# Patient Record
Sex: FEMALE | Race: WHITE | NOT HISPANIC OR LATINO | Employment: OTHER | URBAN - METROPOLITAN AREA
[De-identification: names, ages, dates, MRNs, and addresses within clinical notes are randomized per-mention and may not be internally consistent; named-entity substitution may affect disease eponyms.]

---

## 2017-01-13 ENCOUNTER — TELEPHONE (OUTPATIENT)
Dept: INTERNAL MEDICINE | Facility: CLINIC | Age: 82
End: 2017-01-13

## 2017-01-13 DIAGNOSIS — D64.9 ANEMIA, UNSPECIFIED TYPE: Primary | ICD-10-CM

## 2017-01-13 NOTE — TELEPHONE ENCOUNTER
----- Message from Emily Crawford sent at 1/13/2017 12:07 PM CST -----  Contact: Relative/Cindy/[phone]  Pt's relative(Cindy) said that she is calling in regards to pt has an appointment for 1/17/2017 and she wants to know if pt needs to have labs done before her appointment, she said if she does not answer just leave her a voicemail. Please call and advise            Thank you

## 2017-01-17 ENCOUNTER — TELEPHONE (OUTPATIENT)
Dept: INTERNAL MEDICINE | Facility: CLINIC | Age: 82
End: 2017-01-17

## 2017-01-17 ENCOUNTER — OFFICE VISIT (OUTPATIENT)
Dept: INTERNAL MEDICINE | Facility: CLINIC | Age: 82
DRG: 812 | End: 2017-01-17
Payer: MEDICARE

## 2017-01-17 ENCOUNTER — HOSPITAL ENCOUNTER (INPATIENT)
Facility: HOSPITAL | Age: 82
LOS: 1 days | Discharge: HOME | DRG: 812 | End: 2017-01-18
Attending: EMERGENCY MEDICINE | Admitting: INTERNAL MEDICINE
Payer: MEDICARE

## 2017-01-17 VITALS — WEIGHT: 132 LBS | DIASTOLIC BLOOD PRESSURE: 75 MMHG | BODY MASS INDEX: 29.59 KG/M2 | SYSTOLIC BLOOD PRESSURE: 140 MMHG

## 2017-01-17 DIAGNOSIS — F33.41 RECURRENT MAJOR DEPRESSIVE DISORDER, IN PARTIAL REMISSION: ICD-10-CM

## 2017-01-17 DIAGNOSIS — M05.79 RHEUMATOID ARTHRITIS INVOLVING MULTIPLE SITES WITH POSITIVE RHEUMATOID FACTOR: ICD-10-CM

## 2017-01-17 DIAGNOSIS — Z79.52 CURRENT CHRONIC USE OF SYSTEMIC STEROIDS: Primary | ICD-10-CM

## 2017-01-17 DIAGNOSIS — D64.9 ANEMIA, UNSPECIFIED TYPE: ICD-10-CM

## 2017-01-17 DIAGNOSIS — I67.2 CEREBRAL ATHEROSCLEROSIS: ICD-10-CM

## 2017-01-17 DIAGNOSIS — I25.10 CORONARY ARTERY DISEASE DUE TO CALCIFIED CORONARY LESION: ICD-10-CM

## 2017-01-17 DIAGNOSIS — R09.89 BIBASILAR CRACKLES: ICD-10-CM

## 2017-01-17 DIAGNOSIS — I77.9 BILATERAL CAROTID ARTERY DISEASE: ICD-10-CM

## 2017-01-17 DIAGNOSIS — D50.0 CHRONIC BLOOD LOSS ANEMIA: ICD-10-CM

## 2017-01-17 DIAGNOSIS — I10 BENIGN HYPERTENSION: ICD-10-CM

## 2017-01-17 DIAGNOSIS — I51.89 LEFT VENTRICULAR DIASTOLIC DYSFUNCTION WITH PRESERVED SYSTOLIC FUNCTION: ICD-10-CM

## 2017-01-17 DIAGNOSIS — D64.9 SEVERE ANEMIA: ICD-10-CM

## 2017-01-17 DIAGNOSIS — K21.9 GASTROESOPHAGEAL REFLUX DISEASE WITHOUT ESOPHAGITIS: ICD-10-CM

## 2017-01-17 DIAGNOSIS — K92.2 GASTROINTESTINAL HEMORRHAGE, UNSPECIFIED GASTROINTESTINAL HEMORRHAGE TYPE: ICD-10-CM

## 2017-01-17 DIAGNOSIS — I25.84 CORONARY ARTERY DISEASE DUE TO CALCIFIED CORONARY LESION: ICD-10-CM

## 2017-01-17 DIAGNOSIS — G31.84 MCI (MILD COGNITIVE IMPAIRMENT) WITH MEMORY LOSS: ICD-10-CM

## 2017-01-17 DIAGNOSIS — N32.81 OAB (OVERACTIVE BLADDER): ICD-10-CM

## 2017-01-17 DIAGNOSIS — D69.2 SENILE PURPURA: ICD-10-CM

## 2017-01-17 DIAGNOSIS — I10 ESSENTIAL HYPERTENSION: ICD-10-CM

## 2017-01-17 DIAGNOSIS — I70.0 AORTIC ATHEROSCLEROSIS: ICD-10-CM

## 2017-01-17 DIAGNOSIS — D50.0 BLOOD LOSS ANEMIA: Primary | ICD-10-CM

## 2017-01-17 DIAGNOSIS — G70.00 MG (MYASTHENIA GRAVIS): ICD-10-CM

## 2017-01-17 LAB
ABO + RH BLD: NORMAL
ALBUMIN SERPL BCP-MCNC: 3.3 G/DL
ALP SERPL-CCNC: 54 U/L
ALT SERPL W/O P-5'-P-CCNC: 11 U/L
ANION GAP SERPL CALC-SCNC: 12 MMOL/L
APTT BLDCRRT: 21.7 SEC
AST SERPL-CCNC: 17 U/L
BASOPHILS # BLD AUTO: 0.03 K/UL
BASOPHILS NFR BLD: 0.3 %
BILIRUB SERPL-MCNC: 0.2 MG/DL
BLD GP AB SCN CELLS X3 SERPL QL: NORMAL
BLD PROD TYP BPU: NORMAL
BLD PROD TYP BPU: NORMAL
BLOOD UNIT EXPIRATION DATE: NORMAL
BLOOD UNIT EXPIRATION DATE: NORMAL
BLOOD UNIT TYPE CODE: 5100
BLOOD UNIT TYPE CODE: 5100
BLOOD UNIT TYPE: NORMAL
BLOOD UNIT TYPE: NORMAL
BNP SERPL-MCNC: 618 PG/ML
BUN SERPL-MCNC: 19 MG/DL
CALCIUM SERPL-MCNC: 8.4 MG/DL
CHLORIDE SERPL-SCNC: 104 MMOL/L
CO2 SERPL-SCNC: 22 MMOL/L
CODING SYSTEM: NORMAL
CODING SYSTEM: NORMAL
CREAT SERPL-MCNC: 1.2 MG/DL
DIFFERENTIAL METHOD: ABNORMAL
DISPENSE STATUS: NORMAL
DISPENSE STATUS: NORMAL
EOSINOPHIL # BLD AUTO: 0 K/UL
EOSINOPHIL NFR BLD: 0.1 %
ERYTHROCYTE [DISTWIDTH] IN BLOOD BY AUTOMATED COUNT: 16.1 %
EST. GFR  (AFRICAN AMERICAN): 46.6 ML/MIN/1.73 M^2
EST. GFR  (NON AFRICAN AMERICAN): 40.4 ML/MIN/1.73 M^2
FERRITIN SERPL-MCNC: 12 NG/ML
FOLATE SERPL-MCNC: 15.7 NG/ML
GLUCOSE SERPL-MCNC: 107 MG/DL
HCT VFR BLD AUTO: 17.4 %
HGB BLD-MCNC: 5.3 G/DL
INR PPP: 1
IRON SERPL-MCNC: 130 UG/DL
LIPASE SERPL-CCNC: 11 U/L
LYMPHOCYTES # BLD AUTO: 0.3 K/UL
LYMPHOCYTES NFR BLD: 3 %
MCH RBC QN AUTO: 24.5 PG
MCHC RBC AUTO-ENTMCNC: 30.5 %
MCV RBC AUTO: 81 FL
MONOCYTES # BLD AUTO: 0.8 K/UL
MONOCYTES NFR BLD: 7.4 %
NEUTROPHILS # BLD AUTO: 9.2 K/UL
NEUTROPHILS NFR BLD: 89 %
PLATELET # BLD AUTO: 391 K/UL
PMV BLD AUTO: 12.2 FL
POTASSIUM SERPL-SCNC: 3.6 MMOL/L
PROT SERPL-MCNC: 6.4 G/DL
PROTHROMBIN TIME: 11 SEC
RBC # BLD AUTO: 2.16 M/UL
RETICS/RBC NFR AUTO: 2.2 %
SATURATED IRON: 28 %
SODIUM SERPL-SCNC: 138 MMOL/L
TOTAL IRON BINDING CAPACITY: 472 UG/DL
TRANS ERYTHROCYTES VOL PATIENT: NORMAL ML
TRANS ERYTHROCYTES VOL PATIENT: NORMAL ML
TRANSFERRIN SERPL-MCNC: 319 MG/DL
VIT B12 SERPL-MCNC: 520 PG/ML
WBC # BLD AUTO: 10.33 K/UL

## 2017-01-17 PROCEDURE — 85730 THROMBOPLASTIN TIME PARTIAL: CPT

## 2017-01-17 PROCEDURE — 86900 BLOOD TYPING SEROLOGIC ABO: CPT

## 2017-01-17 PROCEDURE — 93010 ELECTROCARDIOGRAM REPORT: CPT | Mod: ,,, | Performed by: INTERNAL MEDICINE

## 2017-01-17 PROCEDURE — 99285 EMERGENCY DEPT VISIT HI MDM: CPT | Mod: 25

## 2017-01-17 PROCEDURE — 63600175 PHARM REV CODE 636 W HCPCS: Performed by: STUDENT IN AN ORGANIZED HEALTH CARE EDUCATION/TRAINING PROGRAM

## 2017-01-17 PROCEDURE — 80053 COMPREHEN METABOLIC PANEL: CPT

## 2017-01-17 PROCEDURE — P9021 RED BLOOD CELLS UNIT: HCPCS

## 2017-01-17 PROCEDURE — 1125F AMNT PAIN NOTED PAIN PRSNT: CPT | Mod: S$GLB,,, | Performed by: INTERNAL MEDICINE

## 2017-01-17 PROCEDURE — 96376 TX/PRO/DX INJ SAME DRUG ADON: CPT

## 2017-01-17 PROCEDURE — 12000002 HC ACUTE/MED SURGE SEMI-PRIVATE ROOM

## 2017-01-17 PROCEDURE — 1157F ADVNC CARE PLAN IN RCRD: CPT | Mod: S$GLB,,, | Performed by: INTERNAL MEDICINE

## 2017-01-17 PROCEDURE — 63600175 PHARM REV CODE 636 W HCPCS: Performed by: EMERGENCY MEDICINE

## 2017-01-17 PROCEDURE — 83690 ASSAY OF LIPASE: CPT

## 2017-01-17 PROCEDURE — 86850 RBC ANTIBODY SCREEN: CPT

## 2017-01-17 PROCEDURE — 85025 COMPLETE CBC W/AUTO DIFF WBC: CPT | Mod: 91

## 2017-01-17 PROCEDURE — 85045 AUTOMATED RETICULOCYTE COUNT: CPT

## 2017-01-17 PROCEDURE — C9113 INJ PANTOPRAZOLE SODIUM, VIA: HCPCS | Performed by: EMERGENCY MEDICINE

## 2017-01-17 PROCEDURE — 82746 ASSAY OF FOLIC ACID SERUM: CPT

## 2017-01-17 PROCEDURE — 1159F MED LIST DOCD IN RCRD: CPT | Mod: S$GLB,,, | Performed by: INTERNAL MEDICINE

## 2017-01-17 PROCEDURE — 82728 ASSAY OF FERRITIN: CPT

## 2017-01-17 PROCEDURE — 83880 ASSAY OF NATRIURETIC PEPTIDE: CPT

## 2017-01-17 PROCEDURE — 25000003 PHARM REV CODE 250: Performed by: EMERGENCY MEDICINE

## 2017-01-17 PROCEDURE — 96375 TX/PRO/DX INJ NEW DRUG ADDON: CPT

## 2017-01-17 PROCEDURE — 99291 CRITICAL CARE FIRST HOUR: CPT | Mod: ,,, | Performed by: EMERGENCY MEDICINE

## 2017-01-17 PROCEDURE — 99499 UNLISTED E&M SERVICE: CPT | Mod: S$GLB,,, | Performed by: INTERNAL MEDICINE

## 2017-01-17 PROCEDURE — 96366 THER/PROPH/DIAG IV INF ADDON: CPT

## 2017-01-17 PROCEDURE — 99999 PR PBB SHADOW E&M-EST. PATIENT-LVL III: CPT | Mod: PBBFAC,,, | Performed by: INTERNAL MEDICINE

## 2017-01-17 PROCEDURE — 86920 COMPATIBILITY TEST SPIN: CPT

## 2017-01-17 PROCEDURE — 36430 TRANSFUSION BLD/BLD COMPNT: CPT

## 2017-01-17 PROCEDURE — 96365 THER/PROPH/DIAG IV INF INIT: CPT

## 2017-01-17 PROCEDURE — 1160F RVW MEDS BY RX/DR IN RCRD: CPT | Mod: S$GLB,,, | Performed by: INTERNAL MEDICINE

## 2017-01-17 PROCEDURE — 82607 VITAMIN B-12: CPT

## 2017-01-17 PROCEDURE — 83540 ASSAY OF IRON: CPT

## 2017-01-17 PROCEDURE — 99214 OFFICE O/P EST MOD 30 MIN: CPT | Mod: S$GLB,,, | Performed by: INTERNAL MEDICINE

## 2017-01-17 PROCEDURE — 85610 PROTHROMBIN TIME: CPT

## 2017-01-17 PROCEDURE — 93005 ELECTROCARDIOGRAM TRACING: CPT

## 2017-01-17 RX ORDER — HYDROCODONE BITARTRATE AND ACETAMINOPHEN 5; 325 MG/1; MG/1
1 TABLET ORAL 3 TIMES DAILY PRN
Qty: 90 TABLET | Refills: 0 | Status: ON HOLD | OUTPATIENT
Start: 2017-02-18 | End: 2017-03-30 | Stop reason: HOSPADM

## 2017-01-17 RX ORDER — GLUCAGON 1 MG
1 KIT INJECTION
Status: DISCONTINUED | OUTPATIENT
Start: 2017-01-18 | End: 2017-01-18 | Stop reason: HOSPADM

## 2017-01-17 RX ORDER — OXYBUTYNIN CHLORIDE 5 MG/1
5 TABLET, EXTENDED RELEASE ORAL DAILY
Status: DISCONTINUED | OUTPATIENT
Start: 2017-01-18 | End: 2017-01-18 | Stop reason: HOSPADM

## 2017-01-17 RX ORDER — FUROSEMIDE 10 MG/ML
10 INJECTION INTRAMUSCULAR; INTRAVENOUS
Status: COMPLETED | OUTPATIENT
Start: 2017-01-17 | End: 2017-01-17

## 2017-01-17 RX ORDER — PANTOPRAZOLE SODIUM 40 MG/10ML
40 INJECTION, POWDER, LYOPHILIZED, FOR SOLUTION INTRAVENOUS 2 TIMES DAILY
Status: DISCONTINUED | OUTPATIENT
Start: 2017-01-18 | End: 2017-01-18 | Stop reason: HOSPADM

## 2017-01-17 RX ORDER — IBUPROFEN 200 MG
16 TABLET ORAL
Status: DISCONTINUED | OUTPATIENT
Start: 2017-01-18 | End: 2017-01-18 | Stop reason: HOSPADM

## 2017-01-17 RX ORDER — IBUPROFEN 200 MG
24 TABLET ORAL
Status: DISCONTINUED | OUTPATIENT
Start: 2017-01-18 | End: 2017-01-18 | Stop reason: HOSPADM

## 2017-01-17 RX ORDER — HYDROCODONE BITARTRATE AND ACETAMINOPHEN 5; 325 MG/1; MG/1
1 TABLET ORAL 3 TIMES DAILY PRN
Qty: 90 TABLET | Refills: 0 | Status: ON HOLD | OUTPATIENT
Start: 2017-03-18 | End: 2017-03-30 | Stop reason: HOSPADM

## 2017-01-17 RX ORDER — ACETAMINOPHEN 500 MG
500 TABLET ORAL EVERY 6 HOURS PRN
Status: DISCONTINUED | OUTPATIENT
Start: 2017-01-18 | End: 2017-01-18 | Stop reason: HOSPADM

## 2017-01-17 RX ORDER — ATORVASTATIN CALCIUM 40 MG/1
40 TABLET, FILM COATED ORAL NIGHTLY
Qty: 90 TABLET | Refills: 3 | Status: ON HOLD | OUTPATIENT
Start: 2017-01-17 | End: 2017-04-23 | Stop reason: HOSPADM

## 2017-01-17 RX ORDER — TOLTERODINE 2 MG/1
2 CAPSULE, EXTENDED RELEASE ORAL DAILY
Qty: 90 CAPSULE | Refills: 3 | Status: ON HOLD | OUTPATIENT
Start: 2017-01-17 | End: 2017-04-24 | Stop reason: HOSPADM

## 2017-01-17 RX ORDER — PREDNISONE 10 MG/1
10 TABLET ORAL DAILY
Status: DISCONTINUED | OUTPATIENT
Start: 2017-01-18 | End: 2017-01-18 | Stop reason: HOSPADM

## 2017-01-17 RX ORDER — LOSARTAN POTASSIUM 50 MG/1
100 TABLET ORAL DAILY
Status: DISCONTINUED | OUTPATIENT
Start: 2017-01-18 | End: 2017-01-18 | Stop reason: HOSPADM

## 2017-01-17 RX ORDER — PANTOPRAZOLE SODIUM 40 MG/1
40 TABLET, DELAYED RELEASE ORAL DAILY
Qty: 90 TABLET | Refills: 3 | Status: SHIPPED | OUTPATIENT
Start: 2017-01-17

## 2017-01-17 RX ORDER — HYDRALAZINE HYDROCHLORIDE 25 MG/1
25 TABLET, FILM COATED ORAL EVERY 8 HOURS
Status: DISCONTINUED | OUTPATIENT
Start: 2017-01-18 | End: 2017-01-18 | Stop reason: HOSPADM

## 2017-01-17 RX ORDER — ISOSORBIDE MONONITRATE 30 MG/1
30 TABLET, EXTENDED RELEASE ORAL NIGHTLY
Qty: 90 TABLET | Refills: 0 | Status: ON HOLD | OUTPATIENT
Start: 2017-01-17 | End: 2017-04-18 | Stop reason: SDUPTHER

## 2017-01-17 RX ORDER — ATORVASTATIN CALCIUM 20 MG/1
40 TABLET, FILM COATED ORAL NIGHTLY
Status: DISCONTINUED | OUTPATIENT
Start: 2017-01-18 | End: 2017-01-18 | Stop reason: HOSPADM

## 2017-01-17 RX ORDER — HYDROCODONE BITARTRATE AND ACETAMINOPHEN 500; 5 MG/1; MG/1
TABLET ORAL
Status: DISCONTINUED | OUTPATIENT
Start: 2017-01-17 | End: 2017-01-18 | Stop reason: HOSPADM

## 2017-01-17 RX ORDER — ISOSORBIDE MONONITRATE 30 MG/1
30 TABLET, EXTENDED RELEASE ORAL NIGHTLY
Status: DISCONTINUED | OUTPATIENT
Start: 2017-01-18 | End: 2017-01-18 | Stop reason: HOSPADM

## 2017-01-17 RX ORDER — HYDRALAZINE HYDROCHLORIDE 25 MG/1
25 TABLET, FILM COATED ORAL EVERY 8 HOURS
Qty: 270 TABLET | Refills: 3 | Status: ON HOLD | OUTPATIENT
Start: 2017-01-17 | End: 2017-04-23 | Stop reason: HOSPADM

## 2017-01-17 RX ORDER — DONEPEZIL HYDROCHLORIDE 10 MG/1
10 TABLET, FILM COATED ORAL DAILY
Status: DISCONTINUED | OUTPATIENT
Start: 2017-01-18 | End: 2017-01-18 | Stop reason: HOSPADM

## 2017-01-17 RX ORDER — RAMELTEON 8 MG/1
8 TABLET ORAL NIGHTLY PRN
Status: DISCONTINUED | OUTPATIENT
Start: 2017-01-18 | End: 2017-01-18 | Stop reason: HOSPADM

## 2017-01-17 RX ORDER — LOSARTAN POTASSIUM 100 MG/1
100 TABLET ORAL DAILY
Qty: 90 TABLET | Refills: 3 | Status: ON HOLD | OUTPATIENT
Start: 2017-01-17 | End: 2017-04-23

## 2017-01-17 RX ORDER — HYDROCODONE BITARTRATE AND ACETAMINOPHEN 5; 325 MG/1; MG/1
1 TABLET ORAL 3 TIMES DAILY PRN
Qty: 90 TABLET | Refills: 0 | Status: SHIPPED | OUTPATIENT
Start: 2017-01-18 | End: 2017-02-13 | Stop reason: SDUPTHER

## 2017-01-17 RX ORDER — PANTOPRAZOLE SODIUM 40 MG/10ML
80 INJECTION, POWDER, LYOPHILIZED, FOR SOLUTION INTRAVENOUS
Status: COMPLETED | OUTPATIENT
Start: 2017-01-17 | End: 2017-01-17

## 2017-01-17 RX ORDER — DONEPEZIL HYDROCHLORIDE 10 MG/1
10 TABLET, FILM COATED ORAL DAILY
Qty: 90 TABLET | Refills: 3 | Status: SHIPPED | OUTPATIENT
Start: 2017-01-17 | End: 2018-01-17

## 2017-01-17 RX ORDER — HYDROCHLOROTHIAZIDE 12.5 MG/1
12.5 TABLET ORAL DAILY
Status: DISCONTINUED | OUTPATIENT
Start: 2017-01-18 | End: 2017-01-18 | Stop reason: HOSPADM

## 2017-01-17 RX ORDER — ALPRAZOLAM 0.5 MG/1
0.5 TABLET ORAL DAILY PRN
Qty: 90 TABLET | Refills: 0 | Status: ON HOLD | OUTPATIENT
Start: 2017-01-17 | End: 2017-04-24 | Stop reason: HOSPADM

## 2017-01-17 RX ORDER — OXYCODONE HYDROCHLORIDE 5 MG/1
5 TABLET ORAL EVERY 4 HOURS PRN
Status: DISCONTINUED | OUTPATIENT
Start: 2017-01-18 | End: 2017-01-18 | Stop reason: HOSPADM

## 2017-01-17 RX ORDER — ALPRAZOLAM 0.5 MG/1
0.5 TABLET ORAL NIGHTLY PRN
Status: DISCONTINUED | OUTPATIENT
Start: 2017-01-18 | End: 2017-01-18 | Stop reason: HOSPADM

## 2017-01-17 RX ORDER — DULOXETIN HYDROCHLORIDE 30 MG/1
30 CAPSULE, DELAYED RELEASE ORAL DAILY
Status: DISCONTINUED | OUTPATIENT
Start: 2017-01-18 | End: 2017-01-18 | Stop reason: HOSPADM

## 2017-01-17 RX ORDER — PREDNISONE 10 MG/1
10 TABLET ORAL DAILY
Qty: 90 TABLET | Refills: 3 | Status: SHIPPED | OUTPATIENT
Start: 2017-01-17

## 2017-01-17 RX ORDER — DULOXETIN HYDROCHLORIDE 30 MG/1
30 CAPSULE, DELAYED RELEASE ORAL DAILY
Qty: 90 CAPSULE | Refills: 3 | Status: SHIPPED | OUTPATIENT
Start: 2017-01-17

## 2017-01-17 RX ORDER — ONDANSETRON 8 MG/1
8 TABLET, ORALLY DISINTEGRATING ORAL EVERY 8 HOURS PRN
Status: DISCONTINUED | OUTPATIENT
Start: 2017-01-18 | End: 2017-01-18 | Stop reason: HOSPADM

## 2017-01-17 RX ADMIN — PANTOPRAZOLE SODIUM 80 MG: 40 INJECTION, POWDER, FOR SOLUTION INTRAVENOUS at 06:01

## 2017-01-17 RX ADMIN — FUROSEMIDE 10 MG: 10 INJECTION, SOLUTION INTRAMUSCULAR; INTRAVENOUS at 07:01

## 2017-01-17 RX ADMIN — DEXTROSE 8 MG/HR: 50 INJECTION, SOLUTION INTRAVENOUS at 07:01

## 2017-01-17 NOTE — IP AVS SNAPSHOT
[facility]  [address]  [place] [address]  Phone: [phone]           Patient Discharge Instructions     Our goal is to set you up for success. This packet includes information on your condition, medications, and your home care. It will help you to care for yourself so you don't get sicker and need to go back to the hospital.     Please ask your nurse if you have any questions.        There are many details to remember when preparing to leave the hospital. Here is what you will need to do:    1. Take your medicine. If you are prescribed medications, review your Medication List in the following pages. You may have new medications to  at the pharmacy and others that you'll need to stop taking. Review the instructions for how and when to take your medications. Talk with your doctor or nurses if you are unsure of what to do.     2. Go to your follow-up appointments. Specific follow-up information is listed in the following pages. Your may be contacted by a transition nurse or clinical provider about future appointments. Be sure we have all of the phone numbers to reach you, if needed. Please contact your provider's office if you are unable to make an appointment.     3. Watch for warning signs. Your doctor or nurse will give you detailed warning signs to watch for and when to call for assistance. These instructions may also include educational information about your condition. If you experience any of warning signs to your health, call your doctor.               ** Verify the list of medication(s) below is accurate and up to date. Carry this with you in case of emergency. If your medications have changed, please notify your healthcare provider.             Medication List      START taking these medications        Additional Info                      ferrous sulfate 325 (65 FE) MG EC tablet   Refills:  0   Dose:  325 mg    Instructions:  Take 1 tablet (325 mg total)  by mouth once daily.     Begin Date    AM    Noon    PM    Bedtime         CONTINUE taking these medications        Additional Info                      alprazolam 0.5 MG tablet   Commonly known as:  XANAX   Quantity:  90 tablet   Refills:  0   Dose:  0.5 mg    Last time this was given:  0.5 mg on 1/18/2017 12:40 AM   Instructions:  Take 1 tablet (0.5 mg total) by mouth daily as needed.     Begin Date    AM    Noon    PM    Bedtime       aspirin 81 mg Tab   Refills:  0   Dose:  81 mg    Instructions:  Take 81 mg by mouth. 1 Tablet Oral Every day     Begin Date    AM    Noon    PM    Bedtime       atorvastatin 40 MG tablet   Commonly known as:  LIPITOR   Quantity:  90 tablet   Refills:  3   Dose:  40 mg    Last time this was given:  40 mg on 1/18/2017 12:40 AM   Instructions:  Take 1 tablet (40 mg total) by mouth every evening.     Begin Date    AM    Noon    PM    Bedtime       DOC-Q-LACE 100 MG capsule   Quantity:  180 capsule   Refills:  0   Generic drug:  docusate sodium    Instructions:  TAKE 1 CAPSULE BY MOUTH TWICE DAILY     Begin Date    AM    Noon    PM    Bedtime       donepezil 10 MG tablet   Commonly known as:  ARICEPT   Quantity:  90 tablet   Refills:  3   Dose:  10 mg    Last time this was given:  10 mg on 1/18/2017  9:15 AM   Instructions:  Take 1 tablet (10 mg total) by mouth once daily.     Begin Date    AM    Noon    PM    Bedtime       duloxetine 30 MG capsule   Commonly known as:  CYMBALTA   Quantity:  90 capsule   Refills:  3   Dose:  30 mg    Last time this was given:  30 mg on 1/18/2017  9:15 AM   Instructions:  Take 1 capsule (30 mg total) by mouth once daily.     Begin Date    AM    Noon    PM    Bedtime       hydrALAZINE 25 MG tablet   Commonly known as:  APRESOLINE   Quantity:  270 tablet   Refills:  3   Dose:  25 mg    Last time this was given:  25 mg on 1/18/2017  1:24 PM   Instructions:  Take 1 tablet (25 mg total) by mouth every 8 (eight) hours.     Begin Date    AM    Noon    PM     Bedtime       hydrochlorothiazide 12.5 mg capsule   Commonly known as:  MICROZIDE   Quantity:  90 capsule   Refills:  3   Dose:  12.5 mg    Instructions:  Take 1 capsule (12.5 mg total) by mouth once daily.     Begin Date    AM    Noon    PM    Bedtime       * hydrocodone-acetaminophen 5-325mg 5-325 mg per tablet   Commonly known as:  NORCO   Quantity:  90 tablet   Refills:  0   Dose:  1 tablet    Instructions:  Take 1 tablet by mouth 3 (three) times daily as needed for Pain.     Begin Date    AM    Noon    PM    Bedtime       * hydrocodone-acetaminophen 5-325mg 5-325 mg per tablet   Commonly known as:  NORCO   Quantity:  90 tablet   Refills:  0   Dose:  1 tablet    Start Date:  2/18/2017   Instructions:  Take 1 tablet by mouth 3 (three) times daily as needed for Pain.     Begin Date    AM    Noon    PM    Bedtime       * hydrocodone-acetaminophen 5-325mg 5-325 mg per tablet   Commonly known as:  NORCO   Quantity:  90 tablet   Refills:  0   Dose:  1 tablet    Start Date:  3/18/2017   Instructions:  Take 1 tablet by mouth 3 (three) times daily as needed for Pain.     Begin Date    AM    Noon    PM    Bedtime       isosorbide mononitrate 30 MG 24 hr tablet   Commonly known as:  IMDUR   Quantity:  90 tablet   Refills:  0   Dose:  30 mg    Last time this was given:  30 mg on 1/18/2017 12:40 AM   Instructions:  Take 1 tablet (30 mg total) by mouth every evening.     Begin Date    AM    Noon    PM    Bedtime       losartan 100 MG tablet   Commonly known as:  COZAAR   Quantity:  90 tablet   Refills:  3   Dose:  100 mg    Last time this was given:  100 mg on 1/18/2017  9:15 AM   Instructions:  Take 1 tablet (100 mg total) by mouth once daily.     Begin Date    AM    Noon    PM    Bedtime       pantoprazole 40 MG tablet   Commonly known as:  PROTONIX   Quantity:  90 tablet   Refills:  3   Dose:  40 mg    Instructions:  Take 1 tablet (40 mg total) by mouth once daily.     Begin Date    AM    Noon    PM    Bedtime        predniSONE 10 MG tablet   Commonly known as:  DELTASONE   Quantity:  90 tablet   Refills:  3   Dose:  10 mg    Last time this was given:  10 mg on 1/18/2017  9:15 AM   Instructions:  Take 1 tablet (10 mg total) by mouth once daily.     Begin Date    AM    Noon    PM    Bedtime       tolterodine 2 MG Cp24   Commonly known as:  DETROL [place]   Quantity:  90 capsule   Refills:  3   Dose:  2 mg    Instructions:  Take 1 capsule (2 mg total) by mouth once daily.     Begin Date    AM    Noon    PM    Bedtime       * Notice:  This list has 3 medication(s) that are the same as other medications prescribed for you. Read the directions carefully, and ask your doctor or other care provider to review them with you.      STOP taking these medications     clopidogrel 75 mg tablet   Commonly known as:  PLAVIX            Where to Get Your Medications      You can get these medications from any pharmacy     You don't need a prescription for these medications     ferrous sulfate 325 (65 FE) MG EC tablet                  Please bring to all follow up appointments:    1. A copy of your discharge instructions.  2. All medicines you are currently taking in their original bottles.  3. Identification and insurance card.    Please arrive 15 minutes ahead of scheduled appointment time.    Please call 24 hours in advance if you must reschedule your appointment and/or time.        Your Scheduled Appointments     Jan 25, 2017 11:00 AM CST   Carotid Ultrasound with VASCULAR, CARDIOLOGY   [facility] ([address] )    [address]  [facility] [address]   [phone]                Discharge Instructions     Future Orders    Diet general     Questions:    Total calories:      Fat restriction, if any:      Protein restriction, if any:      Na restriction, if any:      Fluid restriction:      Additional restrictions:          Primary Diagnosis     Your primary diagnosis was:  Iron Deficiency Anemia Due To Chronic Blood  "Loss      Admission Information     Date & Time Provider Department CSN    1/17/2017  4:27 PM Jose Thrasher MD [facility] [id number]      Care Providers     Provider Role Specialty Primary office phone    Jose Thrasher MD Attending Provider Hospitalist [phone]    Jose Thrasher MD Team Attending  Hospitalist [phone]    Arpita Carbone MD Team Attending  Hospitalist [phone]    Shukri Newman MD Consulting Physician  Gastroenterology [phone]      Your Vitals Were     BP Pulse Temp Resp Height Weight    118/68 (BP Location: Left arm, Patient Position: Sitting, BP Method: Manual) 71 98.9 °F (37.2 °C) (Oral) 18 4' 8" (1.422 m) 60.4 kg (133 lb 1.6 oz)    SpO2 BMI             93% 29.84 kg/m2         Recent Lab Values        6/7/2005 4/10/2006 8/21/2006 5/7/2007 4/28/2008 5/4/2009 11/22/2010 3/21/2011      8:40 AM  7:15 AM  7:59 AM  9:25 AM  9:04 AM  9:08 AM 10:50 AM  9:50 AM    A1C 5.8 6.3 (H) 6.3 (H) 6.0 6.2 6.2 5.1 5.3      Pending Labs     Order Current Status    Prepare RBC 2 Units Preliminary result      Allergies as of 1/18/2017        Reactions    Norvasc  [Amlodipine]     Other reaction(s): Edema      [facility]     [facility] Nurse Care Line - 24/7 Assistance  Unless otherwise directed by your provider, please contact [facility], our nurse care line that is available for 24/7 assistance.     Registered nurses in the [facility] provide clinical advisement, health education, appointment booking, and other advisory services.  Call for this free service at [phone].        Advance Directives     An advance directive is a document which, in the event you are no longer able to make decisions for yourself, tells your healthcare team what kind of treatment you do or do not want to receive, or who you would like to make those decisions for you.  If you do not currently have an advance directive, [facility] encourages you to create one.  " For more information call:  [phone]), [phone] ([phone]),  or log on to [email or web address].        Smoking Cessation     If you would like to quit smoking:   You may be eligible for free services if you are a [place] resident and started smoking cigarettes before September 1, 1988.  Call the Smoking Cessation [employer] ([facility]) toll free at (677) 704-5314 or (600) 152-6487.   Call 5-800-QUIT-NOW if you do not meet the above criteria.            Language Assistance Services     ATTENTION: Language assistance services are available, free of charge. Please call [phone].      ATENCIÓN: Si warner epsteni, tiene a pate disposición servicios gratuitos de asistencia lingüística. Llame al [phone].     [facility] Ý: N?u b?n nói Ti?ng Vi?t, có các d?ch v? h? tr? ngôn ng? mi?n phí dành cho b?n. G?i s? [phone].        Blood Transfusion Reaction Signs and Symptoms     The blood you have received has been matched for you as carefully as possible. Most patients who receive a blood transfusion do not experience problems. However, there can be a delayed reaction that happens a few weeks after your blood transfusion. Contact your physician immediately if you experience any NEW SYMPTOMS listed below:     Fever greater than 100.4 degrees    Chills   Yellow color to your skin or eyes(Jaundice)   Back pain, chest pain, or pain at the infusion site   Weakness (more than usual)   Discomfort or uneasiness more than usual (Malaise)   Nausea or vomiting   Shortness of breath, wheezing, or coughing   Higher or lower blood pressure than normal   Skin rash, itching, skin redness, or localized swelling (example: hands or feet)   Urinating less than normal   Urine appears reddish or orange and is darker than normal      Remember that some these signs may already exist for you--such as having chronic back pain or high blood pressure. You only need to look for and report to your doctor any new  occurrences since your blood transfusion that are of concern.        Stroke Education              Heart Failure Education       Heart Failure: Being Active  You have a condition called heart failure. Being active doesnt mean that you have to wear yourself out. Even a little movement each day helps to strengthen your heart. If you cant get out to exercise, you can do simple stretching and strengthening exercises at home. These are good ways to keep you well-conditioned and prevent you and your heart from becoming excessively weak.    Ideas to get you started  · Add a little movement to things you do now. Walk to mail letters. Park your car at the far end of the parking lot and walk to the store. Walk up a flight of stairs instead of taking the elevator.  · Choose activities you enjoy. You might walk, swim, or ride an exercise bike. Things like gardening and washing the car count, too. Other possibilities include: washing dishes, walking the dog, walking around the mall, and doing aerobic activities with friends.  · Join a group exercise program at a [facility], a senior center, or a community center. Or look into a hospital cardiac rehabilitation program. Ask your doctor if you qualify.  Tips to keep you going  · Get up and get dressed each day. Go to a coffee shop and read a newspaper or go somewhere that you'll be in the presence of other active people. Youll feel more like being active.  · Make a plan. Choose one or more activities that you enjoy and that you can easily do. Then plan to do at least one each day. You might write your plan on a calendar.  · Go with a friend or a group if you like company. This can help you feel supported and stay motivated, too.  · Plan social events that you enjoy. This will keep you mentally engaged as well as physically motivated to do things you find pleasure in.  For your safety  · Talk with your healthcare provider before starting an exercise program.  · Exercise indoors  when its too hot or too cold outside, or when the air quality is poor. Try walking at a shopping mall.  · Wear socks and sturdy shoes to maintain your balance and prevent falls.  · Start slowly. Do a few minutes several times a day at first. Increase your time and speed little by little.  · Stop and rest whenever you feel tired or get short of breath.  · Dont push yourself on days when you dont feel well.  © 0179-1369 [employer]. [address] [place] [address]. All rights reserved. This information is not intended as a substitute for professional medical care. Always follow your healthcare professional's instructions.              Heart Failure: Evaluating Your Heart  You have a condition called heart failure. To evaluate your condition, your doctor will examine you, ask questions, and do some tests. Along with looking for signs of heart failure, the doctor looks for any other health problems that may have led to heart failure. The results of your evaluation will help your doctor form a treatment plan.  Health history and physical exam  Your visit will start with a health history. Tell the doctor about any symptoms youve noticed and about all medicines you take. Then youll have a physical exam. This includes listening to your heartbeat and breathing. Youll also be checked for swelling (edema) in your legs and neck. When you have fluid buildup or fluid in the lungs, it may be called congestive heart failure.  Diagnosing heart failure     During an echocardiogram, sound waves bounce off the heart. These are converted into a picture on the screen.   The following may be done to help your doctor form a diagnosis:  · X-rays show the size and shape of your heart. These pictures can also show fluid in your lungs.  · An electrocardiogram (ECG or EKG) shows the pattern of your heartbeat. Small pads (electrodes) are placed on your chest, arms, and legs. Wires connect the pads to the ECG  machine, which records your hearts electrical signals. This can give the doctor information about heart function.  · An echocardiogram uses ultrasound waves to show the structure and movement of your heart muscle. This shows how well the heart pumps. It also shows the thickness of the heart walls, and if the heart is enlarged. It is one of the most useful, non-invasive tests as it provides information about the heart's general function. This helps your doctor make treatment decisions.  · Lab tests evaluate small amounts of blood or urine for signs of problems. A BNP lab test can help diagnose and evaluate heart failure. BNP stands for B-type natriuretic peptide. The ventricles secrete more BNP when heart failure worsens. Lab tests can also provide information about metabolic dysfunction or heart dysfunction.  Your treatment plan  Based on the results of your evaluation and tests, your doctor will develop a treatment plan. This plan is designed to relieve some of your heart failure symptoms and help make you more comfortable. Your treatment plan may include:  · Medicine to help your heart work better and improve your quality of life  · Changes in what you eat and drink to help prevent fluid from backing up in your body  · Daily monitoring of your weight and heart failure symptoms to see how well your treatment plan is working  · Exercise to help you stay healthy  · Help with quitting smoking  · Emotional and psychological support to help adjust to the changes  · Referrals to other specialists to make sure you are being treated comprehensively  © 1428-6784 [employer]. [address] [place] [address], [place] [address]. All rights reserved. This information is not intended as a substitute for professional medical care. Always follow your healthcare professional's instructions.              Heart Failure: Making Changes to Your Diet  You have a condition called heart failure. When you have heart failure, excess  fluid is more likely to build up in your body because your heart isn't working well. This makes the heart work harder to pump blood. Fluid buildup causes symptoms such as shortness of breath and swelling (edema). This is often referred to as congestive heart failure or CHF. Controlling the amount of salt (sodium) you eat may help stop fluid from building up. Your doctor may also tell you to reduce the amount of fluid you drink.  Reading food labels    Your healthcare provider will tell you how much sodium you can eat each day. Read food labels to keep track. Keep in mind that certain foods are high in salt. These include canned, frozen, and processed foods. Check the amount of sodium in each serving. Watch out for high-sodium ingredients. These include MSG (monosodium glutamate), baking soda, and sodium phosphate.   Eating less salt  Give yourself time to get used to eating less salt. It may take a little while. Here are some tips to help:  · Take the saltshaker off the table. Replace it with salt-free herb mixes and spices.  · Eat fresh or plain frozen vegetables. These have much less salt than canned vegetables.  · Choose low-sodium snacks like sodium-free pretzels, crackers, or air-popped popcorn.  · Dont add salt to your food when youre cooking. Instead, season your foods with pepper, lemon, garlic, or onion.  · When you eat out, ask that your food be cooked without added salt.  · Avoid eating fried foods as these often have a great deal of salt.  If youre told to limit fluids  You may need to limit how much fluid you have to help prevent swelling. This includes anything that is liquid at room temperature, such as ice cream and soup. If your doctor tells you to limit fluid, try these tips:  · Measure drinks in a measuring cup before you drink them. This will help you meet daily goals.  · Chill drinks to make them more refreshing.  · Suck on frozen lemon wedges to quench thirst.  · Only drink when youre  thirsty.  · Chew sugarless gum or suck on hard candy to keep your mouth moist.  · Weigh yourself daily to know if your body's fluid content is rising.  My sodium goal  Your healthcare provider may give you a sodium goal to meet each day. This includes sodium found in food as well as salt that you add. My goal is to eat no more than ___________ mg of sodium per day.     When to call your doctor  Call your doctor right away if you have any symptoms of worsening heart failure. These can include:  · Sudden weight gain  · Increased swelling of your legs or ankles  · Trouble breathing when youre resting or at night  · Increase in the number of pillows you have to sleep on  · Chest pain, pressure, discomfort, or pain in the jaw, neck, or back   © 7961-8298 [employer]. [address] [place] [address]. All rights reserved. This information is not intended as a substitute for professional medical care. Always follow your healthcare professional's instructions.              Heart Failure: Medicines to Help Your Heart    You have a condition called heart failure (also known as congestive heart failure, or CHF). Your doctor will likely prescribe medicines for heart failure and any underlying health problems you have. Most heart failure patients take one or more types of medicinen. Your healthcare provider will work to find the combination of medicines that works best for you.  Heart failure medicines  Here are the most common heart failure medicines:  · ACE inhibitors lower blood pressure and decrease strain on the heart. This makes it easier for the heart to pump. Angiotensin receptor blockers have similar effects. These are prescribed for some patients instead of ACE inhibitors.  · Beta-blockers relieve stress on the heart. They also improve symptoms. They may also improve the heart's pumping action over time.  · Diuretics (also called water pills) help rid your body of excess water. This can help  rid your body of swelling (edema). Having less fluid to pump means your heart doesnt have to work as hard. Some diuretics make your body lose a mineral called potassium. Your doctor will tell you if you need to take supplements or eat more foods high in potassium.  · Digoxin helps your heart pump with more strength. This helps your heart pump more blood with each beat. So, more oxygen-rich blood travels to the rest of the body.  · Aldosterone antagonists help alter hormones and decrease strain on the heart.  · Hydralazine and nitrates are two separate medicines used together to treat heart failure. They may come in one combination pill. They lower blood pressure and decrease how hard the heart has to pump.  Medicines for related conditions  Controlling other heart problems helps keep heart failure under control, too. Depending on other heart problems you have, medicines may be prescribed to:  · Lower blood pressure (antihypertensives).  · Lower cholesterol levels (statins).  · Prevent blood clots (anticoagulants or aspirin).  · Keep the heartbeat steady (antiarrhythmics).  © 8160-6118 [employer]. [address] [place] [address]. All rights reserved. This information is not intended as a substitute for professional medical care. Always follow your healthcare professional's instructions.              Heart Failure: Procedures That May Help    The heart is a muscle that pumps oxygen-rich blood to all parts of the body. When you have heart failure, the heart is not able to pump as well as it should. Blood and fluid may back up into the lungs (congestive heart failure), and some parts of the body dont get enough oxygen-rich blood to work normally. These problems lead to the symptoms of heart failure.     Certain procedures may help the heart pump better in some cases of heart failure. Some procedures are done to treat health problems that may have caused the heart failure such as coronary  artery disease or heart rhythm problems. For more serious heart failure, other options are available.  Treating artery and valve problems  If you have coronary artery disease or valve disease, procedures may be done to improve blood flow. This helps the heart pump better, which can improve heart failure symptoms. First, your doctor may do a cardiac catheterization to help detect clogged blood vessels or valve damage. During this procedure, a  thin tube (catheter) in inserted into a blood vessel and guided to the heart. There a dye is injected and a special type of X-ray (angiogram) is taken of the blood vessels. Procedures to open a blocked artery or fix damaged valves can also be done using catheterization.  · Angioplasty uses a balloon-tipped instrument at the end of the catheter. The balloon is inflated to widen the narrowed artery. In many cases, a stent is expanded to further support the narrowed artery. A stent is a metal mesh tube.  · Valve surgery repairs or replacement of faulty valves can also be done during catheterization so blood can flow properly through the chambers of the heart.  Bypass surgery is another option to help treat blocked arteries. It uses a healthy blood vessel from elsewhere in the body. The healthy blood vessel is attached above and below the blocked area so that blood can flow around the blocked artery.  Treating heart rhythm problems  A device may be placed in the chest to help a weak heart maintain a healthy, heartbeat so the heart can pump more effectively:  · Pacemaker. A pacemaker is an implanted device that regulates your heartbeat electronically. It monitors your heart's rhythm and generates a painless electric impulse that helps the heart beat in a regular rhythm. A pacemaker is programmed to meet your specific heart rhythm needs.  · Biventricular pacing/cardiac resynchronization therapy. A type of pacemaker that paces both pumping chambers of the heart at the same time to  coordinate contractions and to improve the heart's function. Some people with heart failure are candidates for this therapy.  · Implantable cardioverter defibrillator. A device similar to a pacemaker that senses when the heart is beating too fast and delivers an electrical shock to convert the fast rhythm to a normal rhythm. This can be a life saving device.  In severe cases  In more serious cases of heart failure when other treatments no longer work, other options may include:  · Ventricular assist devices (VADs). These are mechanical devices used to take over the pumping function for one or both of the heart's ventricles, or pumping chambers. A VAD may be necessary when heart failure progresses to the point that medicines and other treatments no longer help. In some cases, a VAD may be used as a bridge to transplant.  · Heart transplant. This is replacing the diseased heart with a healthy one from a donor. This is an option for a few people who are very sick. A heart transplant is very serious and not an option for all patients. Your doctor can tell you more.  © 0826-4669 The [employer]. [address] [place] [address]. All rights reserved. This information is not intended as a substitute for professional medical care. Always follow your healthcare professional's instructions.              Heart Failure: Tracking Your Weight  You have a condition called heart failure. When you have heart failure, a sudden weight gain or a steady rise in weight is a warning sign that your body is retaining too much water and salt. This could mean your heart failure is getting worse. If left untreated, it can cause problems for your lungs and result in shortness of breath. Weighing yourself each day is the best way to know if youre retaining water. If your weight goes up quickly, call your doctor. You will be given instructions on how to get rid of the excess water. You will likely need medicines and to avoid  salt. This will help your heart work better.  Call your doctor if you gain more than 2 pounds in 1 day, more than 5 pounds in 1 week, or whatever weight gain you were told to report by your doctor. This is often a sign of worsening heart failure and needs to be evaluated and treated. Your doctor will tell you what to do next.   Tips for weighing yourself    · Weigh yourself at the same time each morning, wearing the same clothes. Weigh yourself after urinating and before eating.  · Use the same scale each day. Make sure the numbers are easy to read. Put the scale on a flat, hard surface -- not on a rug or carpet.  · Do not stop weighing yourself. If you forget one day, weigh again the next morning.  How to use your weight chart  · Keep your weight chart near the scale. Write your weight on the chart as soon as you get off the scale.  · Fill in the month and the start date on the chart. Then write down your weight each day. Your chart will look like this:    · If you miss a day, leave the space blank. Weigh yourself the next day and write your weight in the next space.  · Take your weight chart with you when you go to see your doctor.  © 2819-4756 [employer]. [address] [place] [address], [place] [address]. All rights reserved. This information is not intended as a substitute for professional medical care. Always follow your healthcare professional's instructions.              Heart Failure: Warning Signs of a Flare-Up  You have a condition called heart failure. Once you have heart failure, flare-ups can happen. Below are signs that can mean your heart failure is getting worse. If you notice any of these warning signs, call your healthcare provider.  Swelling    · Your feet, ankles, or lower legs get puffier.  · You notice skin changes on your lower legs.  · Your shoes feel too tight.  · Your clothes are tighter in the waist.  · You have trouble getting rings on or off your fingers.  Shortness of  breath  · You have to breathe harder even when youre doing your normal activities or when youre resting.  · You are short of breath walking up stairs or even short distances.  · You wake up at night short of breath or coughing.  · You need to use more pillows or sit up to sleep.  · You wake up tired or restless.  Other warning signs  · You feel weaker, dizzy, or more tired.  · You have chest pain or changes in your heartbeat.  · You have a cough that wont go away.  · You cant remember things or dont feel like eating.  Tracking your weight  Gaining weight is often the first warning sign that heart failure is getting worse. Gaining even a few pounds can be a sign that your body is retaining excess water and salt. Weighing yourself each day in the morning after you urinate and before you eat, is the best way to know if you're retaining water. Get a scale that is easy to read and make sure you wear the same clothes and use the same scale every time you weigh. Your healthcare provider will show you how to track your weight. Call your doctor if you gain more than 2 pounds in 1 day, 5 pounds in 1 week, or whatever weight gain you were told to report by your doctor. This is often a sign of worsening heart failure and needs to be evaluated and treated before it compromises your breathing. Your doctor will tell you what to do next.    © 9371-4231 The [employer]. [address] [place] [address], [place] [address]. All rights reserved. This information is not intended as a substitute for professional medical care. Always follow your healthcare professional's instructions.              Chronic [place] Disease Education              [facility] complies with applicable Federal civil rights laws and does not discriminate on the basis of race, color, national origin, age, disability, or sex.

## 2017-01-17 NOTE — TELEPHONE ENCOUNTER
Chaparro from Lab (ext [id number]) calling to report Critical Value. Pt has a Hemoglobin of 5.1 and Hematocrit of 18.0. Values read back to Chaparro for verification. Dr. Dykes informed. She called pt's family and left message advising that pt need to report to the closest ER.

## 2017-01-17 NOTE — TELEPHONE ENCOUNTER
"Spoke to Cindy and advised to bring pt to ER. First she did not want to bring pt because she "appeared" fine and she was napping. She also advised that pt's son had brain cancer and someone needed to be home with him after his radiation treatment. She advised that she would just give pt 2 iron tablets for anemia.     After consulting Dr. Dykes-- called Cindy back and re explained importance and pt could potentially  from this she agreed to bring her. But she was still going to wait until after son returned from radiation. I did advise that she could call 911 to have pt transported but she refused.    "

## 2017-01-17 NOTE — TELEPHONE ENCOUNTER
PANIC VALUE  5.1/18 hemoglobin and hematocrit    Needs to go to the closest ER due to new profound anemia, probably GI bleeding

## 2017-01-17 NOTE — MR AVS SNAPSHOT
[facility]  [address]  [facility] [address]  Phone: [phone]  Fax: [phone]                  Danyelle Jacobs   2017 10:30 AM   Office Visit    Description:  Female : 1928   Provider:  Bianca Dykes MD   Department:  [facility]           Reason for Visit     Follow-up           Diagnoses this Visit        Comments    Current chronic use of systemic steroids    -  Primary     OAB (overactive bladder)         Coronary artery disease due to calcified coronary lesion         Essential hypertension         Gastroesophageal reflux disease without esophagitis         MG (myasthenia gravis)         MCI (mild cognitive impairment) with memory loss         Bilateral carotid artery disease         Anemia, unspecified type         Benign hypertension         Left ventricular diastolic dysfunction with preserved systolic function         Rheumatoid arthritis involving multiple sites with positive rheumatoid factor         Recurrent major depressive disorder, in partial remission         Aortic atherosclerosis         Senile purpura         Cerebral atherosclerosis                To Do List           Future Appointments        Provider Department Dept Phone    2017 11:00 AM VASCULAR, CARDIOLOGY [facility] [phone]      Goals (5 Years of Data)     None       These Medications        Disp Refills Start End    tolterodine (DETROL LA) 2 MG Cp24 90 capsule 3 2017     Take 1 capsule (2 mg total) by mouth once daily. - Oral    Pharmacy: [facility] [address] [place] [address] [place] AT [facility] Ph #: [phone]       hydrocodone-acetaminophen 5-325mg (NORCO) 5-325 mg per tablet 90 tablet 0 2017     Take 1 tablet by mouth 3 (three) times daily as needed for Pain. - Oral    Pharmacy: [facility] [employer] [address] [place] [address] [address] [facility] [place] AT [facility] Ph #:  [phone]       hydrALAZINE (APRESOLINE) 25 MG tablet 270 tablet 3 1/17/2017     Take 1 tablet (25 mg total) by mouth every 8 (eight) hours. - Oral    Pharmacy: [address] [place] AT [facility] Ph #: [phone]       duloxetine (CYMBALTA) 30 MG capsule 90 capsule 3 1/17/2017     Take 1 capsule (30 mg total) by mouth once daily. - Oral    Pharmacy: [address] [place] AT [facility] Ph #: [phone]       donepezil (ARICEPT) 10 MG tablet 90 tablet 3 1/17/2017 1/17/2018    Take 1 tablet (10 mg total) by mouth once daily. - Oral    Pharmacy: [address] [place] AT [facility] Ph #: [phone]       atorvastatin (LIPITOR) 40 MG tablet 90 tablet 3 1/17/2017     Take 1 tablet (40 mg total) by mouth every evening. - Oral    Pharmacy: [address] [place] AT [facility] Ph #: [phone]       alprazolam (XANAX) 0.5 MG tablet 90 tablet 0 1/17/2017     Take 1 tablet (0.5 mg total) by mouth daily as needed. - Oral    Pharmacy: [address] [place] AT [facility] Ph #: [phone]       isosorbide mononitrate (IMDUR) 30 MG 24 hr tablet 90 tablet 0 1/17/2017     Take 1 tablet (30 mg total) by mouth every evening. - Oral    Pharmacy: [address] [place] AT [facility] Ph #: [phone]       losartan (COZAAR) 100 MG tablet 90 tablet 3 1/17/2017     Take 1 tablet (100 mg total) by mouth once daily. - Oral    Pharmacy: [facility] [id number] [place] - [address] [place] [address] EXPY AT [facility] Ph #: [phone]       pantoprazole (PROTONIX) 40 MG tablet 90 tablet 3 1/17/2017     Take 1 tablet (40 mg total) by mouth once daily. - Oral    Pharmacy: [facility]  [address] [place] [address] EX[place] AT [facility] Ph #: [phone]       predniSONE (DELTASONE) 10 MG tablet 90 tablet 3 1/17/2017     Take 1 tablet (10 mg total) by mouth once daily. - Oral    Pharmacy: [facility] [address] [place] AT [facility] Ph #: [phone]       hydrocodone-acetaminophen 5-325mg (NORCO) 5-325 mg per tablet 90 tablet 0 2/18/2017     Take 1 tablet by mouth 3 (three) times daily as needed for Pain. - Oral    Pharmacy: [address] EX[place] AT [facility] Ph #: [phone]       hydrocodone-acetaminophen 5-325mg (NORCO) 5-325 mg per tablet 90 tablet 0 3/18/2017     Take 1 tablet by mouth 3 (three) times daily as needed for Pain. - Oral    Pharmacy: [address] [place] AT [facility] Ph #: [phone]         [facility]     [facility] - 24/7 Assistance  Registered nurses in the [facility] provide clinical advisement, health education, appointment booking, and other advisory services.  Call for this free service at [phone].             Medications           Message regarding Medications     Verify the changes and/or additions to your medication regime listed below are the same as discussed with your clinician today.  If any of these changes or additions are incorrect, please notify your healthcare provider.        CHANGE how you are taking these medications     Start Taking Instead of    tolterodine (DETROL LA) 2 MG Cp24 tolterodine (DETROL LA) 2 MG Cp24    Dosage:  Take 1 capsule (2 mg total) by mouth once daily. Dosage:  TAKE ONE CAPSULE BY MOUTH EVERY DAY    Reason for Change:  Reorder            Verify that the below list of medications is an accurate representation of the medications you are currently taking.  If none reported, the list may be blank. If incorrect,  please contact your healthcare provider. Carry this list with you in case of emergency.           Current Medications     alprazolam (XANAX) 0.5 MG tablet Take 1 tablet (0.5 mg total) by mouth daily as needed.    aspirin 81 mg Tab Take 81 mg by mouth. 1 Tablet Oral Every day    atorvastatin (LIPITOR) 40 MG tablet Take 1 tablet (40 mg total) by mouth every evening.    clopidogrel (PLAVIX) 75 mg tablet Take 1 tablet (75 mg total) by mouth once daily.    DOC-Q-LACE 100 mg capsule TAKE 1 CAPSULE BY MOUTH TWICE DAILY    donepezil (ARICEPT) 10 MG tablet Take 1 tablet (10 mg total) by mouth once daily.    duloxetine (CYMBALTA) 30 MG capsule Take 1 capsule (30 mg total) by mouth once daily.    hydrALAZINE (APRESOLINE) 25 MG tablet Take 1 tablet (25 mg total) by mouth every 8 (eight) hours.    hydrochlorothiazide (MICROZIDE) 12.5 mg capsule Take 1 capsule (12.5 mg total) by mouth once daily.    hydrocodone-acetaminophen 5-325mg (NORCO) 5-325 mg per tablet Starting on Jan 18, 2017. Take 1 tablet by mouth 3 (three) times daily as needed for Pain.    hydrocodone-acetaminophen 5-325mg (NORCO) 5-325 mg per tablet Starting on Feb 18, 2017. Take 1 tablet by mouth 3 (three) times daily as needed for Pain.    hydrocodone-acetaminophen 5-325mg (NORCO) 5-325 mg per tablet Starting on Mar 18, 2017. Take 1 tablet by mouth 3 (three) times daily as needed for Pain.    isosorbide mononitrate (IMDUR) 30 MG 24 hr tablet Take 1 tablet (30 mg total) by mouth every evening.    losartan (COZAAR) 100 MG tablet Take 1 tablet (100 mg total) by mouth once daily.    pantoprazole (PROTONIX) 40 MG tablet Take 1 tablet (40 mg total) by mouth once daily.    predniSONE (DELTASONE) 10 MG tablet Take 1 tablet (10 mg total) by mouth once daily.    tolterodine (DETROL LA) 2 MG Cp24 Take 1 capsule (2 mg total) by mouth once daily.           Clinical Reference Information           Vital Signs - Last Recorded  Most recent update: 1/17/2017 10:30 AM by Lexi  ELLIOTT Horta    BP Wt BMI          (!) 140/75 59.9 kg (132 lb) 29.59 kg/m2        Blood Pressure          Most Recent Value    BP  (!)  140/75      Allergies as of 1/17/2017     Norvasc  [Amlodipine]      Immunizations Administered on Date of Encounter - 1/17/2017     None      Orders Placed During Today's Visit     Future Labs/Procedures Expected by Expires    CAR Ultrasound doppler carotid bliateral  As directed 1/17/2018

## 2017-01-17 NOTE — PROGRESS NOTES
Subjective:       Patient ID: Danyelle Jacobs is a 88 y.o. female.    Chief Complaint: Follow-up    HPI Comments: Follow up multiple issues    Here with daughter in law Cindy      Stable. Mood is unchanged. Sleeping okay.   Some stress because son is ill but she has not had any acute changes.  No falls.  No weight loss  Eating and drinking well. Chronic acid reflux but no alarm symptoms. No fever, chills, sweats or blood in the stool.     Still complains of diffuse pain symptoms throughout her entire body, head to toe. Pattern of medication use has been stable. Has seen rheumatology, usually sees once yearly. No fever, chills, sweats, cough, shortness of breath, bladder or bowel symptoms.     She is due for follow-up in cardiology with regard to her carotid issues.  No TIA or CVA sx.    She has recently had follow-up in Neurology regarding her myasthenia gravis and MCI.   Family does not think there are any changes in her symptoms to justify any medication changes.      Seen in Neurology 11/1/16: Discussed safety issues related to MCI- medication management, cooking, managing finances  -Discussed medications for cognitive enhancement- Aricept/Exelon and Namenda CR Continue Aricept 10 mg daily Daughter in law feels she is doing well on Aricept Does not want to add medication at this time. Will add Namenda XR if progression.  -Discussed NP testing with Dr Mtz. Defer at this time  -Will continue to follow    No iron deficiency or MGUS based on Hematology 11/1/16.    She has seen endocrinology and is getting Reclast.    Patient Active Problem List:     MG (myasthenia gravis)     CAD (coronary artery disease)     PAOD (peripheral arterial occlusive disease)     PUD (peptic ulcer disease)     IBS (irritable bowel syndrome)     Mixed hyperlipidemia     Anxiety     Recurrent major depressive disorder, in partial remission     Fibromyalgia     Neuropathic pain of both legs     Bilateral carotid artery disease: 40-49% R 60-69%  L 4/16     Vitamin D deficiency disease     Goiter, nontoxic, multinodular: ultrasound 2014     Adverse effect of oral bisphosphonates     Senile osteoporosis: see DEXA 2016, on Reclst per endo     MCI (mild cognitive impairment) with memory loss     Gastroesophageal reflux disease without esophagitis     Benign hypertension     OAB (overactive bladder)     Left ventricular diastolic dysfunction with preserved systolic function     Rheumatoid arthritis involving multiple sites with positive rheumatoid factor     Chronic kidney disease (CKD), stage I     Former smoker     Intractable tension-type headache     Anemia     Current chronic use of systemic steroids     Aortic atherosclerosis: see CXR 2012     Senile purpura      Review of Systems   Constitutional: Negative for fatigue and fever.   Eyes: Negative for visual disturbance.   Respiratory: Negative for cough and shortness of breath.    Cardiovascular: Negative for chest pain.   Gastrointestinal: Negative for abdominal pain.        GERD   Genitourinary: Negative for difficulty urinating and hematuria.   Musculoskeletal: Positive for arthralgias, back pain and neck pain.   Skin: Negative for rash.   Neurological: Negative for weakness, numbness and headaches.   Psychiatric/Behavioral: Negative for behavioral problems, confusion and dysphoric mood. The patient is nervous/anxious.        Objective:      Physical Exam   Constitutional: She is oriented to person, place, and time. She appears well-developed and well-nourished.   HENT:   Head: Normocephalic and atraumatic.   Right Ear: External ear normal.   Left Ear: External ear normal.   Nose: Nose normal.   Mouth/Throat: Oropharynx is clear and moist. No oropharyngeal exudate.   Eyes: Conjunctivae and EOM are normal. No scleral icterus.   Neck: Normal range of motion. Neck supple. No JVD present. No thyromegaly present.   Cardiovascular: Normal rate, regular rhythm, normal heart sounds and intact distal pulses.   Exam reveals no gallop.    Pulmonary/Chest: Effort normal and breath sounds normal. No respiratory distress. She has no wheezes. She exhibits no tenderness.   Abdominal: Soft. Bowel sounds are normal. She exhibits no distension and no mass. There is no tenderness. There is no rebound and no guarding.   Musculoskeletal: Normal range of motion. She exhibits no edema or tenderness.   Lymphadenopathy:     She has no cervical adenopathy.   Neurological: She is alert and oriented to person, place, and time. She displays normal reflexes. No cranial nerve deficit. Coordination normal.   Skin: Skin is warm. No rash noted. No erythema.   Senile purpura both arms   Psychiatric: She has a normal mood and affect. Her behavior is normal. Judgment and thought content normal.   Nursing note and vitals reviewed.      Assessment:       1. Current chronic use of systemic steroids    2. OAB (overactive bladder)    3. Coronary artery disease due to calcified coronary lesion    4. Essential hypertension    5. Gastroesophageal reflux disease without esophagitis    6. MG (myasthenia gravis)    7. MCI (mild cognitive impairment) with memory loss    8. Bilateral carotid artery disease: 40-49% R 60-69% L 4/16    9. Anemia, unspecified type    10. Benign hypertension    11. Left ventricular diastolic dysfunction with preserved systolic function    12. Rheumatoid arthritis involving multiple sites with positive rheumatoid factor    13. Recurrent major depressive disorder, in partial remission    14. Aortic atherosclerosis: see CXR 2012    15. Senile purpura    16. Cerebral atherosclerosis         Plan:         Current chronic use of systemic steroids    OAB (overactive bladder)  -     tolterodine (DETROL LA) 2 MG Cp24; Take 1 capsule (2 mg total) by mouth once daily.  Dispense: 90 capsule; Refill: 3    Coronary artery disease due to calcified coronary lesion  -     hydrALAZINE (APRESOLINE) 25 MG tablet; Take 1 tablet (25 mg total) by mouth  every 8 (eight) hours.  Dispense: 270 tablet; Refill: 3  -     isosorbide mononitrate (IMDUR) 30 MG 24 hr tablet; Take 1 tablet (30 mg total) by mouth every evening.  Dispense: 90 tablet; Refill: 0    Essential hypertension  -     losartan (COZAAR) 100 MG tablet; Take 1 tablet (100 mg total) by mouth once daily.  Dispense: 90 tablet; Refill: 3    Gastroesophageal reflux disease without esophagitis  -     pantoprazole (PROTONIX) 40 MG tablet; Take 1 tablet (40 mg total) by mouth once daily.  Dispense: 90 tablet; Refill: 3    MG (myasthenia gravis)  -     predniSONE (DELTASONE) 10 MG tablet; Take 1 tablet (10 mg total) by mouth once daily.  Dispense: 90 tablet; Refill: 3    MCI (mild cognitive impairment) with memory loss: stable on tx    Bilateral carotid artery disease: 40-49% R 60-69% L 4/16: keep Cardiology follow up  -     CAR Ultrasound doppler carotid bliateral; Future    Anemia, unspecified type: labs from today pending    Benign hypertension: stable on tx    Left ventricular diastolic dysfunction with preserved systolic function: stable without alarm sx    Rheumatoid arthritis involving multiple sites with positive rheumatoid factor: keep Rheum follow up    Recurrent major depressive disorder, in partial remission: stable on tx    Aortic atherosclerosis: see CXR 2012: stable on tx    Senile purpura: no alarm sx    Cerebral atherosclerosis   -     CAR Ultrasound doppler carotid bliateral; Future    Other orders  -     hydrocodone-acetaminophen 5-325mg (NORCO) 5-325 mg per tablet; Take 1 tablet by mouth 3 (three) times daily as needed for Pain.  Dispense: 90 tablet; Refill: 0  -     duloxetine (CYMBALTA) 30 MG capsule; Take 1 capsule (30 mg total) by mouth once daily.  Dispense: 90 capsule; Refill: 3  -     donepezil (ARICEPT) 10 MG tablet; Take 1 tablet (10 mg total) by mouth once daily.  Dispense: 90 tablet; Refill: 3  -     atorvastatin (LIPITOR) 40 MG tablet; Take 1 tablet (40 mg total) by mouth every  evening.  Dispense: 90 tablet; Refill: 3  -     alprazolam (XANAX) 0.5 MG tablet; Take 1 tablet (0.5 mg total) by mouth daily as needed.  Dispense: 90 tablet; Refill: 0  -     hydrocodone-acetaminophen 5-325mg (NORCO) 5-325 mg per tablet; Take 1 tablet by mouth 3 (three) times daily as needed for Pain.  Dispense: 90 tablet; Refill: 0  -     hydrocodone-acetaminophen 5-325mg (NORCO) 5-325 mg per tablet; Take 1 tablet by mouth 3 (three) times daily as needed for Pain.  Dispense: 90 tablet; Refill: 0    I will review all studies and determine further tx depending on findings    Addendum:  Labs returned with severe new anemia.  Patient referred to emergency room- see [facility] part of this note.  Daughter in law White will transport.  Concerning for GI bleeding, occult, given lack of overt symptoms and high risk medication

## 2017-01-17 NOTE — IP AVS SNAPSHOT
[address]  [place] [address]  Phone: [phone]           I have received a copy of my After Visit Summary and discharge instructions from [facility].    INSTRUCTIONS RECEIVED AND UNDERSTOOD BY:                     Patient/Patient Representative: ________________________________________________________________     Date/Time: ________________________________________________________________                     Instructions Given By: ________________________________________________________________     Date/Time: ________________________________________________________________

## 2017-01-17 NOTE — ED PROVIDER NOTES
Encounter Date: 1/17/2017       History     Chief Complaint   Patient presents with    Abnormal Lab     seen by dr arboleda, called back with anemia, had labs drawn today, 'severe dementia     Review of patient's allergies indicates:   Allergen Reactions    Norvasc  [amlodipine]      Other reaction(s): Edema     HPI Comments: Ms. Danyelle Jacobs is a 89 yo lady with PHx significant for CAD (50% stenosis back in 2007, no intervention) anxiety, chronic pain, rheumatoid arthritis, GERD, PAD, bilateral carotid stenosis who presents today after seeing Dr. Arboleda in clinic for a check up and CBC showed a Hb of 5.1 from 10.9 in October 2016. She denies any increased fatigue or SOB from her baseline and also denies CP, n/v/d, or blood in her stool. She has been on Fe supplements for years and was recently taken off in November by hematologist as her smear did not indicate iron deficiency anemia. Her daughter notes that the patient has been belching more recently, since her son has become ill. She has had some bilateral pitting edema in both legs but claudication is also at her baseline.     History and ROS mostly obtained from daughter as patient mainly speaking [place] during interview, though according to daughter she can speak and understand English perfectly fine.     The history is provided by a relative and medical records.     Past Medical History   Diagnosis Date    Adverse effect of oral bisphosphonates     Anemia     Anxiety     Aortic atherosclerosis     Benign hypertension 10/5/2015    Carcinoma, renal cell     Carotid arterial disease 1/20/2014    Cerebellar stroke, acute     Chronic kidney disease (CKD), stage I 2/18/2016    Coronary artery disease     Current chronic use of systemic steroids 10/4/2016    Degenerative disc disease     Depression     Diverticulosis     Former smoker 6/13/2016    Gastroesophageal reflux disease without esophagitis 10/5/2015    GERD (gastroesophageal reflux disease)      Goiter, nontoxic, multinodular     IBS (irritable bowel syndrome)     Left ventricular diastolic dysfunction with preserved systolic function 10/5/2015    Lung nodule      right    MGUS (monoclonal gammopathy of unknown significance) 6/13/2016    Myasthenia gravis, adult form     Osteoporosis 8/10/2015    Osteoporosis, unspecified     PAOD (peripheral arterial occlusive disease)     RA (rheumatoid arthritis)     Smoker 1/20/2014    Stroke     Ulcer     Vitamin D deficiency disease 1/20/2014     No past medical history pertinent negatives.  Past Surgical History   Procedure Laterality Date    Cholecystectomy      Hysterectomy      Kidney surgery  2003    Nephrectomy      Cataract extraction w/  intraocular lens implant Bilateral      Family History   Problem Relation Age of Onset    Stroke Mother     Heart disease Father     Alzheimer's disease Sister     Thyroid cancer Neg Hx      Social History   Substance Use Topics    Smoking status: Former Smoker     Quit date: 1/19/2015    Smokeless tobacco: Never Used    Alcohol use No     Review of Systems   Unable to perform ROS: Other   Constitutional: Positive for fatigue. Negative for chills, diaphoresis, fever and unexpected weight change.   HENT: Negative.    Respiratory: Negative for cough, shortness of breath and wheezing.    Cardiovascular: Positive for leg swelling. Negative for chest pain and palpitations.   Gastrointestinal: Negative for abdominal pain, blood in stool, constipation, diarrhea and nausea.   Genitourinary: Negative for dysuria and urgency.   Musculoskeletal: Positive for arthralgias, back pain, myalgias and neck pain.   Skin: Positive for color change.   Neurological: Negative for tremors, seizures and headaches.   Psychiatric/Behavioral: Negative.    All other systems reviewed and are negative.      Physical Exam   Initial Vitals   BP Pulse Resp Temp SpO2   01/17/17 1349 01/17/17 1349 01/17/17 1349 01/17/17 1349 01/17/17  1349   128/58 82 18 98.4 °F (36.9 °C) 96 %     Physical Exam    Constitutional: She appears well-developed and well-nourished.   Conjunctival pallor    HENT:   Head: Normocephalic and atraumatic.   Neck: Normal range of motion. Neck supple.   Cardiovascular: Normal rate and normal heart sounds. Exam reveals no gallop and no friction rub.    No murmur heard.  Pulmonary/Chest: Effort normal. She has rales in the right lower field and the left lower field.   Abdominal: Soft. Bowel sounds are normal. There is tenderness in the right upper quadrant, right lower quadrant and epigastric area.   Some stool palpated in RLQ   Musculoskeletal: She exhibits edema and tenderness.   Limited by generalized pain but patient can actively and passively move all joints. RA sequelae noted to hands.    Lymphadenopathy:     She has no cervical adenopathy.   Neurological: She is alert and oriented to person, place, and time. She has normal strength.   Skin: Skin is warm.     Rectal: no melena, no blood.  Brown stool which was guaiac positive  Psych: normal affect  : no CVA ttp    ED Course   Procedures   Critical Care Procedure Note:  Direct patient critical care time: 10 minutes  Additional history critical care time:5 minutes  Ordering / reviewing labs and studies: critical care time:10 minutes  Documentation critical care time: 10 minutes  Consulting other physicians critical care time: 10 minutes  Total critical care time (exclusive of procedural time) : 45 minutes   Reason for Critical Care: GI hemorrhage requiring blood transfusion.    Labs Reviewed   CBC W/ AUTO DIFFERENTIAL - Abnormal; Notable for the following:        Result Value    RBC 2.16 (*)     Hemoglobin 5.3 (*)     Hematocrit 17.4 (*)     MCV 81 (*)     MCH 24.5 (*)     MCHC 30.5 (*)     RDW 16.1 (*)     Platelets 391 (*)     Gran # 9.2 (*)     Lymph # 0.3 (*)     Gran% 89.0 (*)     Lymph% 3.0 (*)     All other components within normal limits    Narrative:     H&H    critical result(s) called and verbal readback obtained from   BLAIR SWEENEY RN AT 1732 ON 01/17/2017 BY THERESA  FINAL RESULTS VERIFIED BY REPEAT ANALYSIS, 01/17/2017 17:33   COMPREHENSIVE METABOLIC PANEL - Abnormal; Notable for the following:     CO2 22 (*)     Calcium 8.4 (*)     Albumin 3.3 (*)     Alkaline Phosphatase 54 (*)     eGFR if  46.6 (*)     eGFR if non  40.4 (*)     All other components within normal limits   B-TYPE NATRIURETIC PEPTIDE - Abnormal; Notable for the following:      (*)     All other components within normal limits   IRON AND TIBC - Abnormal; Notable for the following:     TIBC 472 (*)     All other components within normal limits    Narrative:     add on reticulocytes per dr gonzalez order #[id number]  01/17/2017  17:58   Add on Folate per Dr. Gonzalez, order#[id number]  Add on Vitamin B12 per Dr. Gonzalez, order#[id number]   01/17/2017  18:56   ADD ON IRON AND TIBC ORDER #[id number] ADD ON TRANFERRIN ORDER   #[id number]  ADD ON FERRITIN ORDER #[id number] PER DR GONZALEZ  01/17/2017  19:30    FERRITIN - Abnormal; Notable for the following:     Ferritin 12 (*)     All other components within normal limits    Narrative:     add on reticulocytes per dr gonzalez order #[id number]  01/17/2017  17:58   Add on Folate per Dr. Gonzalez, order#[id number]  Add on Vitamin B12 per Dr. Gonzalez, order#[id number]   01/17/2017  18:56   ADD ON IRON AND TIBC ORDER #[id number] ADD ON TRANFERRIN ORDER   #[id number]  ADD ON FERRITIN ORDER #[id number] PER DR GONZALEZ  01/17/2017  19:30    PROTIME-INR   LIPASE   APTT   RETICULOCYTES   RETICULOCYTES    Narrative:     add on reticulocytes per dr gonzalez order #[id number]  01/17/2017  17:58    IRON AND TIBC   TRANSFERRIN   FERRITIN   VITAMIN B12   FOLATE   VITAMIN B12    Narrative:     add on reticulocytes per dr gonzalez order #[id number]  01/17/2017  17:58   Add on Folate per Dr. Gonzalez, order#[id number]  Add on Vitamin B12 per Dr. Gonzalez, order#[id number]   01/17/2017  18:56   ADD ON  IRON AND TIBC ORDER #[id number] ADD ON TRANFERRIN ORDER   #[id number]  ADD ON FERRITIN ORDER #[id number] PER DR COPE  01/17/2017  19:30    FOLATE    Narrative:     add on reticulocytes per dr cope order #[id number]  01/17/2017  17:58   Add on Folate per Dr. Cope, order#[id number]  Add on Vitamin B12 per Dr. Cope, order#[id number]   01/17/2017  18:56   ADD ON IRON AND TIBC ORDER #[id number] ADD ON TRANFERRIN ORDER   #[id number]  ADD ON FERRITIN ORDER #[id number] PER DR COPE  01/17/2017  19:30    TYPE & SCREEN     Imaging Results         X-Ray Chest PA And Lateral (Final result) Result time:  01/17/17 18:30:24    Final result by Cassia Alex MD (01/17/17 18:30:24)    Impression:      As above.      Electronically signed by: CASSIA ALEX MD  Date:     01/17/17  Time:    18:30     Narrative:    PA and lateral chest x-ray    Comparison: 5/20/12    Findings: There is prominence of interstitial markings in the low lung zones.  There are small bilateral pleural effusions.  There is no pneumothorax.  Cardiac silhouette is enlarged, increased from prior study.  Aortic calcification noted.  Degenerative changes of the thoracic spine with exaggerated kyphosis noted.                EKG Readings: (Independently Interpreted)   Initial Reading: No STEMI. Rhythm: Sinus Arrhythmia.   Some ectopic atrial beats. Sinus arrhythmia. Rate of 80bpm          Medical Decision Making:   Initial Assessment:   Ms Danyelle Jacobs is a lady with past history of dementia, GERD, PAD, bilateral carotid stenosis (no prior TIA or stroke), anxiety, chronic pain and rheumatoid arthritis who presents after labs in clinic showed a Hb of 5.1 from 10.9  On 10/4.   Differential Diagnosis:   Differentials include GI bleed, possible from gastritis, ulcer or in large intestine including diverticulosis or colon Ca.   Clinical Tests:   Lab Tests: Ordered and Reviewed  Radiological Study: Ordered and Reviewed  Medical Tests: Ordered and Reviewed  ED Management:  4:55 PM  -  Patient is currently HD stable no complaints of dizziness or SOB  - will repeat CBC, get CMP, PT/INR, type and screen, BNP  - will prepare 2Units pRBCs, consent obtained  - FOBT positive, no overt blood or melanotic stool noted on MA  - will give PPI IV    5:37 PM  - H/h 5.3/17.4  - will transfuse 2Units  - admit to hospital medicine with GI consult for endoscopy  - will trend H/h q8h  - plan discussed with patient and daughter    Other:   I have discussed this case with another health care provider.              Attending Attestation:   Physician Attestation Statement for Resident:  As the supervising MD   Physician Attestation Statement: I have personally seen and examined this patient.   I agree with the above history. -:   As the supervising MD I agree with the above PE.    As the supervising MD I agree with the above treatment, course, plan, and disposition.   -: Pt presents with symptomatic anemia with complaint of weakness and SORENSON and anemia noted on labs today done in her primary doctor's office. Rectal exam negative for melena and gross blood but hemoccult positive. Suspect slow tempo GI losses. Pt consented for blood transfusion 2 units PRBC's were ordered. Also considered variceal bleed, intraabdominal hemorrhage among other diagnoses, though these were both felt to    be unlikely. Medicine accepted the pt for admission.     I have reviewed and agree with the residents interpretation of the following: lab data and EKG.  I have reviewed the following: old records at this facility.                    ED Course     Clinical Impression:   The primary encounter diagnosis was Blood loss anemia. Diagnoses of Bibasilar crackles and Gastrointestinal hemorrhage, unspecified gastrointestinal hemorrhage type were also pertinent to this visit.    Disposition:   Disposition: Admitted  Condition: Stable       Traci Cope MD  Resident  01/17/17 1830       Amauri Snyder MD  01/17/17 8373       Amauri Snyder  MD  01/17/17 4215

## 2017-01-18 VITALS
WEIGHT: 133.13 LBS | BODY MASS INDEX: 29.95 KG/M2 | HEIGHT: 56 IN | HEART RATE: 72 BPM | OXYGEN SATURATION: 96 % | SYSTOLIC BLOOD PRESSURE: 130 MMHG | TEMPERATURE: 99 F | RESPIRATION RATE: 18 BRPM | DIASTOLIC BLOOD PRESSURE: 74 MMHG

## 2017-01-18 DIAGNOSIS — D50.0 CHRONIC BLOOD LOSS ANEMIA: Primary | ICD-10-CM

## 2017-01-18 LAB
ANION GAP SERPL CALC-SCNC: 11 MMOL/L
ANISOCYTOSIS BLD QL SMEAR: SLIGHT
BASOPHILS # BLD AUTO: 0.06 K/UL
BASOPHILS # BLD AUTO: 0.08 K/UL
BASOPHILS NFR BLD: 0.6 %
BASOPHILS NFR BLD: 1 %
BUN SERPL-MCNC: 16 MG/DL
CALCIUM SERPL-MCNC: 8.4 MG/DL
CHLORIDE SERPL-SCNC: 105 MMOL/L
CO2 SERPL-SCNC: 23 MMOL/L
CREAT SERPL-MCNC: 1 MG/DL
DIFFERENTIAL METHOD: ABNORMAL
DIFFERENTIAL METHOD: ABNORMAL
EOSINOPHIL # BLD AUTO: 0.2 K/UL
EOSINOPHIL # BLD AUTO: 0.3 K/UL
EOSINOPHIL NFR BLD: 2.1 %
EOSINOPHIL NFR BLD: 3.7 %
ERYTHROCYTE [DISTWIDTH] IN BLOOD BY AUTOMATED COUNT: 16.7 %
ERYTHROCYTE [DISTWIDTH] IN BLOOD BY AUTOMATED COUNT: 16.8 %
EST. GFR  (AFRICAN AMERICAN): 58.1 ML/MIN/1.73 M^2
EST. GFR  (NON AFRICAN AMERICAN): 50.4 ML/MIN/1.73 M^2
GLUCOSE SERPL-MCNC: 81 MG/DL
H PYLORI IGG SERPL QL IA: NEGATIVE
HCT VFR BLD AUTO: 25.2 %
HCT VFR BLD AUTO: 26.2 %
HGB BLD-MCNC: 8.1 G/DL
HGB BLD-MCNC: 8.4 G/DL
HYPOCHROMIA BLD QL SMEAR: ABNORMAL
LYMPHOCYTES # BLD AUTO: 0.7 K/UL
LYMPHOCYTES # BLD AUTO: 1 K/UL
LYMPHOCYTES NFR BLD: 11.8 %
LYMPHOCYTES NFR BLD: 7 %
MAGNESIUM SERPL-MCNC: 2.2 MG/DL
MCH RBC QN AUTO: 26.6 PG
MCH RBC QN AUTO: 26.8 PG
MCHC RBC AUTO-ENTMCNC: 32.1 %
MCHC RBC AUTO-ENTMCNC: 32.1 %
MCV RBC AUTO: 83 FL
MCV RBC AUTO: 83 FL
MONOCYTES # BLD AUTO: 0.9 K/UL
MONOCYTES # BLD AUTO: 1 K/UL
MONOCYTES NFR BLD: 10.5 %
MONOCYTES NFR BLD: 9.2 %
NEUTROPHILS # BLD AUTO: 6 K/UL
NEUTROPHILS # BLD AUTO: 8.6 K/UL
NEUTROPHILS NFR BLD: 73 %
NEUTROPHILS NFR BLD: 80.7 %
OVALOCYTES BLD QL SMEAR: ABNORMAL
PHOSPHATE SERPL-MCNC: 3.1 MG/DL
PLATELET # BLD AUTO: 285 K/UL
PLATELET # BLD AUTO: 297 K/UL
PMV BLD AUTO: 11.9 FL
PMV BLD AUTO: 12.1 FL
POIKILOCYTOSIS BLD QL SMEAR: SLIGHT
POLYCHROMASIA BLD QL SMEAR: ABNORMAL
POTASSIUM SERPL-SCNC: 3.1 MMOL/L
RBC # BLD AUTO: 3.04 M/UL
RBC # BLD AUTO: 3.14 M/UL
SODIUM SERPL-SCNC: 139 MMOL/L
WBC # BLD AUTO: 10.6 K/UL
WBC # BLD AUTO: 8.3 K/UL

## 2017-01-18 PROCEDURE — 36430 TRANSFUSION BLD/BLD COMPNT: CPT

## 2017-01-18 PROCEDURE — 85025 COMPLETE CBC W/AUTO DIFF WBC: CPT

## 2017-01-18 PROCEDURE — 25000003 PHARM REV CODE 250: Performed by: INTERNAL MEDICINE

## 2017-01-18 PROCEDURE — 36415 COLL VENOUS BLD VENIPUNCTURE: CPT

## 2017-01-18 PROCEDURE — 63600175 PHARM REV CODE 636 W HCPCS: Performed by: INTERNAL MEDICINE

## 2017-01-18 PROCEDURE — C9113 INJ PANTOPRAZOLE SODIUM, VIA: HCPCS | Performed by: INTERNAL MEDICINE

## 2017-01-18 PROCEDURE — 83735 ASSAY OF MAGNESIUM: CPT

## 2017-01-18 PROCEDURE — 84100 ASSAY OF PHOSPHORUS: CPT

## 2017-01-18 PROCEDURE — 99238 HOSP IP/OBS DSCHRG MGMT 30/<: CPT | Mod: ,,, | Performed by: INTERNAL MEDICINE

## 2017-01-18 PROCEDURE — 86677 HELICOBACTER PYLORI ANTIBODY: CPT

## 2017-01-18 PROCEDURE — 80048 BASIC METABOLIC PNL TOTAL CA: CPT

## 2017-01-18 RX ORDER — FERROUS SULFATE 325(65) MG
325 TABLET, DELAYED RELEASE (ENTERIC COATED) ORAL DAILY
Refills: 0 | COMMUNITY
Start: 2017-01-18 | End: 2017-01-19 | Stop reason: SDUPTHER

## 2017-01-18 RX ORDER — POTASSIUM CHLORIDE 20 MEQ/1
20 TABLET, EXTENDED RELEASE ORAL ONCE
Status: COMPLETED | OUTPATIENT
Start: 2017-01-18 | End: 2017-01-18

## 2017-01-18 RX ADMIN — PANTOPRAZOLE SODIUM 40 MG: 40 INJECTION, POWDER, FOR SOLUTION INTRAVENOUS at 09:01

## 2017-01-18 RX ADMIN — ALPRAZOLAM 0.5 MG: 0.5 TABLET ORAL at 12:01

## 2017-01-18 RX ADMIN — PREDNISONE 10 MG: 10 TABLET ORAL at 09:01

## 2017-01-18 RX ADMIN — HYDRALAZINE HYDROCHLORIDE 25 MG: 25 TABLET, FILM COATED ORAL at 01:01

## 2017-01-18 RX ADMIN — DULOXETINE 30 MG: 30 CAPSULE, DELAYED RELEASE ORAL at 09:01

## 2017-01-18 RX ADMIN — ISOSORBIDE MONONITRATE 30 MG: 30 TABLET, EXTENDED RELEASE ORAL at 12:01

## 2017-01-18 RX ADMIN — HYDRALAZINE HYDROCHLORIDE 25 MG: 25 TABLET, FILM COATED ORAL at 05:01

## 2017-01-18 RX ADMIN — HYDROCHLOROTHIAZIDE 12.5 MG: 12.5 TABLET ORAL at 09:01

## 2017-01-18 RX ADMIN — ATORVASTATIN CALCIUM 40 MG: 20 TABLET, FILM COATED ORAL at 12:01

## 2017-01-18 RX ADMIN — DONEPEZIL HYDROCHLORIDE 10 MG: 10 TABLET, FILM COATED ORAL at 09:01

## 2017-01-18 RX ADMIN — OXYCODONE HYDROCHLORIDE 5 MG: 5 TABLET ORAL at 12:01

## 2017-01-18 RX ADMIN — LOSARTAN POTASSIUM 100 MG: 50 TABLET, FILM COATED ORAL at 09:01

## 2017-01-18 RX ADMIN — OXYCODONE HYDROCHLORIDE 5 MG: 5 TABLET ORAL at 05:01

## 2017-01-18 RX ADMIN — OXYBUTYNIN CHLORIDE 5 MG: 5 TABLET, EXTENDED RELEASE ORAL at 09:01

## 2017-01-18 RX ADMIN — POTASSIUM CHLORIDE 20 MEQ: 1500 TABLET, EXTENDED RELEASE ORAL at 11:01

## 2017-01-18 NOTE — PLAN OF CARE
Problem: Patient Care Overview  Goal: Plan of Care Review  Outcome: Ongoing (interventions implemented as appropriate)  Pt VSS; remained free of falls/trauma/injuries this shift. Pt denied any SOB, chest pain or any type of discomfort. Plan for possible EGD after plavix been held for 5 days. Potassium replaced this shift. Plan of care reviewed with pt; verbalized understanding. No signs of acute distress noted. Will continue to monitor.

## 2017-01-18 NOTE — MEDICAL/APP STUDENT
88F presenting with haemoglobin of 5.1. Past medical history significant for myasthenia gravis and rheumatoid arthritis on long-term prednisone for both, HFpEF and history of MI, (?renal cell carcinoma and nephrectomy), mild cognitive decline. Previous scans have shown diverticulosis and hiatal hernia.    HPI  Ms. Jacobs is a Algerian speaker and speaks little English. The following history was taken from her daughter, Soniya.     Ms. Jacobs presented yesterday after a low Hb (5) was picked up by her primary care doctor (Dr. Karimi). Soniya states that Ms. Jacobs has complained of increased fatigue and weakness over the past week but denies dyspnoea, dizziness, chest pain and palpitations. Soniya relayed that Ms. Jacobs denies diarrhoea, constipation, ND bleeding, dark stools, mucous in stools and dysphagia. She has noticed worsening of her reflux symptoms. Ms. Jacobs suffers from pain associated with her rheumatoid arthritis and takes vicodin and advil regularly. She has been taking ii BD advil long term and has recently added a third lunchtime dose. According to Soniya, Ms. Jacobs has never had an EGD and had a colonoscopy about 10 years ago that revealed diverticular disease and polyps. Soniya states that Ms. Jacobs has no personal history of peptic ulcers and denies a family history of colorectal cancer.     Ms. Jacobs has long-standing mild normocytic anaemia for which she was previously on iron supplementation. She was recently seen by Dr Dillon of haem/onc (11/1/16) for MGUS and anaemia work up. The outcome of this consult was that her anaemia was probably explained by inflammation associated with RA +/- contributions from medications (donepezil, hydrazaline, HCT, losartan). Dr Dillon also concluded that Ms. Jacobs does not have MGUS as she has never had an abnormal paraprotein band despite 4 SPEP studies.    There have been family stressors lately with Ms. Jacobs's son in law going through chemotherapy however Soniya tells me that the family  have withheld this information from Ms. Jacobs as they don't want to upset her.     Medical history  Renal cell carcinoma (this is documented in previous hospital notes however Soniya denies that Ms. Jacobs had RCC and said instead she had a liver mass? Both kidneys seen in last CT abdo in 2011)  Rheumatoid arthritis  Myasthenia gravis  Mild cognitive decline   Coronary artery disease and history of MI   Peripheral arterial occlusive disease  Osteoporosis  Diastolic dysfunction  Hypertension  Diverticulosis  Non-toxic multi-nodular goitre    Surgical history  Cholecystectomy  Hysterectomy  Nephrectomy  Bilateral cataract extraction     Medications  - Allergies: amlodipine (oedema), did not tolerate bisphosphonates  Alprazolam 0.5mg PO PRN  Aspirin 81mg PO OD  Atorvostatin 40mg PO nocte  Clopidogrel 75mg PO OD  Docusate 100mg PO BD  Donepezil 10mg PO OD  Duloxetine 30mg PO OD  Hydralazine 25mg TDS  HCT 12.5mg PO OD  Hydrocodone-acetaminophen 5-325 TDS  ISMN 30mg PO OD  Iosartan 100mg PO OD  Pantoprazole 40mg PO OD  Predinosone 10mg PO OD  Oxybutynin 2mg PO OD    O/E  T 98.6, P 85, RR, 18, /78, SpO2 94% RA  Patient dry coughing a lot throughout my interview with patient and thus seemed uncomfortable, not in respiratory distress, reported pain associated with PIVC  Soft cervical collar (wears constantly for pain)  Ulnar deviation at MCP   Ecchymoses on both arms   HSDNM  Bowel sounds active  Tenderness in epigastrium, nil other abdo tenderness  No peripheral oedema  Calves SNT    Investigations  Hb 5.3 on admission, 8.1 this morning post 2u PRBC  Hct 17.4 on admission, 25.2 now  MCV 81  WCC N    TIBC 472 H  Ferritin 12L  Fe 130N    Creat 1.2  BUN 19    Problem list  Anaemia    RA pain management without NSAID  ? Pleural effusion on CXR    Plan  EGD  Cplonoscopy

## 2017-01-18 NOTE — PLAN OF CARE
Patient will discharge home today with follow up by GI next week.     01/18/17 160   Discharge Assessment   Assessment Type Discharge Planning Assessment   Confirmed/corrected address and phone number on facesheet? Yes   Assessment information obtained from? Patient;Caregiver;Medical Record  (Walker valenzuela at bedside)   Expected Length of Stay (days) 1   Communicated expected length of stay with patient/caregiver yes   Prior to hospitilization cognitive status: Alert/Oriented   Prior to hospitalization functional status: Independent   Current cognitive status: Alert/Oriented   Current Functional Status: Independent   Arrived From home or self-care   Lives With child(sherry), adult  (sonAndrés)   Able to Return to Prior Arrangements yes   Is patient able to care for self after discharge? Yes   How many people do you have in your home that can help with your care after discharge? 2   Patient's perception of discharge disposition home or selfcare   Readmission Within The Last 30 Days no previous admission in last 30 days   Patient currently being followed by outpatient case management? No   Patient currently receives home health services? No   Does the patient currently use HME? No   Patient currently receives private duty nursing? No   Patient currently receives any other outside agency services? No   Equipment Currently Used at Home none   Do you have any problems affording any of your prescribed medications? No   Is the patient taking medications as prescribed? yes   Do you have any financial concerns preventing you from receiving the healthcare you need? No   Does the patient have transportation to healthcare appointments? Yes   Transportation Available family or friend will provide   On Dialysis? No   Does the patient receive services at the [facility]? No   Are there any open cases? No   Discharge Plan A Home with family   Discharge Plan B Home with family   Patient/Family In Agreement With Plan yes

## 2017-01-18 NOTE — NURSING
Patient discharge by BREANA Payton discharge nurse. IV d/c'd x2 per nurse; catheter tip intact x2. Patient tolerated well. Resting in bed, family at bedside. Awaiting patient escort. No signs of acute distress noted. Will continue to monitor.

## 2017-01-18 NOTE — PROGRESS NOTES
D/C instructions given and signed. Pt waiting at bedside for transportation to arrive. Son states his sister can be to pick Pt up at or around 1800. Floor Nurse notified IV x 2 was left in place until Pt transport arrives

## 2017-01-18 NOTE — CONSULTS
CONSULT NOTE    Date of admission: 1/17/2017    Hospital LOS: 1    Reason for Consult: Occult GIB    HPI:  Danyelle Jacobs is a 88 y.o. Mosotho-speaking female with medical history of iron deficiency anemia, GERD, CAD, bilateral carotid stenosis, CVA, HTN, myasthenia gravis, RA, renal cell carcinoma, and MGUS (minor). She is here after routine labs taken at PCP showed Hgb of 5.1 on 1/17 (previous Hgb was 10.9 on 10/2016 which is her baseline) and she was subsequently sent to the ED. Patient has been experiencing worsening fatigue and weakness over the last several months. She is still able to complete all her ADLs and denies any SOB, lightheadedness, dizziness, or dyspnea on exertion. She denies any changes in her bowel habits or any presence of melena or hematochezia. She reports no recent abdominal pain made worse after eating.     Patient has had a previous EGD in 2011 (iron deficiency anemia and dysphagia work up) which showed erythema and linear erosions, a non-obstructing Schatzki ring at the GE junction, and hiatal hernia; no active bleeding. Patient's last colonoscopy was in 2011 and showed diverticulosis and a benign 4 mm polyp in the sigmoid colon. Patient does have a significant NSAID use for her RA; she reports taking 5-6 Advil per day (200 mg each) for many years. In addition patient has been taking prednisone 10 mg QD but does take pantoprazole 40 mg QD. She is currently taking clopidogrel and aspirin 81 mg (last taken yesterday). Patient does not have any recent abdominal imaging on file. Since being admitted she has been transfused with 2U PRBCs with good response (Hgb now 8.1 this morning) and continues to have no signs of active bleeding.     Review of Systems:  Constitutional: No fever, chills. Positive for weakness and fatigue; appetite change  Eyes: no visual changes    ENT: no sore throat or runny nose.  Respiratory: no cough or shortness of breath    Cardiovascular: no chest pain or palpitations     Gastrointestinal: as per HPI  Hematologic/Lymphatic: No petechia  Musculoskeletal: Positive for arthralgias and myalgias (back)    Neurological: no seizures, tremors or change in mental status  Behavioral/Psych: No suicidal ideation, no hallucination  Skin: No rash    Past Medical History: has a past medical history of Adverse effect of oral bisphosphonates; Anemia; Anxiety; Aortic atherosclerosis; Benign hypertension; Carcinoma, renal cell; Carotid arterial disease; Cerebellar stroke, acute; Chronic kidney disease (CKD), stage I; Coronary artery disease; Current chronic use of systemic steroids; Degenerative disc disease; Depression; Diverticulosis; Former smoker; Gastroesophageal reflux disease without esophagitis; GERD (gastroesophageal reflux disease); Goiter, nontoxic, multinodular; IBS (irritable bowel syndrome); Left ventricular diastolic dysfunction with preserved systolic function; Lung nodule; MGUS (monoclonal gammopathy of unknown significance); Myasthenia gravis, adult form; Osteoporosis; Osteoporosis, unspecified; PAOD (peripheral arterial occlusive disease); RA (rheumatoid arthritis); Smoker; Stroke; Ulcer; and Vitamin D deficiency disease.    Past Surgical History: has a past surgical history that includes Cholecystectomy; Hysterectomy; Kidney surgery (2003); Nephrectomy; and Cataract extraction w/  intraocular lens implant (Bilateral).    Family History:family history includes Alzheimer's disease in her sister; Heart disease in her father; Stroke in her mother. There is no history of Thyroid cancer.    Allergies: Reviewed in EPIC.     Social History: reports that she quit smoking about 2 years ago. She has never used smokeless tobacco. She reports that she does not drink alcohol or use illicit drugs.    Home medications:   No current facility-administered medications on file prior to encounter.      Current Outpatient Prescriptions on File Prior to Encounter   Medication Sig Dispense Refill     alprazolam (XANAX) 0.5 MG tablet Take 1 tablet (0.5 mg total) by mouth daily as needed. 90 tablet 0    aspirin 81 mg Tab Take 81 mg by mouth. 1 Tablet Oral Every day      atorvastatin (LIPITOR) 40 MG tablet Take 1 tablet (40 mg total) by mouth every evening. 90 tablet 3    clopidogrel (PLAVIX) 75 mg tablet Take 1 tablet (75 mg total) by mouth once daily. 90 tablet 3    DOC-Q-LACE 100 mg capsule TAKE 1 CAPSULE BY MOUTH TWICE DAILY 180 capsule 0    donepezil (ARICEPT) 10 MG tablet Take 1 tablet (10 mg total) by mouth once daily. 90 tablet 3    duloxetine (CYMBALTA) 30 MG capsule Take 1 capsule (30 mg total) by mouth once daily. 90 capsule 3    hydrALAZINE (APRESOLINE) 25 MG tablet Take 1 tablet (25 mg total) by mouth every 8 (eight) hours. 270 tablet 3    hydrochlorothiazide (MICROZIDE) 12.5 mg capsule Take 1 capsule (12.5 mg total) by mouth once daily. 90 capsule 3    hydrocodone-acetaminophen 5-325mg (NORCO) 5-325 mg per tablet Take 1 tablet by mouth 3 (three) times daily as needed for Pain. 90 tablet 0    [START ON 2/18/2017] hydrocodone-acetaminophen 5-325mg (NORCO) 5-325 mg per tablet Take 1 tablet by mouth 3 (three) times daily as needed for Pain. 90 tablet 0    [START ON 3/18/2017] hydrocodone-acetaminophen 5-325mg (NORCO) 5-325 mg per tablet Take 1 tablet by mouth 3 (three) times daily as needed for Pain. 90 tablet 0    isosorbide mononitrate (IMDUR) 30 MG 24 hr tablet Take 1 tablet (30 mg total) by mouth every evening. 90 tablet 0    losartan (COZAAR) 100 MG tablet Take 1 tablet (100 mg total) by mouth once daily. 90 tablet 3    pantoprazole (PROTONIX) 40 MG tablet Take 1 tablet (40 mg total) by mouth once daily. 90 tablet 3    predniSONE (DELTASONE) 10 MG tablet Take 1 tablet (10 mg total) by mouth once daily. 90 tablet 3    tolterodine (DETROL LA) 2 MG Cp24 Take 1 capsule (2 mg total) by mouth once daily. 90 capsule 3       Scheduled Meds:    atorvastatin  40 mg Oral QHS    donepezil  10  mg Oral Daily    duloxetine  30 mg Oral Daily    hydrALAZINE  25 mg Oral Q8H    hydrochlorothiazide  12.5 mg Oral Daily    isosorbide mononitrate  30 mg Oral QHS    losartan  100 mg Oral Daily    oxybutynin  5 mg Oral Daily    pantoprazole  40 mg Intravenous BID    predniSONE  10 mg Oral Daily       Continuous Infusions:      PRN Meds: sodium chloride, acetaminophen, alprazolam, dextrose 50%, dextrose 50%, glucagon (human recombinant), glucose, glucose, ondansetron, oxycodone, promethazine (PHENERGAN) IVPB, ramelteon    Vital signs:  Temp:  [98 °F (36.7 °C)-99.3 °F (37.4 °C)]   Pulse:  [62-85]   Resp:  [16-20]   BP: (128-186)/(58-78)   SpO2:  [94 %-96 %]     Physical exam:  General: No acute distress  HEENT: Head: Normocephalic, atraumatic.   Eyes: Sclera non-icteric. EOMI.   Neck: Supple  Heart: Regular rate and rhythm, no gallops  Lungs: Non labored breathing, no wheezing; bibasilar rales  Abdomen: Bowel sounds normal, no organomegaly, masses, or hernia. No tenderness, no rebound or guarding. No distention.   Rectal: Per ED brown stool that was hemoccult positive   Extremities: Arthritic changes to hands bilaterally from RA; moves all but has generalized pain on ROM  Neurologic: Able to follow commands and move 4 extremities. Alert.   Skin: No rash    Routine labs:    Recent Labs  Lab 01/18/17  0147   WBC 10.60   HGB 8.1*   HCT 25.2*      MCV 83       Recent Labs  Lab 01/17/17  1706   INR 1.0   APTT 21.7       Recent Labs  Lab 01/18/17  0147      K 3.1*   CO2 23   BUN 16   CREATININE 1.0       Recent Labs  Lab 01/17/17  1706   ALBUMIN 3.3*   ALKPHOS 54*   ALT 11   AST 17   BILITOT 0.2     MELD-Na score: 6 at 1/18/2017  1:47 AM  MELD score: 6 at 1/18/2017  1:47 AM  Calculated from:  Serum Creatinine: 1.0 mg/dL at 1/18/2017  1:47 AM  Serum Sodium: 139 mmol/L (Rounded to 137) at 1/18/2017  1:47 AM  Total Bilirubin: 0.2 mg/dL (Rounded to 1) at 1/17/2017  5:06 PM  INR(ratio): 1.0 at 1/17/2017  5:06  PM  Age: 88 years    Imaging and prior endoscopies  As above    Assessment:  Danyelle Jacobs is a 88 y.o. Italian-speaking female with medical history of iron deficiency anemia, GERD, CAD, bilateral carotid stenosis, CVA, HTN, myasthenia gravis, RA, renal cell carcinoma, and MGUS (minor) here with drop in Hgb to 5.1 that appears to be a slow chronic bleed given the lack of symptoms outside of general fatigue as well as no sign of active bleeding in her stool. Previous colonoscopy in 2011 was normal outside of diverticuli and a benign polyp; previous EGD in 2011 showed inflammation and linear erosions but no active bleeding. Patient does have a significant NSAID use for her RA that puts her at risk for PUD.     Recs:  Patient can resume 81 mg aspirin; please continue to hold clopidogrel   Continue protonix 40mg IV BID; recommend discontinuing NSAID use if possible  Intravascular resuscitation/support with IVFs   Serial H/H's and pRBCs transfusion as indicated  Patient can resume diet while waiting on clopidogrel hold; will need to be clear liquid the night before procedure and NPO after midnight  Plan for EGD once patient is off clopidogrel for 5 days; can be scheduled to be done as an outpatient if discharged  However in the setting of hemodynamic compromise from overt GI bleeding EGD will be done emergently   Please notify GI team if there is significant change in patient's clinical status    Discussed with staff; formal assessment to follow    Daren Tijerina MD  IM PGY-1  Pager: [phone]  1/18/2017, 7:11 AM

## 2017-01-19 ENCOUNTER — TELEPHONE (OUTPATIENT)
Dept: INTERNAL MEDICINE | Facility: CLINIC | Age: 82
End: 2017-01-19

## 2017-01-19 DIAGNOSIS — E87.6 HYPOKALEMIA: ICD-10-CM

## 2017-01-19 DIAGNOSIS — K92.1 GASTROINTESTINAL HEMORRHAGE WITH MELENA: Primary | ICD-10-CM

## 2017-01-19 RX ORDER — FERROUS SULFATE 325(65) MG
325 TABLET, DELAYED RELEASE (ENTERIC COATED) ORAL 2 TIMES DAILY
Qty: 60 TABLET | Refills: 12
Start: 2017-01-19

## 2017-01-19 NOTE — TELEPHONE ENCOUNTER
I spoke to Cindy- she needs scopes done.   I placed orders.  She is on iron will increase to 2 x daily    She needs to see me also for a hospital follow up    She can come in in 3-4 weeks, needs to be on a Monday, around 10 or 11 in the morning, okay to work her in.  Okay to mail appointment.  Should have labs done same day.  Thank you

## 2017-01-19 NOTE — TELEPHONE ENCOUNTER
----- Message from Clive Rothman sent at 1/19/2017  1:19 PM CST -----  Contact: Cindy daughter in Law [phone]   Cindy is requesting a call back regarding her [facility] release, please call and advice    Thanks

## 2017-01-20 ENCOUNTER — TELEPHONE (OUTPATIENT)
Dept: ENDOSCOPY | Facility: HOSPITAL | Age: 82
End: 2017-01-20

## 2017-01-20 DIAGNOSIS — Z12.11 SPECIAL SCREENING FOR MALIGNANT NEOPLASMS, COLON: Primary | ICD-10-CM

## 2017-01-20 RX ORDER — POLYETHYLENE GLYCOL 3350, SODIUM SULFATE ANHYDROUS, SODIUM BICARBONATE, SODIUM CHLORIDE, POTASSIUM CHLORIDE 236; 22.74; 6.74; 5.86; 2.97 G/4L; G/4L; G/4L; G/4L; G/4L
4 POWDER, FOR SOLUTION ORAL ONCE
Qty: 4000 ML | Refills: 0 | Status: SHIPPED | OUTPATIENT
Start: 2017-01-20 | End: 2017-01-20

## 2017-01-20 NOTE — TELEPHONE ENCOUNTER
Spoke with patient's daughter in law, Cindy, with permission of patient about Hx, allergies, meds and instructions for EGD/COLONOSCOPY scheduled 1/24/17 at 0930 on 4th floor.  Voices understanding.  Instructions mailed.  Patient has been off Plavix as instructed since d/c from hospital 1/19/17.

## 2017-01-23 ENCOUNTER — TELEPHONE (OUTPATIENT)
Dept: INTERNAL MEDICINE | Facility: CLINIC | Age: 82
End: 2017-01-23

## 2017-01-23 NOTE — TELEPHONE ENCOUNTER
----- Message from Bi Dillon MD sent at 1/22/2017 12:18 PM CST -----  Bianca,    It seems her MCV fell quickly after stopping iron and I see she is undergoing GI workup for potential blood loss now which may be the case with the fall in MCV.  Iron replacement may have just been keeping her in check and she likely has inflammatory anemia as well with elevated ESR and high rheumatoid factor.  Have you restarted iron?    Depending on her GI workup I may need to see her back if anemia persists; she might need more rheum intervention as well.    -Matt  ----- Message -----     From: Bianca Dykes MD     Sent: 11/1/2016   8:39 PM       To: MD Erik Mcintosh-    I got your message and looked at your note.  Thanks for clarifying.  I will adjust her problem  list accordingly.  I appreciate your input    Bianca

## 2017-01-24 ENCOUNTER — ANESTHESIA EVENT (OUTPATIENT)
Dept: ENDOSCOPY | Facility: HOSPITAL | Age: 82
End: 2017-01-24
Payer: MEDICARE

## 2017-01-24 ENCOUNTER — HOSPITAL ENCOUNTER (OUTPATIENT)
Facility: HOSPITAL | Age: 82
Discharge: HOME | End: 2017-01-24
Attending: INTERNAL MEDICINE | Admitting: INTERNAL MEDICINE
Payer: MEDICARE

## 2017-01-24 ENCOUNTER — SURGERY (OUTPATIENT)
Age: 82
End: 2017-01-24

## 2017-01-24 ENCOUNTER — ANESTHESIA (OUTPATIENT)
Dept: ENDOSCOPY | Facility: HOSPITAL | Age: 82
End: 2017-01-24
Payer: MEDICARE

## 2017-01-24 VITALS
BODY MASS INDEX: 27.71 KG/M2 | HEIGHT: 58 IN | OXYGEN SATURATION: 96 % | RESPIRATION RATE: 18 BRPM | HEART RATE: 67 BPM | SYSTOLIC BLOOD PRESSURE: 130 MMHG | DIASTOLIC BLOOD PRESSURE: 68 MMHG | TEMPERATURE: 98 F | WEIGHT: 132 LBS | RESPIRATION RATE: 18 BRPM

## 2017-01-24 DIAGNOSIS — D62 ANEMIA ASSOCIATED WITH ACUTE BLOOD LOSS: Primary | ICD-10-CM

## 2017-01-24 PROCEDURE — D9220A PRA ANESTHESIA: Mod: ANES,,, | Performed by: ANESTHESIOLOGY

## 2017-01-24 PROCEDURE — 37000008 HC ANESTHESIA 1ST 15 MINUTES: Performed by: INTERNAL MEDICINE

## 2017-01-24 PROCEDURE — 43255 EGD CONTROL BLEEDING ANY: CPT | Performed by: INTERNAL MEDICINE

## 2017-01-24 PROCEDURE — 43255 EGD CONTROL BLEEDING ANY: CPT | Mod: ,,, | Performed by: INTERNAL MEDICINE

## 2017-01-24 PROCEDURE — D9220A PRA ANESTHESIA: Mod: CRNA,,, | Performed by: NURSE ANESTHETIST, CERTIFIED REGISTERED

## 2017-01-24 PROCEDURE — 45378 DIAGNOSTIC COLONOSCOPY: CPT | Mod: 51,,, | Performed by: INTERNAL MEDICINE

## 2017-01-24 PROCEDURE — 45378 DIAGNOSTIC COLONOSCOPY: CPT | Performed by: INTERNAL MEDICINE

## 2017-01-24 PROCEDURE — 25000003 PHARM REV CODE 250: Performed by: INTERNAL MEDICINE

## 2017-01-24 PROCEDURE — 63600175 PHARM REV CODE 636 W HCPCS: Performed by: NURSE ANESTHETIST, CERTIFIED REGISTERED

## 2017-01-24 PROCEDURE — 27200959 HC CATHETER, ERBE, ARGON PLASMA: Performed by: INTERNAL MEDICINE

## 2017-01-24 PROCEDURE — 25000003 PHARM REV CODE 250: Performed by: NURSE ANESTHETIST, CERTIFIED REGISTERED

## 2017-01-24 PROCEDURE — 37000009 HC ANESTHESIA EA ADD 15 MINS: Performed by: INTERNAL MEDICINE

## 2017-01-24 RX ORDER — SODIUM CHLORIDE 9 MG/ML
INJECTION, SOLUTION INTRAVENOUS CONTINUOUS
Status: DISCONTINUED | OUTPATIENT
Start: 2017-01-24 | End: 2017-01-24 | Stop reason: HOSPADM

## 2017-01-24 RX ORDER — PROPOFOL 10 MG/ML
INJECTION, EMULSION INTRAVENOUS CONTINUOUS PRN
Status: DISCONTINUED | OUTPATIENT
Start: 2017-01-24 | End: 2017-01-24

## 2017-01-24 RX ORDER — LIDOCAINE HCL/PF 100 MG/5ML
SYRINGE (ML) INTRAVENOUS
Status: DISCONTINUED | OUTPATIENT
Start: 2017-01-24 | End: 2017-01-24

## 2017-01-24 RX ORDER — CLOPIDOGREL BISULFATE 75 MG/1
75 TABLET ORAL DAILY
Status: ON HOLD | COMMUNITY
End: 2017-04-10

## 2017-01-24 RX ORDER — PROPOFOL 10 MG/ML
INJECTION, EMULSION INTRAVENOUS
Status: DISCONTINUED | OUTPATIENT
Start: 2017-01-24 | End: 2017-01-24

## 2017-01-24 RX ADMIN — PROPOFOL 100 MCG/KG/MIN: 10 INJECTION, EMULSION INTRAVENOUS at 10:01

## 2017-01-24 RX ADMIN — SODIUM CHLORIDE: 0.9 INJECTION, SOLUTION INTRAVENOUS at 09:01

## 2017-01-24 RX ADMIN — LIDOCAINE HYDROCHLORIDE 40 MG: 20 INJECTION, SOLUTION INTRAVENOUS at 10:01

## 2017-01-24 RX ADMIN — PROPOFOL 20 MG: 10 INJECTION, EMULSION INTRAVENOUS at 10:01

## 2017-01-24 NOTE — ANESTHESIA POSTPROCEDURE EVALUATION
"Anesthesia Post Evaluation    Patient: Danyelle Jacobs    Procedure(s) Performed: Procedure(s) (LRB):  ESOPHAGOGASTRODUODENOSCOPY (EGD) (N/A)  COLONOSCOPY (N/A)    Final Anesthesia Type: general  Patient location during evaluation: PACU  Patient participation: Yes- Able to Participate  Level of consciousness: awake and alert  Post-procedure vital signs: reviewed and stable  Pain management: adequate  Airway patency: patent  PONV status at discharge: No PONV  Anesthetic complications: no      Cardiovascular status: blood pressure returned to baseline  Respiratory status: unassisted, spontaneous ventilation and room air  Hydration status: euvolemic  Follow-up not needed.        Visit Vitals    /68 (BP Location: Left arm, Patient Position: Lying, BP Method: Automatic)    Pulse 67    Temp 36.6 °C (97.9 °F) (Oral)    Resp 18    Ht 4' 10" (1.473 m)    Wt 59.9 kg (132 lb)    SpO2 96%    Breastfeeding No    BMI 27.59 kg/m2       Pain/Aj Score: Pain Assessment Performed: Yes (1/24/2017 11:30 AM)  Presence of Pain: denies (1/24/2017 11:30 AM)  Aj Score: 10 (1/24/2017 11:28 AM)      "

## 2017-01-24 NOTE — H&P (VIEW-ONLY)
CONSULT NOTE    Date of admission: 1/17/2017    Hospital LOS: 1    Reason for Consult: Occult GIB    HPI:  Danyelle Jacobs is a 88 y.o. Kazakh-speaking female with medical history of iron deficiency anemia, GERD, CAD, bilateral carotid stenosis, CVA, HTN, myasthenia gravis, RA, renal cell carcinoma, and MGUS (minor). She is here after routine labs taken at PCP showed Hgb of 5.1 on 1/17 (previous Hgb was 10.9 on 10/2016 which is her baseline) and she was subsequently sent to the ED. Patient has been experiencing worsening fatigue and weakness over the last several months. She is still able to complete all her ADLs and denies any SOB, lightheadedness, dizziness, or dyspnea on exertion. She denies any changes in her bowel habits or any presence of melena or hematochezia. She reports no recent abdominal pain made worse after eating.     Patient has had a previous EGD in 2011 (iron deficiency anemia and dysphagia work up) which showed erythema and linear erosions, a non-obstructing Schatzki ring at the GE junction, and hiatal hernia; no active bleeding. Patient's last colonoscopy was in 2011 and showed diverticulosis and a benign 4 mm polyp in the sigmoid colon. Patient does have a significant NSAID use for her RA; she reports taking 5-6 Advil per day (200 mg each) for many years. In addition patient has been taking prednisone 10 mg QD but does take pantoprazole 40 mg QD. She is currently taking clopidogrel and aspirin 81 mg (last taken yesterday). Patient does not have any recent abdominal imaging on file. Since being admitted she has been transfused with 2U PRBCs with good response (Hgb now 8.1 this morning) and continues to have no signs of active bleeding.     Review of Systems:  Constitutional: No fever, chills. Positive for weakness and fatigue; appetite change  Eyes: no visual changes    ENT: no sore throat or runny nose.  Respiratory: no cough or shortness of breath    Cardiovascular: no chest pain or palpitations     Gastrointestinal: as per HPI  Hematologic/Lymphatic: No petechia  Musculoskeletal: Positive for arthralgias and myalgias (back)    Neurological: no seizures, tremors or change in mental status  Behavioral/Psych: No suicidal ideation, no hallucination  Skin: No rash    Past Medical History: has a past medical history of Adverse effect of oral bisphosphonates; Anemia; Anxiety; Aortic atherosclerosis; Benign hypertension; Carcinoma, renal cell; Carotid arterial disease; Cerebellar stroke, acute; Chronic kidney disease (CKD), stage I; Coronary artery disease; Current chronic use of systemic steroids; Degenerative disc disease; Depression; Diverticulosis; Former smoker; Gastroesophageal reflux disease without esophagitis; GERD (gastroesophageal reflux disease); Goiter, nontoxic, multinodular; IBS (irritable bowel syndrome); Left ventricular diastolic dysfunction with preserved systolic function; Lung nodule; MGUS (monoclonal gammopathy of unknown significance); Myasthenia gravis, adult form; Osteoporosis; Osteoporosis, unspecified; PAOD (peripheral arterial occlusive disease); RA (rheumatoid arthritis); Smoker; Stroke; Ulcer; and Vitamin D deficiency disease.    Past Surgical History: has a past surgical history that includes Cholecystectomy; Hysterectomy; Kidney surgery (2003); Nephrectomy; and Cataract extraction w/  intraocular lens implant (Bilateral).    Family History:family history includes Alzheimer's disease in her sister; Heart disease in her father; Stroke in her mother. There is no history of Thyroid cancer.    Allergies: Reviewed in EPIC.     Social History: reports that she quit smoking about 2 years ago. She has never used smokeless tobacco. She reports that she does not drink alcohol or use illicit drugs.    Home medications:   No current facility-administered medications on file prior to encounter.      Current Outpatient Prescriptions on File Prior to Encounter   Medication Sig Dispense Refill     alprazolam (XANAX) 0.5 MG tablet Take 1 tablet (0.5 mg total) by mouth daily as needed. 90 tablet 0    aspirin 81 mg Tab Take 81 mg by mouth. 1 Tablet Oral Every day      atorvastatin (LIPITOR) 40 MG tablet Take 1 tablet (40 mg total) by mouth every evening. 90 tablet 3    clopidogrel (PLAVIX) 75 mg tablet Take 1 tablet (75 mg total) by mouth once daily. 90 tablet 3    DOC-Q-LACE 100 mg capsule TAKE 1 CAPSULE BY MOUTH TWICE DAILY 180 capsule 0    donepezil (ARICEPT) 10 MG tablet Take 1 tablet (10 mg total) by mouth once daily. 90 tablet 3    duloxetine (CYMBALTA) 30 MG capsule Take 1 capsule (30 mg total) by mouth once daily. 90 capsule 3    hydrALAZINE (APRESOLINE) 25 MG tablet Take 1 tablet (25 mg total) by mouth every 8 (eight) hours. 270 tablet 3    hydrochlorothiazide (MICROZIDE) 12.5 mg capsule Take 1 capsule (12.5 mg total) by mouth once daily. 90 capsule 3    hydrocodone-acetaminophen 5-325mg (NORCO) 5-325 mg per tablet Take 1 tablet by mouth 3 (three) times daily as needed for Pain. 90 tablet 0    [START ON 2/18/2017] hydrocodone-acetaminophen 5-325mg (NORCO) 5-325 mg per tablet Take 1 tablet by mouth 3 (three) times daily as needed for Pain. 90 tablet 0    [START ON 3/18/2017] hydrocodone-acetaminophen 5-325mg (NORCO) 5-325 mg per tablet Take 1 tablet by mouth 3 (three) times daily as needed for Pain. 90 tablet 0    isosorbide mononitrate (IMDUR) 30 MG 24 hr tablet Take 1 tablet (30 mg total) by mouth every evening. 90 tablet 0    losartan (COZAAR) 100 MG tablet Take 1 tablet (100 mg total) by mouth once daily. 90 tablet 3    pantoprazole (PROTONIX) 40 MG tablet Take 1 tablet (40 mg total) by mouth once daily. 90 tablet 3    predniSONE (DELTASONE) 10 MG tablet Take 1 tablet (10 mg total) by mouth once daily. 90 tablet 3    tolterodine (DETROL LA) 2 MG Cp24 Take 1 capsule (2 mg total) by mouth once daily. 90 capsule 3       Scheduled Meds:    atorvastatin  40 mg Oral QHS    donepezil  10  mg Oral Daily    duloxetine  30 mg Oral Daily    hydrALAZINE  25 mg Oral Q8H    hydrochlorothiazide  12.5 mg Oral Daily    isosorbide mononitrate  30 mg Oral QHS    losartan  100 mg Oral Daily    oxybutynin  5 mg Oral Daily    pantoprazole  40 mg Intravenous BID    predniSONE  10 mg Oral Daily       Continuous Infusions:      PRN Meds: sodium chloride, acetaminophen, alprazolam, dextrose 50%, dextrose 50%, glucagon (human recombinant), glucose, glucose, ondansetron, oxycodone, promethazine (PHENERGAN) IVPB, ramelteon    Vital signs:  Temp:  [98 °F (36.7 °C)-99.3 °F (37.4 °C)]   Pulse:  [62-85]   Resp:  [16-20]   BP: (128-186)/(58-78)   SpO2:  [94 %-96 %]     Physical exam:  General: No acute distress  HEENT: Head: Normocephalic, atraumatic.   Eyes: Sclera non-icteric. EOMI.   Neck: Supple  Heart: Regular rate and rhythm, no gallops  Lungs: Non labored breathing, no wheezing; bibasilar rales  Abdomen: Bowel sounds normal, no organomegaly, masses, or hernia. No tenderness, no rebound or guarding. No distention.   Rectal: Per ED brown stool that was hemoccult positive   Extremities: Arthritic changes to hands bilaterally from RA; moves all but has generalized pain on ROM  Neurologic: Able to follow commands and move 4 extremities. Alert.   Skin: No rash    Routine labs:    Recent Labs  Lab 01/18/17  0147   WBC 10.60   HGB 8.1*   HCT 25.2*      MCV 83       Recent Labs  Lab 01/17/17  1706   INR 1.0   APTT 21.7       Recent Labs  Lab 01/18/17  0147      K 3.1*   CO2 23   BUN 16   CREATININE 1.0       Recent Labs  Lab 01/17/17  1706   ALBUMIN 3.3*   ALKPHOS 54*   ALT 11   AST 17   BILITOT 0.2     MELD-Na score: 6 at 1/18/2017  1:47 AM  MELD score: 6 at 1/18/2017  1:47 AM  Calculated from:  Serum Creatinine: 1.0 mg/dL at 1/18/2017  1:47 AM  Serum Sodium: 139 mmol/L (Rounded to 137) at 1/18/2017  1:47 AM  Total Bilirubin: 0.2 mg/dL (Rounded to 1) at 1/17/2017  5:06 PM  INR(ratio): 1.0 at 1/17/2017  5:06  PM  Age: 88 years    Imaging and prior endoscopies  As above    Assessment:  Danyelle Jacobs is a 88 y.o. Divehi-speaking female with medical history of iron deficiency anemia, GERD, CAD, bilateral carotid stenosis, CVA, HTN, myasthenia gravis, RA, renal cell carcinoma, and MGUS (minor) here with drop in Hgb to 5.1 that appears to be a slow chronic bleed given the lack of symptoms outside of general fatigue as well as no sign of active bleeding in her stool. Previous colonoscopy in 2011 was normal outside of diverticuli and a benign polyp; previous EGD in 2011 showed inflammation and linear erosions but no active bleeding. Patient does have a significant NSAID use for her RA that puts her at risk for PUD.     Recs:  Patient can resume 81 mg aspirin; please continue to hold clopidogrel   Continue protonix 40mg IV BID; recommend discontinuing NSAID use if possible  Intravascular resuscitation/support with IVFs   Serial H/H's and pRBCs transfusion as indicated  Patient can resume diet while waiting on clopidogrel hold; will need to be clear liquid the night before procedure and NPO after midnight  Plan for EGD once patient is off clopidogrel for 5 days; can be scheduled to be done as an outpatient if discharged  However in the setting of hemodynamic compromise from overt GI bleeding EGD will be done emergently   Please notify GI team if there is significant change in patient's clinical status    Discussed with staff; formal assessment to follow    Daren Tijerina MD  IM PGY-1  Pager: [phone]  1/18/2017, 7:11 AM

## 2017-01-24 NOTE — ANESTHESIA PREPROCEDURE EVALUATION
01/24/2017  Danyelle Jacobs is a 88 y.o., female.    [facility] Anesthesia Evaluation    I have reviewed the Patient Summary Reports.    I have reviewed the Nursing Notes.   I have reviewed the Medications.     Review of Systems  Anesthesia Hx:  No problems with previous Anesthesia    Cardiovascular:   Hypertension CABG/stent    Renal/:   Chronic Renal Disease    Hepatic/GI:   PUD, GERD, well controlled    Musculoskeletal:   Arthritis     Psych:   Psychiatric History          Physical Exam  General:  Well nourished    Airway/Jaw/Neck:  Airway Findings: Mouth Opening: Normal General Airway Assessment: Adult       Chest/Lungs:  Chest/Lungs Findings: Normal Respiratory Rate     Heart/Vascular:  Heart Findings: Rate: Normal  Rhythm: Regular Rhythm        Mental Status:  Mental Status Findings:  Cooperative         Anesthesia Plan  Type of Anesthesia, risks & benefits discussed:  Anesthesia Type:  general  Patient's Preference:   Intra-op Monitoring Plan:   Intra-op Monitoring Plan Comments:   Post Op Pain Control Plan:   Post Op Pain Control Plan Comments:   Induction:   IV  Beta Blocker:  Patient is not currently on a Beta-Blocker (No further documentation required).       Informed Consent: Patient understands risks and agrees with Anesthesia plan.  Questions answered. Anesthesia consent signed with patient.  ASA Score: 3     Day of Surgery Review of History & Physical:    H&P update referred to the surgeon.         Ready For Surgery From Anesthesia Perspective.     Pre-operative evaluation for Procedure(s) (LRB):  ESOPHAGOGASTRODUODENOSCOPY (EGD) (N/A)  COLONOSCOPY (N/A)    Danyelle Jacobs is a 88 y.o. female     Patient Active Problem List   Diagnosis    MG (myasthenia gravis)    CAD (coronary artery disease)    PAOD (peripheral arterial occlusive disease)    PUD (peptic ulcer disease)    IBS (irritable bowel  syndrome)    Mixed hyperlipidemia    Anxiety    Recurrent major depressive disorder, in partial remission    Fibromyalgia    Neuropathic pain of both legs    Bilateral carotid artery disease: 40-49% R 60-69% L 4/16    Vitamin D deficiency disease    Goiter, nontoxic, multinodular: ultrasound 2014    Adverse effect of oral bisphosphonates    Senile osteoporosis: see DEXA 2016, on Reclst per endo    MCI (mild cognitive impairment) with memory loss    Gastroesophageal reflux disease without esophagitis    Benign hypertension    OAB (overactive bladder)    Left ventricular diastolic dysfunction with preserved systolic function    Rheumatoid arthritis involving multiple sites with positive rheumatoid factor    Chronic kidney disease (CKD), stage I    Former smoker    Intractable tension-type headache    Anemia    Current chronic use of systemic steroids    Aortic atherosclerosis: see CXR 2012    Senile purpura    Chronic blood loss anemia       Review of patient's allergies indicates:   Allergen Reactions    Norvasc  [amlodipine]      Other reaction(s): Edema       No current facility-administered medications on file prior to encounter.      Current Outpatient Prescriptions on File Prior to Encounter   Medication Sig Dispense Refill    alprazolam (XANAX) 0.5 MG tablet Take 1 tablet (0.5 mg total) by mouth daily as needed. 90 tablet 0    aspirin 81 mg Tab Take 81 mg by mouth. 1 Tablet Oral Every day      atorvastatin (LIPITOR) 40 MG tablet Take 1 tablet (40 mg total) by mouth every evening. 90 tablet 3    DOC-Q-LACE 100 mg capsule TAKE 1 CAPSULE BY MOUTH TWICE DAILY 180 capsule 0    donepezil (ARICEPT) 10 MG tablet Take 1 tablet (10 mg total) by mouth once daily. 90 tablet 3    duloxetine (CYMBALTA) 30 MG capsule Take 1 capsule (30 mg total) by mouth once daily. 90 capsule 3    ferrous sulfate 325 (65 FE) MG EC tablet Take 1 tablet (325 mg total) by mouth 2 (two) times daily. 60 tablet 12     hydrALAZINE (APRESOLINE) 25 MG tablet Take 1 tablet (25 mg total) by mouth every 8 (eight) hours. 270 tablet 3    hydrochlorothiazide (MICROZIDE) 12.5 mg capsule Take 1 capsule (12.5 mg total) by mouth once daily. 90 capsule 3    hydrocodone-acetaminophen 5-325mg (NORCO) 5-325 mg per tablet Take 1 tablet by mouth 3 (three) times daily as needed for Pain. 90 tablet 0    [START ON 2/18/2017] hydrocodone-acetaminophen 5-325mg (NORCO) 5-325 mg per tablet Take 1 tablet by mouth 3 (three) times daily as needed for Pain. 90 tablet 0    [START ON 3/18/2017] hydrocodone-acetaminophen 5-325mg (NORCO) 5-325 mg per tablet Take 1 tablet by mouth 3 (three) times daily as needed for Pain. 90 tablet 0    isosorbide mononitrate (IMDUR) 30 MG 24 hr tablet Take 1 tablet (30 mg total) by mouth every evening. 90 tablet 0    losartan (COZAAR) 100 MG tablet Take 1 tablet (100 mg total) by mouth once daily. 90 tablet 3    pantoprazole (PROTONIX) 40 MG tablet Take 1 tablet (40 mg total) by mouth once daily. 90 tablet 3    predniSONE (DELTASONE) 10 MG tablet Take 1 tablet (10 mg total) by mouth once daily. 90 tablet 3    tolterodine (DETROL LA) 2 MG Cp24 Take 1 capsule (2 mg total) by mouth once daily. 90 capsule 3       Past Surgical History   Procedure Laterality Date    Cholecystectomy      Hysterectomy      Kidney surgery  2003    Nephrectomy      Cataract extraction w/  intraocular lens implant Bilateral        Social History     Social History    Marital status:      Spouse name: N/A    Number of children: N/A    Years of education: N/A     Occupational History    Not on file.     Social History Main Topics    Smoking status: Former Smoker     Quit date: 1/19/2015    Smokeless tobacco: Never Used    Alcohol use No    Drug use: No    Sexual activity: No     Other Topics Concern    Not on file     Social History Narrative         Vital Signs Range (Last 24H):         CBC: No results for input(s): WBC,  RBC, HGB, HCT, PLT, MCV, MCH, MCHC in the last 72 hours.    CMP: No results for input(s): NA, K, CL, CO2, BUN, CREATININE, GLU, MG, PHOS, CALCIUM, ALBUMIN, PROT, ALKPHOS, ALT, AST, BILITOT in the last 72 hours.    INR  No results for input(s): INR, PROTIME, APTT in the last 72 hours.    Invalid input(s): PT        Diagnostic Studies:      [facility] Echo:

## 2017-01-24 NOTE — OR NURSING
As per Dr. Worthington okay to resume plavix 75 mg , po on 1/24/17. Pt and family member verbalized understanding.

## 2017-01-24 NOTE — DISCHARGE INSTRUCTIONS
Colonoscopy     A camera attached to a flexible tube with a viewing lens is used to take video pictures.     Colonoscopy is used to view the inside of your lower digestive tract (colon and rectum). It can help screen for colon cancer and can help find the source of abdominal pain, bleeding, and changes in bowel habits. The test is usually done in the hospital on an outpatient basis. During the exam, the doctor can remove a small tissue sample ( a biopsy) for testing. Small growths, such as polyps, may also be removed during colonoscopy.  Getting ready   · Be sure to tell your doctor about any medications you take. Also tell your doctor about any health conditions you may have.  · Discuss the risks of the test with your doctor. These include bleeding and bowel puncture.  · Your rectum and colon must be empty for the test. So be sure to follow the diet and bowel prep instructions exactly. If you dont, the test may need to be rescheduled.  · Ask your doctor whether you need to have a friend or family member prepared to drive you home after the test.  During the test   · You are given sedating (relaxing) medication through an IV line. You may be drowsy or completely asleep.  · The procedure takes 30 minutes or longer.  · The doctor performs a digital rectal exam to check for anal and rectal problems. The rectum is lubricated and the scope inserted.  · If you are awake, you may have a feeling similar to needing to have a bowel movement. You may also feel pressure as air is pumped into the colon. Its OK to pass gas during the procedure.  After the test   ·      Colonoscopy provides an inside view of the entire colon.     You may discuss the results with your doctor right away or at a future visit.  · Try to pass all the gas right after the test to help prevent bloating and cramping.  · After the test, you can go back to your normal eating and other activities.  Risks and possible complications include:  · Bleeding · A  puncture or tear in the colon · Risks of anesthesia       © 4120-8193 [employer]. [address] [place] [address], [place] [address]. All rights reserved. This information is not intended as a substitute for professional medical care. Always follow your healthcare professional's instructions.        Upper GI Endoscopy     During endoscopy, a long, flexible tube is used to view the inside of your upper GI tract.      Upper GI endoscopy allows your healthcare provider to look directly into the beginning of your gastrointestinal (GI) tract. The esophagus, stomach, and duodenum (the first part of the small intestine) make up the upper GI tract.   Before the exam  Follow these and any other instructions you are given before your endoscopy. If you dont follow the healthcare providers instructions carefully, the test may need to be canceled or done over:  · Don't eat or drink anything after midnight the night before your exam. If your exam is in the afternoon, drink only clear liquids in the morning. Don't eat or drink anything for 8 hours before the exam. In some cases, you may be able to take medicines with sips of water until 2 hours before the procedure. Speak with your healthcare provider about this.   · Bring your X-rays and any other test results you have.  · Because you will be sedated, arrange for an adult to drive you home after the exam.  · Tell your healthcare provider before the exam if you are taking any medicines or have any medical problems.  The procedure  Here is what to expect:  · You will lie on the endoscopy table. Usually patients lie on the left side.  · You will be monitored and given oxygen.  · Your throat may be numbed with a spray or gargle. You are given medicine through an intravenous (IV) line that will help you relax and remain comfortable. You may be awake or asleep during the procedure.  · The healthcare provider will put the endoscope in your mouth and down your esophagus. It is  thinner than most pieces of food that you swallow. It will not affect your breathing. The medicine helps keep you from gagging.  · Air is put into your GI tract to expand it. It can make you burp.  · During the procedure, the healthcare provider can take biopsies (tissue samples), remove abnormalities, such as polyps, or treat abnormalities through a variety of devices placed through the endoscope. You will not feel this.   · The endoscope carries images of your upper GI tract to a video screen. If you are awake, you may be able to look at the images.  · After the procedure is done, you will rest for a time. An adult must drive you home.  When to call your healthcare provider  Contact your healthcare provider if you have:  · Black or tarry stools, or blood in your stool  · Fever  · Pain in your belly that does not go away  · Nausea and vomiting, or vomiting blood   © 8407-7218 [employer]. [address] [place] [address], [place] [address]. All rights reserved. This information is not intended as a substitute for professional medical care. Always follow your healthcare professional's instructions.

## 2017-01-24 NOTE — IP AVS SNAPSHOT
[facility]  [address]  [facility] [address]  Phone: [phone]           Patient Discharge Instructions     Our goal is to set you up for success. This packet includes information on your condition, medications, and your home care. It will help you to care for yourself so you don't get sicker and need to go back to the hospital.     Please ask your nurse if you have any questions.        There are many details to remember when preparing to leave the hospital. Here is what you will need to do:    1. Take your medicine. If you are prescribed medications, review your Medication List in the following pages. You may have new medications to  at the pharmacy and others that you'll need to stop taking. Review the instructions for how and when to take your medications. Talk with your doctor or nurses if you are unsure of what to do.     2. Go to your follow-up appointments. Specific follow-up information is listed in the following pages. Your may be contacted by a transition nurse or clinical provider about future appointments. Be sure we have all of the phone numbers to reach you, if needed. Please contact your provider's office if you are unable to make an appointment.     3. Watch for warning signs. Your doctor or nurse will give you detailed warning signs to watch for and when to call for assistance. These instructions may also include educational information about your condition. If you experience any of warning signs to your health, call your doctor.               [facility]  Unless otherwise directed by your provider, please contact [facility], our nurse care line that is available for 24/7 assistance.     [phone] (toll-free)    Registered nurses in the [facility] provide clinical advisement, health education, appointment booking, and other advisory services.                    ** Verify the list of medication(s) below is accurate and up  to date. Carry this with you in case of emergency. If your medications have changed, please notify your healthcare provider.             Medication List      CONTINUE taking these medications        Additional Info                      alprazolam 0.5 MG tablet   Commonly known as:  XANAX   Quantity:  90 tablet   Refills:  0   Dose:  0.5 mg    Instructions:  Take 1 tablet (0.5 mg total) by mouth daily as needed.     Begin Date    AM    Noon    PM    Bedtime       aspirin 81 mg Tab   Refills:  0   Dose:  81 mg    Instructions:  Take 81 mg by mouth. 1 Tablet Oral Every day     Begin Date    AM    Noon    PM    Bedtime       atorvastatin 40 MG tablet   Commonly known as:  LIPITOR   Quantity:  90 tablet   Refills:  3   Dose:  40 mg    Instructions:  Take 1 tablet (40 mg total) by mouth every evening.     Begin Date    AM    Noon    PM    Bedtime       DOC-Q-LACE 100 MG capsule   Quantity:  180 capsule   Refills:  0   Generic drug:  docusate sodium    Instructions:  TAKE 1 CAPSULE BY MOUTH TWICE DAILY     Begin Date    AM    Noon    PM    Bedtime       donepezil 10 MG tablet   Commonly known as:  ARICEPT   Quantity:  90 tablet   Refills:  3   Dose:  10 mg    Instructions:  Take 1 tablet (10 mg total) by mouth once daily.     Begin Date    AM    Noon    PM    Bedtime       duloxetine 30 MG capsule   Commonly known as:  CYMBALTA   Quantity:  90 capsule   Refills:  3   Dose:  30 mg    Instructions:  Take 1 capsule (30 mg total) by mouth once daily.     Begin Date    AM    Noon    PM    Bedtime       ferrous sulfate 325 (65 FE) MG EC tablet   Quantity:  60 tablet   Refills:  12   Dose:  325 mg    Instructions:  Take 1 tablet (325 mg total) by mouth 2 (two) times daily.     Begin Date    AM    Noon    PM    Bedtime       hydrALAZINE 25 MG tablet   Commonly known as:  APRESOLINE   Quantity:  270 tablet   Refills:  3   Dose:  25 mg    Instructions:  Take 1 tablet (25 mg total) by mouth every 8 (eight) hours.     Begin Date    AM     Noon    PM    Bedtime       hydrochlorothiazide 12.5 mg capsule   Commonly known as:  MICROZIDE   Quantity:  90 capsule   Refills:  3   Dose:  12.5 mg    Instructions:  Take 1 capsule (12.5 mg total) by mouth once daily.     Begin Date    AM    Noon    PM    Bedtime       * hydrocodone-acetaminophen 5-325mg 5-325 mg per tablet   Commonly known as:  NORCO   Quantity:  90 tablet   Refills:  0   Dose:  1 tablet    Instructions:  Take 1 tablet by mouth 3 (three) times daily as needed for Pain.     Begin Date    AM    Noon    PM    Bedtime       * hydrocodone-acetaminophen 5-325mg 5-325 mg per tablet   Commonly known as:  NORCO   Quantity:  90 tablet   Refills:  0   Dose:  1 tablet    Start Date:  2/18/2017   Instructions:  Take 1 tablet by mouth 3 (three) times daily as needed for Pain.     Begin Date    AM    Noon    PM    Bedtime       * hydrocodone-acetaminophen 5-325mg 5-325 mg per tablet   Commonly known as:  NORCO   Quantity:  90 tablet   Refills:  0   Dose:  1 tablet    Start Date:  3/18/2017   Instructions:  Take 1 tablet by mouth 3 (three) times daily as needed for Pain.     Begin Date    AM    Noon    PM    Bedtime       isosorbide mononitrate 30 MG 24 hr tablet   Commonly known as:  IMDUR   Quantity:  90 tablet   Refills:  0   Dose:  30 mg    Instructions:  Take 1 tablet (30 mg total) by mouth every evening.     Begin Date    AM    Noon    PM    Bedtime       losartan 100 MG tablet   Commonly known as:  COZAAR   Quantity:  90 tablet   Refills:  3   Dose:  100 mg    Instructions:  Take 1 tablet (100 mg total) by mouth once daily.     Begin Date    AM    Noon    PM    Bedtime       pantoprazole 40 MG tablet   Commonly known as:  PROTONIX   Quantity:  90 tablet   Refills:  3   Dose:  40 mg    Instructions:  Take 1 tablet (40 mg total) by mouth once daily.     Begin Date    AM    Noon    PM    Bedtime       predniSONE 10 MG tablet   Commonly known as:  DELTASONE   Quantity:  90 tablet   Refills:  3   Dose:  10  mg    Instructions:  Take 1 tablet (10 mg total) by mouth once daily.     Begin Date    AM    Noon    PM    Bedtime       tolterodine 2 MG Cp24   Commonly known as:  DETROL LA   Quantity:  90 capsule   Refills:  3   Dose:  2 mg    Instructions:  Take 1 capsule (2 mg total) by mouth once daily.     Begin Date    AM    Noon    PM    Bedtime       * Notice:  This list has 3 medication(s) that are the same as other medications prescribed for you. Read the directions carefully, and ask your doctor or other care provider to review them with you.      ASK your doctor about these medications        Additional Info                      clopidogrel 75 mg tablet   Commonly known as:  PLAVIX   Refills:  0   Dose:  75 mg    Instructions:  Take 75 mg by mouth once daily.     Begin Date    AM    Noon    PM    Bedtime                  Please bring to all follow up appointments:    1. A copy of your discharge instructions.  2. All medicines you are currently taking in their original bottles.  3. Identification and insurance card.    Please arrive 15 minutes ahead of scheduled appointment time.    Please call 24 hours in advance if you must reschedule your appointment and/or time.        Your Scheduled Appointments     Jan 30, 2017 11:00 AM CST   Carotid Ultrasound with VASCULAR, CARDIOLOGY   [facility] - Vascular Cardiology ([facility] )    [address]  [place] [address]   [phone]            Feb 13, 2017 10:00 AM CST   Non-Fasting Lab with LAB, APPOINTMENT [facility] ([facility])    [address]  [place] [address]   [phone]            Feb 13, 2017 10:30 AM CST   Established Patient Visit with Bianca Dykes MD   [facility] - Internal Medicine ([facility])    [address]  [place] [address]   [phone]                Discharge Instructions     Future Orders    Activity as tolerated     Call  MD for:  difficulty breathing, headache or visual disturbances     Call MD for:  extreme fatigue     Call MD for:  hives     Call MD for:  persistent dizziness or light-headedness     Call MD for:  persistent nausea and vomiting     Call MD for:  redness, tenderness, or signs of infection (pain, swelling, redness, odor or green/yellow discharge around incision site)     Call MD for:  severe uncontrolled pain     Call MD for:  temperature >100.4     Diet general     Questions:    Total calories:      Fat restriction, if any:      Protein restriction, if any:      Na restriction, if any:      Fluid restriction:      Additional restrictions:          Discharge Instructions         Colonoscopy     A camera attached to a flexible tube with a viewing lens is used to take video pictures.     Colonoscopy is used to view the inside of your lower digestive tract (colon and rectum). It can help screen for colon cancer and can help find the source of abdominal pain, bleeding, and changes in bowel habits. The test is usually done in the hospital on an outpatient basis. During the exam, the doctor can remove a small tissue sample ( a biopsy) for testing. Small growths, such as polyps, may also be removed during colonoscopy.  Getting ready   · Be sure to tell your doctor about any medications you take. Also tell your doctor about any health conditions you may have.  · Discuss the risks of the test with your doctor. These include bleeding and bowel puncture.  · Your rectum and colon must be empty for the test. So be sure to follow the diet and bowel prep instructions exactly. If you dont, the test may need to be rescheduled.  · Ask your doctor whether you need to have a friend or family member prepared to drive you home after the test.  During the test   · You are given sedating (relaxing) medication through an IV line. You may be drowsy or completely asleep.  · The procedure takes 30 minutes or longer.  · The doctor performs a  digital rectal exam to check for anal and rectal problems. The rectum is lubricated and the scope inserted.  · If you are awake, you may have a feeling similar to needing to have a bowel movement. You may also feel pressure as air is pumped into the colon. Its OK to pass gas during the procedure.  After the test   ·      Colonoscopy provides an inside view of the entire colon.     You may discuss the results with your doctor right away or at a future visit.  · Try to pass all the gas right after the test to help prevent bloating and cramping.  · After the test, you can go back to your normal eating and other activities.  Risks and possible complications include:  · Bleeding · A puncture or tear in the colon · Risks of anesthesia       © 4761-3416 [employer]. [address] [place] [address], [place] [address]. All rights reserved. This information is not intended as a substitute for professional medical care. Always follow your healthcare professional's instructions.        Upper GI Endoscopy     During endoscopy, a long, flexible tube is used to view the inside of your upper GI tract.      Upper GI endoscopy allows your healthcare provider to look directly into the beginning of your gastrointestinal (GI) tract. The esophagus, stomach, and duodenum (the first part of the small intestine) make up the upper GI tract.   Before the exam  Follow these and any other instructions you are given before your endoscopy. If you dont follow the healthcare providers instructions carefully, the test may need to be canceled or done over:  · Don't eat or drink anything after midnight the night before your exam. If your exam is in the afternoon, drink only clear liquids in the morning. Don't eat or drink anything for 8 hours before the exam. In some cases, you may be able to take medicines with sips of water until 2 hours before the procedure. Speak with your healthcare provider about this.   · Bring your X-rays and any other  test results you have.  · Because you will be sedated, arrange for an adult to drive you home after the exam.  · Tell your healthcare provider before the exam if you are taking any medicines or have any medical problems.  The procedure  Here is what to expect:  · You will lie on the endoscopy table. Usually patients lie on the left side.  · You will be monitored and given oxygen.  · Your throat may be numbed with a spray or gargle. You are given medicine through an intravenous (IV) line that will help you relax and remain comfortable. You may be awake or asleep during the procedure.  · The healthcare provider will put the endoscope in your mouth and down your esophagus. It is thinner than most pieces of food that you swallow. It will not affect your breathing. The medicine helps keep you from gagging.  · Air is put into your GI tract to expand it. It can make you burp.  · During the procedure, the healthcare provider can take biopsies (tissue samples), remove abnormalities, such as polyps, or treat abnormalities through a variety of devices placed through the endoscope. You will not feel this.   · The endoscope carries images of your upper GI tract to a video screen. If you are awake, you may be able to look at the images.  · After the procedure is done, you will rest for a time. An adult must drive you home.  When to call your healthcare provider  Contact your healthcare provider if you have:  · Black or tarry stools, or blood in your stool  · Fever  · Pain in your belly that does not go away  · Nausea and vomiting, or vomiting blood   © 4768-9943 [employer]. [address] [place] [address], [place] [address]. All rights reserved. This information is not intended as a substitute for professional medical care. Always follow your healthcare professional's instructions.            Admission Information     Date & Time Provider Department CSN    1/24/2017  9:35 AM Kristofer Worthington MD [facility]  "[id number]      Care Providers     Provider Role Specialty Primary office phone    Kristofer Worthington MD Attending Provider Gastroenterology [phone]      Your Vitals Were     BP Pulse Temp Resp Height Weight    117/50 (BP Location: Left arm, Patient Position: Lying, BP Method: Automatic) 72 98.7 °F (37.1 °C) 16 4' 10" (1.473 m) 59.9 kg (132 lb)    SpO2 BMI             94% 27.59 kg/m2         Recent Lab Values        6/7/2005 4/10/2006 8/21/2006 5/7/2007 4/28/2008 5/4/2009 11/22/2010 3/21/2011      8:40 AM  7:15 AM  7:59 AM  9:25 AM  9:04 AM  9:08 AM 10:50 AM  9:50 AM    A1C 5.8 6.3 (H) 6.3 (H) 6.0 6.2 6.2 5.1 5.3      Allergies as of 1/24/2017        Reactions    Norvasc  [Amlodipine]     Other reaction(s): Edema      Advance Directives     An advance directive is a document which, in the event you are no longer able to make decisions for yourself, tells your healthcare team what kind of treatment you do or do not want to receive, or who you would like to make those decisions for you.  If you do not currently have an advance directive, [facility] encourages you to create one.  For more information call:  [phone]), [phone] ([phone]),  or log on to [email or web address].        Smoking Cessation     If you would like to quit smoking:   You may be eligible for free services if you are a [place] resident and started smoking cigarettes before September 1, 1988.  Call the Smoking Cessation Trust (SCT) toll free at (932) 217-0915 or (158) 708-6644.   Call 5-521-QUIT-NOW if you do not meet the above criteria.            Language Assistance Services     ATTENTION: Language assistance services are available, free of charge. Please call [phone].      ATENCIÓN: Si habla español, tiene a pate disposición servicios gratuitos de asistencia lingüística. Llame al [phone].     RODO Ý: N?u b?n nói Ti?ng Vi?t, có các d?ch v? h? tr? ngôn ng? mi?n phí cristhianh cho b?n. G?i s? [phone].      "   Stroke Education              Heart Failure Education       Heart Failure: Being Active  You have a condition called heart failure. Being active doesnt mean that you have to wear yourself out. Even a little movement each day helps to strengthen your heart. If you cant get out to exercise, you can do simple stretching and strengthening exercises at home. These are good ways to keep you well-conditioned and prevent you and your heart from becoming excessively weak.    Ideas to get you started  · Add a little movement to things you do now. Walk to mail letters. Park your car at the far end of the parking lot and walk to the store. Walk up a flight of stairs instead of taking the elevator.  · Choose activities you enjoy. You might walk, swim, or ride an exercise bike. Things like gardening and washing the car count, too. Other possibilities include: washing dishes, walking the dog, walking around the mall, and doing aerobic activities with friends.  · Join a group exercise program at a [facility], a senior center, or a community center. Or look into a hospital cardiac rehabilitation program. Ask your doctor if you qualify.  Tips to keep you going  · Get up and get dressed each day. Go to a coffee shop and read a newspaper or go somewhere that you'll be in the presence of other active people. Youll feel more like being active.  · Make a plan. Choose one or more activities that you enjoy and that you can easily do. Then plan to do at least one each day. You might write your plan on a calendar.  · Go with a friend or a group if you like company. This can help you feel supported and stay motivated, too.  · Plan social events that you enjoy. This will keep you mentally engaged as well as physically motivated to do things you find pleasure in.  For your safety  · Talk with your healthcare provider before starting an exercise program.  · Exercise indoors when its too hot or too cold outside, or when the air quality is  poor. Try walking at a shopping mall.  · Wear socks and sturdy shoes to maintain your balance and prevent falls.  · Start slowly. Do a few minutes several times a day at first. Increase your time and speed little by little.  · Stop and rest whenever you feel tired or get short of breath.  · Dont push yourself on days when you dont feel well.  © 0922-4785 [employer]. [address] [place] [address], [place] [address]. All rights reserved. This information is not intended as a substitute for professional medical care. Always follow your healthcare professional's instructions.              Heart Failure: Evaluating Your Heart  You have a condition called heart failure. To evaluate your condition, your doctor will examine you, ask questions, and do some tests. Along with looking for signs of heart failure, the doctor looks for any other health problems that may have led to heart failure. The results of your evaluation will help your doctor form a treatment plan.  Health history and physical exam  Your visit will start with a health history. Tell the doctor about any symptoms youve noticed and about all medicines you take. Then youll have a physical exam. This includes listening to your heartbeat and breathing. Youll also be checked for swelling (edema) in your legs and neck. When you have fluid buildup or fluid in the lungs, it may be called congestive heart failure.  Diagnosing heart failure     During an echocardiogram, sound waves bounce off the heart. These are converted into a picture on the screen.   The following may be done to help your doctor form a diagnosis:  · X-rays show the size and shape of your heart. These pictures can also show fluid in your lungs.  · An electrocardiogram (ECG or EKG) shows the pattern of your heartbeat. Small pads (electrodes) are placed on your chest, arms, and legs. Wires connect the pads to the ECG machine, which records your hearts electrical signals. This can give the  doctor information about heart function.  · An echocardiogram uses ultrasound waves to show the structure and movement of your heart muscle. This shows how well the heart pumps. It also shows the thickness of the heart walls, and if the heart is enlarged. It is one of the most useful, non-invasive tests as it provides information about the heart's general function. This helps your doctor make treatment decisions.  · Lab tests evaluate small amounts of blood or urine for signs of problems. A BNP lab test can help diagnose and evaluate heart failure. BNP stands for B-type natriuretic peptide. The ventricles secrete more BNP when heart failure worsens. Lab tests can also provide information about metabolic dysfunction or heart dysfunction.  Your treatment plan  Based on the results of your evaluation and tests, your doctor will develop a treatment plan. This plan is designed to relieve some of your heart failure symptoms and help make you more comfortable. Your treatment plan may include:  · Medicine to help your heart work better and improve your quality of life  · Changes in what you eat and drink to help prevent fluid from backing up in your body  · Daily monitoring of your weight and heart failure symptoms to see how well your treatment plan is working  · Exercise to help you stay healthy  · Help with quitting smoking  · Emotional and psychological support to help adjust to the changes  · Referrals to other specialists to make sure you are being treated comprehensively  © 3984-0985 [employer]. [address] [place] [address], [place] [address]. All rights reserved. This information is not intended as a substitute for professional medical care. Always follow your healthcare professional's instructions.              Heart Failure: Making Changes to Your Diet  You have a condition called heart failure. When you have heart failure, excess fluid is more likely to build up in your body because your heart isn't  working well. This makes the heart work harder to pump blood. Fluid buildup causes symptoms such as shortness of breath and swelling (edema). This is often referred to as congestive heart failure or CHF. Controlling the amount of salt (sodium) you eat may help stop fluid from building up. Your doctor may also tell you to reduce the amount of fluid you drink.  Reading food labels    Your healthcare provider will tell you how much sodium you can eat each day. Read food labels to keep track. Keep in mind that certain foods are high in salt. These include canned, frozen, and processed foods. Check the amount of sodium in each serving. Watch out for high-sodium ingredients. These include MSG (monosodium glutamate), baking soda, and sodium phosphate.   Eating less salt  Give yourself time to get used to eating less salt. It may take a little while. Here are some tips to help:  · Take the saltshaker off the table. Replace it with salt-free herb mixes and spices.  · Eat fresh or plain frozen vegetables. These have much less salt than canned vegetables.  · Choose low-sodium snacks like sodium-free pretzels, crackers, or air-popped popcorn.  · Dont add salt to your food when youre cooking. Instead, season your foods with pepper, lemon, garlic, or onion.  · When you eat out, ask that your food be cooked without added salt.  · Avoid eating fried foods as these often have a great deal of salt.  If youre told to limit fluids  You may need to limit how much fluid you have to help prevent swelling. This includes anything that is liquid at room temperature, such as ice cream and soup. If your doctor tells you to limit fluid, try these tips:  · Measure drinks in a measuring cup before you drink them. This will help you meet daily goals.  · Chill drinks to make them more refreshing.  · Suck on frozen lemon wedges to quench thirst.  · Only drink when youre thirsty.  · Chew sugarless gum or suck on hard candy to keep your mouth  moist.  · Weigh yourself daily to know if your body's fluid content is rising.  My sodium goal  Your healthcare provider may give you a sodium goal to meet each day. This includes sodium found in food as well as salt that you add. My goal is to eat no more than ___________ mg of sodium per day.     When to call your doctor  Call your doctor right away if you have any symptoms of worsening heart failure. These can include:  · Sudden weight gain  · Increased swelling of your legs or ankles  · Trouble breathing when youre resting or at night  · Increase in the number of pillows you have to sleep on  · Chest pain, pressure, discomfort, or pain in the jaw, neck, or back   © 0544-6695 [employer]. [address] [place] [address], [place] [address]. All rights reserved. This information is not intended as a substitute for professional medical care. Always follow your healthcare professional's instructions.              Heart Failure: Medicines to Help Your Heart    You have a condition called heart failure (also known as congestive heart failure, or CHF). Your doctor will likely prescribe medicines for heart failure and any underlying health problems you have. Most heart failure patients take one or more types of medicinen. Your healthcare provider will work to find the combination of medicines that works best for you.  Heart failure medicines  Here are the most common heart failure medicines:  · ACE inhibitors lower blood pressure and decrease strain on the heart. This makes it easier for the heart to pump. Angiotensin receptor blockers have similar effects. These are prescribed for some patients instead of ACE inhibitors.  · Beta-blockers relieve stress on the heart. They also improve symptoms. They may also improve the heart's pumping action over time.  · Diuretics (also called water pills) help rid your body of excess water. This can help rid your body of swelling (edema). Having less fluid to pump means your  heart doesnt have to work as hard. Some diuretics make your body lose a mineral called potassium. Your doctor will tell you if you need to take supplements or eat more foods high in potassium.  · Digoxin helps your heart pump with more strength. This helps your heart pump more blood with each beat. So, more oxygen-rich blood travels to the rest of the body.  · Aldosterone antagonists help alter hormones and decrease strain on the heart.  · Hydralazine and nitrates are two separate medicines used together to treat heart failure. They may come in one combination pill. They lower blood pressure and decrease how hard the heart has to pump.  Medicines for related conditions  Controlling other heart problems helps keep heart failure under control, too. Depending on other heart problems you have, medicines may be prescribed to:  · Lower blood pressure (antihypertensives).  · Lower cholesterol levels (statins).  · Prevent blood clots (anticoagulants or aspirin).  · Keep the heartbeat steady (antiarrhythmics).  © 2994-2423 [employer]. [address] [place] [address], [place] [address]. All rights reserved. This information is not intended as a substitute for professional medical care. Always follow your healthcare professional's instructions.              Heart Failure: Procedures That May Help    The heart is a muscle that pumps oxygen-rich blood to all parts of the body. When you have heart failure, the heart is not able to pump as well as it should. Blood and fluid may back up into the lungs (congestive heart failure), and some parts of the body dont get enough oxygen-rich blood to work normally. These problems lead to the symptoms of heart failure.     Certain procedures may help the heart pump better in some cases of heart failure. Some procedures are done to treat health problems that may have caused the heart failure such as coronary artery disease or heart rhythm problems. For more serious heart failure,  other options are available.  Treating artery and valve problems  If you have coronary artery disease or valve disease, procedures may be done to improve blood flow. This helps the heart pump better, which can improve heart failure symptoms. First, your doctor may do a cardiac catheterization to help detect clogged blood vessels or valve damage. During this procedure, a  thin tube (catheter) in inserted into a blood vessel and guided to the heart. There a dye is injected and a special type of X-ray (angiogram) is taken of the blood vessels. Procedures to open a blocked artery or fix damaged valves can also be done using catheterization.  · Angioplasty uses a balloon-tipped instrument at the end of the catheter. The balloon is inflated to widen the narrowed artery. In many cases, a stent is expanded to further support the narrowed artery. A stent is a metal mesh tube.  · Valve surgery repairs or replacement of faulty valves can also be done during catheterization so blood can flow properly through the chambers of the heart.  Bypass surgery is another option to help treat blocked arteries. It uses a healthy blood vessel from elsewhere in the body. The healthy blood vessel is attached above and below the blocked area so that blood can flow around the blocked artery.  Treating heart rhythm problems  A device may be placed in the chest to help a weak heart maintain a healthy, heartbeat so the heart can pump more effectively:  · Pacemaker. A pacemaker is an implanted device that regulates your heartbeat electronically. It monitors your heart's rhythm and generates a painless electric impulse that helps the heart beat in a regular rhythm. A pacemaker is programmed to meet your specific heart rhythm needs.  · Biventricular pacing/cardiac resynchronization therapy. A type of pacemaker that paces both pumping chambers of the heart at the same time to coordinate contractions and to improve the heart's function. Some people  with heart failure are candidates for this therapy.  · Implantable cardioverter defibrillator. A device similar to a pacemaker that senses when the heart is beating too fast and delivers an electrical shock to convert the fast rhythm to a normal rhythm. This can be a life saving device.  In severe cases  In more serious cases of heart failure when other treatments no longer work, other options may include:  · Ventricular assist devices (VADs). These are mechanical devices used to take over the pumping function for one or both of the heart's ventricles, or pumping chambers. A VAD may be necessary when heart failure progresses to the point that medicines and other treatments no longer help. In some cases, a VAD may be used as a bridge to transplant.  · Heart transplant. This is replacing the diseased heart with a healthy one from a donor. This is an option for a few people who are very sick. A heart transplant is very serious and not an option for all patients. Your doctor can tell you more.  © 2045-6625 [employer]. [address] [place] [address]. All rights reserved. This information is not intended as a substitute for professional medical care. Always follow your healthcare professional's instructions.              Heart Failure: Tracking Your Weight  You have a condition called heart failure. When you have heart failure, a sudden weight gain or a steady rise in weight is a warning sign that your body is retaining too much water and salt. This could mean your heart failure is getting worse. If left untreated, it can cause problems for your lungs and result in shortness of breath. Weighing yourself each day is the best way to know if youre retaining water. If your weight goes up quickly, call your doctor. You will be given instructions on how to get rid of the excess water. You will likely need medicines and to avoid salt. This will help your heart work better.  Call your doctor if you gain  more than 2 pounds in 1 day, more than 5 pounds in 1 week, or whatever weight gain you were told to report by your doctor. This is often a sign of worsening heart failure and needs to be evaluated and treated. Your doctor will tell you what to do next.   Tips for weighing yourself    · Weigh yourself at the same time each morning, wearing the same clothes. Weigh yourself after urinating and before eating.  · Use the same scale each day. Make sure the numbers are easy to read. Put the scale on a flat, hard surface -- not on a rug or carpet.  · Do not stop weighing yourself. If you forget one day, weigh again the next morning.  How to use your weight chart  · Keep your weight chart near the scale. Write your weight on the chart as soon as you get off the scale.  · Fill in the month and the start date on the chart. Then write down your weight each day. Your chart will look like this:    · If you miss a day, leave the space blank. Weigh yourself the next day and write your weight in the next space.  · Take your weight chart with you when you go to see your doctor.  © 1372-0776 [employer]. [address] [place] [address], [place] [address]. All rights reserved. This information is not intended as a substitute for professional medical care. Always follow your healthcare professional's instructions.              Heart Failure: Warning Signs of a Flare-Up  You have a condition called heart failure. Once you have heart failure, flare-ups can happen. Below are signs that can mean your heart failure is getting worse. If you notice any of these warning signs, call your healthcare provider.  Swelling    · Your feet, ankles, or lower legs get puffier.  · You notice skin changes on your lower legs.  · Your shoes feel too tight.  · Your clothes are tighter in the waist.  · You have trouble getting rings on or off your fingers.  Shortness of breath  · You have to breathe harder even when youre doing your normal activities or  when youre resting.  · You are short of breath walking up stairs or even short distances.  · You wake up at night short of breath or coughing.  · You need to use more pillows or sit up to sleep.  · You wake up tired or restless.  Other warning signs  · You feel weaker, dizzy, or more tired.  · You have chest pain or changes in your heartbeat.  · You have a cough that wont go away.  · You cant remember things or dont feel like eating.  Tracking your weight  Gaining weight is often the first warning sign that heart failure is getting worse. Gaining even a few pounds can be a sign that your body is retaining excess water and salt. Weighing yourself each day in the morning after you urinate and before you eat, is the best way to know if you're retaining water. Get a scale that is easy to read and make sure you wear the same clothes and use the same scale every time you weigh. Your healthcare provider will show you how to track your weight. Call your doctor if you gain more than 2 pounds in 1 day, 5 pounds in 1 week, or whatever weight gain you were told to report by your doctor. This is often a sign of worsening heart failure and needs to be evaluated and treated before it compromises your breathing. Your doctor will tell you what to do next.    © 5130-4468 [employer]. [address] [place] [address], [place] [address]. All rights reserved. This information is not intended as a substitute for professional medical care. Always follow your healthcare professional's instructions.              Chronic [place]ey Disease Education              [facility] complies with applicable Federal civil rights laws and does not discriminate on the basis of race, color, national origin, age, disability, or sex.

## 2017-01-30 ENCOUNTER — CLINICAL SUPPORT (OUTPATIENT)
Dept: CARDIOLOGY | Facility: CLINIC | Age: 82
End: 2017-01-30
Payer: MEDICARE

## 2017-01-30 DIAGNOSIS — I77.9 BILATERAL CAROTID ARTERY DISEASE: ICD-10-CM

## 2017-01-30 DIAGNOSIS — I67.2 CEREBRAL ATHEROSCLEROSIS: ICD-10-CM

## 2017-01-30 LAB — INTERNAL CAROTID STENOSIS: ABNORMAL

## 2017-01-30 PROCEDURE — 93880 EXTRACRANIAL BILAT STUDY: CPT | Mod: S$GLB,,, | Performed by: INTERNAL MEDICINE

## 2017-02-05 NOTE — DISCHARGE SUMMARY
"[facility] Hospital Medicine  Discharge Summary      Patient Name: Danyelle Jacobs  MRN: [id number]  Admission Date: 1/17/2017  Hospital Length of Stay: 1 days  Discharge Date and Time: 1/18/2017  5:32 PM  Attending Physician: No att. providers found   Discharging Provider: Jose Thrasher MD  Primary Care Provider: Bianca Dykes MD    Hospital Medicine Team[place] [facility]  Jose Thrasher MD    HPI:     Ms. Jacobs is a very nice [place] speaking lady with a past medical history of RCCA, HTN, RA and MGUS. She also has a history of iron deficiency anemia for which she used to take iron supplementation, though it was stopped. She is accompanied by her sister-in-law, who assists with her history despite not being able to speak [place]. Apparently she has been progressively more "tired feeling" over the past few months, though she denies any new shortness of breath, SORENSON or dizziness. She has been passing her bowels normally with no abdominal pain. She denies any blood in her stool or dark, tarry stools, stating, "they've just been brown."     Her family member relates to me that for many years she has been taking chronic liqui-gel Advil's, two in the morning and two at night for her RA. Recently she has added a third to lunch. In addition, her son is now at home after being institutionalized for many years for cognitive delay, due to newly diagnosed brain cancer, all of which has been affecting her mood, diet and stress.      * No surgery found *      Indwelling Lines/Drains at time of discharge:   Lines/Drains/Airways          No matching active lines, drains, or airways        Hospital Course:     She responded well to transfusion and had no signs of blood loss.  She was seen by GI who felt like she may have chronic GI losses but no acute bleed.  She was asked to hold her plavix for five days and will return as an outpatient for EGD next week with GI.  She will follow with her PCP.    Consults: "   Consults         Status Ordering Provider     Inpatient consult to Gastroenterology  Once     Provider:  (Not yet assigned)    Final result LADARIUS DELGADO            Pending Diagnostic Studies:     None        Final Active Diagnoses:    Diagnosis Date Noted POA    PRINCIPAL PROBLEM:  Chronic blood loss anemia [D50.0] 01/17/2017 Yes      Problems Resolved During this Admission:    Diagnosis Date Noted Date Resolved POA      Discharged Condition: good    Disposition: Home or Self Care    Follow Up:    Patient Instructions:     Diet general       Medications:  Reconciled Home Medications:   Discharge Medication List as of 1/18/2017  4:02 PM      START taking these medications    Details   ferrous sulfate 325 (65 FE) MG EC tablet Take 1 tablet (325 mg total) by mouth once daily., Starting 1/18/2017, Until Discontinued, OTC         CONTINUE these medications which have NOT CHANGED    Details   alprazolam (XANAX) 0.5 MG tablet Take 1 tablet (0.5 mg total) by mouth daily as needed., Starting 1/17/2017, Until Discontinued, Print      aspirin 81 mg Tab Take 81 mg by mouth. 1 Tablet Oral Every day, Until Discontinued, Historical Med      atorvastatin (LIPITOR) 40 MG tablet Take 1 tablet (40 mg total) by mouth every evening., Starting 1/17/2017, Until Discontinued, Normal      DOC-Q-LACE 100 mg capsule TAKE 1 CAPSULE BY MOUTH TWICE DAILY, Normal      donepezil (ARICEPT) 10 MG tablet Take 1 tablet (10 mg total) by mouth once daily., Starting 1/17/2017, Until Wed 1/17/18, Normal      duloxetine (CYMBALTA) 30 MG capsule Take 1 capsule (30 mg total) by mouth once daily., Starting 1/17/2017, Until Discontinued, Normal      hydrALAZINE (APRESOLINE) 25 MG tablet Take 1 tablet (25 mg total) by mouth every 8 (eight) hours., Starting 1/17/2017, Until Discontinued, Normal      hydrochlorothiazide (MICROZIDE) 12.5 mg capsule Take 1 capsule (12.5 mg total) by mouth once daily., Starting 10/4/2016, Until Discontinued, Normal      !!  hydrocodone-acetaminophen 5-325mg (NORCO) 5-325 mg per tablet Take 1 tablet by mouth 3 (three) times daily as needed for Pain., Starting 1/18/2017, Until Discontinued, Print      !! hydrocodone-acetaminophen 5-325mg (NORCO) 5-325 mg per tablet Take 1 tablet by mouth 3 (three) times daily as needed for Pain., Starting 2/18/2017, Until Discontinued, Print      !! hydrocodone-acetaminophen 5-325mg (NORCO) 5-325 mg per tablet Take 1 tablet by mouth 3 (three) times daily as needed for Pain., Starting 3/18/2017, Until Discontinued, Print      isosorbide mononitrate (IMDUR) 30 MG 24 hr tablet Take 1 tablet (30 mg total) by mouth every evening., Starting 1/17/2017, Until Discontinued, Normal      losartan (COZAAR) 100 MG tablet Take 1 tablet (100 mg total) by mouth once daily., Starting 1/17/2017, Until Discontinued, Normal      pantoprazole (PROTONIX) 40 MG tablet Take 1 tablet (40 mg total) by mouth once daily., Starting 1/17/2017, Until Discontinued, Normal      predniSONE (DELTASONE) 10 MG tablet Take 1 tablet (10 mg total) by mouth once daily., Starting 1/17/2017, Until Discontinued, Normal      tolterodine (DETROL LA) 2 MG Cp24 Take 1 capsule (2 mg total) by mouth once daily., Starting 1/17/2017, Until Discontinued, Normal       !! - Potential duplicate medications found. Please discuss with provider.      STOP taking these medications       clopidogrel (PLAVIX) 75 mg tablet Comments:   Reason for Stopping:             Time spent on the discharge of patient: 25 minutes    Jose Thrasher MD  Department of Hospital Medicine  [facility]

## 2017-02-13 ENCOUNTER — LAB VISIT (OUTPATIENT)
Dept: LAB | Facility: HOSPITAL | Age: 82
End: 2017-02-13
Attending: INTERNAL MEDICINE
Payer: MEDICARE

## 2017-02-13 ENCOUNTER — TELEPHONE (OUTPATIENT)
Dept: INTERNAL MEDICINE | Facility: CLINIC | Age: 82
End: 2017-02-13

## 2017-02-13 ENCOUNTER — OFFICE VISIT (OUTPATIENT)
Dept: INTERNAL MEDICINE | Facility: CLINIC | Age: 82
End: 2017-02-13
Payer: MEDICARE

## 2017-02-13 VITALS — DIASTOLIC BLOOD PRESSURE: 60 MMHG | SYSTOLIC BLOOD PRESSURE: 120 MMHG

## 2017-02-13 DIAGNOSIS — Z79.52 CURRENT CHRONIC USE OF SYSTEMIC STEROIDS: ICD-10-CM

## 2017-02-13 DIAGNOSIS — D62 ANEMIA ASSOCIATED WITH ACUTE BLOOD LOSS: Primary | ICD-10-CM

## 2017-02-13 DIAGNOSIS — N18.1 CHRONIC KIDNEY DISEASE (CKD), STAGE I: ICD-10-CM

## 2017-02-13 DIAGNOSIS — M05.79 RHEUMATOID ARTHRITIS INVOLVING MULTIPLE SITES WITH POSITIVE RHEUMATOID FACTOR: ICD-10-CM

## 2017-02-13 DIAGNOSIS — I77.9 BILATERAL CAROTID ARTERY DISEASE: ICD-10-CM

## 2017-02-13 DIAGNOSIS — E87.6 HYPOKALEMIA: ICD-10-CM

## 2017-02-13 DIAGNOSIS — K21.9 GASTROESOPHAGEAL REFLUX DISEASE WITHOUT ESOPHAGITIS: ICD-10-CM

## 2017-02-13 DIAGNOSIS — K92.1 GASTROINTESTINAL HEMORRHAGE WITH MELENA: ICD-10-CM

## 2017-02-13 DIAGNOSIS — K31.819 ANGIODYSPLASIA OF STOMACH: ICD-10-CM

## 2017-02-13 DIAGNOSIS — F33.41 RECURRENT MAJOR DEPRESSIVE DISORDER, IN PARTIAL REMISSION: ICD-10-CM

## 2017-02-13 PROBLEM — D50.0 CHRONIC BLOOD LOSS ANEMIA: Status: RESOLVED | Noted: 2017-01-17 | Resolved: 2017-02-13

## 2017-02-13 LAB
ANION GAP SERPL CALC-SCNC: 10 MMOL/L
ANISOCYTOSIS BLD QL SMEAR: SLIGHT
BASOPHILS # BLD AUTO: 0.11 K/UL
BASOPHILS NFR BLD: 1.1 %
BUN SERPL-MCNC: 16 MG/DL
CALCIUM SERPL-MCNC: 8.7 MG/DL
CHLORIDE SERPL-SCNC: 105 MMOL/L
CO2 SERPL-SCNC: 24 MMOL/L
CREAT SERPL-MCNC: 1 MG/DL
DIFFERENTIAL METHOD: ABNORMAL
EOSINOPHIL # BLD AUTO: 0.5 K/UL
EOSINOPHIL NFR BLD: 5.2 %
ERYTHROCYTE [DISTWIDTH] IN BLOOD BY AUTOMATED COUNT: 23.4 %
EST. GFR  (AFRICAN AMERICAN): 58 ML/MIN/1.73 M^2
EST. GFR  (NON AFRICAN AMERICAN): 50 ML/MIN/1.73 M^2
GLUCOSE SERPL-MCNC: 94 MG/DL
HCT VFR BLD AUTO: 32.1 %
HGB BLD-MCNC: 9.9 G/DL
HYPOCHROMIA BLD QL SMEAR: ABNORMAL
LYMPHOCYTES # BLD AUTO: 0.8 K/UL
LYMPHOCYTES NFR BLD: 8.2 %
MCH RBC QN AUTO: 28.6 PG
MCHC RBC AUTO-ENTMCNC: 30.8 %
MCV RBC AUTO: 93 FL
MONOCYTES # BLD AUTO: 0.9 K/UL
MONOCYTES NFR BLD: 9.5 %
NEUTROPHILS # BLD AUTO: 7.3 K/UL
NEUTROPHILS NFR BLD: 76 %
OVALOCYTES BLD QL SMEAR: ABNORMAL
PLATELET # BLD AUTO: 257 K/UL
PMV BLD AUTO: ABNORMAL FL
POIKILOCYTOSIS BLD QL SMEAR: SLIGHT
POLYCHROMASIA BLD QL SMEAR: ABNORMAL
POTASSIUM SERPL-SCNC: 4.2 MMOL/L
RBC # BLD AUTO: 3.46 M/UL
SODIUM SERPL-SCNC: 139 MMOL/L
WBC # BLD AUTO: 9.64 K/UL

## 2017-02-13 PROCEDURE — 1125F AMNT PAIN NOTED PAIN PRSNT: CPT | Mod: S$GLB,,, | Performed by: INTERNAL MEDICINE

## 2017-02-13 PROCEDURE — 99999 PR PBB SHADOW E&M-EST. PATIENT-LVL II: CPT | Mod: PBBFAC,,, | Performed by: INTERNAL MEDICINE

## 2017-02-13 PROCEDURE — 1157F ADVNC CARE PLAN IN RCRD: CPT | Mod: S$GLB,,, | Performed by: INTERNAL MEDICINE

## 2017-02-13 PROCEDURE — 99215 OFFICE O/P EST HI 40 MIN: CPT | Mod: S$GLB,,, | Performed by: INTERNAL MEDICINE

## 2017-02-13 PROCEDURE — 1159F MED LIST DOCD IN RCRD: CPT | Mod: S$GLB,,, | Performed by: INTERNAL MEDICINE

## 2017-02-13 PROCEDURE — 1160F RVW MEDS BY RX/DR IN RCRD: CPT | Mod: S$GLB,,, | Performed by: INTERNAL MEDICINE

## 2017-02-13 PROCEDURE — 99499 UNLISTED E&M SERVICE: CPT | Mod: S$GLB,,, | Performed by: INTERNAL MEDICINE

## 2017-02-13 RX ORDER — HYDROCODONE BITARTRATE AND ACETAMINOPHEN 5; 325 MG/1; MG/1
1 TABLET ORAL 3 TIMES DAILY PRN
Qty: 90 TABLET | Refills: 0 | Status: ON HOLD | OUTPATIENT
Start: 2017-04-18 | End: 2017-03-30 | Stop reason: HOSPADM

## 2017-02-13 NOTE — MR AVS SNAPSHOT
[facility]  [address]  [place] [address]  Phone: [phone]  Fax: [phone]                  Danyelle Jacobs   2017 10:30 AM   Office Visit    Description:  Female : 1928   Provider:  Bianca Dykes MD   Department:  [facility]           Reason for Visit     Hospital Follow Up           Diagnoses this Visit        Comments    Anemia associated with acute blood loss    -  Primary     Chronic kidney disease (CKD), stage I         Current chronic use of systemic steroids         Gastroesophageal reflux disease without esophagitis         Angiodysplasia of stomach         Bilateral carotid artery disease                To Do List           Goals (5 Years of Data)     None       These Medications        Disp Refills Start End    hydrocodone-acetaminophen 5-325mg (NORCO) 5-325 mg per tablet 90 tablet 0 2017     Take 1 tablet by mouth 3 (three) times daily as needed for Pain. - Oral    Pharmacy: [facility] [address] [place] [address] AT [facility] #: [phone]         [facility] On Call     [facility] On Call Nurse Care Line -  Assistance  Registered nurses in the [facility] On Call Center provide clinical advisement, health education, appointment booking, and other advisory services.  Call for this free service at [phone].             Medications           Message regarding Medications     Verify the changes and/or additions to your medication regime listed below are the same as discussed with your clinician today.  If any of these changes or additions are incorrect, please notify your healthcare provider.             Verify that the below list of medications is an accurate representation of the medications you are currently taking.  If none reported, the list may be blank. If incorrect, please contact your healthcare provider. Carry this list with you in case of emergency.           Current  Medications     alprazolam (XANAX) 0.5 MG tablet Take 1 tablet (0.5 mg total) by mouth daily as needed.    aspirin 81 mg Tab Take 81 mg by mouth. 1 Tablet Oral Every day    atorvastatin (LIPITOR) 40 MG tablet Take 1 tablet (40 mg total) by mouth every evening.    clopidogrel (PLAVIX) 75 mg tablet Take 75 mg by mouth once daily.    DOC-Q-LACE 100 mg capsule TAKE 1 CAPSULE BY MOUTH TWICE DAILY    donepezil (ARICEPT) 10 MG tablet Take 1 tablet (10 mg total) by mouth once daily.    duloxetine (CYMBALTA) 30 MG capsule Take 1 capsule (30 mg total) by mouth once daily.    ferrous sulfate 325 (65 FE) MG EC tablet Take 1 tablet (325 mg total) by mouth 2 (two) times daily.    hydrALAZINE (APRESOLINE) 25 MG tablet Take 1 tablet (25 mg total) by mouth every 8 (eight) hours.    hydrochlorothiazide (MICROZIDE) 12.5 mg capsule Take 1 capsule (12.5 mg total) by mouth once daily.    hydrocodone-acetaminophen 5-325mg (NORCO) 5-325 mg per tablet Starting on Feb 18, 2017. Take 1 tablet by mouth 3 (three) times daily as needed for Pain.    hydrocodone-acetaminophen 5-325mg (NORCO) 5-325 mg per tablet Starting on Mar 18, 2017. Take 1 tablet by mouth 3 (three) times daily as needed for Pain.    hydrocodone-acetaminophen 5-325mg (NORCO) 5-325 mg per tablet Starting on Apr 18, 2017. Take 1 tablet by mouth 3 (three) times daily as needed for Pain.    isosorbide mononitrate (IMDUR) 30 MG 24 hr tablet Take 1 tablet (30 mg total) by mouth every evening.    losartan (COZAAR) 100 MG tablet Take 1 tablet (100 mg total) by mouth once daily.    pantoprazole (PROTONIX) 40 MG tablet Take 1 tablet (40 mg total) by mouth once daily.    predniSONE (DELTASONE) 10 MG tablet Take 1 tablet (10 mg total) by mouth once daily.    tolterodine (DETROL LA) 2 MG Cp24 Take 1 capsule (2 mg total) by mouth once daily.           Clinical Reference Information           Your Vitals Were     BP                   120/60           Blood Pressure          Most Recent Value     BP  120/60      Allergies as of 2/13/2017     Norvasc  [Amlodipine]      Immunizations Administered on Date of Encounter - 2/13/2017     None      Instructions      Instrucciones médicas anticipadas    Un formulario de instrucciones médicas anticipadas le permite a usted planear con anticipación el nivel de atención médica que quisiera recibir en willian de no poder expresar lo que desea. Esta declaración describe el tratamiento médico que desearía recibir o nombra a las personas que quisiera que tomaran por usted cualquier decisión sobre la atención médica. Tenga en cuenta que las leyes varían de un estado a otro, y que podría ser recomendable hablar con un abogado.  Ponga por escrito lo que desea  · Es importante sergo instrucciones médicas anticipadas ya sea usted joven o de edad avanzada. Las lesiones o enfermedades pueden presentarse a cualquier edad.  · Decida lo que es importante para usted y el tratamiento que desearía recibir o no recibir.  · Algunos estados solamente permiten lazarus clase de instrucciones médicas anticipadas. Otros permiten dos: un poder legal duradero para el cuidado médico y un testamento en bety. Por último, otros estados combinan ambos documentos en un mismo formulario.  Un poder legal duradero para el cuidado médico  · Sammi formulario le permite a usted nombrar a alguien para que sea pate agente (o apoderado).  · Esta persona puede decidir el tratamiento que usted reciba solamente cuando usted no pueda expresarse por sí mismo.  · No es necesario que usted se esté muriendo para que pate agente pueda actuar. Por ejemplo, pate agente podría decidir por usted si usted se encontrara en un coma del cual probablemente se podría recuperar.  Un testamento en bety  · Sammi formulario le permite a usted hacer lazarus lista de la atención que quisiera recibir al final de pate bety.  · Un testamento en bety solamente se aplica si usted no fuera a vivir sin tratamiento médico, manuelito en el willian de un cáncer avanzado,  un derrame cerebral masivo u otra enfermedad grave de la que no se recuperará.  · Entra en efecto solamente cuando usted ya no puede expresar orion deseos.  Date Last Reviewed: 2/13/2016  © 6691-1063 [employer]. [address] [place] [address]. Todos los derechos reservados. Esta información no pretende sustituir la atención médica profesional. Sólo pate médico puede diagnosticar y tratar un problema de isma.        Selección de un agente o apoderado    El poder legal permanente para cuidados médicos solamente es frazier en la medida que lo es la persona que usted nombra manuelito agente. Si esta persona conoce orion deseos de tratamiento y está dispuesta a llevarlos a cabo, probablemente usted esté jer representado. Asegúrese de decirle a pate agente lo que es importante para usted.  A quién escoger  Estas son algunas sugerencias para escoger un agente:  · Puede nombrar a un miembro de pate kristine, a un amigo cercano, a un sacerdote, a un ministro de la hunter o a un rabino.  · Debe nombrar solamente a lazarus persona manuelito pate agente. Luego nombre a christiana o dos suplentes, ya que se necesita a lazarus persona que sirva de reemplazo en willian de que pate primera opción no pueda ser ubicada cuando se necesite.  · Hable con cada persona que usted está pensando en nombrar manuelito pate agente o manuelito suplente. Hágalo antes de decidir a quién le va a encargar la responsabilidad del cumplimiento de orion deseos.  Pate agente debe ser...  · Lazarus persona adulta, de 18 años de edad o mayor.  · Alguien en quien usted confía y con quien pueda hablar acerca del nivel de atención que quisiera recibir y sobre lo que es importante para usted.  · Alguien que apoye los tratamientos que usted haya elegido.  En muchos estados pate agente no puede ser...  · Pate médico o profesional de la isma.  · Un empleado de pate médico o de un hospital, de lazarus casa de reposo o de un hospicio donde usted reciba atención. Algunos estados tienen otras restricciones  sobre quién puede ser nombrado manuelito agente para instrucciones médicas anticipadas.  Sugerencia: le puede convenir escribir orion deseos y darle lazarus copia a pate agente.   Date Last Reviewed: 2/14/2016  © 8826-2644 [employer]. [address] [place] [address]. Todos los derechos reservados. Esta información no pretende sustituir la atención médica profesional. Sólo pate médico puede diagnosticar y tratar un problema de isma.        El papel del agente en el poder legal duradero para cuidados médicos    Con frecuencia es difícil saber qué opciones de tratamiento podría tener en el futuro. ¿Qué sucede si usted no puede sussy esas decisiones por sí mismo? Un poder legal duradero para cuidados médicos le permite nombrar a un agente o apoderado para que cumpla con orion deseos solamente en el willian de que usted no pueda expresarlos.  Los deberes del agente  · El deber de pate agente es ocuparse de que orion deseos se cumplan.  · Si orion deseos no se conocen, pate agente debe tratar de decidir qué es lo que usted hubiera querido.  · Lo que pate agente decida tiene precedencia sobre lo que otras personas pueden desear para usted.  · Un poder legal duradero para cuidados médicos no le otorga a pate agente ningún control sobre pate dinero, ni se le puede obligar a que pague las cuentas que le corresponderían a usted.  Entérese de lo que pate agente puede hacer  Las restricciones sobre lo que un agente puede y no puede hacer varían de un estado a otro. Consulte las leyes de pate estado. En la mayoría de los estados pate agente puede:  · Escoger o rechazar medios de mantenimiento artificial de la bety y otros tratamientos médicos en pate representación.  · Ramses pate consentimiento para un tratamiento y luego decidir suspenderlo si pate estado de isma no mejora.  · Tener acceso a pate expediente médico y la facultad de ponerlo a disposición de terceros.  · Solicitar lazarus autopsia y donar orion órganos, a menos que usted haya declarado que  desea otra cosa en orion instrucciones médicas anticipadas.  Entérese si pate estado permite que pate agente scotty lo siguiente:  · Rechazar o suspender la atención para mejorar la bety.  · Rechazar o suspender la alimentación por sonda u otro cuidado de mantenimiento de la bety, incluso si orion instrucciones anticipadas no incluyen que no quiere esos tratamientos.  · Ordenar la esterilización o el aborto.  Date Last Reviewed: 2/14/2016  © 0782-0014 [employer]. [address] [place] [address], [place] [address]. Todos los derechos reservados. Esta información no pretende sustituir la atención médica profesional. Sólo pate médico puede diagnosticar y tratar un problema de isma.        Mantenimiento de la bety    Si usted entiende en qué forma los tratamientos específicos pueden afectar pate calidad de bety, podrá decidir cuáles escoge y cuáles rechaza. Le puede convenir hablar con pate proveedor de atención médica sobre los posibles beneficios y riesgos de los tratamientos. La posibilidad de buenos resultados de estas terapias varían según la situación clínica de cada individuo y puede ser muy difícil de predecir. Si pate bety está en peligro, hay tanna categorías principales de tratamiento médico.  Los que mantienen la bety  · Estos cuidados mantienen el corazón y los pulmones funcionando cuando ya no funcionan por sí solos.  · La resucitación cardiopulmonar hace volver a funcionar el corazón y los pulmones si ilana de hacerlo.  · Un respirador hace que se siga respirando, al bombear aire a los pulmones a través de un tubo que se pone en la tráquea.  Los que prolongan la bety  · Estos cuidados lo mantienen vivo por más tiempo cuando tiene lazarus enfermedad incurable.  · La alimentación por sonda (o alimentación total parenteral) proporciona alimentos líquidos a través de lazarus sonda o línea intravenosa (IV). Esta alimentación se da cuando no se puede masticar o tragar.  · Un riñón artificial (o diálisis) es lazarus máquina que limpia  la good cuando los riñones ya no funcionan por sí solos.  Los que mejoran la bety  · Estos cuidados controlan el dolor y las molestias, tales manuelito las náuseas o las dificultades para respirar. Sammi tipo de cuidados no está diseñado para prolongarle la bety, ramses sí para mejorar pate calidad.  · Los cuidados paliativos son los que se dan para proporcionar lazarus mayor comodidad, e incluyen el suministro de alimentos y líquidos por la boca o la ayuda para bañarse. Los cuidados paliativos se suelen sergo en las últimas fases de lazarus enfermedad terminal.  · Se suministran calmantes potentes para que el paciente no sufra dolor y esté más cómodo.  Orden de no resucitar  ¿Desea que le neela resucitación cardiopulmonar si pate corazón yari de funcionar mientras usted está en un hospital o en lazarus casa de reposo? Si la respuesta es no, hable con pate proveedor de atención médica para sergo lazarus orden de no resucitar (DNR, por orion siglas en inglés).  Tenga en cuenta  Puede ser que princess la anestesia, cuando esté en lazarus denise de emergencias o recibiendo atención del equipo médico que ha respondido a lazarus llamada al 911, no se tome en cuenta pate orden de no resucitar ni orion instrucciones médicas anticipadas. Pregúntele a pate proveedor de atención médica qué puede hacer para asegurarse de que se cumplan orion deseos. Además, lazarus DNR no evitará que usted obtenga otro tipo de cuidados médicos necesarios tales manuelito tratamientos para el dolor o los sangrados.   Date Last Reviewed: 2/26/2016  © 7418-8537 [employer]. [address] [place] [address], [place] [address]. Todos los derechos reservados. Esta información no pretende sustituir la atención médica profesional. Sólo pate médico puede diagnosticar y tratar un problema de isma.             Language Assistance Services     ATTENTION: Language assistance services are available, free of charge. Please call [phone].      ATENCIÓN: Si habla español, tiene a pate disposición servicios  calebos de asistencia lingüística. Jamison guerin [phone].     RODO Ý: N?u b?n nói Ti?ng Vi?t, có các d?ch v? h? tr? ngôn ng? mi?n phí dành cho b?n. G?i s? [phone].         [facility] complies with applicable Federal civil rights laws and does not discriminate on the basis of race, color, national origin, age, disability, or sex.

## 2017-02-13 NOTE — PROGRESS NOTES
Transitional Care Note  Subjective:       Patient ID: Danyelle Jacobs is a 88 y.o. female.  Chief Complaint: Hospital Follow Up    Family and/or Caretaker present at visit?  Yes.  Diagnostic tests reviewed/disposition: I have reviewed all completed as well as pending diagnostic tests at the time of discharge.  Disease/illness education: yes  Home health/community services discussion/referrals: Patient has home health established at home.   Establishment or re-establishment of referral orders for community resources: No other necessary community resources.   Discussion with other health care providers: No discussion with other health care providers necessary.     Admitted with profound anemia thought to be from blood loss; transfusion.  She was seen by GI who felt like she may have chronic GI losses but no acute bleed.   EGD revealed asingle bleeding angiodysplastic lesion in the stomach. Treated with argon plasma coagulation.  Colonoscopy wnl.    Now on iron daily.    She had been on a lot of Advil, but has stopped altogether.  Now only on Plavix and aspirin.  Taking tylenol only.    Patient Active Problem List   Diagnosis    MG (myasthenia gravis)    CAD (coronary artery disease)    PAOD (peripheral arterial occlusive disease)    IBS (irritable bowel syndrome)    Mixed hyperlipidemia    Anxiety    Recurrent major depressive disorder, in partial remission    Fibromyalgia    Neuropathic pain of both legs    Carotid disease: Carotids 40-49 % R; 50-59% L 1/17    Vitamin D deficiency disease    Goiter, nontoxic, multinodular: ultrasound 2014    Adverse effect of oral bisphosphonates    Senile osteoporosis: see DEXA 2016, on Reclst per endo    MCI (mild cognitive impairment) with memory loss    Gastroesophageal reflux disease without esophagitis    Benign hypertension    OAB (overactive bladder)    Left ventricular diastolic dysfunction with preserved systolic function    Rheumatoid arthritis involving  multiple sites with positive rheumatoid factor    Chronic kidney disease (CKD), stage I    Former smoker    Intractable tension-type headache    Anemia    Current chronic use of systemic steroids    Aortic atherosclerosis: see CXR 2012    Senile purpura    Anemia associated with acute blood loss    Angiodysplasia of stomach: see EGD 1/17                               HPI  Review of Systems   Constitutional: Negative for fatigue and fever.   Respiratory: Negative for cough and shortness of breath.    Cardiovascular: Negative for chest pain.   Gastrointestinal: Negative for abdominal distention, abdominal pain, blood in stool, constipation, diarrhea, nausea, rectal pain and vomiting.        GERD sx  No pain  Stool dark from iron     Genitourinary: Negative for difficulty urinating and hematuria.   Musculoskeletal: Positive for arthralgias and back pain.   Skin: Negative for rash.   Neurological: Negative for weakness and numbness.       Objective:      Physical Exam   Constitutional: She is oriented to person, place, and time. She appears well-developed and well-nourished.   HENT:   Head: Normocephalic and atraumatic.   Right Ear: External ear normal.   Left Ear: External ear normal.   Nose: Nose normal.   Mouth/Throat: Oropharynx is clear and moist. No oropharyngeal exudate.   Eyes: Conjunctivae and EOM are normal. No scleral icterus.   Neck: Normal range of motion. Neck supple. No JVD present. No thyromegaly present.   Cardiovascular: Normal rate, regular rhythm, normal heart sounds and intact distal pulses.  Exam reveals no gallop.    No murmur heard.  Pulmonary/Chest: Effort normal and breath sounds normal. No respiratory distress. She has no wheezes. She exhibits no tenderness.   Abdominal: Soft. Bowel sounds are normal. She exhibits no distension and no mass. There is no tenderness. There is no rebound and no guarding.   Musculoskeletal: Normal range of motion. She exhibits no edema or tenderness.    Rheumatoid deformities   Lymphadenopathy:     She has no cervical adenopathy.   Neurological: She is alert and oriented to person, place, and time. She displays normal reflexes. No cranial nerve deficit. Coordination normal.   Skin: Skin is warm. No rash noted. No erythema.   Psychiatric: She has a normal mood and affect. Her behavior is normal. Judgment and thought content normal.   Nursing note and vitals reviewed.      Assessment:       1. Anemia associated with acute blood loss    2. Chronic kidney disease (CKD), stage I    3. Current chronic use of systemic steroids    4. Gastroesophageal reflux disease without esophagitis    5. Angiodysplasia of stomach: see EGD 1/17    6. Carotid disease: Carotids 40-49 % R; 50-59% L 1/17    7. Rheumatoid arthritis involving multiple sites with positive rheumatoid factor    8. Recurrent major depressive disorder, in partial remission        Plan:         Anemia associated with acute blood loss: Labs and review    Chronic kidney disease (CKD), stage I; Hydration    Current chronic use of systemic steroids: Keep rheum follow up.  Cautions reviewed    Gastroesophageal reflux disease without esophagitis: Continue with PPI therapy    Angiodysplasia of stomach: see EGD 1/17    Carotid disease: Carotids 40-49 % R; 50-59% L 1/17: stable    Rheumatoid arthritis: stable on regimen    Depression and dementia: stable on tx    Other orders  -     hydrocodone-acetaminophen 5-325mg (NORCO) 5-325 mg per tablet; Take 1 tablet by mouth 3 (three) times daily as needed for Pain.  Dispense: 90 tablet; Refill: 0    Living will information provided and discussed    Anticipate return in 3 months, sooner with problems in the interim.    I will review all studies and determine further tx depending on findings

## 2017-02-13 NOTE — PATIENT INSTRUCTIONS
Instrucciones médicas anticipadas    Un formulario de instrucciones médicas anticipadas le permite a usted planear con anticipación el nivel de atención médica que quisiera recibir en willian de no poder expresar lo que desea. Esta declaración describe el tratamiento médico que desearía recibir o nombra a las personas que quisiera que tomaran por usted cualquier decisión sobre la atención médica. Tenga en cuenta que las leyes varían de un estado a otro, y que podría ser recomendable hablar con un abogado.  Ponga por escrito lo que desea  · Es importante sergo instrucciones médicas anticipadas ya sea usted joven o de edad avanzada. Las lesiones o enfermedades pueden presentarse a cualquier edad.  · Decida lo que es importante para usted y el tratamiento que desearía recibir o no recibir.  · Algunos estados solamente permiten lazarus clase de instrucciones médicas anticipadas. Otros permiten dos: un poder legal duradero para el cuidado médico y un testamento en bety. Por último, otros estados combinan ambos documentos en un mismo formulario.  Un poder legal duradero para el cuidado médico  · Sammi formulario le permite a usted nombrar a alguien para que sea pate agente (o apoderado).  · Esta persona puede decidir el tratamiento que usted reciba solamente cuando usted no pueda expresarse por sí mismo.  · No es necesario que usted se esté muriendo para que pate agente pueda actuar. Por ejemplo, pate agente podría decidir por usted si usted se encontrara en un coma del cual probablemente se podría recuperar.  Un testamento en bety  · Sammi formulario le permite a usted hacer lazarus lista de la atención que quisiera recibir al final de pate bety.  · Un testamento en bety solamente se aplica si usted no fuera a vivir sin tratamiento médico, manuelito en el willian de un cáncer avanzado, un derrame cerebral masivo u otra enfermedad grave de la que no se recuperará.  · Entra en efecto solamente cuando usted ya no puede expresar orion deseos.  Date Last  Reviewed: 2/13/2016  © 2009-3461 [employer]. [address] [place] [address], [place] [address]. Todos los derechos reservados. Esta información no pretende sustituir la atención médica profesional. Sólo pate médico puede diagnosticar y tratar un problema de isma.        Selección de un agente o apoderado    El poder legal permanente para cuidados médicos solamente es frazier en la medida que lo es la persona que usted nombra manuelito agente. Si esta persona conoce orion deseos de tratamiento y está dispuesta a llevarlos a cabo, probablemente usted esté jer representado. Asegúrese de decirle a pate agente lo que es importante para usted.  A quién escoger  Estas son algunas sugerencias para escoger un agente:  · Puede nombrar a un miembro de pate kristine, a un amigo cercano, a un sacerdote, a un ministro de la hunter o a un rabino.  · Debe nombrar solamente a lazarus persona manuelito pate agente. Luego nombre a christiana o dos suplentes, ya que se necesita a lazarus persona que sirva de reemplazo en willian de que pate primera opción no pueda ser ubicada cuando se necesite.  · Hable con cada persona que usted está pensando en nombrar manuelito pate agente o manuelito suplente. Hágalo antes de decidir a quién le va a encargar la responsabilidad del cumplimiento de orion deseos.  Pate agente debe ser...  · Lazarus persona adulta, de 18 años de edad o mayor.  · Alguien en quien usted confía y con quien pueda hablar acerca del nivel de atención que quisiera recibir y sobre lo que es importante para usted.  · Alguien que apoye los tratamientos que usted haya elegido.  En muchos estados pate agente no puede ser...  · Pate médico o profesional de la isma.  · Un empleado de pate médico o de un hospital, de lazarus casa de reposo o de un hospicio donde usted reciba atención. Algunos estados tienen otras restricciones sobre quién puede ser nombrado manuelito agente para instrucciones médicas anticipadas.  Sugerencia: le puede convenir escribir orion deseos y darle lazarus copia a pate agente.    Date Last Reviewed: 2/14/2016  © 1508-6902 [employer]. [address] [place] [address], [place] [address]. Todos los derechos reservados. Esta información no pretende sustituir la atención médica profesional. Sólo pate médico puede diagnosticar y tratar un problema de isma.        El papel del agente en el poder legal duradero para cuidados médicos    Con frecuencia es difícil saber qué opciones de tratamiento podría tener en el futuro. ¿Qué sucede si usted no puede sussy esas decisiones por sí mismo? Un poder legal duradero para cuidados médicos le permite nombrar a un agente o apoderado para que cumpla con orion deseos solamente en el willian de que usted no pueda expresarlos.  Los deberes del agente  · El deber de pate agente es ocuparse de que orion deseos se cumplan.  · Si orion deseos no se conocen, pate agente debe tratar de decidir qué es lo que usted hubiera querido.  · Lo que pate agente decida tiene precedencia sobre lo que otras personas pueden desear para usted.  · Un poder legal duradero para cuidados médicos no le otorga a pate agente ningún control sobre pate dinero, ni se le puede obligar a que pague las cuentas que le corresponderían a usted.  Entérese de lo que pate agente puede hacer  Las restricciones sobre lo que un agente puede y no puede hacer varían de un estado a otro. Consulte las leyes de pate estado. En la mayoría de los estados pate agente puede:  · Escoger o rechazar medios de mantenimiento artificial de la bety y otros tratamientos médicos en pate representación.  · Ramses pate consentimiento para un tratamiento y luego decidir suspenderlo si pate estado de isma no mejora.  · Tener acceso a pate expediente médico y la facultad de ponerlo a disposición de terceros.  · Solicitar lazarus autopsia y donar orion órganos, a menos que usted haya declarado que desea otra cosa en orion instrucciones médicas anticipadas.  Entérese si pate estado permite que pate agente scotty lo siguiente:  · Rechazar o suspender la atención para  mejorar la bety.  · Rechazar o suspender la alimentación por sonda u otro cuidado de mantenimiento de la bety, incluso si orion instrucciones anticipadas no incluyen que no quiere esos tratamientos.  · Ordenar la esterilización o el aborto.  Date Last Reviewed: 2/14/2016  © 8327-9592 [employer]. [address] [place] [address]. Todos los derechos reservados. Esta información no pretende sustituir la atención médica profesional. Sólo pate médico puede diagnosticar y tratar un problema de isma.        Mantenimiento de la bety    Si usted entiende en qué forma los tratamientos específicos pueden afectar pate calidad de bety, podrá decidir cuáles escoge y cuáles rechaza. Le puede convenir hablar con pate proveedor de atención médica sobre los posibles beneficios y riesgos de los tratamientos. La posibilidad de buenos resultados de estas terapias varían según la situación clínica de cada individuo y puede ser muy difícil de predecir. Si pate bety está en peligro, hay tanna categorías principales de tratamiento médico.  Los que mantienen la bety  · Estos cuidados mantienen el corazón y los pulmones funcionando cuando ya no funcionan por sí solos.  · La resucitación cardiopulmonar hace volver a funcionar el corazón y los pulmones si ilana de hacerlo.  · Un respirador hace que se siga respirando, al bombear aire a los pulmones a través de un tubo que se pone en la tráquea.  Los que prolongan la bety  · Estos cuidados lo mantienen vivo por más tiempo cuando tiene lazarus enfermedad incurable.  · La alimentación por sonda (o alimentación total parenteral) proporciona alimentos líquidos a través de lazarus sonda o línea intravenosa (IV). Esta alimentación se da cuando no se puede masticar o tragar.  · Un riñón artificial (o diálisis) es lazarus máquina que limpia la good cuando los riñones ya no funcionan por sí solos.  Los que mejoran la bety  · Estos cuidados controlan el dolor y las molestias, tales manuelito las náuseas o  las dificultades para respirar. Sammi tipo de cuidados no está diseñado para prolongarle la bety, ramses sí para mejorar pate calidad.  · Los cuidados paliativos son los que se dan para proporcionar lazarus mayor comodidad, e incluyen el suministro de alimentos y líquidos por la boca o la ayuda para bañarse. Los cuidados paliativos se suelen sergo en las últimas fases de lazarus enfermedad terminal.  · Se suministran calmantes potentes para que el paciente no sufra dolor y esté más cómodo.  Orden de no resucitar  ¿Desea que le neela resucitación cardiopulmonar si pate corazón yari de funcionar mientras usted está en un hospital o en lazarus casa de reposo? Si la respuesta es no, hable con pate proveedor de atención médica para srego lazarus orden de no resucitar (DNR, por orion siglas en inglés).  Tenga en cuenta  Puede ser que princess la anestesia, cuando esté en lazarus denise de emergencias o recibiendo atención del equipo médico que ha respondido a lazarus llamada al 911, no se tome en cuenta pate orden de no resucitar ni orion instrucciones médicas anticipadas. Pregúntele a pate proveedor de atención médica qué puede hacer para asegurarse de que se cumplan orion deseos. Además, lazarus DNR no evitará que usted obtenga otro tipo de cuidados médicos necesarios tales manuelito tratamientos para el dolor o los sangrados.   Date Last Reviewed: 2/26/2016  © 7912-6944 [employer]. [address] [place] [address]. Todos los derechos reservados. Esta información no pretende sustituir la atención médica profesional. Sólo pate médico puede diagnosticar y tratar un problema de isma.

## 2017-02-24 RX ORDER — DOCUSATE SODIUM 100 MG/1
CAPSULE, LIQUID FILLED ORAL
Qty: 180 CAPSULE | Refills: 0 | Status: SHIPPED | OUTPATIENT
Start: 2017-02-24

## 2017-03-05 ENCOUNTER — HOSPITAL ENCOUNTER (EMERGENCY)
Facility: OTHER | Age: 82
Discharge: HOME | End: 2017-03-05
Attending: EMERGENCY MEDICINE
Payer: MEDICARE

## 2017-03-05 VITALS
WEIGHT: 140 LBS | HEART RATE: 80 BPM | HEIGHT: 57 IN | SYSTOLIC BLOOD PRESSURE: 239 MMHG | BODY MASS INDEX: 30.2 KG/M2 | TEMPERATURE: 99 F | RESPIRATION RATE: 20 BRPM | DIASTOLIC BLOOD PRESSURE: 83 MMHG | OXYGEN SATURATION: 96 %

## 2017-03-05 DIAGNOSIS — R52 PAIN: ICD-10-CM

## 2017-03-05 DIAGNOSIS — M16.10 HIP ARTHRITIS: Primary | ICD-10-CM

## 2017-03-05 PROCEDURE — 99283 EMERGENCY DEPT VISIT LOW MDM: CPT

## 2017-03-05 PROCEDURE — 25000003 PHARM REV CODE 250: Performed by: EMERGENCY MEDICINE

## 2017-03-05 RX ORDER — TRAMADOL HYDROCHLORIDE 50 MG/1
50 TABLET ORAL
Status: COMPLETED | OUTPATIENT
Start: 2017-03-05 | End: 2017-03-05

## 2017-03-05 RX ORDER — TRAMADOL HYDROCHLORIDE 50 MG/1
50 TABLET ORAL EVERY 6 HOURS PRN
Qty: 12 TABLET | Refills: 0 | Status: SHIPPED | OUTPATIENT
Start: 2017-03-05 | End: 2017-03-15

## 2017-03-05 RX ADMIN — TRAMADOL HYDROCHLORIDE 50 MG: 50 TABLET, FILM COATED ORAL at 12:03

## 2017-03-05 NOTE — ED AVS SNAPSHOT
[facility] EMERGENCY DEPARTMENT  [address] [place] [address]               Danyelle Jacobs   3/5/2017 11:11 AM   ED    Description:  Female : 1928   Department:  [facility] Emergency Department           Your Care was Coordinated By:     Provider Role From To    Daren Mandujano MD Attending Provider 17 1113 --      Reason for Visit     Hip Pain           Diagnoses this Visit        Comments    Hip arthritis    -  Primary     Pain           ED Disposition     ED Disposition Condition Comment    Discharge             To Do List           Follow-up Information     Follow up with Bianca Dykes MD In 1 week.    Specialty:  Internal Medicine    Contact information:    [address]  [facility] [address]  [phone]         These Medications        Disp Refills Start End    tramadol (ULTRAM) 50 mg tablet 12 tablet 0 3/5/2017 3/15/2017    Take 1 tablet (50 mg total) by mouth every 6 (six) hours as needed for Pain. - Oral    [facility] [address] [place] [address] AT [facility] #: [phone]         [facility] On Call     [facility] On Call Nurse Care Line -  Assistance  Registered nurses in the [facility] On Call Center provide clinical advisement, health education, appointment booking, and other advisory services.  Call for this free service at [phone].             Medications           Message regarding Medications     Verify the changes and/or additions to your medication regime listed below are the same as discussed with your clinician today.  If any of these changes or additions are incorrect, please notify your healthcare provider.        START taking these NEW medications        Refills    tramadol (ULTRAM) 50 mg tablet 0    Sig: Take 1 tablet (50 mg total) by mouth every 6 (six) hours as needed for Pain.    Class: Print    Route: Oral      These medications were administered today        Dose Freq    tramadol  tablet 50 mg 50 mg ED 1 Time    Sig: Take 1 tablet (50 mg total) by mouth ED 1 Time.    Class: Normal    Route: Oral           Verify that the below list of medications is an accurate representation of the medications you are currently taking.  If none reported, the list may be blank. If incorrect, please contact your healthcare provider. Carry this list with you in case of emergency.           Current Medications     alprazolam (XANAX) 0.5 MG tablet Take 1 tablet (0.5 mg total) by mouth daily as needed.    aspirin 81 mg Tab Take 81 mg by mouth. 1 Tablet Oral Every day    atorvastatin (LIPITOR) 40 MG tablet Take 1 tablet (40 mg total) by mouth every evening.    clopidogrel (PLAVIX) 75 mg tablet Take 75 mg by mouth once daily.     mg capsule TAKE 1 CAPSULE BY MOUTH TWICE DAILY    donepezil (ARICEPT) 10 MG tablet Take 1 tablet (10 mg total) by mouth once daily.    duloxetine (CYMBALTA) 30 MG capsule Take 1 capsule (30 mg total) by mouth once daily.    ferrous sulfate 325 (65 FE) MG EC tablet Take 1 tablet (325 mg total) by mouth 2 (two) times daily.    hydrALAZINE (APRESOLINE) 25 MG tablet Take 1 tablet (25 mg total) by mouth every 8 (eight) hours.    hydrochlorothiazide (MICROZIDE) 12.5 mg capsule Take 1 capsule (12.5 mg total) by mouth once daily.    hydrocodone-acetaminophen 5-325mg (NORCO) 5-325 mg per tablet Take 1 tablet by mouth 3 (three) times daily as needed for Pain.    isosorbide mononitrate (IMDUR) 30 MG 24 hr tablet Take 1 tablet (30 mg total) by mouth every evening.    losartan (COZAAR) 100 MG tablet Take 1 tablet (100 mg total) by mouth once daily.    pantoprazole (PROTONIX) 40 MG tablet Take 1 tablet (40 mg total) by mouth once daily.    predniSONE (DELTASONE) 10 MG tablet Take 1 tablet (10 mg total) by mouth once daily.    tolterodine (DETROL LA) 2 MG Cp24 Take 1 capsule (2 mg total) by mouth once daily.    hydrocodone-acetaminophen 5-325mg (NORCO) 5-325 mg per tablet Starting on Mar 18, 2017.  "Take 1 tablet by mouth 3 (three) times daily as needed for Pain.    hydrocodone-acetaminophen 5-325mg (NORCO) 5-325 mg per tablet Starting on Apr 18, 2017. Take 1 tablet by mouth 3 (three) times daily as needed for Pain.    tramadol (ULTRAM) 50 mg tablet Take 1 tablet (50 mg total) by mouth every 6 (six) hours as needed for Pain.    tramadol tablet 50 mg Take 1 tablet (50 mg total) by mouth ED 1 Time.           Clinical Reference Information           Your Vitals Were     BP Pulse Temp Resp Height Weight    239/83 (Patient Position: Lying) 80 98.8 °F (37.1 °C) (Skin) 20 4' 9" (1.448 m) 63.5 kg (140 lb)    SpO2 BMI             96% 30.3 kg/m2         Allergies as of 3/5/2017        Reactions    Norvasc  [Amlodipine]     Other reaction(s): Edema      Immunizations Administered on Date of Encounter - 3/5/2017     None      ED Micro, Lab, POCT     None      ED Imaging Orders     Start Ordered       Status Ordering Provider    03/05/17 1118 03/05/17 1117  X-Ray Hip 2 View Right  1 time imaging      Final result         Discharge Instructions         Osteoarthritis  Osteoarthritis (also called degenerative joint disease) happens when the cartilage in a joint becomes damaged and worn. This may be due to age, wear and tear, overuse of the joint, or other problems. Osteoarthritis can affect any joint. But it is most common in hands, knees, spine, hips, and feet. Symptoms include joint stiffness, pain, and swelling.  Home care  · When a joint is more sore than usual, rest it for a day or two.  · Heat can help relieve stiffness. Take a hot bath or apply a heating pad for up to 30 minutes at a time. If symptoms are worse in the morning, using heat just after awakening can help relax the muscle and soothe the joints.   · Ice helps relieve pain and swelling. It is often used after activity. Use a cold pack wrapped in a thin cloth on the joint for 10-15 minutes at a time.   · Alternating hot and cold can also help relieve pain. Try " this for 20 minutes at a time, several times per day.  · Exercise helps prevent the muscles and ligaments around the joint from becoming weak. It also helps maintain function in the joint.  Be as active as you can. Talk to your doctor about what activity program is best for you.  · Excess weight puts a lot of extra strain on weight-bearing joints of the lower back, hips, knees, feet and ankles. If you are overweight, talk to your doctor about a safe and effective weight loss program.  · Use anti-inflammatory medications as prescribed for pain. This incudes acetaminophen or NSAIDs such as ibuprofen or naproxen. If needed, topical or injected medications may be recommended. Talk to your doctor if these options are not enough to manage your pain.  · Talk with your doctor about devices that might help improve your function and reduce pain.  Follow-up care  Follow up with your doctor as advised by our staff.  When to seek medical attention  Call your doctor right away if any of the following occur:  · Redness or swelling of a painful joint  · Discharge or pus from a painful joint  · Fever of 100.4ºF (38ºC) or higher, or as directed by your healthcare provider  · Worsening joint pain  · Decreased ability to move the joint or bear weight on the joint  Date Last Reviewed: 4/13/2015  © 7007-0703 [employer]. [address] [place] [address], [place] [address]. All rights reserved. This information is not intended as a substitute for professional medical care. Always follow your healthcare professional's instructions.          Your Scheduled Appointments     May 22, 2017 10:20 AM CDT   Non-Fasting Lab with LAB, APPOINTMENT [facility] ([facility])    [address]  [facility] [address]   [phone]            May 22, 2017 10:30 AM CDT   Established Patient Visit with MD Navi White - Internal Medicine ([facility]) [address]  [facility] [address] [phone]              Smoking Cessation     If you would like to quit smoking:   You may be eligible for free services if you are a [place] resident and started smoking cigarettes before September 1, 1988.  Call the Smoking Cessation Trust (SCT) toll free at (744) 868-2753 or (794) 827-3262.   Call 1-800-QUIT-NOW if you do not meet the above criteria.             [facility] Emergency Department complies with applicable Federal civil rights laws and does not discriminate on the basis of race, color, national origin, age, disability, or sex.        Language Assistance Services     ATTENTION: Language assistance services are available, free of charge. Please call [phone].      ATENCIÓN: Si habla lucian, tiene a pate disposición servicios gratuitos de asistencia lingüística. Llame al [phone].     CHÚ Ý: N?u b?n nói Ti?ng Vi?t, có các d?ch v? h? tr? ngôn ng? mi?n phí dành cho b?n. G?i s? [phone].

## 2017-03-05 NOTE — DISCHARGE INSTRUCTIONS
Osteoarthritis  Osteoarthritis (also called degenerative joint disease) happens when the cartilage in a joint becomes damaged and worn. This may be due to age, wear and tear, overuse of the joint, or other problems. Osteoarthritis can affect any joint. But it is most common in hands, knees, spine, hips, and feet. Symptoms include joint stiffness, pain, and swelling.  Home care  · When a joint is more sore than usual, rest it for a day or two.  · Heat can help relieve stiffness. Take a hot bath or apply a heating pad for up to 30 minutes at a time. If symptoms are worse in the morning, using heat just after awakening can help relax the muscle and soothe the joints.   · Ice helps relieve pain and swelling. It is often used after activity. Use a cold pack wrapped in a thin cloth on the joint for 10-15 minutes at a time.   · Alternating hot and cold can also help relieve pain. Try this for 20 minutes at a time, several times per day.  · Exercise helps prevent the muscles and ligaments around the joint from becoming weak. It also helps maintain function in the joint.  Be as active as you can. Talk to your doctor about what activity program is best for you.  · Excess weight puts a lot of extra strain on weight-bearing joints of the lower back, hips, knees, feet and ankles. If you are overweight, talk to your doctor about a safe and effective weight loss program.  · Use anti-inflammatory medications as prescribed for pain. This incudes acetaminophen or NSAIDs such as ibuprofen or naproxen. If needed, topical or injected medications may be recommended. Talk to your doctor if these options are not enough to manage your pain.  · Talk with your doctor about devices that might help improve your function and reduce pain.  Follow-up care  Follow up with your doctor as advised by our staff.  When to seek medical attention  Call your doctor right away if any of the following occur:  · Redness or swelling of a painful  joint  · Discharge or pus from a painful joint  · Fever of 100.4ºF (38ºC) or higher, or as directed by your healthcare provider  · Worsening joint pain  · Decreased ability to move the joint or bear weight on the joint  Date Last Reviewed: 4/13/2015  © 1712-7202 [employer]. [address] [place] [address], [place] [address]. All rights reserved. This information is not intended as a substitute for professional medical care. Always follow your healthcare professional's instructions.

## 2017-03-05 NOTE — ED PROVIDER NOTES
Encounter Date: 3/5/2017       History     Chief Complaint   Patient presents with    Hip Pain     according to family pt has beed complaining of R hip pain for 2 days / Maced[place] Speaking      Review of patient's allergies indicates:   Allergen Reactions    Norvasc  [amlodipine]      Other reaction(s): Edema     Patient is a 88 y.o. female presenting with the following complaint: leg pain. The history is provided by the patient and a caregiver.   Leg Pain    The incident occurred at home. There was no injury mechanism. The incident occurred several days ago. The pain is present in the right hip (This is a recurrent problem). The quality of the pain is described as aching and sharp. The pain is at a severity of 5/10. The pain has been constant since onset. Associated symptoms include inability to bear weight and loss of motion. Pertinent negatives include no numbness, no muscle weakness, no loss of sensation and no tingling. She reports no foreign bodies present. The symptoms are aggravated by activity, bearing weight and palpation. She has tried nothing for the symptoms.     Past Medical History:   Diagnosis Date    Adverse effect of oral bisphosphonates     Anemia     Angiodysplasia of stomach: see EGD 1/17 2/13/2017    Anxiety     Aortic atherosclerosis     Benign hypertension 10/5/2015    Carcinoma, renal cell     Carotid arterial disease 1/20/2014    Cerebellar stroke, acute     Chronic kidney disease (CKD), stage I 2/18/2016    Coronary artery disease     Current chronic use of systemic steroids 10/4/2016    Degenerative disc disease     Depression     Diverticulosis     Former smoker 6/13/2016    Gastroesophageal reflux disease without esophagitis 10/5/2015    GERD (gastroesophageal reflux disease)     Goiter, nontoxic, multinodular     IBS (irritable bowel syndrome)     Left ventricular diastolic dysfunction with preserved systolic function 10/5/2015    Lung nodule     right    MGUS  (monoclonal gammopathy of unknown significance) 6/13/2016    Myasthenia gravis, adult form     Osteoporosis 8/10/2015    Osteoporosis, unspecified     PAOD (peripheral arterial occlusive disease)     RA (rheumatoid arthritis)     Smoker 1/20/2014    Stroke     Ulcer     Vitamin D deficiency disease 1/20/2014     Past Surgical History:   Procedure Laterality Date    CATARACT EXTRACTION W/  INTRAOCULAR LENS IMPLANT Bilateral     CHOLECYSTECTOMY      COLONOSCOPY N/A 1/24/2017    Procedure: COLONOSCOPY;  Surgeon: Kristofer Worthington MD;  Location: [address];  Service: Endoscopy;  Laterality: N/A;  for chronic anemia ; needs to be off plavix x 5 days.     Off Plavix since recent hospitalization 1/19/17.    Speaks Czech/needs .    HYSTERECTOMY      KIDNEY SURGERY  2003    NEPHRECTOMY       Family History   Problem Relation Age of Onset    Stroke Mother     Heart disease Father     Alzheimer's disease Sister     Thyroid cancer Neg Hx      Social History   Substance Use Topics    Smoking status: Former Smoker     Quit date: 1/19/2015    Smokeless tobacco: Never Used    Alcohol use No     Review of Systems   Constitutional: Negative.    HENT: Negative.    Eyes: Negative.    Respiratory: Negative.    Cardiovascular: Negative.    Gastrointestinal: Negative.    Endocrine: Negative.    Genitourinary: Negative.    Musculoskeletal: Positive for arthralgias, back pain, gait problem, joint swelling and myalgias.   Skin: Negative.    Allergic/Immunologic: Negative.    Neurological: Negative for tingling and numbness.   Hematological: Negative.    Psychiatric/Behavioral: Negative.    All other systems reviewed and are negative.      Physical Exam   Initial Vitals   BP Pulse Resp Temp SpO2   03/05/17 1112 03/05/17 1112 03/05/17 1112 03/05/17 1112 03/05/17 1112   239/83 80 20 98.8 °F (37.1 °C) 96 %     Physical Exam    Nursing note and vitals reviewed.  Constitutional: Vital signs are normal. She  appears well-developed. She is active and cooperative.   HENT:   Head: Normocephalic and atraumatic.   Eyes: Conjunctivae, EOM and lids are normal. Pupils are equal, round, and reactive to light.   Neck: Trachea normal and full passive range of motion without pain. Neck supple. No thyroid mass present.   Cardiovascular: Normal rate, regular rhythm, S1 normal, S2 normal, normal heart sounds, intact distal pulses and normal pulses.   Abdominal: Soft. Normal appearance, normal aorta and bowel sounds are normal.   Musculoskeletal:        Right hip: She exhibits decreased range of motion and bony tenderness.        Legs:  Lymphadenopathy:     She has no axillary adenopathy.   Neurological: She is alert and oriented to person, place, and time.   Skin: Skin is warm, dry and intact.   Psychiatric: She has a normal mood and affect. Her speech is normal and behavior is normal. Judgment and thought content normal. Cognition and memory are normal.         ED Course   Procedures  Labs Reviewed - No data to display                 Imaging Results         X-Ray Hip 2 View Right (Final result) Result time:  03/05/17 12:08:03    Final result by Interface, Rad Results In (03/05/17 12:08:03)    Narrative:    Study Desc:   XR HIP 2 VIEW RIGHT  Clinical History: Right hip pain.  No known trauma.  Comparison: None     FINDINGS:  AP and frog-leg views of the right hip are performed.     No acute fracture identified. Normal alignment without dislocation.  Mild degenerative   changes of the right hip with small marginal osteophyte formation.     Vascular stent is noted in the right pelvis/inguinal region.  Vascular calcifications are   noted.     IMPRESSION:  1.  No acute osseous abnormality of the right hip.  2.  Mild right hip degenerative arthritis.     SL: 24 Signed by: Demar Sevilla MD  2017-03-05 12:08:00[-0600]                          ED Course     Clinical Impression:   The primary encounter diagnosis was Hip arthritis. A  diagnosis of Pain was also pertinent to this visit.          Daren Mandujano MD  03/05/17 2817

## 2017-03-05 NOTE — ED TRIAGE NOTES
Pt states she has pain to R hip, worse with movement. Pt denies trauma. Pt has no obvious deformity or swelling noted.

## 2017-03-13 ENCOUNTER — TELEPHONE (OUTPATIENT)
Dept: INTERNAL MEDICINE | Facility: CLINIC | Age: 82
End: 2017-03-13

## 2017-03-13 ENCOUNTER — OFFICE VISIT (OUTPATIENT)
Dept: RHEUMATOLOGY | Facility: CLINIC | Age: 82
End: 2017-03-13
Payer: MEDICARE

## 2017-03-13 VITALS
BODY MASS INDEX: 24.29 KG/M2 | HEIGHT: 62 IN | DIASTOLIC BLOOD PRESSURE: 64 MMHG | SYSTOLIC BLOOD PRESSURE: 134 MMHG | HEART RATE: 55 BPM | WEIGHT: 132 LBS

## 2017-03-13 DIAGNOSIS — G89.4 CHRONIC PAIN SYNDROME: ICD-10-CM

## 2017-03-13 DIAGNOSIS — M05.79 RHEUMATOID ARTHRITIS INVOLVING MULTIPLE SITES WITH POSITIVE RHEUMATOID FACTOR: Primary | ICD-10-CM

## 2017-03-13 DIAGNOSIS — D50.9 IRON DEFICIENCY ANEMIA: ICD-10-CM

## 2017-03-13 PROCEDURE — 1157F ADVNC CARE PLAN IN RCRD: CPT | Mod: S$GLB,,, | Performed by: INTERNAL MEDICINE

## 2017-03-13 PROCEDURE — 99499 UNLISTED E&M SERVICE: CPT | Mod: S$GLB,,, | Performed by: INTERNAL MEDICINE

## 2017-03-13 PROCEDURE — 1125F AMNT PAIN NOTED PAIN PRSNT: CPT | Mod: S$GLB,,, | Performed by: INTERNAL MEDICINE

## 2017-03-13 PROCEDURE — 99999 PR PBB SHADOW E&M-EST. PATIENT-LVL IV: CPT | Mod: PBBFAC,,, | Performed by: INTERNAL MEDICINE

## 2017-03-13 PROCEDURE — 1160F RVW MEDS BY RX/DR IN RCRD: CPT | Mod: S$GLB,,, | Performed by: INTERNAL MEDICINE

## 2017-03-13 PROCEDURE — 99213 OFFICE O/P EST LOW 20 MIN: CPT | Mod: 25,S$GLB,, | Performed by: INTERNAL MEDICINE

## 2017-03-13 PROCEDURE — 96372 THER/PROPH/DIAG INJ SC/IM: CPT | Mod: S$GLB,,, | Performed by: INTERNAL MEDICINE

## 2017-03-13 PROCEDURE — 1159F MED LIST DOCD IN RCRD: CPT | Mod: S$GLB,,, | Performed by: INTERNAL MEDICINE

## 2017-03-13 RX ORDER — TRIAMCINOLONE ACETONIDE 40 MG/ML
60 INJECTION, SUSPENSION INTRA-ARTICULAR; INTRAMUSCULAR
Status: COMPLETED | OUTPATIENT
Start: 2017-03-13 | End: 2017-03-17

## 2017-03-13 RX ORDER — GABAPENTIN 100 MG/1
100 CAPSULE ORAL 3 TIMES DAILY
Qty: 90 CAPSULE | Refills: 11 | Status: ON HOLD | OUTPATIENT
Start: 2017-03-13 | End: 2017-03-30 | Stop reason: HOSPADM

## 2017-03-13 RX ORDER — FLUCONAZOLE 100 MG/1
100 TABLET ORAL DAILY
Qty: 7 TABLET | Refills: 0 | Status: SHIPPED | OUTPATIENT
Start: 2017-03-13 | End: 2017-03-31 | Stop reason: SDUPTHER

## 2017-03-13 RX ORDER — FERROUS SULFATE 325(65) MG
TABLET ORAL
Qty: 180 TABLET | Refills: 0 | Status: SHIPPED | OUTPATIENT
Start: 2017-03-13 | End: 2017-04-02 | Stop reason: SDUPTHER

## 2017-03-13 ASSESSMENT — ROUTINE ASSESSMENT OF PATIENT INDEX DATA (RAPID3)
AM STIFFNESS SCORE: 1, YES
PATIENT GLOBAL ASSESSMENT SCORE: 10
PSYCHOLOGICAL DISTRESS SCORE: 3.3
FATIGUE SCORE: 10
TOTAL RAPID3 SCORE: 8.66
MDHAQ FUNCTION SCORE: 1.8
WHEN YOU AWAKENED IN THE MORNING OVER THE LAST WEEK, PLEASE INDICATE THE AMOUNT OF TIME IT TAKES UNTIL YOU ARE AS LIMBER AS YOU WILL BE FOR THE DAY: 30 MINS
PAIN SCORE: 10

## 2017-03-13 NOTE — TELEPHONE ENCOUNTER
OK for diflucan for 7 days- I sent in     Can also use over the counter Lotrimin powder during the day and lotrimin cream at night    Follow up poor results, thanks

## 2017-03-13 NOTE — MR AVS SNAPSHOT
[facility]  [address]  [facility] [address]  Phone: [phone]  Fax: [phone]                  Danyelle Jacobs   3/13/2017 4:00 PM   Office Visit    Description:  Female : 1928   Provider:  Vicente Olivo MD   Department:  [facility]           Diagnoses this Visit        Comments    Rheumatoid arthritis involving multiple sites with positive rheumatoid factor    -  Primary     Chronic pain syndrome                To Do List           Future Appointments        Provider Department Dept Phone    2017 9:30 AM MD [facility] [phone]    2017 10:20 AM LAB, APPOINTMENT [facility] [phone]    2017 10:30 AM Bianca Dykes MD [facility] - Internal Medicine [phone]      Goals (5 Years of Data)     None      Follow-Up and Disposition     Return in about 3 months (around 2017).       These Medications        Disp Refills Start End    gabapentin (NEURONTIN) 100 MG capsule 90 capsule 11 3/13/2017 3/13/2018    Take 1 capsule (100 mg total) by mouth 3 (three) times daily. - Oral    Pharmacy: [facility] [address] [place] [address] AT [facility] Ph #: [phone]         [facility]     [facility] -  Assistance  Registered nurses in the [facility] provide clinical advisement, health education, appointment booking, and other advisory services.  Call for this free service at [phone].             Medications           Message regarding Medications     Verify the changes and/or additions to your medication regime listed below are the same as discussed with your clinician today.  If any of these changes or additions are incorrect, please notify your healthcare provider.        START taking these NEW medications        Refills    gabapentin (NEURONTIN) 100 MG capsule 11    Sig: Take 1  capsule (100 mg total) by mouth 3 (three) times daily.    Class: Normal    Route: Oral      These medications were administered today        Dose Freq    triamcinolone acetonide injection 60 mg 60 mg Clinic/HOD 1 time    Sig: Inject 1.5 mLs (60 mg total) into the muscle one time.    Class: Normal    Route: Intramuscular           Verify that the below list of medications is an accurate representation of the medications you are currently taking.  If none reported, the list may be blank. If incorrect, please contact your healthcare provider. Carry this list with you in case of emergency.           Current Medications     alprazolam (XANAX) 0.5 MG tablet Take 1 tablet (0.5 mg total) by mouth daily as needed.    aspirin 81 mg Tab Take 81 mg by mouth. 1 Tablet Oral Every day    atorvastatin (LIPITOR) 40 MG tablet Take 1 tablet (40 mg total) by mouth every evening.    clopidogrel (PLAVIX) 75 mg tablet Take 75 mg by mouth once daily.     mg capsule TAKE 1 CAPSULE BY MOUTH TWICE DAILY    donepezil (ARICEPT) 10 MG tablet Take 1 tablet (10 mg total) by mouth once daily.    duloxetine (CYMBALTA) 30 MG capsule Take 1 capsule (30 mg total) by mouth once daily.    ferrous sulfate 325 (65 FE) MG EC tablet Take 1 tablet (325 mg total) by mouth 2 (two) times daily.    ferrous sulfate 325 mg (65 mg iron) Tab tablet TAKE 1 TABLET BY MOUTH TWICE DAILY    fluconazole (DIFLUCAN) 100 MG tablet Take 1 tablet (100 mg total) by mouth once daily.    hydrALAZINE (APRESOLINE) 25 MG tablet Take 1 tablet (25 mg total) by mouth every 8 (eight) hours.    hydrochlorothiazide (MICROZIDE) 12.5 mg capsule Take 1 capsule (12.5 mg total) by mouth once daily.    hydrocodone-acetaminophen 5-325mg (NORCO) 5-325 mg per tablet Take 1 tablet by mouth 3 (three) times daily as needed for Pain.    hydrocodone-acetaminophen 5-325mg (NORCO) 5-325 mg per tablet Starting on Mar 18, 2017. Take 1 tablet by mouth 3 (three) times daily as needed for Pain.     "hydrocodone-acetaminophen 5-325mg (NORCO) 5-325 mg per tablet Starting on Apr 18, 2017. Take 1 tablet by mouth 3 (three) times daily as needed for Pain.    isosorbide mononitrate (IMDUR) 30 MG 24 hr tablet Take 1 tablet (30 mg total) by mouth every evening.    losartan (COZAAR) 100 MG tablet Take 1 tablet (100 mg total) by mouth once daily.    pantoprazole (PROTONIX) 40 MG tablet Take 1 tablet (40 mg total) by mouth once daily.    predniSONE (DELTASONE) 10 MG tablet Take 1 tablet (10 mg total) by mouth once daily.    tolterodine (DETROL LA) 2 MG Cp24 Take 1 capsule (2 mg total) by mouth once daily.    tramadol (ULTRAM) 50 mg tablet Take 1 tablet (50 mg total) by mouth every 6 (six) hours as needed for Pain.    gabapentin (NEURONTIN) 100 MG capsule Take 1 capsule (100 mg total) by mouth 3 (three) times daily.           Clinical Reference Information           Your Vitals Were     BP Pulse Height Weight BMI    134/64 55 5' 2.4" (1.585 m) 59.9 kg (132 lb) 23.83 kg/m2      Blood Pressure          Most Recent Value    BP  134/64      Allergies as of 3/13/2017     Norvasc  [Amlodipine]      Immunizations Administered on Date of Encounter - 3/13/2017     None      Language Assistance Services     ATTENTION: Language assistance services are available, free of charge. Please call [phone].      ATENCIÓN: Si habla español, tiene a pate disposición servicios gratuitos de asistencia lingüística. Llame al [phone].     RODO Ý: N?u b?n nói Ti?ng Vi?t, có các d?ch v? h? tr? ngôn ng? mi?n phí dành cho b?n. G?i s? [phone].         [facility] complies with applicable Federal civil rights laws and does not discriminate on the basis of race, color, national origin, age, disability, or sex.        "

## 2017-03-13 NOTE — PROGRESS NOTES
"Subjective:       Patient ID: Danyelle Jacobs is a 88 y.o. female.    Chief Complaint: No chief complaint on file.    HPI     F/u FMS, RA    No DMARD due to age and comorbidities  Prednisone for myasthenia gravis    Cymbalta 30 mg/d ; has helped per daughter in law   Zolendronic acid for osteoporosis (GI intolerance of oral bis-phosphonates)    March 5 went to ER with hip pain; XR showed  She returns for annual follow-up  She is still taking Cymbalta  She continues on 10 mg of prednisone daily  No antecedent trauma  Family not sure why pain is worse  ER gave tramadol; it did not help; she has continued hydrocodone for long time  Hx gabapentin; not sure why this was stopped around 2014; no AE on record     Patient has not had any infections since I last saw her    Wears collar      Review of Systems   Constitutional: Negative for fatigue, fever and unexpected weight change.   HENT: Negative for mouth sores.         Dry mouth   Respiratory: Negative for cough and shortness of breath.    Cardiovascular: Negative for chest pain.   Gastrointestinal: Negative for abdominal pain and diarrhea.   Skin: Negative for rash.   Neurological: Positive for headaches.   Hematological: Bruises/bleeds easily.   Psychiatric/Behavioral: Negative for sleep disturbance.         Objective:     /64  Pulse (!) 55  Ht 5' 2.4" (1.585 m)  Wt 59.9 kg (132 lb)  BMI 23.83 kg/m2     Physical Exam   Skin:     Bruises on forearm   Musculoskeletal:   No joint swelling  Interosseous atrophy  Deformities L hand  No joint swelling  Can stand with assistance; when asked to point to the pain she indicates low back and R iliac ; on palpation there is diffuse tenderness iliolumbar area and both lateral hips over trochanters        February 13 CBC notable for hemoglobin 9.9, hematocrit 32.1, which is improved from previous.  Basic metabolic panel same day showed creatinine 1.0 and normal electrolytes    X-rays obtained in the emergency room include the " right hip and there is no evidence for fracture of the hip or the right pubic ramus or iliac.  Assessment:       1. Rheumatoid arthritis involving multiple sites with positive rheumatoid factor    2. Chronic pain syndrome        Rheumatoid arthritis remains suppressed by prednisone which she is taking chronically for myasthenia gravis  Her general condition suggest that there is a little increase in lower back or pelvic pain that occurred about 2 weeks ago.  She is unable to localize the pain in a meaningful way and on examination this is not apparent either.  X-rays do not show a fracture in the area of the hip acetabulum or pubic ramus.  Sacral fracture can easily be occult with plain x-rays, as could a lumbar compression fracture considering the limitations of x-rays obtained.    Plan:     1.  In addition to her current pain, we'll try giving her Kenalog 60mg im for short-term pain relief  2.  I will prescribe gabapentin 100 mg to start back on either twice a day or 3 times a day to see if this will help with some of her pain.  3.  If neither of the above is helping then we can either obtain additional x-rays were get a nuclear bone scan to see if there is a fracture somewhere else  4.  I've asked the daughter-in-law to give us a progress report over the next few weeks if she continues to suffer this kind of pain

## 2017-03-13 NOTE — TELEPHONE ENCOUNTER
----- Message from Anabella Seo sent at 3/13/2017 12:10 PM CDT -----  Contact: Cindy/ Daughter in law/ [phone]   Cindy want to have some medication sent to the pharmacy for a yeast infection under both breast. Please call and advise      Thank you

## 2017-03-17 RX ADMIN — TRIAMCINOLONE ACETONIDE 60 MG: 40 INJECTION, SUSPENSION INTRA-ARTICULAR; INTRAMUSCULAR at 05:03

## 2017-03-23 ENCOUNTER — TELEPHONE (OUTPATIENT)
Dept: INTERNAL MEDICINE | Facility: CLINIC | Age: 82
End: 2017-03-23

## 2017-03-23 NOTE — TELEPHONE ENCOUNTER
----- Message from Hever Lucas sent at 3/23/2017  1:49 PM CDT -----  Contact: Cindy [phone]  Patient's daughter in law is returning the office call . Please advise , Thanks !

## 2017-03-23 NOTE — TELEPHONE ENCOUNTER
----- Message from Veronica Landni MA sent at 3/23/2017 11:56 AM CDT -----  Contact: daughter / catherine - [phone]   Type: Sooner appointment than  is able to schedule    When is the first available appointment?  4/3/17    What is the nature of the appointment? Congestion / cough / no appetite     What appointment type: UC     Comments: refused UC appt. Requesting appt tomorrow. Please call. Thanks!

## 2017-03-23 NOTE — TELEPHONE ENCOUNTER
Apt schedule called pt daughter and left a detail message that apt has been schedule for tomorrow

## 2017-03-24 ENCOUNTER — OFFICE VISIT (OUTPATIENT)
Dept: INTERNAL MEDICINE | Facility: CLINIC | Age: 82
DRG: 193 | End: 2017-03-24
Payer: MEDICARE

## 2017-03-24 ENCOUNTER — HOSPITAL ENCOUNTER (INPATIENT)
Facility: HOSPITAL | Age: 82
LOS: 6 days | Discharge: HOME HEALTH | DRG: 193 | End: 2017-03-30
Attending: EMERGENCY MEDICINE | Admitting: INTERNAL MEDICINE
Payer: MEDICARE

## 2017-03-24 VITALS
DIASTOLIC BLOOD PRESSURE: 65 MMHG | HEART RATE: 86 BPM | TEMPERATURE: 99 F | SYSTOLIC BLOOD PRESSURE: 137 MMHG | OXYGEN SATURATION: 88 %

## 2017-03-24 DIAGNOSIS — R09.02 HYPOXIA: Primary | ICD-10-CM

## 2017-03-24 DIAGNOSIS — M25.551 HIP PAIN, RIGHT: ICD-10-CM

## 2017-03-24 DIAGNOSIS — R06.03 RESPIRATORY DISTRESS: ICD-10-CM

## 2017-03-24 DIAGNOSIS — I25.10 CORONARY ARTERY DISEASE INVOLVING NATIVE HEART WITHOUT ANGINA PECTORIS, UNSPECIFIED VESSEL OR LESION TYPE: ICD-10-CM

## 2017-03-24 DIAGNOSIS — R06.03 RESPIRATORY DISTRESS: Primary | ICD-10-CM

## 2017-03-24 DIAGNOSIS — M25.551 PAIN OF RIGHT HIP JOINT: ICD-10-CM

## 2017-03-24 DIAGNOSIS — R06.02 SOB (SHORTNESS OF BREATH): ICD-10-CM

## 2017-03-24 PROBLEM — N17.9 AKI (ACUTE KIDNEY INJURY): Status: ACTIVE | Noted: 2017-03-24

## 2017-03-24 PROBLEM — R41.0 DELIRIUM: Status: ACTIVE | Noted: 2017-03-24

## 2017-03-24 PROBLEM — J18.9 PNEUMONIA DUE TO INFECTIOUS ORGANISM: Status: ACTIVE | Noted: 2017-03-24

## 2017-03-24 PROBLEM — J96.01 ACUTE RESPIRATORY FAILURE WITH HYPOXIA: Status: ACTIVE | Noted: 2017-03-24

## 2017-03-24 LAB
ALBUMIN SERPL BCP-MCNC: 2.3 G/DL
ALP SERPL-CCNC: 115 U/L
ALT SERPL W/O P-5'-P-CCNC: 24 U/L
ANION GAP SERPL CALC-SCNC: 14 MMOL/L
AST SERPL-CCNC: 46 U/L
BACTERIA #/AREA URNS AUTO: ABNORMAL /HPF
BASOPHILS # BLD AUTO: 0.01 K/UL
BASOPHILS NFR BLD: 0.1 %
BILIRUB SERPL-MCNC: 0.3 MG/DL
BILIRUB UR QL STRIP: NEGATIVE
BNP SERPL-MCNC: 496 PG/ML
BUN SERPL-MCNC: 48 MG/DL
CALCIUM SERPL-MCNC: 8.8 MG/DL
CHLORIDE SERPL-SCNC: 110 MMOL/L
CLARITY UR REFRACT.AUTO: ABNORMAL
CO2 SERPL-SCNC: 21 MMOL/L
COLOR UR AUTO: YELLOW
CREAT SERPL-MCNC: 1.8 MG/DL
DIFFERENTIAL METHOD: ABNORMAL
EOSINOPHIL # BLD AUTO: 0 K/UL
EOSINOPHIL NFR BLD: 0 %
ERYTHROCYTE [DISTWIDTH] IN BLOOD BY AUTOMATED COUNT: 21.2 %
EST. GFR  (AFRICAN AMERICAN): 28.6 ML/MIN/1.73 M^2
EST. GFR  (NON AFRICAN AMERICAN): 24.8 ML/MIN/1.73 M^2
GLUCOSE SERPL-MCNC: 102 MG/DL
GLUCOSE UR QL STRIP: NEGATIVE
HCT VFR BLD AUTO: 32 %
HGB BLD-MCNC: 10.3 G/DL
HGB UR QL STRIP: NEGATIVE
HYALINE CASTS UR QL AUTO: 9 /LPF
KETONES UR QL STRIP: NEGATIVE
LACTATE SERPL-SCNC: 1.5 MMOL/L
LEUKOCYTE ESTERASE UR QL STRIP: NEGATIVE
LYMPHOCYTES # BLD AUTO: 0.3 K/UL
LYMPHOCYTES NFR BLD: 1.4 %
MCH RBC QN AUTO: 28.1 PG
MCHC RBC AUTO-ENTMCNC: 32.2 %
MCV RBC AUTO: 87 FL
MICROSCOPIC COMMENT: ABNORMAL
MONOCYTES # BLD AUTO: 0.4 K/UL
MONOCYTES NFR BLD: 2.2 %
NEUTROPHILS # BLD AUTO: 19 K/UL
NEUTROPHILS NFR BLD: 96.3 %
NITRITE UR QL STRIP: NEGATIVE
PH UR STRIP: 5 [PH] (ref 5–8)
PLATELET # BLD AUTO: 338 K/UL
PLATELET BLD QL SMEAR: ABNORMAL
PMV BLD AUTO: 11.3 FL
POTASSIUM SERPL-SCNC: 4.7 MMOL/L
PROCALCITONIN SERPL IA-MCNC: 1.98 NG/ML
PROT SERPL-MCNC: 6.9 G/DL
PROT UR QL STRIP: ABNORMAL
RBC # BLD AUTO: 3.67 M/UL
RBC #/AREA URNS AUTO: 1 /HPF (ref 0–4)
SODIUM SERPL-SCNC: 145 MMOL/L
SP GR UR STRIP: 1.02 (ref 1–1.03)
SQUAMOUS #/AREA URNS AUTO: 1 /HPF
URN SPEC COLLECT METH UR: ABNORMAL
UROBILINOGEN UR STRIP-ACNC: NEGATIVE EU/DL
WBC # BLD AUTO: 19.81 K/UL
WBC #/AREA URNS AUTO: 7 /HPF (ref 0–5)

## 2017-03-24 PROCEDURE — 1159F MED LIST DOCD IN RCRD: CPT | Mod: S$GLB,,, | Performed by: NURSE PRACTITIONER

## 2017-03-24 PROCEDURE — 87086 URINE CULTURE/COLONY COUNT: CPT

## 2017-03-24 PROCEDURE — 84145 PROCALCITONIN (PCT): CPT

## 2017-03-24 PROCEDURE — 99285 EMERGENCY DEPT VISIT HI MDM: CPT | Mod: 25

## 2017-03-24 PROCEDURE — 94640 AIRWAY INHALATION TREATMENT: CPT

## 2017-03-24 PROCEDURE — 81001 URINALYSIS AUTO W/SCOPE: CPT

## 2017-03-24 PROCEDURE — 83880 ASSAY OF NATRIURETIC PEPTIDE: CPT

## 2017-03-24 PROCEDURE — 25000242 PHARM REV CODE 250 ALT 637 W/ HCPCS: Performed by: INTERNAL MEDICINE

## 2017-03-24 PROCEDURE — 1160F RVW MEDS BY RX/DR IN RCRD: CPT | Mod: S$GLB,,, | Performed by: NURSE PRACTITIONER

## 2017-03-24 PROCEDURE — 11000001 HC ACUTE MED/SURG PRIVATE ROOM

## 2017-03-24 PROCEDURE — 25000003 PHARM REV CODE 250: Performed by: INTERNAL MEDICINE

## 2017-03-24 PROCEDURE — 99999 PR PBB SHADOW E&M-EST. PATIENT-LVL III: CPT | Mod: PBBFAC,,, | Performed by: NURSE PRACTITIONER

## 2017-03-24 PROCEDURE — 94761 N-INVAS EAR/PLS OXIMETRY MLT: CPT

## 2017-03-24 PROCEDURE — 83605 ASSAY OF LACTIC ACID: CPT

## 2017-03-24 PROCEDURE — 85025 COMPLETE CBC W/AUTO DIFF WBC: CPT

## 2017-03-24 PROCEDURE — 87449 NOS EACH ORGANISM AG IA: CPT

## 2017-03-24 PROCEDURE — 1157F ADVNC CARE PLAN IN RCRD: CPT | Mod: S$GLB,,, | Performed by: NURSE PRACTITIONER

## 2017-03-24 PROCEDURE — 99285 EMERGENCY DEPT VISIT HI MDM: CPT | Mod: ,,, | Performed by: EMERGENCY MEDICINE

## 2017-03-24 PROCEDURE — 63600175 PHARM REV CODE 636 W HCPCS: Performed by: PHYSICIAN ASSISTANT

## 2017-03-24 PROCEDURE — 99215 OFFICE O/P EST HI 40 MIN: CPT | Mod: S$GLB,,, | Performed by: NURSE PRACTITIONER

## 2017-03-24 PROCEDURE — 93005 ELECTROCARDIOGRAM TRACING: CPT

## 2017-03-24 PROCEDURE — 96365 THER/PROPH/DIAG IV INF INIT: CPT

## 2017-03-24 PROCEDURE — 99223 1ST HOSP IP/OBS HIGH 75: CPT | Mod: ,,, | Performed by: INTERNAL MEDICINE

## 2017-03-24 PROCEDURE — 93010 ELECTROCARDIOGRAM REPORT: CPT | Mod: ,,, | Performed by: INTERNAL MEDICINE

## 2017-03-24 PROCEDURE — 27000221 HC OXYGEN, UP TO 24 HOURS

## 2017-03-24 PROCEDURE — 87040 BLOOD CULTURE FOR BACTERIA: CPT | Mod: 59

## 2017-03-24 PROCEDURE — 80053 COMPREHEN METABOLIC PANEL: CPT

## 2017-03-24 RX ORDER — LOSARTAN POTASSIUM 50 MG/1
100 TABLET ORAL DAILY
Status: DISCONTINUED | OUTPATIENT
Start: 2017-03-25 | End: 2017-03-30 | Stop reason: HOSPADM

## 2017-03-24 RX ORDER — HYDRALAZINE HYDROCHLORIDE 25 MG/1
25 TABLET, FILM COATED ORAL EVERY 8 HOURS
Status: DISCONTINUED | OUTPATIENT
Start: 2017-03-24 | End: 2017-03-30 | Stop reason: HOSPADM

## 2017-03-24 RX ORDER — AMOXICILLIN 250 MG
1 CAPSULE ORAL DAILY
Status: DISCONTINUED | OUTPATIENT
Start: 2017-03-25 | End: 2017-03-30 | Stop reason: HOSPADM

## 2017-03-24 RX ORDER — ISOSORBIDE MONONITRATE 30 MG/1
30 TABLET, EXTENDED RELEASE ORAL NIGHTLY
Status: DISCONTINUED | OUTPATIENT
Start: 2017-03-24 | End: 2017-03-30 | Stop reason: HOSPADM

## 2017-03-24 RX ORDER — HYDROCODONE BITARTRATE AND ACETAMINOPHEN 5; 325 MG/1; MG/1
1 TABLET ORAL EVERY 8 HOURS PRN
Status: DISCONTINUED | OUTPATIENT
Start: 2017-03-24 | End: 2017-03-30 | Stop reason: HOSPADM

## 2017-03-24 RX ORDER — NAPROXEN SODIUM 220 MG/1
81 TABLET, FILM COATED ORAL DAILY
Status: DISCONTINUED | OUTPATIENT
Start: 2017-03-25 | End: 2017-03-30 | Stop reason: HOSPADM

## 2017-03-24 RX ORDER — SODIUM CHLORIDE 0.9 % (FLUSH) 0.9 %
3 SYRINGE (ML) INJECTION EVERY 8 HOURS
Status: DISCONTINUED | OUTPATIENT
Start: 2017-03-24 | End: 2017-03-30 | Stop reason: HOSPADM

## 2017-03-24 RX ORDER — IPRATROPIUM BROMIDE AND ALBUTEROL SULFATE 2.5; .5 MG/3ML; MG/3ML
3 SOLUTION RESPIRATORY (INHALATION) EVERY 4 HOURS
Status: DISCONTINUED | OUTPATIENT
Start: 2017-03-24 | End: 2017-03-30 | Stop reason: HOSPADM

## 2017-03-24 RX ORDER — CLOPIDOGREL BISULFATE 75 MG/1
75 TABLET ORAL DAILY
Status: DISCONTINUED | OUTPATIENT
Start: 2017-03-25 | End: 2017-03-30 | Stop reason: HOSPADM

## 2017-03-24 RX ORDER — DONEPEZIL HYDROCHLORIDE 10 MG/1
10 TABLET, FILM COATED ORAL DAILY
Status: DISCONTINUED | OUTPATIENT
Start: 2017-03-25 | End: 2017-03-30 | Stop reason: HOSPADM

## 2017-03-24 RX ORDER — DOCUSATE SODIUM 100 MG/1
100 CAPSULE, LIQUID FILLED ORAL 2 TIMES DAILY
Status: DISCONTINUED | OUTPATIENT
Start: 2017-03-24 | End: 2017-03-30 | Stop reason: HOSPADM

## 2017-03-24 RX ORDER — ATORVASTATIN CALCIUM 20 MG/1
40 TABLET, FILM COATED ORAL NIGHTLY
Status: DISCONTINUED | OUTPATIENT
Start: 2017-03-24 | End: 2017-03-30 | Stop reason: HOSPADM

## 2017-03-24 RX ORDER — PREDNISONE 10 MG/1
10 TABLET ORAL DAILY
Status: DISCONTINUED | OUTPATIENT
Start: 2017-03-25 | End: 2017-03-30 | Stop reason: HOSPADM

## 2017-03-24 RX ORDER — ALPRAZOLAM 0.5 MG/1
0.5 TABLET ORAL DAILY PRN
Status: DISCONTINUED | OUTPATIENT
Start: 2017-03-24 | End: 2017-03-30 | Stop reason: HOSPADM

## 2017-03-24 RX ORDER — GABAPENTIN 100 MG/1
100 CAPSULE ORAL 3 TIMES DAILY
Status: DISCONTINUED | OUTPATIENT
Start: 2017-03-24 | End: 2017-03-30

## 2017-03-24 RX ORDER — DULOXETIN HYDROCHLORIDE 30 MG/1
30 CAPSULE, DELAYED RELEASE ORAL DAILY
Status: DISCONTINUED | OUTPATIENT
Start: 2017-03-25 | End: 2017-03-30 | Stop reason: HOSPADM

## 2017-03-24 RX ORDER — FERROUS SULFATE 325(65) MG
325 TABLET, DELAYED RELEASE (ENTERIC COATED) ORAL 2 TIMES DAILY
Status: DISCONTINUED | OUTPATIENT
Start: 2017-03-24 | End: 2017-03-30 | Stop reason: HOSPADM

## 2017-03-24 RX ORDER — SODIUM CHLORIDE 9 MG/ML
INJECTION, SOLUTION INTRAVENOUS CONTINUOUS
Status: DISCONTINUED | OUTPATIENT
Start: 2017-03-24 | End: 2017-03-25

## 2017-03-24 RX ORDER — GUAIFENESIN 100 MG/5ML
400 SOLUTION ORAL EVERY 6 HOURS
Status: DISCONTINUED | OUTPATIENT
Start: 2017-03-24 | End: 2017-03-30 | Stop reason: HOSPADM

## 2017-03-24 RX ORDER — ACETAMINOPHEN 500 MG
500 TABLET ORAL EVERY 8 HOURS PRN
Status: DISCONTINUED | OUTPATIENT
Start: 2017-03-24 | End: 2017-03-30 | Stop reason: HOSPADM

## 2017-03-24 RX ORDER — IPRATROPIUM BROMIDE AND ALBUTEROL SULFATE 2.5; .5 MG/3ML; MG/3ML
3 SOLUTION RESPIRATORY (INHALATION)
Status: DISCONTINUED | OUTPATIENT
Start: 2017-03-24 | End: 2017-03-24

## 2017-03-24 RX ORDER — PANTOPRAZOLE SODIUM 40 MG/1
40 TABLET, DELAYED RELEASE ORAL DAILY
Status: DISCONTINUED | OUTPATIENT
Start: 2017-03-25 | End: 2017-03-30 | Stop reason: HOSPADM

## 2017-03-24 RX ORDER — ONDANSETRON 8 MG/1
8 TABLET, ORALLY DISINTEGRATING ORAL EVERY 8 HOURS PRN
Status: DISCONTINUED | OUTPATIENT
Start: 2017-03-24 | End: 2017-03-30 | Stop reason: HOSPADM

## 2017-03-24 RX ORDER — HYDROCHLOROTHIAZIDE 12.5 MG/1
12.5 TABLET ORAL DAILY
Status: DISCONTINUED | OUTPATIENT
Start: 2017-03-25 | End: 2017-03-30 | Stop reason: HOSPADM

## 2017-03-24 RX ADMIN — GUAIFENESIN 400 MG: 100 SOLUTION ORAL at 08:03

## 2017-03-24 RX ADMIN — GABAPENTIN 100 MG: 100 CAPSULE ORAL at 08:03

## 2017-03-24 RX ADMIN — CEFTRIAXONE 1 G: 1 INJECTION, SOLUTION INTRAVENOUS at 03:03

## 2017-03-24 RX ADMIN — FERROUS SULFATE TAB EC 325 MG (65 MG FE EQUIVALENT) 325 MG: 325 (65 FE) TABLET DELAYED RESPONSE at 08:03

## 2017-03-24 RX ADMIN — IPRATROPIUM BROMIDE AND ALBUTEROL SULFATE 3 ML: .5; 3 SOLUTION RESPIRATORY (INHALATION) at 05:03

## 2017-03-24 RX ADMIN — ISOSORBIDE MONONITRATE 30 MG: 30 TABLET, EXTENDED RELEASE ORAL at 08:03

## 2017-03-24 RX ADMIN — HYDRALAZINE HYDROCHLORIDE 25 MG: 25 TABLET ORAL at 08:03

## 2017-03-24 RX ADMIN — DOCUSATE SODIUM 100 MG: 100 CAPSULE, LIQUID FILLED ORAL at 08:03

## 2017-03-24 RX ADMIN — ATORVASTATIN CALCIUM 40 MG: 20 TABLET, FILM COATED ORAL at 08:03

## 2017-03-24 NOTE — ED PROVIDER NOTES
Encounter Date: 3/24/2017       History     Chief Complaint   Patient presents with    Hip Pain     Pt presented to the ED c/o right hip pain. Pt was brought down by the nurse from the clinic . Pt's daughter at the bedside. Pt was dx with deanuomina at the urgent care clinic today. Pt's room air stats were 88%.      Review of patient's allergies indicates:   Allergen Reactions    Norvasc  [amlodipine]      Other reaction(s): Edema     HPI Comments: Patient is a 88-year-old female with past medical history of GERD, anemia, coronary artery disease, and osteoporosis who presents to the emergency department from urgent care due to hypoxia with a pulse ox of 88%.  History was provided from daughter-in-law who is at bedside at time of exam.  Family member states that patient has been having a productive cough since Sunday as well as some hip pain since March 5.  Patient went to urgent care for above symptoms and was found to have a pulse ox of 88% and was placed on oxygen and sent to the emergency department.  He denies any nausea, vomiting, fevers, chills, chest pain or any other complaints.  Due to above symptoms patient did present to the ED    The history is provided by the patient.     Past Medical History:   Diagnosis Date    Adverse effect of oral bisphosphonates     Anemia     Angiodysplasia of stomach: see EGD 1/17 2/13/2017    Anxiety     Aortic atherosclerosis     Benign hypertension 10/5/2015    Carcinoma, renal cell     Carotid arterial disease 1/20/2014    Cerebellar stroke, acute     Chronic kidney disease (CKD), stage I 2/18/2016    Coronary artery disease     Current chronic use of systemic steroids 10/4/2016    Degenerative disc disease     Depression     Diverticulosis     Former smoker 6/13/2016    Gastroesophageal reflux disease without esophagitis 10/5/2015    GERD (gastroesophageal reflux disease)     Goiter, nontoxic, multinodular     IBS (irritable bowel syndrome)     Left  ventricular diastolic dysfunction with preserved systolic function 10/5/2015    Lung nodule     right    MGUS (monoclonal gammopathy of unknown significance) 6/13/2016    Myasthenia gravis, adult form     Osteoporosis 8/10/2015    Osteoporosis, unspecified     PAOD (peripheral arterial occlusive disease)     RA (rheumatoid arthritis)     Smoker 1/20/2014    Stroke     Ulcer     Vitamin D deficiency disease 1/20/2014     Past Surgical History:   Procedure Laterality Date    CATARACT EXTRACTION W/  INTRAOCULAR LENS IMPLANT Bilateral     CHOLECYSTECTOMY      COLONOSCOPY N/A 1/24/2017    Procedure: COLONOSCOPY;  Surgeon: Kristofer Worthington MD;  Location: [facility] ([address] [place] [address]);  Service: Endoscopy;  Laterality: N/A;  for chronic anemia ; needs to be off plavix x 5 days.     Off Plavix since recent hospitalization 1/19/17.    Speaks Vietnamese/needs .    HYSTERECTOMY      KIDNEY SURGERY  2003    NEPHRECTOMY       Family History   Problem Relation Age of Onset    Stroke Mother     Heart disease Father     Alzheimer's disease Sister     Thyroid cancer Neg Hx      Social History   Substance Use Topics    Smoking status: Former Smoker     Quit date: 1/19/2015    Smokeless tobacco: Never Used    Alcohol use No     Review of Systems   Unable to perform ROS: Dementia       Physical Exam   Initial Vitals   BP Pulse Resp Temp SpO2   03/24/17 1258 03/24/17 1258 03/24/17 1258 03/24/17 1258 03/24/17 1258   131/63 78 22 98.8 °F (37.1 °C) 94 %     Physical Exam    Nursing note and vitals reviewed.  Constitutional: She appears well-developed and well-nourished.   HENT:   Head: Normocephalic and atraumatic.   Eyes: EOM are normal. Pupils are equal, round, and reactive to light.   Neck: Normal range of motion. Neck supple. No thyromegaly present. No tracheal deviation present. No JVD present.   Cardiovascular: Normal rate and regular rhythm. Exam reveals no gallop and no friction rub.    No murmur  heard.  Pulmonary/Chest: No stridor. No respiratory distress. She has no wheezes. She has no rhonchi. She has no rales.   Course breath sounds   Abdominal: Soft. Bowel sounds are normal. She exhibits no distension and no mass. There is no tenderness. There is no rebound and no guarding.   Musculoskeletal: Normal range of motion. She exhibits no edema or tenderness.   Lymphadenopathy:     She has no cervical adenopathy.   Neurological: She is alert and oriented to person, place, and time. She displays normal reflexes. No cranial nerve deficit or sensory deficit.   Skin: Skin is warm and dry. No rash and no abscess noted. No erythema. No pallor.   Psychiatric: She has a normal mood and affect.         ED Course   Procedures  Labs Reviewed   CBC W/ AUTO DIFFERENTIAL - Abnormal; Notable for the following:        Result Value    WBC 19.81 (*)     RBC 3.67 (*)     Hemoglobin 10.3 (*)     Hematocrit 32.0 (*)     RDW 21.2 (*)     Gran # 19.0 (*)     Lymph # 0.3 (*)     Gran% 96.3 (*)     Lymph% 1.4 (*)     Mono% 2.2 (*)     All other components within normal limits   COMPREHENSIVE METABOLIC PANEL - Abnormal; Notable for the following:     CO2 21 (*)     BUN, Bld 48 (*)     Creatinine 1.8 (*)     Albumin 2.3 (*)     AST 46 (*)     eGFR if  28.6 (*)     eGFR if non  24.8 (*)     All other components within normal limits   URINALYSIS - Abnormal; Notable for the following:     Appearance, UA Cloudy (*)     Protein, UA 1+ (*)     All other components within normal limits   URINALYSIS MICROSCOPIC - Abnormal; Notable for the following:     WBC, UA 7 (*)     Bacteria, UA Few (*)     Hyaline Casts, UA 9 (*)     All other components within normal limits   B-TYPE NATRIURETIC PEPTIDE - Abnormal; Notable for the following:      (*)     All other components within normal limits    Narrative:     BNP ADD ON PER DR. ALLIE COTTON ORDER # [id number]   03/24/2017  15:44    CULTURE, URINE   CULTURE, BLOOD    CULTURE, BLOOD   CULTURE, RESPIRATORY   VIRAL RESPIRATORY PANEL (ANTIGEN DETECTION)   LACTIC ACID, PLASMA   PROCALCITONIN   B-TYPE NATRIURETIC PEPTIDE   LEGIONELLA ANTIGEN, URINE RANDOM     EKG Readings: (Independently Interpreted)   Initial Reading: No STEMI. Heart Rate: 86. Axis: Left Axis Deviation.     Imaging Results         CT Lower Extremity Without Cont Right (Final result) Result time:  03/24/17 17:51:06    Final result by Alex Joseph MD (03/24/17 17:51:06)    Impression:        No acute displaced fracture or dislocation.  An MRI pelvis may be performed if clinical concern for fracture persists.      Mild degenerative changes without joint effusion.    Incidental findings include diverticulosis coli in the right common/external iliac artery stent.  ______________________________________     Electronically signed by resident: ALEX JOSEPH MD  Date:     03/24/17  Time:    17:33            As the supervising and teaching physician, I personally reviewed the images and resident's interpretation and I agree with the findings.          Electronically signed by: CHANO PERALES MD  Date:     03/24/17  Time:    17:51     Narrative:    Procedure comments: Axial 2-mm images of the right hip obtained without intravenous contrast.  Data submitted for coronal and sagittal reformats.    Clinical history: History of right hip pain.      Comparison: Radiograph 3/5/2017.    Findings:    There is no evidence of fracture or dislocation.  Joints maintain appropriate alignment without evidence of effusion.  There is mild degenerative changes about the right hip with superior acetabular spurring and sclerotic change.  There is mild superior joint space narrowing.  Bony mineralization within normal limits.  Soft tissues are unremarkable.    Limited evaluation of the intrapelvic structures demonstrates a large vascular stent within the right common and external carotid arteries.  There is diverticulosis coli.            X-Ray  Chest 1 View (Final result) Result time:  03/24/17 15:58:14    Final result by Chano Perales MD (03/24/17 15:58:14)    Impression:     Examination suggestive of lung bases minimal atelectasis or minimal air space disease.      Electronically signed by: CHANO PERALES MD  Date:     03/24/17  Time:    15:58     Narrative:    Lateral chest    Results: Correlation with radiograph 3/24/17   AP view.  Small opacity noted at the lung base posteriorly, could represent  subsegmental area of atelectasis or minimal air space disease.  No significant pleural effusion.  Kyphosis of the thoracic spine.            X-Ray Chest AP Portable (SOB) (Final result) Result time:  03/24/17 14:43:22    Final result by Chano Perales MD (03/24/17 14:43:22)    Impression:     No definite acute intrathoracic process seen.  Cardiomegaly with mild increased pulmonary venous pressure appears to be chronic.      Electronically signed by: CHANO PERALES MD  Date:     03/24/17  Time:    14:43     Narrative:     AP chest radiograph.      Comparison: 1/17/17    Results: The lung volume appears adequate.  Increased interstitial lung markings seen throughout both lung fields, similar to prior studies.  No acute area of sizable airspace consolidation.  Minimal atelectatic changes at the lung bases.  No pleural abnormality is seen.  The cardiac silhouette is prominent and pulmonary vascularity appear within normal limits.  The mediastinum is midline. .  No pneumothorax.  Prior several rib fractures right hemithorax.  Atherosclerotic changes of the aorta.               Medical Decision Making:   History:   Old Medical Records: I decided to obtain old medical records.       APC / Resident Notes:   Patient is a 88-year-old female with past medical history of GERD, anemia, coronary artery disease, and osteoporosis who presents to the emergency department from urgent care due to hypoxia with a pulse ox of 88%.   Physical exam elderly  female in no acute respiratory distress with underlying anemia.  Heart regular rate and rhythm.  Lungs reveal course breath sounds bilaterally.  Will obtain a CBC, CMP, lactic acid, BNP, chest x-ray, CT scan of right hip for pain.    UA shows +1 protein and negative leukocytes and nitrites.  CBC shows leukocytosis with a white blood cell count of 19.81.  CMP shows creatinine 1.8 and BUN of 48.  Lactic acid of 1.5.  Chest x-ray shows no definite acute intrathoracic process seen.  Cardiomegaly with mild increased pulmonary venous pressure appears to be chronic.  Patient will be admitted to hospital medicine for presumed pneumonia in light of leukocytosis and hypoxia. Patient will be treated with Rocephin.  Will obtain blood cultures.  Plan of treatment discussed with attending physician and she is agreeable to above treatment.         Attending Attestation:     Physician Attestation Statement for NP/PA:   I have conducted a face to face encounter with this patient in addition to the NP/PA, due to Medical Complexity          Attending ED Notes:   80-year-old female presents with cough and shortness of breath.  His recently diagnosed with pneumonia.  Her oxygen saturation is low.she has been complaining about persistent right hip pain.  Evaluated by her primary care doctor as well as rheumatology.  Have a low suspicion for fracture, previous x-rays were normal, as well as a low suspicion for a septic joint, however since this is her only focus of complaint.  I will get a CT scan to evaluate this.  Chest x-ray was normal  Antibiotics were started.  The patient will be admitted for further therapy and management.          ED Course     Clinical Impression:   The primary encounter diagnosis was Hypoxia. Diagnoses of Respiratory distress, Pain of right hip joint, and SOB (shortness of breath) were also pertinent to this visit.    Disposition:   Disposition: Admitted  Condition: Nate Lara PA-C  03/24/17  1816       Kristina Santiago MD  03/24/17 1925

## 2017-03-24 NOTE — PROVIDER PROGRESS NOTES - EMERGENCY DEPT.
Encounter Date: 3/24/2017    ED Physician Progress Notes       SCRIBE NOTE: I, Ana Cristina Barahona, am scribing for, and in the presence of,  Dr. Felix.  Physician Statement: I, Dr. Felix, personally performed the services described in this documentation as scribed by Ana Cristina Barahona in my presence, and it is both accurate and complete.     Physician Note:   Time seen by intake provider: 1:21 PM     88 year old female presents for further evaluation of pneumonia diagnosis earlier today. Patient also with complaint of right hip pain. Relative reports she was recently diagnosed with osteoarthritis in that hip and was prescribed Tramadol, without improvement. She was later given a cortisone shot, which only relieved pain for a day. Relative notes patient has also been talking to  relatives.    Patient sent from clinic for pneumonia. Patient also complains of right hip pain, seems chronic more than anything. She had air sats of 88% at clinic. I will order pneumonia workup.    I initially evaluated this patient and ordered workup while in intake.  The patient will receive a full evaluation in an ED pod when space is available.  All results from tests ordered in intake will not be followed by the intake team, including myself. All results will be followed by the ED Pod team.

## 2017-03-24 NOTE — PROGRESS NOTES
Subjective:       Patient ID: Danyelle Jacobs is a 88 y.o. female.    Chief Complaint: No chief complaint on file.    Shortness of Breath   This is a new problem. The current episode started in the past 7 days. The problem occurs constantly. The problem has been rapidly worsening. Risk factors include recent leg injury.     Review of Systems   Unable to perform ROS: Other   Respiratory: Positive for shortness of breath.        Objective:      Physical Exam   Constitutional: She is oriented to person, place, and time. Vital signs are normal. She appears well-developed and well-nourished. She is cooperative.  Non-toxic appearance. She has a sickly appearance. She appears ill. She appears distressed.   HENT:   Head: Normocephalic and atraumatic.   Right Ear: Hearing and external ear normal.   Left Ear: Hearing and external ear normal.   Nose: Nose normal.   Eyes: Conjunctivae and EOM are normal. Pupils are equal, round, and reactive to light. Right eye exhibits no discharge. Left eye exhibits no discharge.   Neck: Normal range of motion. Neck supple.   Cardiovascular: Normal rate, regular rhythm and intact distal pulses.    Pulmonary/Chest: Effort normal. No respiratory distress. She has rhonchi. She has rales. She exhibits no tenderness.   Abdominal: Normal appearance.   Musculoskeletal: Normal range of motion. She exhibits no edema or tenderness.   Neurological: She is alert and oriented to person, place, and time. She has normal reflexes.   Skin: Skin is warm and dry.   Psychiatric: She has a normal mood and affect. Her behavior is normal. Judgment and thought content normal.       Assessment:       1. Respiratory distress        Plan:       Diagnoses and all orders for this visit:    Respiratory distress  -     Refer to Emergency Dept.        Pt has been given instructions populated from [employer] database and has verbalized understanding of the after visit summary and information contained wherein.    Follow up with a  "primary care provider. May go to ER for acute shortness of breath, lightheadedness, fever, or any other emergent complaints or changes in condition.    "This note will be shared with the patient"    The [employer] Dictation software was used during the dictation of portions or the entirety of this medical record.  Phonetic or grammatic errors may exist due to the use of this software. For clarification, refer to the author of the document.             "

## 2017-03-24 NOTE — PLAN OF CARE
Pt free from falls and injury. No distress noted. Denies pain. Vital signs stable. Daughter at bedside. Bed low with wheels locked. Side rails up x 2. Environment clutter free. Call light within reach. Will continue to monitor.

## 2017-03-24 NOTE — ED NOTES
I assume care for this patient. Pt resting in bed. NO acute distress noted. Respirations appear even and unlabored. Side rails upx2. Call light within reach. Will continue to monitor.

## 2017-03-24 NOTE — ED TRIAGE NOTES
Patient comes in with complaints of right hip pain and patient's daughter in law states they were seen at the clinic where they diagnosed patient with pneumonia and was told to come to the ER.

## 2017-03-24 NOTE — ED NOTES
Pt placed on continuous cardiac and pulse ox monitoring with 2 liters of oxygen per nasal cannula.  Non-invasive blood pressure to cycle every 15 minutes.  NSR noted; VS stable.  Pt denies needs or complaints at this time.  Bed locked in lowest position; side rails up and locked x 2; call light and personal belongings within reach.  Family member at bedside; will continue to monitor pt.

## 2017-03-24 NOTE — IP AVS SNAPSHOT
[facility]  [address]  [facility] [address]  Phone: [phone]           Patient Discharge Instructions   Our goal is to set you up for success. This packet includes information on your condition, medications, and your home care.  It will help you care for yourself to prevent having to return to the hospital.     Please ask your nurse if you have any questions.      There are many details to remember when preparing to leave the hospital. Here is what you will need to do:    1. Take your medicine. If you are prescribed medications, review your Medication List on the following pages. You may have new medications to  at the pharmacy and others that you'll need to stop taking. Review the instructions for how and when to take your medications. Talk with your doctor or nurses if you are unsure of what to do.     2. Go to your follow-up appointments. Specific follow-up information is listed in the following pages. Your may be contacted by a nurse or clinical provider about future appointments. Be sure we have all of the phone numbers to reach you. Please contact your provider's office if you are unable to make an appointment.     3. Watch for warning signs. Your doctor or nurse will give you detailed warning signs to watch for and when to call for assistance. These instructions may also include educational information about your condition. If you experience any of warning signs to your health, call your doctor.           [facility]  Unless otherwise directed by your provider, please   contact [facility], our nurse care line   that is available for 24/7 assistance.     [phone] (toll-free)     Registered nurses in the [facility]   provide: appointment scheduling, clinical advisement, health education, and other advisory services.                  ** Verify the list of medication(s) below is accurate and up to date. Carry this with you in case of  emergency. If your medications have changed, please notify your healthcare provider.             Medication List      START taking these medications        Additional Info                      pregabalin 50 MG capsule   Commonly known as:  LYRICA   Quantity:  60 capsule   Refills:  3   Dose:  50 mg    Instructions:  Take 1 capsule (50 mg total) by mouth 2 (two) times daily.     Begin Date    AM    Noon    PM    Bedtime         CHANGE how you take these medications        Additional Info                      hydrocodone-acetaminophen 5-325mg 5-325 mg per tablet   Commonly known as:  NORCO   Quantity:  30 tablet   Refills:  0   Dose:  1 tablet   What changed:    - when to take this  - Another medication with the same name was removed. Continue taking this medication, and follow the directions you see here.    Last time this was given:  1 tablet on 3/30/2017 10:24 AM   Instructions:  Take 1 tablet by mouth every 6 (six) hours as needed for Pain.     Begin Date    AM    Noon    PM    Bedtime         CONTINUE taking these medications        Additional Info                      alprazolam 0.5 MG tablet   Commonly known as:  XANAX   Quantity:  90 tablet   Refills:  0   Dose:  0.5 mg    Instructions:  Take 1 tablet (0.5 mg total) by mouth daily as needed.     Begin Date    AM    Noon    PM    Bedtime       aspirin 81 mg Tab   Refills:  0   Dose:  81 mg    Instructions:  Take 81 mg by mouth. 1 Tablet Oral Every day     Begin Date    AM    Noon    PM    Bedtime       atorvastatin 40 MG tablet   Commonly known as:  LIPITOR   Quantity:  90 tablet   Refills:  3   Dose:  40 mg    Last time this was given:  40 mg on 3/29/2017 10:20 PM   Instructions:  Take 1 tablet (40 mg total) by mouth every evening.     Begin Date    AM    Noon    PM    Bedtime       clopidogrel 75 mg tablet   Commonly known as:  PLAVIX   Refills:  0   Dose:  75 mg    Last time this was given:  75 mg on 3/30/2017 10:00 AM   Instructions:  Take 75 mg by mouth  once daily.     Begin Date    AM    Noon    PM    Bedtime        MG capsule   Quantity:  180 capsule   Refills:  0   Generic drug:  docusate sodium    Last time this was given:  100 mg on 3/30/2017 10:00 AM   Instructions:  TAKE 1 CAPSULE BY MOUTH TWICE DAILY     Begin Date    AM    Noon    PM    Bedtime       donepezil 10 MG tablet   Commonly known as:  ARICEPT   Quantity:  90 tablet   Refills:  3   Dose:  10 mg    Last time this was given:  10 mg on 3/30/2017 10:00 AM   Instructions:  Take 1 tablet (10 mg total) by mouth once daily.     Begin Date    AM    Noon    PM    Bedtime       duloxetine 30 MG capsule   Commonly known as:  CYMBALTA   Quantity:  90 capsule   Refills:  3   Dose:  30 mg    Last time this was given:  30 mg on 3/30/2017 10:00 AM   Instructions:  Take 1 capsule (30 mg total) by mouth once daily.     Begin Date    AM    Noon    PM    Bedtime       * ferrous sulfate 325 (65 FE) MG EC tablet   Quantity:  60 tablet   Refills:  12   Dose:  325 mg    Last time this was given:  325 mg on 3/30/2017 10:00 AM   Instructions:  Take 1 tablet (325 mg total) by mouth 2 (two) times daily.     Begin Date    AM    Noon    PM    Bedtime       * ferrous sulfate 325 mg (65 mg iron) Tab tablet   Quantity:  180 tablet   Refills:  0    Instructions:  TAKE 1 TABLET BY MOUTH TWICE DAILY     Begin Date    AM    Noon    PM    Bedtime       hydrALAZINE 25 MG tablet   Commonly known as:  APRESOLINE   Quantity:  270 tablet   Refills:  3   Dose:  25 mg    Last time this was given:  25 mg on 3/30/2017  6:00 AM   Instructions:  Take 1 tablet (25 mg total) by mouth every 8 (eight) hours.     Begin Date    AM    Noon    PM    Bedtime       hydrochlorothiazide 12.5 mg capsule   Commonly known as:  MICROZIDE   Quantity:  90 capsule   Refills:  3   Dose:  12.5 mg    Instructions:  Take 1 capsule (12.5 mg total) by mouth once daily.     Begin Date    AM    Noon    PM    Bedtime       isosorbide mononitrate 30 MG 24 hr tablet    Commonly known as:  IMDUR   Quantity:  90 tablet   Refills:  0   Dose:  30 mg    Last time this was given:  30 mg on 3/29/2017 10:21 PM   Instructions:  Take 1 tablet (30 mg total) by mouth every evening.     Begin Date    AM    Noon    PM    Bedtime       losartan 100 MG tablet   Commonly known as:  COZAAR   Quantity:  90 tablet   Refills:  3   Dose:  100 mg    Last time this was given:  100 mg on 3/30/2017 10:00 AM   Instructions:  Take 1 tablet (100 mg total) by mouth once daily.     Begin Date    AM    Noon    PM    Bedtime       pantoprazole 40 MG tablet   Commonly known as:  PROTONIX   Quantity:  90 tablet   Refills:  3   Dose:  40 mg    Last time this was given:  40 mg on 3/30/2017 10:00 AM   Instructions:  Take 1 tablet (40 mg total) by mouth once daily.     Begin Date    AM    Noon    PM    Bedtime       predniSONE 10 MG tablet   Commonly known as:  DELTASONE   Quantity:  90 tablet   Refills:  3   Dose:  10 mg    Last time this was given:  10 mg on 3/30/2017 10:00 AM   Instructions:  Take 1 tablet (10 mg total) by mouth once daily.     Begin Date    AM    Noon    PM    Bedtime       tolterodine 2 MG Cp24   Commonly known as:  DETROL LA   Quantity:  90 capsule   Refills:  3   Dose:  2 mg    Instructions:  Take 1 capsule (2 mg total) by mouth once daily.     Begin Date    AM    Noon    PM    Bedtime       * Notice:  This list has 2 medication(s) that are the same as other medications prescribed for you. Read the directions carefully, and ask your doctor or other care provider to review them with you.      STOP taking these medications     gabapentin 100 MG capsule   Commonly known as:  NEURONTIN            Where to Get Your Medications      You can get these medications from any pharmacy     Bring a paper prescription for each of these medications     hydrocodone-acetaminophen 5-325mg 5-325 mg per tablet    pregabalin 50 MG capsule                  Please bring to all follow up appointments:    1. A copy of  your discharge instructions.  2. All medicines you are currently taking in their original bottles.  3. Identification and insurance card.    Please arrive 15 minutes ahead of scheduled appointment time.    Please call 24 hours in advance if you must reschedule your appointment and/or time.        Your Scheduled Appointments     May 22, 2017  9:30 AM CDT   Established Patient Visit with MD Navi Jorgensen [facility] ([facility] )    [address]  [place] [address]   [phone]            May 22, 2017 10:20 AM CDT   [facility] ([facility])    [address]  [place] [id number] [phone]            May 22, 2017 10:30 AM CDT   Established Patient Visit with Bianca Dykes MD   [facility] ([facility])    [address]  [place] [address]   [phone]              Referrals     Future Orders    Ambulatory referral to Outpatient Case Management     Questions:    Does the patient have a chronic or uncontrolled disease process?:      Does the patient have a new diagnosis of a catastrophic or life altering illness/treatment?:      Does the patient have any psycho-social issues that may affect their ability to adhere to treatment plan?:      Does patient have any behaviors or circumstances that may impede ability to adhere to treatment plan?:      Is patient at risk for admission/readmission?:          Discharge Instructions     Future Orders    Diet general     Questions:    Total calories:      Fat restriction, if any:      Protein restriction, if any:      Na restriction, if any:      Fluid restriction:      Additional restrictions:          Primary Diagnosis     Your primary diagnosis was:  Respiratory Insufficiency      Admission Information     Date & Time Provider Department CSN     "3/24/2017  1:12 PM Jose Thrasher MD [facility] [id number]      Care Providers     Provider Role Specialty Primary office phone    Jose Thrasher MD Attending Provider Hospitalist [phone]    Jose Thrasher MD Team Attending  Hospitalist [phone]      Your Vitals Were     BP Pulse Temp Resp Height Weight    90/55 75 98.1 °F (36.7 °C) (Oral) 20 4' 9" (1.448 m) 65.8 kg (145 lb)    SpO2 BMI             95% 31.38 kg/m2         Recent Lab Values        6/7/2005 4/10/2006 8/21/2006 5/7/2007 4/28/2008 5/4/2009 11/22/2010 3/21/2011      8:40 AM  7:15 AM  7:59 AM  9:25 AM  9:04 AM  9:08 AM 10:50 AM  9:50 AM    A1C 5.8 6.3 (H) 6.3 (H) 6.0 6.2 6.2 5.1 5.3      Pending Labs     Order Current Status    Blood culture Preliminary result      Allergies as of 3/30/2017        Reactions    Norvasc  [Amlodipine]     Other reaction(s): Edema      Advance Directives     An advance directive is a document which, in the event you are no longer able to make decisions for yourself, tells your healthcare team what kind of treatment you do or do not want to receive, or who you would like to make those decisions for you.  If you do not currently have an advance directive, [facility] encourages you to create one.  For more information call:  [phone]), [phone] ([phone]),  or log on to [email or web address].        Smoking Cessation     If you would like to quit smoking:   You may be eligible for free services if you are a [place] resident and started smoking cigarettes before September 1, 1988.  Call the Smoking Cessation Trust (SCT) toll free at (474) 248-3432 or (381) 992-0346.   Call 1-800-QUIT-NOW if you do not meet the above criteria.   Contact us via email: [email or web address]   View our website for more information: [email or web address]        Language Assistance Services     ATTENTION: Language assistance services are available, free of charge. Please call " [phone].      ATENCIÓN: Si atiliola lucian, tiene a pate disposición servicios gratuitos de asistencia lingüística. Jamison al [phone].     [facility] Ý: N?u b?n nói Ti?ng Vi?t, có các d?ch v? h? tr? ngôn ng? mi?n phí dành cho b?n. G?i s? [phone].        Stroke Education              Heart Failure Education       Heart Failure: Being Active  You have a condition called heart failure. Being active doesnt mean that you have to wear yourself out. Even a little movement each day helps to strengthen your heart. If you cant get out to exercise, you can do simple stretching and strengthening exercises at home. These are good ways to keep you well-conditioned and prevent you and your heart from becoming excessively weak.    Ideas to get you started  · Add a little movement to things you do now. Walk to mail letters. Park your car at the far end of the parking lot and walk to the store. Walk up a flight of stairs instead of taking the elevator.  · Choose activities you enjoy. You might walk, swim, or ride an exercise bike. Things like gardening and washing the car count, too. Other possibilities include: washing dishes, walking the dog, walking around the mall, and doing aerobic activities with friends.  · Join a group exercise program at a [facility] or [facility], a senior center, or a community center. Or look into a hospital cardiac rehabilitation program. Ask your doctor if you qualify.  Tips to keep you going  · Get up and get dressed each day. Go to a coffee shop and read a newspaper or go somewhere that you'll be in the presence of other active people. Youll feel more like being active.  · Make a plan. Choose one or more activities that you enjoy and that you can easily do. Then plan to do at least one each day. You might write your plan on a calendar.  · Go with a friend or a group if you like company. This can help you feel supported and stay motivated, too.  · Plan social events that you enjoy. This will keep you  mentally engaged as well as physically motivated to do things you find pleasure in.  For your safety  · Talk with your healthcare provider before starting an exercise program.  · Exercise indoors when its too hot or too cold outside, or when the air quality is poor. Try walking at a shopping mall.  · Wear socks and sturdy shoes to maintain your balance and prevent falls.  · Start slowly. Do a few minutes several times a day at first. Increase your time and speed little by little.  · Stop and rest whenever you feel tired or get short of breath.  · Dont push yourself on days when you dont feel well.  Date Last Reviewed: 3/20/2016  © 7452-6205 [employer]. [address] [place] [address], [place] [address]. All rights reserved. This information is not intended as a substitute for professional medical care. Always follow your healthcare professional's instructions.              Heart Failure: Evaluating Your Heart  You have a condition called heart failure. To evaluate your condition, your doctor will examine you, ask questions, and do some tests. Along with looking for signs of heart failure, the doctor looks for any other health problems that may have led to heart failure. The results of your evaluation will help your doctor form a treatment plan.  Health history and physical exam  Your visit will start with a health history. Tell the doctor about any symptoms youve noticed and about all medicines you take. Then youll have a physical exam. This includes listening to your heartbeat and breathing. Youll also be checked for swelling (edema) in your legs and neck. When you have fluid buildup or fluid in the lungs, it may be called congestive heart failure.  Diagnosing heart failure     During an echocardiogram, sound waves bounce off the heart. These are converted into a picture on the screen.   The following may be done to help your doctor form a diagnosis:  · X-rays show the size and shape of your heart.  These pictures can also show fluid in your lungs.  · An electrocardiogram (ECG or EKG) shows the pattern of your heartbeat. Small pads (electrodes) are placed on your chest, arms, and legs. Wires connect the pads to the ECG machine, which records your hearts electrical signals. This can give the doctor information about heart function.  · An echocardiogram uses ultrasound waves to show the structure and movement of your heart muscle. This shows how well the heart pumps. It also shows the thickness of the heart walls, and if the heart is enlarged. It is one of the most useful, non-invasive tests as it provides information about the heart's general function. This helps your doctor make treatment decisions.  · Lab tests evaluate small amounts of blood or urine for signs of problems. A BNP lab test can help diagnose and evaluate heart failure. BNP stands for B-type natriuretic peptide. The ventricles secrete more BNP when heart failure worsens. Lab tests can also provide information about metabolic dysfunction or heart dysfunction.  Your treatment plan  Based on the results of your evaluation and tests, your doctor will develop a treatment plan. This plan is designed to relieve some of your heart failure symptoms and help make you more comfortable. Your treatment plan may include:  · Medicine to help your heart work better and improve your quality of life  · Changes in what you eat and drink to help prevent fluid from backing up in your body  · Daily monitoring of your weight and heart failure symptoms to see how well your treatment plan is working  · Exercise to help you stay healthy  · Help with quitting smoking  · Emotional and psychological support to help adjust to the changes  · Referrals to other specialists to make sure you are being treated comprehensively  Date Last Reviewed: 3/21/2016  © 6807-1434 [employer]. [address] [place] [address], [place] [address]. All rights reserved. This information is  not intended as a substitute for professional medical care. Always follow your healthcare professional's instructions.              Heart Failure: Making Changes to Your Diet  You have a condition called heart failure. When you have heart failure, excess fluid is more likely to build up in your body because your heart isn't working well. This makes the heart work harder to pump blood. Fluid buildup causes symptoms such as shortness of breath and swelling (edema). This is often referred to as congestive heart failure or CHF. Controlling the amount of salt (sodium) you eat may help stop fluid from building up. Your doctor may also tell you to reduce the amount of fluid you drink.  Reading food labels    Your healthcare provider will tell you how much sodium you can eat each day. Read food labels to keep track. Keep in mind that certain foods are high in salt. These include canned, frozen, and processed foods. Check the amount of sodium in each serving. Watch out for high-sodium ingredients. These include MSG (monosodium glutamate), baking soda, and sodium phosphate.   Eating less salt  Give yourself time to get used to eating less salt. It may take a little while. Here are some tips to help:  · Take the saltshaker off the table. Replace it with salt-free herb mixes and spices.  · Eat fresh or plain frozen vegetables. These have much less salt than canned vegetables.  · Choose low-sodium snacks like sodium-free pretzels, crackers, or air-popped popcorn.  · Dont add salt to your food when youre cooking. Instead, season your foods with pepper, lemon, garlic, or onion.  · When you eat out, ask that your food be cooked without added salt.  · Avoid eating fried foods as these often have a great deal of salt.  If youre told to limit fluids  You may need to limit how much fluid you have to help prevent swelling. This includes anything that is liquid at room temperature, such as ice cream and soup. If your doctor tells you  to limit fluid, try these tips:  · Measure drinks in a measuring cup before you drink them. This will help you meet daily goals.  · Chill drinks to make them more refreshing.  · Suck on frozen lemon wedges to quench thirst.  · Only drink when youre thirsty.  · Chew sugarless gum or suck on hard candy to keep your mouth moist.  · Weigh yourself daily to know if your body's fluid content is rising.  My sodium goal  Your healthcare provider may give you a sodium goal to meet each day. This includes sodium found in food as well as salt that you add. My goal is to eat no more than ___________ mg of sodium per day.     When to call your doctor  Call your doctor right away if you have any symptoms of worsening heart failure. These can include:  · Sudden weight gain  · Increased swelling of your legs or ankles  · Trouble breathing when youre resting or at night  · Increase in the number of pillows you have to sleep on  · Chest pain, pressure, discomfort, or pain in the jaw, neck, or back   Date Last Reviewed: 3/21/2016  © 8705-0489 [employer]. [address] [place] [address]. All rights reserved. This information is not intended as a substitute for professional medical care. Always follow your healthcare professional's instructions.              Heart Failure: Medicines to Help Your Heart    You have a condition called heart failure (also known as congestive heart failure, or CHF). Your doctor will likely prescribe medicines for heart failure and any underlying health problems you have. Most heart failure patients take one or more types of medicinen. Your healthcare provider will work to find the combination of medicines that works best for you.  Heart failure medicines  Here are the most common heart failure medicines:  · ACE inhibitors lower blood pressure and decrease strain on the heart. This makes it easier for the heart to pump. Angiotensin receptor blockers have similar effects. These are  prescribed for some patients instead of ACE inhibitors.  · Beta-blockers relieve stress on the heart. They also improve symptoms. They may also improve the heart's pumping action over time.  · Diuretics (also called water pills) help rid your body of excess water. This can help rid your body of swelling (edema). Having less fluid to pump means your heart doesnt have to work as hard. Some diuretics make your body lose a mineral called potassium. Your doctor will tell you if you need to take supplements or eat more foods high in potassium.  · Digoxin helps your heart pump with more strength. This helps your heart pump more blood with each beat. So, more oxygen-rich blood travels to the rest of the body.  · Aldosterone antagonists help alter hormones and decrease strain on the heart.  · Hydralazine and nitrates are two separate medicines used together to treat heart failure. They may come in one combination pill. They lower blood pressure and decrease how hard the heart has to pump.  Medicines for related conditions  Controlling other heart problems helps keep heart failure under control, too. Depending on other heart problems you have, medicines may be prescribed to:  · Lower blood pressure (antihypertensives).  · Lower cholesterol levels (statins).  · Prevent blood clots (anticoagulants or aspirin).  · Keep the heartbeat steady (antiarrhythmics).  Date Last Reviewed: 3/5/2016  © 9843-7661 [employer]. [address] [place] [address], [place] [address]. All rights reserved. This information is not intended as a substitute for professional medical care. Always follow your healthcare professional's instructions.              Heart Failure: Procedures That May Help    The heart is a muscle that pumps oxygen-rich blood to all parts of the body. When you have heart failure, the heart is not able to pump as well as it should. Blood and fluid may back up into the lungs (congestive heart failure), and some parts of  the body dont get enough oxygen-rich blood to work normally. These problems lead to the symptoms of heart failure.     Certain procedures may help the heart pump better in some cases of heart failure. Some procedures are done to treat health problems that may have caused the heart failure such as coronary artery disease or heart rhythm problems. For more serious heart failure, other options are available.  Treating artery and valve problems  If you have coronary artery disease or valve disease, procedures may be done to improve blood flow. This helps the heart pump better, which can improve heart failure symptoms. First, your doctor may do a cardiac catheterization to help detect clogged blood vessels or valve damage. During this procedure, a  thin tube (catheter) in inserted into a blood vessel and guided to the heart. There a dye is injected and a special type of X-ray (angiogram) is taken of the blood vessels. Procedures to open a blocked artery or fix damaged valves can also be done using catheterization.  · Angioplasty uses a balloon-tipped instrument at the end of the catheter. The balloon is inflated to widen the narrowed artery. In many cases, a stent is expanded to further support the narrowed artery. A stent is a metal mesh tube.  · Valve surgery repairs or replacement of faulty valves can also be done during catheterization so blood can flow properly through the chambers of the heart.  Bypass surgery is another option to help treat blocked arteries. It uses a healthy blood vessel from elsewhere in the body. The healthy blood vessel is attached above and below the blocked area so that blood can flow around the blocked artery.  Treating heart rhythm problems  A device may be placed in the chest to help a weak heart maintain a healthy, heartbeat so the heart can pump more effectively:  · Pacemaker. A pacemaker is an implanted device that regulates your heartbeat electronically. It monitors your heart's  rhythm and generates a painless electric impulse that helps the heart beat in a regular rhythm. A pacemaker is programmed to meet your specific heart rhythm needs.  · Biventricular pacing/cardiac resynchronization therapy. A type of pacemaker that paces both pumping chambers of the heart at the same time to coordinate contractions and to improve the heart's function. Some people with heart failure are candidates for this therapy.  · Implantable cardioverter defibrillator. A device similar to a pacemaker that senses when the heart is beating too fast and delivers an electrical shock to convert the fast rhythm to a normal rhythm. This can be a life saving device.  In severe cases  In more serious cases of heart failure when other treatments no longer work, other options may include:  · Ventricular assist devices (VADs). These are mechanical devices used to take over the pumping function for one or both of the heart's ventricles, or pumping chambers. A VAD may be necessary when heart failure progresses to the point that medicines and other treatments no longer help. In some cases, a VAD may be used as a bridge to transplant.  · Heart transplant. This is replacing the diseased heart with a healthy one from a donor. This is an option for a few people who are very sick. A heart transplant is very serious and not an option for all patients. Your doctor can tell you more.  Date Last Reviewed: 3/20/2016  © 1313-1746 [employer]. [address] [place] [address], [place] [address]. All rights reserved. This information is not intended as a substitute for professional medical care. Always follow your healthcare professional's instructions.              Heart Failure: Tracking Your Weight  You have a condition called heart failure. When you have heart failure, a sudden weight gain or a steady rise in weight is a warning sign that your body is retaining too much water and salt. This could mean your heart failure is getting  worse. If left untreated, it can cause problems for your lungs and result in shortness of breath. Weighing yourself each day is the best way to know if youre retaining water. If your weight goes up quickly, call your doctor. You will be given instructions on how to get rid of the excess water. You will likely need medicines and to avoid salt. This will help your heart work better.  Call your doctor if you gain more than 2 pounds in 1 day, more than 5 pounds in 1 week, or whatever weight gain you were told to report by your doctor. This is often a sign of worsening heart failure and needs to be evaluated and treated. Your doctor will tell you what to do next.   Tips for weighing yourself    · Weigh yourself at the same time each morning, wearing the same clothes. Weigh yourself after urinating and before eating.  · Use the same scale each day. Make sure the numbers are easy to read. Put the scale on a flat, hard surface -- not on a rug or carpet.  · Do not stop weighing yourself. If you forget one day, weigh again the next morning.  How to use your weight chart  · Keep your weight chart near the scale. Write your weight on the chart as soon as you get off the scale.  · Fill in the month and the start date on the chart. Then write down your weight each day. Your chart will look like this:    · If you miss a day, leave the space blank. Weigh yourself the next day and write your weight in the next space.  · Take your weight chart with you when you go to see your doctor.  Date Last Reviewed: 3/20/2016  © 2427-7716 [employer]. [address] [place] [address]. All rights reserved. This information is not intended as a substitute for professional medical care. Always follow your healthcare professional's instructions.              Heart Failure: Warning Signs of a Flare-Up  You have a condition called heart failure. Once you have heart failure, flare-ups can happen. Below are signs that can mean  your heart failure is getting worse. If you notice any of these warning signs, call your healthcare provider.  Swelling    · Your feet, ankles, or lower legs get puffier.  · You notice skin changes on your lower legs.  · Your shoes feel too tight.  · Your clothes are tighter in the waist.  · You have trouble getting rings on or off your fingers.  Shortness of breath  · You have to breathe harder even when youre doing your normal activities or when youre resting.  · You are short of breath walking up stairs or even short distances.  · You wake up at night short of breath or coughing.  · You need to use more pillows or sit up to sleep.  · You wake up tired or restless.  Other warning signs  · You feel weaker, dizzy, or more tired.  · You have chest pain or changes in your heartbeat.  · You have a cough that wont go away.  · You cant remember things or dont feel like eating.  Tracking your weight  Gaining weight is often the first warning sign that heart failure is getting worse. Gaining even a few pounds can be a sign that your body is retaining excess water and salt. Weighing yourself each day in the morning after you urinate and before you eat, is the best way to know if you're retaining water. Get a scale that is easy to read and make sure you wear the same clothes and use the same scale every time you weigh. Your healthcare provider will show you how to track your weight. Call your doctor if you gain more than 2 pounds in 1 day, 5 pounds in 1 week, or whatever weight gain you were told to report by your doctor. This is often a sign of worsening heart failure and needs to be evaluated and treated before it compromises your breathing. Your doctor will tell you what to do next.    Date Last Reviewed: 3/15/2016  © 4064-2015 [employer]. [address] [place] [address], [place] [address]. All rights reserved. This information is not intended as a substitute for professional medical care. Always follow your  healthcare professional's instructions.              Chronic [place] Disease Education              [facility] complies with applicable Federal civil rights laws and does not discriminate on the basis of race, color, national origin, age, disability, or sex.

## 2017-03-25 LAB
ALBUMIN SERPL BCP-MCNC: 1.9 G/DL
ANION GAP SERPL CALC-SCNC: 13 MMOL/L
BASOPHILS # BLD AUTO: 0.01 K/UL
BASOPHILS NFR BLD: 0.1 %
BUN SERPL-MCNC: 41 MG/DL
CALCIUM SERPL-MCNC: 8 MG/DL
CHLORIDE SERPL-SCNC: 110 MMOL/L
CO2 SERPL-SCNC: 20 MMOL/L
CREAT SERPL-MCNC: 1.2 MG/DL
DIFFERENTIAL METHOD: ABNORMAL
EOSINOPHIL # BLD AUTO: 0 K/UL
EOSINOPHIL NFR BLD: 0.1 %
ERYTHROCYTE [DISTWIDTH] IN BLOOD BY AUTOMATED COUNT: 21.6 %
EST. GFR  (AFRICAN AMERICAN): 46.6 ML/MIN/1.73 M^2
EST. GFR  (NON AFRICAN AMERICAN): 40.4 ML/MIN/1.73 M^2
GLUCOSE SERPL-MCNC: 78 MG/DL
HCT VFR BLD AUTO: 26.5 %
HGB BLD-MCNC: 8.6 G/DL
LYMPHOCYTES # BLD AUTO: 0.5 K/UL
LYMPHOCYTES NFR BLD: 3 %
MAGNESIUM SERPL-MCNC: 2.5 MG/DL
MCH RBC QN AUTO: 28.6 PG
MCHC RBC AUTO-ENTMCNC: 32.5 %
MCV RBC AUTO: 88 FL
MONOCYTES # BLD AUTO: 0.7 K/UL
MONOCYTES NFR BLD: 4.3 %
NEUTROPHILS # BLD AUTO: 16 K/UL
NEUTROPHILS NFR BLD: 92.5 %
PHOSPHATE SERPL-MCNC: 2.7 MG/DL
PLATELET # BLD AUTO: 267 K/UL
PLATELET BLD QL SMEAR: ABNORMAL
PMV BLD AUTO: ABNORMAL FL
POTASSIUM SERPL-SCNC: 3.8 MMOL/L
RBC # BLD AUTO: 3.01 M/UL
SODIUM SERPL-SCNC: 143 MMOL/L
WBC # BLD AUTO: 17.34 K/UL

## 2017-03-25 PROCEDURE — 63600175 PHARM REV CODE 636 W HCPCS: Performed by: INTERNAL MEDICINE

## 2017-03-25 PROCEDURE — 25000242 PHARM REV CODE 250 ALT 637 W/ HCPCS: Performed by: INTERNAL MEDICINE

## 2017-03-25 PROCEDURE — 99232 SBSQ HOSP IP/OBS MODERATE 35: CPT | Mod: ,,, | Performed by: INTERNAL MEDICINE

## 2017-03-25 PROCEDURE — 97161 PT EVAL LOW COMPLEX 20 MIN: CPT

## 2017-03-25 PROCEDURE — 80069 RENAL FUNCTION PANEL: CPT

## 2017-03-25 PROCEDURE — 27000221 HC OXYGEN, UP TO 24 HOURS

## 2017-03-25 PROCEDURE — 11000001 HC ACUTE MED/SURG PRIVATE ROOM

## 2017-03-25 PROCEDURE — 99900035 HC TECH TIME PER 15 MIN (STAT)

## 2017-03-25 PROCEDURE — 63600175 PHARM REV CODE 636 W HCPCS: Performed by: PHYSICIAN ASSISTANT

## 2017-03-25 PROCEDURE — 25000003 PHARM REV CODE 250: Performed by: INTERNAL MEDICINE

## 2017-03-25 PROCEDURE — 36415 COLL VENOUS BLD VENIPUNCTURE: CPT

## 2017-03-25 PROCEDURE — 83735 ASSAY OF MAGNESIUM: CPT

## 2017-03-25 PROCEDURE — 94761 N-INVAS EAR/PLS OXIMETRY MLT: CPT

## 2017-03-25 PROCEDURE — 94640 AIRWAY INHALATION TREATMENT: CPT

## 2017-03-25 PROCEDURE — 85025 COMPLETE CBC W/AUTO DIFF WBC: CPT

## 2017-03-25 PROCEDURE — 97166 OT EVAL MOD COMPLEX 45 MIN: CPT

## 2017-03-25 RX ADMIN — ASPIRIN 81 MG CHEWABLE TABLET 81 MG: 81 TABLET CHEWABLE at 09:03

## 2017-03-25 RX ADMIN — DOCUSATE SODIUM 100 MG: 100 CAPSULE, LIQUID FILLED ORAL at 09:03

## 2017-03-25 RX ADMIN — IPRATROPIUM BROMIDE AND ALBUTEROL SULFATE 3 ML: .5; 3 SOLUTION RESPIRATORY (INHALATION) at 11:03

## 2017-03-25 RX ADMIN — DOCUSATE SODIUM 100 MG: 100 CAPSULE, LIQUID FILLED ORAL at 10:03

## 2017-03-25 RX ADMIN — HYDROCODONE BITARTRATE AND ACETAMINOPHEN 1 TABLET: 5; 325 TABLET ORAL at 05:03

## 2017-03-25 RX ADMIN — HYDRALAZINE HYDROCHLORIDE 25 MG: 25 TABLET ORAL at 01:03

## 2017-03-25 RX ADMIN — GABAPENTIN 100 MG: 100 CAPSULE ORAL at 01:03

## 2017-03-25 RX ADMIN — DULOXETINE 30 MG: 30 CAPSULE, DELAYED RELEASE ORAL at 09:03

## 2017-03-25 RX ADMIN — IPRATROPIUM BROMIDE AND ALBUTEROL SULFATE 3 ML: .5; 3 SOLUTION RESPIRATORY (INHALATION) at 12:03

## 2017-03-25 RX ADMIN — ISOSORBIDE MONONITRATE 30 MG: 30 TABLET, EXTENDED RELEASE ORAL at 10:03

## 2017-03-25 RX ADMIN — IPRATROPIUM BROMIDE AND ALBUTEROL SULFATE 3 ML: .5; 3 SOLUTION RESPIRATORY (INHALATION) at 08:03

## 2017-03-25 RX ADMIN — CLOPIDOGREL 75 MG: 75 TABLET, FILM COATED ORAL at 09:03

## 2017-03-25 RX ADMIN — DONEPEZIL HYDROCHLORIDE 10 MG: 10 TABLET, FILM COATED ORAL at 09:03

## 2017-03-25 RX ADMIN — IPRATROPIUM BROMIDE AND ALBUTEROL SULFATE 3 ML: .5; 3 SOLUTION RESPIRATORY (INHALATION) at 07:03

## 2017-03-25 RX ADMIN — Medication 3 ML: at 10:03

## 2017-03-25 RX ADMIN — GABAPENTIN 100 MG: 100 CAPSULE ORAL at 10:03

## 2017-03-25 RX ADMIN — HYDROCHLOROTHIAZIDE 12.5 MG: 12.5 TABLET ORAL at 09:03

## 2017-03-25 RX ADMIN — GUAIFENESIN 400 MG: 100 SOLUTION ORAL at 05:03

## 2017-03-25 RX ADMIN — CEFTRIAXONE 1 G: 1 INJECTION, SOLUTION INTRAVENOUS at 02:03

## 2017-03-25 RX ADMIN — PANTOPRAZOLE SODIUM 40 MG: 40 TABLET, DELAYED RELEASE ORAL at 09:03

## 2017-03-25 RX ADMIN — HYDRALAZINE HYDROCHLORIDE 25 MG: 25 TABLET ORAL at 05:03

## 2017-03-25 RX ADMIN — FERROUS SULFATE TAB EC 325 MG (65 MG FE EQUIVALENT) 325 MG: 325 (65 FE) TABLET DELAYED RESPONSE at 09:03

## 2017-03-25 RX ADMIN — ATORVASTATIN CALCIUM 40 MG: 20 TABLET, FILM COATED ORAL at 10:03

## 2017-03-25 RX ADMIN — IPRATROPIUM BROMIDE AND ALBUTEROL SULFATE 3 ML: .5; 3 SOLUTION RESPIRATORY (INHALATION) at 04:03

## 2017-03-25 RX ADMIN — PREDNISONE 10 MG: 10 TABLET ORAL at 09:03

## 2017-03-25 RX ADMIN — HYDRALAZINE HYDROCHLORIDE 25 MG: 25 TABLET ORAL at 10:03

## 2017-03-25 RX ADMIN — LOSARTAN POTASSIUM 100 MG: 50 TABLET, FILM COATED ORAL at 09:03

## 2017-03-25 RX ADMIN — STANDARDIZED SENNA CONCENTRATE AND DOCUSATE SODIUM 1 TABLET: 8.6; 5 TABLET, FILM COATED ORAL at 09:03

## 2017-03-25 RX ADMIN — IPRATROPIUM BROMIDE AND ALBUTEROL SULFATE 3 ML: .5; 3 SOLUTION RESPIRATORY (INHALATION) at 05:03

## 2017-03-25 RX ADMIN — FERROUS SULFATE TAB EC 325 MG (65 MG FE EQUIVALENT) 325 MG: 325 (65 FE) TABLET DELAYED RESPONSE at 10:03

## 2017-03-25 RX ADMIN — GABAPENTIN 100 MG: 100 CAPSULE ORAL at 05:03

## 2017-03-25 RX ADMIN — SODIUM CHLORIDE: 0.9 INJECTION, SOLUTION INTRAVENOUS at 01:03

## 2017-03-25 RX ADMIN — GUAIFENESIN 400 MG: 100 SOLUTION ORAL at 12:03

## 2017-03-25 NOTE — PLAN OF CARE
Problem: Patient Care Overview  Goal: Plan of Care Review  Outcome: Ongoing (interventions implemented as appropriate)  Pt free from falls and injury. No distress noted.VSS. Family at bedside. Pt able to communicate needs to staff or family. Repositioned with help. No new s/s of skin breakdown. Call light within reach. WCTM

## 2017-03-25 NOTE — ASSESSMENT & PLAN NOTE
Appears compensated despite elevated BNP. Patient with recent history of poor oral intake. Plan for gentle hydration overnight due to PARVEEN.

## 2017-03-25 NOTE — H&P
"[facility] Hospital Medicine  History & Physical    Patient Name: Danyelle Jacobs  MRN: [id number]  Admission Date: 3/24/2017  Attending Physician: Arpita Carbone MD   Primary Care Provider: Bianca Dykes MD    [facility] Medicine Team: [facility] HOSP MED  Arpita Carbone MD     Patient information was obtained from relative(s), past medical records and ER records.     Subjective:     Principal Problem:Acute respiratory failure with hypoxia    Chief Complaint:   Chief Complaint   Patient presents with    Hip Pain     Pt presented to the ED c/o right hip pain. Pt was brought down by the nurse from the clinic . Pt's daughter at the bedside. Pt was dx with pnuomina at the urgent care clinic today. Pt's room air stats were 88%.         HPI: 88 y.o. female presented to the ER with past medical history of MG, severe RA, hypertension, diastolic HF, CAD, PAOD, mild dementia.  She was in her usual state of stable health until the gradual onset of intermittent confusion and hallucinations beginning 10 days prior to admission with gradually worsening course. Pertinent associated symptoms include cough x 5 days, poor appetite. She developed the acute onset of right hip pain 3 weeks ago, OP work up has been unrevealing. She has been treated with Tramadol and Kenalog but confusion predates these interventions and they were ineffective in controlling her pain. She has not "been herself" for the past month, with decrease in activities and loss of interest. This has been associated with fecal incontinence and poor hygiene. Patient was seen in IM Clinic today and sent to ED for hypoxia.      Past Medical History:   Diagnosis Date    Adverse effect of oral bisphosphonates     Anemia     Angiodysplasia of stomach: see EGD 1/17 2/13/2017    Anxiety     Aortic atherosclerosis     Benign hypertension 10/5/2015    Carcinoma, renal cell     Carotid arterial disease 1/20/2014    Cerebellar stroke, acute     Chronic " kidney disease (CKD), stage I 2/18/2016    Coronary artery disease     Current chronic use of systemic steroids 10/4/2016    Degenerative disc disease     Depression     Diverticulosis     Former smoker 6/13/2016    Gastroesophageal reflux disease without esophagitis 10/5/2015    GERD (gastroesophageal reflux disease)     Goiter, nontoxic, multinodular     IBS (irritable bowel syndrome)     Left ventricular diastolic dysfunction with preserved systolic function 10/5/2015    Lung nodule     right    MGUS (monoclonal gammopathy of unknown significance) 6/13/2016    Myasthenia gravis, adult form     Osteoporosis 8/10/2015    Osteoporosis, unspecified     PAOD (peripheral arterial occlusive disease)     RA (rheumatoid arthritis)     Smoker 1/20/2014    Stroke     Ulcer     Vitamin D deficiency disease 1/20/2014       Past Surgical History:   Procedure Laterality Date    CATARACT EXTRACTION W/  INTRAOCULAR LENS IMPLANT Bilateral     CHOLECYSTECTOMY      COLONOSCOPY N/A 1/24/2017    Procedure: COLONOSCOPY;  Surgeon: Kristofer Worthington MD;  Location: [address]);  Service: Endoscopy;  Laterality: N/A;  for chronic anemia ; needs to be off plavix x 5 days.     Off Plavix since recent hospitalization 1/19/17.    Speaks Bulgarian/needs .    HYSTERECTOMY      KIDNEY SURGERY  2003    NEPHRECTOMY         Review of patient's allergies indicates:   Allergen Reactions    Norvasc  [amlodipine]      Other reaction(s): Edema       No current facility-administered medications on file prior to encounter.      Current Outpatient Prescriptions on File Prior to Encounter   Medication Sig    alprazolam (XANAX) 0.5 MG tablet Take 1 tablet (0.5 mg total) by mouth daily as needed.    aspirin 81 mg Tab Take 81 mg by mouth. 1 Tablet Oral Every day    atorvastatin (LIPITOR) 40 MG tablet Take 1 tablet (40 mg total) by mouth every evening.    clopidogrel (PLAVIX) 75 mg tablet Take 75 mg by mouth once  daily.     mg capsule TAKE 1 CAPSULE BY MOUTH TWICE DAILY    donepezil (ARICEPT) 10 MG tablet Take 1 tablet (10 mg total) by mouth once daily.    duloxetine (CYMBALTA) 30 MG capsule Take 1 capsule (30 mg total) by mouth once daily.    ferrous sulfate 325 (65 FE) MG EC tablet Take 1 tablet (325 mg total) by mouth 2 (two) times daily.    ferrous sulfate 325 mg (65 mg iron) Tab tablet TAKE 1 TABLET BY MOUTH TWICE DAILY    gabapentin (NEURONTIN) 100 MG capsule Take 1 capsule (100 mg total) by mouth 3 (three) times daily.    hydrALAZINE (APRESOLINE) 25 MG tablet Take 1 tablet (25 mg total) by mouth every 8 (eight) hours.    hydrochlorothiazide (MICROZIDE) 12.5 mg capsule Take 1 capsule (12.5 mg total) by mouth once daily.    hydrocodone-acetaminophen 5-325mg (NORCO) 5-325 mg per tablet Take 1 tablet by mouth 3 (three) times daily as needed for Pain.    hydrocodone-acetaminophen 5-325mg (NORCO) 5-325 mg per tablet Take 1 tablet by mouth 3 (three) times daily as needed for Pain.    [START ON 4/18/2017] hydrocodone-acetaminophen 5-325mg (NORCO) 5-325 mg per tablet Take 1 tablet by mouth 3 (three) times daily as needed for Pain.    isosorbide mononitrate (IMDUR) 30 MG 24 hr tablet Take 1 tablet (30 mg total) by mouth every evening.    losartan (COZAAR) 100 MG tablet Take 1 tablet (100 mg total) by mouth once daily.    pantoprazole (PROTONIX) 40 MG tablet Take 1 tablet (40 mg total) by mouth once daily.    predniSONE (DELTASONE) 10 MG tablet Take 1 tablet (10 mg total) by mouth once daily.    tolterodine (DETROL LA) 2 MG Cp24 Take 1 capsule (2 mg total) by mouth once daily.     Family History     Problem Relation (Age of Onset)    Alzheimer's disease Sister    Heart disease Father    Stroke Mother        Social History Main Topics    Smoking status: Former Smoker     Quit date: 1/19/2015    Smokeless tobacco: Never Used    Alcohol use No    Drug use: No    Sexual activity: No     Review of Systems    Unable to perform ROS: Mental status change   Constitutional: Positive for appetite change and fatigue. Negative for fever.   Respiratory: Positive for cough. Negative for shortness of breath.    Gastrointestinal:        Fecal incontinence   Musculoskeletal: Positive for arthralgias and gait problem.   Psychiatric/Behavioral: Positive for confusion and hallucinations.     Objective:     Vital Signs (Most Recent):  Temp: 97.8 °F (36.6 °C) (03/24/17 1731)  Pulse: 65 (03/24/17 1745)  Resp: 18 (03/24/17 1745)  BP: 133/67 (03/24/17 1731)  SpO2: 97 % (03/24/17 1745) Vital Signs (24h Range):  Temp:  [97.8 °F (36.6 °C)-98.9 °F (37.2 °C)] 97.8 °F (36.6 °C)  Pulse:  [65-86] 65  Resp:  [18-22] 18  SpO2:  [88 %-97 %] 97 %  BP: (129-137)/(63-70) 133/67     Weight: 65.8 kg (145 lb)  Body mass index is 31.38 kg/(m^2).    Physical Exam   Constitutional: She appears well-developed.  Non-toxic appearance.   HENT:   Head: Normocephalic.   Mouth/Throat: Mucous membranes are not pale and not cyanotic.   Eyes: Conjunctivae and lids are normal. Pupils are equal.   Neck: Neck supple.   Cardiovascular: Normal rate, regular rhythm, S1 normal and S2 normal.    Pulmonary/Chest: Effort normal. She has decreased breath sounds.   Abdominal: Soft. Bowel sounds are normal. There is no tenderness.   Musculoskeletal: She exhibits no edema.   Neurological: She is alert. She is not disoriented.   Skin: Skin is warm and dry. No cyanosis. Nails show no clubbing.   Psychiatric: Her mood appears anxious. Cognition and memory are impaired.        Significant Labs:     CBC:   Recent Labs  Lab 03/24/17  1353   WBC 19.81*   HGB 10.3*   HCT 32.0*        CMP:   Recent Labs  Lab 03/24/17  1353      K 4.7      CO2 21*      BUN 48*   CREATININE 1.8*   CALCIUM 8.8   PROT 6.9   ALBUMIN 2.3*   BILITOT 0.3   ALKPHOS 115   AST 46*   ALT 24   ANIONGAP 14   EGFRNONAA 24.8*     Cardiac Markers:   Recent Labs  Lab 03/24/17  1353   *      Lactic Acid:   Recent Labs  Lab 03/24/17  1353   LACTATE 1.5     Urine Studies:     Recent Labs  Lab 03/24/17  1419   COLORU Yellow   APPEARANCEUA Cloudy*   PHUR 5.0   SPECGRAV 1.025   PROTEINUA 1+*   GLUCUA Negative   KETONESU Negative   BILIRUBINUA Negative   OCCULTUA Negative   NITRITE Negative   UROBILINOGEN Negative   LEUKOCYTESUR Negative   RBCUA 1   WBCUA 7*   BACTERIA Few*   SQUAMEPITHEL 1   HYALINECASTS 9*       Significant Imaging: CXR: I have reviewed all pertinent results/findings within the past 24 hours and my personal findings are:  considering cahnges in technique, little change from previous -- possibly a new infiltrate.  EKG: I have reviewed all pertinent results/findings within the past 24 hours and my personal findings are: NSR, occasional PAC    Assessment/Plan:     Current Hospital Problem List:    Active Hospital Problems    Diagnosis  POA    *Acute respiratory failure with hypoxia [J96.01]  Yes     Priority: 1 - High    Pneumonia due to infectious organism [J18.9]  Yes     Priority: 1 - High    Hip pain, right [M25.551]  Yes     Priority: 2     Delirium [R41.0]  Yes     Priority: 2     Benign hypertension [I10]  Yes     Priority: 3     Left ventricular diastolic dysfunction with preserved systolic function [I51.9]  Yes     Priority: 3     CAD (coronary artery disease) [I25.10]  Yes     Priority: 4     Neuropathic pain of both legs [G57.91, G57.92]  Yes     Priority: 5      Chronic     Presumed due to PAOD      PAOD (peripheral arterial occlusive disease) [I77.9]  Yes     Priority: 5     MG (myasthenia gravis) [G70.00]  Yes     Priority: 6     Rheumatoid arthritis involving multiple sites with positive rheumatoid factor [M05.79]  Yes     Priority: 7     Current chronic use of systemic steroids [Z79.52]  Not Applicable     Priority: 8     MCI (mild cognitive impairment) with memory loss [G31.84]  Yes     Priority: 9     PARVEEN (acute kidney injury) [N17.9]  Yes      Resolved  Hospital Problems    Diagnosis Date Resolved POA   No resolved problems to display.       Ordered Medications for management of current problems:    albuterol-ipratropium 2.5mg-0.5mg/3mL  3 mL Nebulization Q4H    [START ON 3/25/2017] aspirin  81 mg Oral Daily    atorvastatin  40 mg Oral QHS    cefTRIAXone (ROCEPHIN) IVPB  1 g Intravenous Q24H    [START ON 3/25/2017] clopidogrel  75 mg Oral Daily    docusate sodium  100 mg Oral BID    [START ON 3/25/2017] donepezil  10 mg Oral Daily    [START ON 3/25/2017] duloxetine  30 mg Oral Daily    ferrous sulfate  325 mg Oral BID    gabapentin  100 mg Oral TID    guaifenesin 100 mg/5 ml  400 mg Oral Q6H    hydrALAZINE  25 mg Oral Q8H    [START ON 3/25/2017] hydrochlorothiazide  12.5 mg Oral Daily    isosorbide mononitrate  30 mg Oral QHS    [START ON 3/25/2017] losartan  100 mg Oral Daily    [START ON 3/25/2017] pantoprazole  40 mg Oral Daily    [START ON 3/25/2017] predniSONE  10 mg Oral Daily    [START ON 3/25/2017] senna-docusate 8.6-50 mg  1 tablet Oral Daily    sodium chloride 0.9%  3 mL Intravenous Q8H       IV Fluids: 0.9 NS @ 50 mL/H x 20 H    Risk  Patient has a condition that poses threat to life and bodily function: Hypoxia and PARVEEN  Patient has an abrupt change in neurologic status: Delirium    Anticipated Disposition: Home-Health Care [facility]    Assessment and Plan by Problem:    * Acute respiratory failure with hypoxia  Patient noted to have mild hypoxia. Unclear etiology but likely pneumonia.    Pneumonia due to infectious organism  Treating with ceftriaxone.    Hip pain, right  Work up has revealed only DJD; CT pending.    Delirium  Unclear etiology; possibly due to hip pain or smoldering infection / pneumonia?    Benign hypertension  Continuing home medications.    Left ventricular diastolic dysfunction with preserved systolic function  Appears compensated despite elevated BNP. Patient with recent history of poor oral intake. Plan for gentle  hydration overnight due to PARVEEN.    CAD (coronary artery disease)  Continuing home medications.      MG (myasthenia gravis)  Continue Prednisone.    Rheumatoid arthritis involving multiple sites with positive rheumatoid factor  Controlled with Prednisone.    VTE Risk Mitigation         Ordered     Place ISHMAEL hose  Until discontinued      03/24/17 1650     Place sequential compression device  Until discontinued      03/24/17 1650     Medium Risk of VTE  Once      03/24/17 1650        Arpita Carbone MD  Department of Hospital Medicine   [facility]

## 2017-03-25 NOTE — PLAN OF CARE
Problem: Physical Therapy Goal  Goal: Physical Therapy Goal  Goals to be met by: 2017     Patient will increase functional independence with mobility by performin. Supine to sit with Contact Guard Assistance  2. Sit to supine with Contact Guard Assistance  3. Sit to stand transfer with Minimal Assistance  4. Bed to chair transfer with Minimal Assistance  5. Gait x 20 feet with Minimal Assistance using appropriate AD.   Outcome: Ongoing (interventions implemented as appropriate)  Pt evaluation complete.      ANDI SILVA, PT  3/25/2017

## 2017-03-25 NOTE — PT/OT/SLP EVAL
Occupational Therapy  Evaluation & Treatment    Danyelle Jacobs   MRN: [id number]   Admitting Diagnosis: Acute respiratory failure with hypoxia    OT Date of Treatment: 03/25/17   OT Start Time: 0829  OT Stop Time: 0850  OT Total Time (min): 21 min    Billable Minutes:  Evaluation: 21 min  Co-eval with PT    Diagnosis: Acute respiratory failure with hypoxia       Past Medical History:   Diagnosis Date    Adverse effect of oral bisphosphonates     Anemia     Angiodysplasia of stomach: see EGD 1/17 2/13/2017    Anxiety     Aortic atherosclerosis     Benign hypertension 10/5/2015    Carcinoma, renal cell     Carotid arterial disease 1/20/2014    Cerebellar stroke, acute     Chronic kidney disease (CKD), stage I 2/18/2016    Coronary artery disease     Current chronic use of systemic steroids 10/4/2016    Degenerative disc disease     Depression     Diverticulosis     Former smoker 6/13/2016    Gastroesophageal reflux disease without esophagitis 10/5/2015    GERD (gastroesophageal reflux disease)     Goiter, nontoxic, multinodular     IBS (irritable bowel syndrome)     Left ventricular diastolic dysfunction with preserved systolic function 10/5/2015    Lung nodule     right    MGUS (monoclonal gammopathy of unknown significance) 6/13/2016    Myasthenia gravis, adult form     Osteoporosis 8/10/2015    Osteoporosis, unspecified     PAOD (peripheral arterial occlusive disease)     RA (rheumatoid arthritis)     Smoker 1/20/2014    Stroke     Ulcer     Vitamin D deficiency disease 1/20/2014      Past Surgical History:   Procedure Laterality Date    CATARACT EXTRACTION W/  INTRAOCULAR LENS IMPLANT Bilateral     CHOLECYSTECTOMY      COLONOSCOPY N/A 1/24/2017    Procedure: COLONOSCOPY;  Surgeon: Kristofer Worthington MD;  Location: [address];  Service: Endoscopy;  Laterality: N/A;  for chronic anemia ; needs to be off plavix x 5 days.     Off Plavix since recent hospitalization 1/19/17.    Speaks  "Hungarian/needs .    HYSTERECTOMY      KIDNEY SURGERY  2003    NEPHRECTOMY         Referring physician: Dr. Carbone  Date referred to OT: 03/24/17    General Precautions: Standard, fall  Orthopedic Precautions: N/A  Braces: N/A          Patient History:  Living Environment  Lives With: child(sherry), adult  Living Arrangements: house  Transportation Available: family or friend will provide  Living Environment Comment:  (Pt lives with adult son and daughter in law in [facility] with no steps to enter - PLOF pt was mod I with mobility using AD and needed assist with self care - pt will have 24 hours assist at discharge)  Equipment Currently Used at Home: wheelchair, walker, rolling, cane, straight    Prior level of function:   Bed Mobility/Transfers: needs assist  Grooming: needs assist  Bathing: needs assist  Upper Body Dressing: needs assist  Lower Body Dressing: needs assist  Toileting: needs assist  Home Management Skills: unable to perform  Mode of Transportation: Family     Dominant hand: right    Subjective:  Communicated with nurse prior to session.  "Oh!! It hurts so much!"  Pt primarily communicates in Filipino but able to same some words and short phrases in english.  OT fluent in Filipino and able to communicate freely with pt.   Chief Complaint: pain  Patient/Family stated goals: pain management    Pain Rating:  (pt with B hip pain, greater on R - pain increased with movement but only slightly subsided at rest - pt did not give numerical value for pain)        Location: hip  Pain Addressed: Reposition, Distraction, Cessation of Activity       Objective:  Patient found with: peripheral IV, oxygen    Cognitive Exam:  Oriented to: Person, Place and pt was unable to indicate current year/month but able to recite her birthday   Follows Commands/attention: Follows two-step commands in Filipino  Communication: clear/fluent  Memory:  Difficulty with some recall  Safety awareness/insight to disability: " impaired  Coping skills/emotional control: Appropriate to situation    Visual/perceptual:  Intact    Physical Exam:  Postural examination/scapula alignment: Rounded shoulder  Skin integrity: Visible skin intact  Edema: Mild in B LEs    Sensation:   Intact    Upper Extremity Range of Motion:  Right Upper Extremity: WFL  Left Upper Extremity: WFL    Upper Extremity Strength:  Right Upper Extremity: fair  Left Upper Extremity: fair   Strength: fair    Fine motor coordination:   Slight difficulty with FM, precision tasks due to arthritis in hands    Gross motor coordination: WFL    Functional Mobility:  Bed Mobility:  Rolling/Turning to Left: Minimum assistance  Scooting/Bridging: Minimum Assistance  Supine to Sit: Minimum Assistance (assist with L LE)    Transfers:  Sit <> Stand Assistance: Maximum Assistance  Sit <> Stand Assistive Device: No Assistive Device  Bed <> Chair Technique: Stand Pivot  Bed <> Chair Transfer Assistance: Maximum Assistance  Bed <> Chair Assistive Device: No Assistive Device    Functional Ambulation: N/A - Pt took ~3 steps from bedside to bedside chair to her L with max assist (no AD)    Activities of Daily Living:  Feeding Level of Assistance: Set-up Assistance    UE Dressing Level of Assistance: Maximum assistance (donning [facility] gown while seated on EOB - needed assist due to PIV and increased pain)    LE Dressing Level of Assistance: Total assistance (to marcial socks (son states she needed assistance with LB dressing prior to hospital stay))    Grooming Position: EOB  Grooming Level of Assistance: Supervision (washed face with cloth)     Toileting Level of Assistance: Activity did not occur     Bathing Level of Assistance: Activity did not occur      Balance:   Static Sit: FAIR: Maintains without assist, but unable to take any challenges   Dynamic Sit: FAIR: Cannot move trunk without losing balance  Static Stand: 0: Needs MAXIMAL assist to maintain   Dynamic stand: N/A   ** Pt sitting  "in recliner at end of session - favoring L hip due to greater pain in R hip - leaning to L    Therapeutic Activities and Exercises:  · Evaluation completed - ADLs and func mobility assessed as noted above  · Pt and son educated on OT role in acute setting    AM-PAC 6 CLICK ADL  How much help from another person does this patient currently need?  1 = Unable, Total/Dependent Assistance  2 = A lot, Maximum/Moderate Assistance  3 = A little, Minimum/Contact Guard/Supervision  4 = None, Modified [place]/Independent    Putting on and taking off regular lower body clothing? : 1  Bathing (including washing, rinsing, drying)?: 2  Toileting, which includes using toilet, bedpan, or urinal? : 2  Putting on and taking off regular upper body clothing?: 2  Taking care of personal grooming such as brushing teeth?: 3  Eating meals?: 3  Total Score: 13    AM-PAC Raw Score CMS "G-Code Modifier Level of Impairment Assistance   6 % Total / Unable   7 - 9 CM 80 - 100% Maximal Assist   10 - 14 CL 60 - 80% Moderate Assist   15 - 19 CK 40 - 60% Moderate Assist   20 - 22 CJ 20 - 40% Minimal Assist   23 CI 1-20% SBA / CGA   24 CH 0% Independent/ Mod I       Patient left up in chair and legs elevated with all lines intact, call button in reach and son present    Assessment:  Danyelle Jacobs is a 88 y.o. female with a medical diagnosis of Acute respiratory failure with hypoxia and presents with decreased strength/endurance, pain, edema, decreased self care, and decreased func mobility.  Prior to hospitalization, pt was mod I using AD for short household ambulation, but needed assistance with her ADLs.  Pt will benefit from skilled OT services in order to improve her overall strength/endurance  In order to improve her level of safety and function with ADLS and mobility.  OT also recommends HHOT for her post acute therapy needs, along with, BSC and TTB for safety during ADLs.     Rehab identified problem list/impairments: Rehab identified " problem list/impairments: weakness, impaired endurance, impaired self care skills, impaired functional mobilty, gait instability, impaired balance, impaired cognition, decreased coordination, decreased upper extremity function, decreased lower extremity function, pain, decreased safety awareness, decreased ROM, edema    Rehab potential is good.    Activity tolerance: Fair - limited by pain    Discharge recommendations: Discharge Facility/Level Of Care Needs: home health OT     Barriers to discharge: Barriers to Discharge: Inaccessible home environment (for bathing))    Equipment recommendations: bedside commode, bath bench     GOALS:   Occupational Therapy Goals        Problem: Occupational Therapy Goal    Goal Priority Disciplines Outcome Interventions   Occupational Therapy Goal     OT, PT/OT Ongoing (interventions implemented as appropriate)    Description:  Goals to be met by: 04/03/17     Patient will increase functional independence with ADLs by performing:    UE Dressing with Minimal Assistance.  LE Dressing with Maximum Assistance.  Grooming while seated with Modified [place].  Toileting from bedside commode with Moderate Assistance for hygiene and clothing management.   Toilet transfer to bedside commode with Moderate Assistance.  Upper extremity exercise program x10 reps per handout, with supervision.                PLAN:  Patient to be seen 4 x/week to address the above listed problems via self-care/home management, therapeutic activities, therapeutic exercises  Plan of Care expires: 04/22/17  Plan of Care reviewed with: patient, son         Twyla Vasquez, OT  03/25/2017

## 2017-03-25 NOTE — SUBJECTIVE & OBJECTIVE
Past Medical History:   Diagnosis Date    Adverse effect of oral bisphosphonates     Anemia     Angiodysplasia of stomach: see EGD 1/17 2/13/2017    Anxiety     Aortic atherosclerosis     Benign hypertension 10/5/2015    Carcinoma, renal cell     Carotid arterial disease 1/20/2014    Cerebellar stroke, acute     Chronic kidney disease (CKD), stage I 2/18/2016    Coronary artery disease     Current chronic use of systemic steroids 10/4/2016    Degenerative disc disease     Depression     Diverticulosis     Former smoker 6/13/2016    Gastroesophageal reflux disease without esophagitis 10/5/2015    GERD (gastroesophageal reflux disease)     Goiter, nontoxic, multinodular     IBS (irritable bowel syndrome)     Left ventricular diastolic dysfunction with preserved systolic function 10/5/2015    Lung nodule     right    MGUS (monoclonal gammopathy of unknown significance) 6/13/2016    Myasthenia gravis, adult form     Osteoporosis 8/10/2015    Osteoporosis, unspecified     PAOD (peripheral arterial occlusive disease)     RA (rheumatoid arthritis)     Smoker 1/20/2014    Stroke     Ulcer     Vitamin D deficiency disease 1/20/2014       Past Surgical History:   Procedure Laterality Date    CATARACT EXTRACTION W/  INTRAOCULAR LENS IMPLANT Bilateral     CHOLECYSTECTOMY      COLONOSCOPY N/A 1/24/2017    Procedure: COLONOSCOPY;  Surgeon: Kristofer Worthington MD;  Location: [address];  Service: Endoscopy;  Laterality: N/A;  for chronic anemia ; needs to be off plavix x 5 days.     Off Plavix since recent hospitalization 1/19/17.    Speaks Andorran/needs .    HYSTERECTOMY      KIDNEY SURGERY  2003    NEPHRECTOMY         Review of patient's allergies indicates:   Allergen Reactions    Norvasc  [amlodipine]      Other reaction(s): Edema       No current facility-administered medications on file prior to encounter.      Current Outpatient Prescriptions on File Prior to Encounter    Medication Sig    alprazolam (XANAX) 0.5 MG tablet Take 1 tablet (0.5 mg total) by mouth daily as needed.    aspirin 81 mg Tab Take 81 mg by mouth. 1 Tablet Oral Every day    atorvastatin (LIPITOR) 40 MG tablet Take 1 tablet (40 mg total) by mouth every evening.    clopidogrel (PLAVIX) 75 mg tablet Take 75 mg by mouth once daily.     mg capsule TAKE 1 CAPSULE BY MOUTH TWICE DAILY    donepezil (ARICEPT) 10 MG tablet Take 1 tablet (10 mg total) by mouth once daily.    duloxetine (CYMBALTA) 30 MG capsule Take 1 capsule (30 mg total) by mouth once daily.    ferrous sulfate 325 (65 FE) MG EC tablet Take 1 tablet (325 mg total) by mouth 2 (two) times daily.    ferrous sulfate 325 mg (65 mg iron) Tab tablet TAKE 1 TABLET BY MOUTH TWICE DAILY    gabapentin (NEURONTIN) 100 MG capsule Take 1 capsule (100 mg total) by mouth 3 (three) times daily.    hydrALAZINE (APRESOLINE) 25 MG tablet Take 1 tablet (25 mg total) by mouth every 8 (eight) hours.    hydrochlorothiazide (MICROZIDE) 12.5 mg capsule Take 1 capsule (12.5 mg total) by mouth once daily.    hydrocodone-acetaminophen 5-325mg (NORCO) 5-325 mg per tablet Take 1 tablet by mouth 3 (three) times daily as needed for Pain.    hydrocodone-acetaminophen 5-325mg (NORCO) 5-325 mg per tablet Take 1 tablet by mouth 3 (three) times daily as needed for Pain.    [START ON 4/18/2017] hydrocodone-acetaminophen 5-325mg (NORCO) 5-325 mg per tablet Take 1 tablet by mouth 3 (three) times daily as needed for Pain.    isosorbide mononitrate (IMDUR) 30 MG 24 hr tablet Take 1 tablet (30 mg total) by mouth every evening.    losartan (COZAAR) 100 MG tablet Take 1 tablet (100 mg total) by mouth once daily.    pantoprazole (PROTONIX) 40 MG tablet Take 1 tablet (40 mg total) by mouth once daily.    predniSONE (DELTASONE) 10 MG tablet Take 1 tablet (10 mg total) by mouth once daily.    tolterodine (DETROL LA) 2 MG Cp24 Take 1 capsule (2 mg total) by mouth once daily.      Family History     Problem Relation (Age of Onset)    Alzheimer's disease Sister    Heart disease Father    Stroke Mother        Social History Main Topics    Smoking status: Former Smoker     Quit date: 1/19/2015    Smokeless tobacco: Never Used    Alcohol use No    Drug use: No    Sexual activity: No     Review of Systems   Unable to perform ROS: Mental status change   Constitutional: Positive for appetite change and fatigue. Negative for fever.   Respiratory: Positive for cough. Negative for shortness of breath.    Gastrointestinal:        Fecal incontinence   Musculoskeletal: Positive for arthralgias and gait problem.   Psychiatric/Behavioral: Positive for confusion and hallucinations.     Objective:     Vital Signs (Most Recent):  Temp: 97.8 °F (36.6 °C) (03/24/17 1731)  Pulse: 65 (03/24/17 1745)  Resp: 18 (03/24/17 1745)  BP: 133/67 (03/24/17 1731)  SpO2: 97 % (03/24/17 1745) Vital Signs (24h Range):  Temp:  [97.8 °F (36.6 °C)-98.9 °F (37.2 °C)] 97.8 °F (36.6 °C)  Pulse:  [65-86] 65  Resp:  [18-22] 18  SpO2:  [88 %-97 %] 97 %  BP: (129-137)/(63-70) 133/67     Weight: 65.8 kg (145 lb)  Body mass index is 31.38 kg/(m^2).    Physical Exam   Constitutional: She appears well-developed.  Non-toxic appearance.   HENT:   Head: Normocephalic.   Mouth/Throat: Mucous membranes are not pale and not cyanotic.   Eyes: Conjunctivae and lids are normal. Pupils are equal.   Neck: Neck supple.   Cardiovascular: Normal rate, regular rhythm, S1 normal and S2 normal.    Pulmonary/Chest: Effort normal. She has decreased breath sounds.   Abdominal: Soft. Bowel sounds are normal. There is no tenderness.   Musculoskeletal: She exhibits no edema.   Neurological: She is alert. She is not disoriented.   Skin: Skin is warm and dry. No cyanosis. Nails show no clubbing.   Psychiatric: Her mood appears anxious. Cognition and memory are impaired.        Significant Labs:     CBC:   Recent Labs  Lab 03/24/17  1353   WBC 19.81*   HGB  10.3*   HCT 32.0*        CMP:   Recent Labs  Lab 03/24/17  1353      K 4.7      CO2 21*      BUN 48*   CREATININE 1.8*   CALCIUM 8.8   PROT 6.9   ALBUMIN 2.3*   BILITOT 0.3   ALKPHOS 115   AST 46*   ALT 24   ANIONGAP 14   EGFRNONAA 24.8*     Cardiac Markers:   Recent Labs  Lab 03/24/17  1353   *     Lactic Acid:   Recent Labs  Lab 03/24/17  1353   LACTATE 1.5     Urine Studies:     Recent Labs  Lab 03/24/17  1419   COLORU Yellow   APPEARANCEUA Cloudy*   PHUR 5.0   SPECGRAV 1.025   PROTEINUA 1+*   GLUCUA Negative   KETONESU Negative   BILIRUBINUA Negative   OCCULTUA Negative   NITRITE Negative   UROBILINOGEN Negative   LEUKOCYTESUR Negative   RBCUA 1   WBCUA 7*   BACTERIA Few*   SQUAMEPITHEL 1   HYALINECASTS 9*       Significant Imaging: CXR: I have reviewed all pertinent results/findings within the past 24 hours and my personal findings are:  considering cahnges in technique, little change from previous -- possibly a new infiltrate.  EKG: I have reviewed all pertinent results/findings within the past 24 hours and my personal findings are: NSR, occasional PAC

## 2017-03-25 NOTE — PLAN OF CARE
Problem: Occupational Therapy Goal  Goal: Occupational Therapy Goal  Goals to be met by: 04/03/17     Patient will increase functional independence with ADLs by performing:    UE Dressing with Minimal Assistance.  LE Dressing with Maximum Assistance.  Grooming while seated with Modified [place].  Toileting from bedside commode with Moderate Assistance for hygiene and clothing management.   Toilet transfer to bedside commode with Moderate Assistance.  Upper extremity exercise program x10 reps per handout, with supervision.  Outcome: Ongoing (interventions implemented as appropriate)  Pt was agreeable to OT/PT evaluation.  Pt noted with decreased strength/endurance, pain, edema, decreased self care, and decreased func mobility.  Prior to hospitalization, pt was mod I using AD for short household ambulation, but needed assistance with her ADLs.  Pt will benefit from skilled OT services in order to improve her overall strength/endurance  In order to improve her level of safety and function with ADLS and mobility.  OT also recommends HHOT for her post acute therapy needs, along with, BSC and TTB for safety during ADLs.      Twyla Vasquez, OT  3/25/2017

## 2017-03-26 LAB
ALBUMIN SERPL BCP-MCNC: 1.8 G/DL
ANION GAP SERPL CALC-SCNC: 9 MMOL/L
ANISOCYTOSIS BLD QL SMEAR: SLIGHT
BACTERIA UR CULT: NO GROWTH
BASOPHILS NFR BLD: 0 %
BNP SERPL-MCNC: 920 PG/ML
BUN SERPL-MCNC: 24 MG/DL
CALCIUM SERPL-MCNC: 8.3 MG/DL
CHLORIDE SERPL-SCNC: 110 MMOL/L
CO2 SERPL-SCNC: 21 MMOL/L
CREAT SERPL-MCNC: 0.9 MG/DL
CRP SERPL-MCNC: 180.4 MG/L
DIASTOLIC DYSFUNCTION: YES
DIFFERENTIAL METHOD: ABNORMAL
EOSINOPHIL NFR BLD: 0 %
ERYTHROCYTE [DISTWIDTH] IN BLOOD BY AUTOMATED COUNT: 21.2 %
ERYTHROCYTE [SEDIMENTATION RATE] IN BLOOD BY WESTERGREN METHOD: 112 MM/HR
EST. GFR  (AFRICAN AMERICAN): >60 ML/MIN/1.73 M^2
EST. GFR  (NON AFRICAN AMERICAN): 57.3 ML/MIN/1.73 M^2
ESTIMATED PA SYSTOLIC PRESSURE: 43.56
GLUCOSE SERPL-MCNC: 94 MG/DL
HCT VFR BLD AUTO: 24.1 %
HGB BLD-MCNC: 8 G/DL
HYPOCHROMIA BLD QL SMEAR: ABNORMAL
LYMPHOCYTES NFR BLD: 4 %
MAGNESIUM SERPL-MCNC: 2.1 MG/DL
MCH RBC QN AUTO: 28.8 PG
MCHC RBC AUTO-ENTMCNC: 33.2 %
MCV RBC AUTO: 87 FL
MITRAL VALVE MOBILITY: NORMAL
MONOCYTES NFR BLD: 1 %
NEUTROPHILS NFR BLD: 95 %
OVALOCYTES BLD QL SMEAR: ABNORMAL
PHOSPHATE SERPL-MCNC: 2.1 MG/DL
PLATELET # BLD AUTO: 292 K/UL
PLATELET BLD QL SMEAR: ABNORMAL
PMV BLD AUTO: 11 FL
POIKILOCYTOSIS BLD QL SMEAR: SLIGHT
POTASSIUM SERPL-SCNC: 3.8 MMOL/L
RBC # BLD AUTO: 2.78 M/UL
RETIRED EF AND QEF - SEE NOTES: 68 (ref 55–65)
SODIUM SERPL-SCNC: 140 MMOL/L
TRICUSPID VALVE REGURGITATION: ABNORMAL
WBC # BLD AUTO: 10.17 K/UL

## 2017-03-26 PROCEDURE — 85651 RBC SED RATE NONAUTOMATED: CPT

## 2017-03-26 PROCEDURE — 36415 COLL VENOUS BLD VENIPUNCTURE: CPT

## 2017-03-26 PROCEDURE — 25000242 PHARM REV CODE 250 ALT 637 W/ HCPCS: Performed by: INTERNAL MEDICINE

## 2017-03-26 PROCEDURE — 86140 C-REACTIVE PROTEIN: CPT

## 2017-03-26 PROCEDURE — 94664 DEMO&/EVAL PT USE INHALER: CPT

## 2017-03-26 PROCEDURE — 63600175 PHARM REV CODE 636 W HCPCS: Performed by: INTERNAL MEDICINE

## 2017-03-26 PROCEDURE — 25000003 PHARM REV CODE 250: Performed by: INTERNAL MEDICINE

## 2017-03-26 PROCEDURE — 80069 RENAL FUNCTION PANEL: CPT

## 2017-03-26 PROCEDURE — 94640 AIRWAY INHALATION TREATMENT: CPT

## 2017-03-26 PROCEDURE — 83880 ASSAY OF NATRIURETIC PEPTIDE: CPT

## 2017-03-26 PROCEDURE — 93306 TTE W/DOPPLER COMPLETE: CPT

## 2017-03-26 PROCEDURE — 83735 ASSAY OF MAGNESIUM: CPT

## 2017-03-26 PROCEDURE — 11000001 HC ACUTE MED/SURG PRIVATE ROOM

## 2017-03-26 PROCEDURE — 85007 BL SMEAR W/DIFF WBC COUNT: CPT

## 2017-03-26 PROCEDURE — 99232 SBSQ HOSP IP/OBS MODERATE 35: CPT | Mod: ,,, | Performed by: INTERNAL MEDICINE

## 2017-03-26 PROCEDURE — 63600175 PHARM REV CODE 636 W HCPCS: Performed by: PHYSICIAN ASSISTANT

## 2017-03-26 PROCEDURE — 87040 BLOOD CULTURE FOR BACTERIA: CPT

## 2017-03-26 PROCEDURE — 94761 N-INVAS EAR/PLS OXIMETRY MLT: CPT

## 2017-03-26 PROCEDURE — 85027 COMPLETE CBC AUTOMATED: CPT

## 2017-03-26 PROCEDURE — 93306 TTE W/DOPPLER COMPLETE: CPT | Mod: 26,,, | Performed by: INTERNAL MEDICINE

## 2017-03-26 RX ADMIN — GABAPENTIN 100 MG: 100 CAPSULE ORAL at 10:03

## 2017-03-26 RX ADMIN — FERROUS SULFATE TAB EC 325 MG (65 MG FE EQUIVALENT) 325 MG: 325 (65 FE) TABLET DELAYED RESPONSE at 10:03

## 2017-03-26 RX ADMIN — DULOXETINE 30 MG: 30 CAPSULE, DELAYED RELEASE ORAL at 08:03

## 2017-03-26 RX ADMIN — GABAPENTIN 100 MG: 100 CAPSULE ORAL at 05:03

## 2017-03-26 RX ADMIN — LOSARTAN POTASSIUM 100 MG: 50 TABLET, FILM COATED ORAL at 08:03

## 2017-03-26 RX ADMIN — GUAIFENESIN 400 MG: 100 SOLUTION ORAL at 11:03

## 2017-03-26 RX ADMIN — DOCUSATE SODIUM 100 MG: 100 CAPSULE, LIQUID FILLED ORAL at 08:03

## 2017-03-26 RX ADMIN — GUAIFENESIN 400 MG: 100 SOLUTION ORAL at 05:03

## 2017-03-26 RX ADMIN — CEFTRIAXONE 1 G: 1 INJECTION, SOLUTION INTRAVENOUS at 02:03

## 2017-03-26 RX ADMIN — Medication 3 ML: at 06:03

## 2017-03-26 RX ADMIN — HYDROCODONE BITARTRATE AND ACETAMINOPHEN 1 TABLET: 5; 325 TABLET ORAL at 02:03

## 2017-03-26 RX ADMIN — IPRATROPIUM BROMIDE AND ALBUTEROL SULFATE 3 ML: .5; 3 SOLUTION RESPIRATORY (INHALATION) at 08:03

## 2017-03-26 RX ADMIN — HYDRALAZINE HYDROCHLORIDE 25 MG: 25 TABLET ORAL at 10:03

## 2017-03-26 RX ADMIN — IPRATROPIUM BROMIDE AND ALBUTEROL SULFATE 3 ML: .5; 3 SOLUTION RESPIRATORY (INHALATION) at 07:03

## 2017-03-26 RX ADMIN — IPRATROPIUM BROMIDE AND ALBUTEROL SULFATE 3 ML: .5; 3 SOLUTION RESPIRATORY (INHALATION) at 04:03

## 2017-03-26 RX ADMIN — DOCUSATE SODIUM 100 MG: 100 CAPSULE, LIQUID FILLED ORAL at 10:03

## 2017-03-26 RX ADMIN — STANDARDIZED SENNA CONCENTRATE AND DOCUSATE SODIUM 1 TABLET: 8.6; 5 TABLET, FILM COATED ORAL at 08:03

## 2017-03-26 RX ADMIN — Medication 3 ML: at 10:03

## 2017-03-26 RX ADMIN — GUAIFENESIN 400 MG: 100 SOLUTION ORAL at 12:03

## 2017-03-26 RX ADMIN — HYDRALAZINE HYDROCHLORIDE 25 MG: 25 TABLET ORAL at 05:03

## 2017-03-26 RX ADMIN — HYDROCHLOROTHIAZIDE 12.5 MG: 12.5 TABLET ORAL at 08:03

## 2017-03-26 RX ADMIN — FERROUS SULFATE TAB EC 325 MG (65 MG FE EQUIVALENT) 325 MG: 325 (65 FE) TABLET DELAYED RESPONSE at 08:03

## 2017-03-26 RX ADMIN — GABAPENTIN 100 MG: 100 CAPSULE ORAL at 02:03

## 2017-03-26 RX ADMIN — Medication 3 ML: at 02:03

## 2017-03-26 RX ADMIN — IPRATROPIUM BROMIDE AND ALBUTEROL SULFATE 3 ML: .5; 3 SOLUTION RESPIRATORY (INHALATION) at 11:03

## 2017-03-26 RX ADMIN — HYDRALAZINE HYDROCHLORIDE 25 MG: 25 TABLET ORAL at 02:03

## 2017-03-26 RX ADMIN — ASPIRIN 81 MG CHEWABLE TABLET 81 MG: 81 TABLET CHEWABLE at 08:03

## 2017-03-26 RX ADMIN — IPRATROPIUM BROMIDE AND ALBUTEROL SULFATE 3 ML: .5; 3 SOLUTION RESPIRATORY (INHALATION) at 03:03

## 2017-03-26 RX ADMIN — PANTOPRAZOLE SODIUM 40 MG: 40 TABLET, DELAYED RELEASE ORAL at 08:03

## 2017-03-26 RX ADMIN — PREDNISONE 10 MG: 10 TABLET ORAL at 08:03

## 2017-03-26 RX ADMIN — ATORVASTATIN CALCIUM 40 MG: 20 TABLET, FILM COATED ORAL at 10:03

## 2017-03-26 RX ADMIN — CLOPIDOGREL 75 MG: 75 TABLET, FILM COATED ORAL at 08:03

## 2017-03-26 RX ADMIN — DONEPEZIL HYDROCHLORIDE 10 MG: 10 TABLET, FILM COATED ORAL at 08:03

## 2017-03-26 RX ADMIN — ISOSORBIDE MONONITRATE 30 MG: 30 TABLET, EXTENDED RELEASE ORAL at 10:03

## 2017-03-26 NOTE — PROGRESS NOTES
"[facility] Hospital Medicine  Progress Note    Patient Name: Danyelle Jacobs  MRN: [id number]  Patient Class: IP- Inpatient   Admission Date: 3/24/2017  Length of Stay: 1 days  Attending Physician: Arpita Carbone MD  Primary Care Provider: Bianca Dykes MD    Hospital Medicine Team: [facility]  Arpita Carbone MD    Subjective:     Principal Problem:Acute respiratory failure with hypoxia    HPI:  88 y.o. female presented to the ER with past medical history of MG, severe RA, hypertension, diastolic HF, CAD, PAOD, mild dementia.  She was in her usual state of stable health until the gradual onset of intermittent confusion and hallucinations beginning 10 days prior to admission with gradually worsening course. Pertinent associated symptoms include cough x 5 days, poor appetite. She developed the acute onset of right hip pain 3 weeks ago, OP work up has been unrevealing. She has been treated with Tramadol and Kenalog but confusion predates these interventions and they were ineffective in controlling her pain. She has not "been herself" for the past month, with decrease in activities and loss of interest. This has been associated with fecal incontinence and poor hygiene. Patient was seen in IM Clinic today and sent to ED for hypoxia.      Hospital Course:       Interval History: Patient with no events overnight, no new complaints. Continues to have significant right hip pain.  Data Review: Results recorded below were reviewed 3/25/2017.    Review of Systems   Constitutional: Negative for fever.   Respiratory: Positive for cough.    Cardiovascular: Negative for chest pain.     Objective:     Vital Signs (Most Recent):  Temp: 98.5 °F (36.9 °C) (03/25/17 2000)  Pulse: 96 (03/25/17 2000)  Resp: 18 (03/25/17 2000)  BP: (!) 163/63 (03/25/17 2000)  SpO2: (!) 94 % (03/25/17 2000) Vital Signs (24h Range):  Temp:  [97.6 °F (36.4 °C)-99.3 °F (37.4 °C)] 98.5 °F (36.9 °C)  Pulse:  [63-96] 96  Resp:  [16-20] " 18  SpO2:  [90 %-100 %] 94 %  BP: (126-165)/(58-77) 163/63     Weight: 65.8 kg (145 lb)  Body mass index is 31.38 kg/(m^2).  No intake or output data in the 24 hours ending 03/25/17 2219   Physical Exam   Constitutional: She appears well-developed.   HENT:   Head: Normocephalic.   Mouth/Throat: Mucous membranes are not pale and not cyanotic.   Eyes: Conjunctivae and lids are normal. Pupils are equal.   Neck: Neck supple.   Cardiovascular: Normal rate, regular rhythm, S1 normal and S2 normal.    Pulmonary/Chest: Effort normal. She has decreased breath sounds.   Abdominal: Soft. Bowel sounds are normal. There is no tenderness.   Musculoskeletal: She exhibits no edema.   Neurological: She is alert. She is not disoriented.   Skin: Skin is warm and dry. She is not diaphoretic. No cyanosis. Nails show no clubbing.   Psychiatric: She has a normal mood and affect. Cognition and memory are impaired.       Significant Labs:     CBC:   Recent Labs  Lab 03/24/17  1353 03/25/17  0523   WBC 19.81* 17.34*   HGB 10.3* 8.6*   HCT 32.0* 26.5*    267     CMP:   Recent Labs  Lab 03/24/17  1353 03/25/17  0523    143   K 4.7 3.8    110   CO2 21* 20*    78   BUN 48* 41*   CREATININE 1.8* 1.2   CALCIUM 8.8 8.0*   PROT 6.9  --    ALBUMIN 2.3* 1.9*   BILITOT 0.3  --    ALKPHOS 115  --    AST 46*  --    ALT 24  --    ANIONGAP 14 13   EGFRNONAA 24.8* 40.4*     Cardiac Markers:   Recent Labs  Lab 03/24/17  1353   *     Lactic Acid:   Recent Labs  Lab 03/24/17  1353   LACTATE 1.5     Urine Studies:   Recent Labs  Lab 03/24/17  1419   COLORU Yellow   APPEARANCEUA Cloudy*   PHUR 5.0   SPECGRAV 1.025   PROTEINUA 1+*   GLUCUA Negative   KETONESU Negative   BILIRUBINUA Negative   OCCULTUA Negative   NITRITE Negative   UROBILINOGEN Negative   LEUKOCYTESUR Negative   RBCUA 1   WBCUA 7*   BACTERIA Few*   SQUAMEPITHEL 1   HYALINECASTS 9*     Assessment/Plan:      Current Hospital Problem List:    Active Hospital Problems     Diagnosis  POA    *Acute respiratory failure with hypoxia [J96.01]  Yes     Priority: 1 - High    Pneumonia due to infectious organism [J18.9]  Yes     Priority: 1 - High    Hip pain, right [M25.551]  Yes     Priority: 2     Delirium [R41.0]  Yes     Priority: 2     Benign hypertension [I10]  Yes     Priority: 3     Left ventricular diastolic dysfunction with preserved systolic function [I51.9]  Yes     Priority: 3     CAD (coronary artery disease) [I25.10]  Yes     Priority: 4     Neuropathic pain of both legs [G57.91, G57.92]  Yes     Priority: 5      Chronic     Presumed due to PAOD      PAOD (peripheral arterial occlusive disease) [I77.9]  Yes     Priority: 5     MG (myasthenia gravis) [G70.00]  Yes     Priority: 6     Rheumatoid arthritis involving multiple sites with positive rheumatoid factor [M05.79]  Yes     Priority: 7     Current chronic use of systemic steroids [Z79.52]  Not Applicable     Priority: 8     MCI (mild cognitive impairment) with memory loss [G31.84]  Yes     Priority: 9     PARVEEN (acute kidney injury) [N17.9]  Yes      Resolved Hospital Problems    Diagnosis Date Resolved POA   No resolved problems to display.       Ordered Medications for management of current problems:    albuterol-ipratropium 2.5mg-0.5mg/3mL  3 mL Nebulization Q4H    aspirin  81 mg Oral Daily    atorvastatin  40 mg Oral QHS    cefTRIAXone (ROCEPHIN) IVPB  1 g Intravenous Q24H    clopidogrel  75 mg Oral Daily    docusate sodium  100 mg Oral BID    donepezil  10 mg Oral Daily    duloxetine  30 mg Oral Daily    ferrous sulfate  325 mg Oral BID    gabapentin  100 mg Oral TID    guaifenesin 100 mg/5 ml  400 mg Oral Q6H    hydrALAZINE  25 mg Oral Q8H    hydrochlorothiazide  12.5 mg Oral Daily    isosorbide mononitrate  30 mg Oral QHS    losartan  100 mg Oral Daily    pantoprazole  40 mg Oral Daily    predniSONE  10 mg Oral Daily    senna-docusate 8.6-50 mg  1 tablet Oral Daily    sodium  chloride 0.9%  3 mL Intravenous Q8H       IV Fluids: IV push only, no IV fluids    Risk  Patient has a condition that poses threat to life and bodily function: PARVEEN    Anticipated Disposition: Home-Health Care [facility] or SNF    Assessment and Plan by Problem:    * Acute respiratory failure with hypoxia  Patient noted to have mild hypoxia. Unclear etiology but likely pneumonia. Monitoring.    Pneumonia due to infectious organism  Treating with ceftriaxone, Nebs and Robitussin.    Hip pain, right  Work up has revealed only DJD; CT unremarkable, no fracture. ESR and CRP in AM.    Delirium  Unclear etiology; possibly due to hip pain or smoldering infection / pneumonia? Waxing and waning.    Benign hypertension  Continuing home medications.    Left ventricular diastolic dysfunction with preserved systolic function  Appears compensated despite elevated BNP. Patient with recent history of poor oral intake.   Plan for gentle hydration overnight due to PARVEEN.  Discontinued IV fluids, recheck BNP.    CAD (coronary artery disease)  Continuing home medications.      MG (myasthenia gravis)  Continue Prednisone.    Rheumatoid arthritis involving multiple sites with positive rheumatoid factor  Controlled with Prednisone.    PARVEEN (acute kidney injury)  Improved with IV hydration.    VTE Risk Mitigation         Ordered     Place ISHMAEL hose  Until discontinued      03/24/17 1650     Place sequential compression device  Until discontinued      03/24/17 1650     Medium Risk of VTE  Once      03/24/17 1650          Arpita Carbone MD  Department of Hospital Medicine   [facility]

## 2017-03-26 NOTE — PLAN OF CARE
Problem: Patient Care Overview  Goal: Plan of Care Review  Outcome: Ongoing (interventions implemented as appropriate)  No acute events throughout shift. VS and assessment performed per orders. Patient progressing toward goals as tolerated. Plan of care reviewed with pt, all concerns addressed. Family at bedside.

## 2017-03-26 NOTE — SUBJECTIVE & OBJECTIVE
Interval History: Patient with no events overnight, no new complaints. Continues to have significant right hip pain.  Data Review: Results recorded below were reviewed 3/25/2017.    Review of Systems   Constitutional: Negative for fever.   Respiratory: Positive for cough.    Cardiovascular: Negative for chest pain.     Objective:     Vital Signs (Most Recent):  Temp: 98.5 °F (36.9 °C) (03/25/17 2000)  Pulse: 96 (03/25/17 2000)  Resp: 18 (03/25/17 2000)  BP: (!) 163/63 (03/25/17 2000)  SpO2: (!) 94 % (03/25/17 2000) Vital Signs (24h Range):  Temp:  [97.6 °F (36.4 °C)-99.3 °F (37.4 °C)] 98.5 °F (36.9 °C)  Pulse:  [63-96] 96  Resp:  [16-20] 18  SpO2:  [90 %-100 %] 94 %  BP: (126-165)/(58-77) 163/63     Weight: 65.8 kg (145 lb)  Body mass index is 31.38 kg/(m^2).  No intake or output data in the 24 hours ending 03/25/17 2219   Physical Exam   Constitutional: She appears well-developed.   HENT:   Head: Normocephalic.   Mouth/Throat: Mucous membranes are not pale and not cyanotic.   Eyes: Conjunctivae and lids are normal. Pupils are equal.   Neck: Neck supple.   Cardiovascular: Normal rate, regular rhythm, S1 normal and S2 normal.    Pulmonary/Chest: Effort normal. She has decreased breath sounds.   Abdominal: Soft. Bowel sounds are normal. There is no tenderness.   Musculoskeletal: She exhibits no edema.   Neurological: She is alert. She is not disoriented.   Skin: Skin is warm and dry. She is not diaphoretic. No cyanosis. Nails show no clubbing.   Psychiatric: She has a normal mood and affect. Cognition and memory are impaired.       Significant Labs:     CBC:   Recent Labs  Lab 03/24/17  1353 03/25/17  0523   WBC 19.81* 17.34*   HGB 10.3* 8.6*   HCT 32.0* 26.5*    267     CMP:   Recent Labs  Lab 03/24/17  1353 03/25/17  0523    143   K 4.7 3.8    110   CO2 21* 20*    78   BUN 48* 41*   CREATININE 1.8* 1.2   CALCIUM 8.8 8.0*   PROT 6.9  --    ALBUMIN 2.3* 1.9*   BILITOT 0.3  --    ALKPHOS 115   --    AST 46*  --    ALT 24  --    ANIONGAP 14 13   EGFRNONAA 24.8* 40.4*     Cardiac Markers:   Recent Labs  Lab 03/24/17  1353   *     Lactic Acid:   Recent Labs  Lab 03/24/17  1353   LACTATE 1.5     Urine Studies:   Recent Labs  Lab 03/24/17  1419   COLORU Yellow   APPEARANCEUA Cloudy*   PHUR 5.0   SPECGRAV 1.025   PROTEINUA 1+*   GLUCUA Negative   KETONESU Negative   BILIRUBINUA Negative   OCCULTUA Negative   NITRITE Negative   UROBILINOGEN Negative   LEUKOCYTESUR Negative   RBCUA 1   WBCUA 7*   BACTERIA Few*   SQUAMEPITHEL 1   HYALINECASTS 9*

## 2017-03-26 NOTE — ASSESSMENT & PLAN NOTE
Unclear etiology; possibly due to hip pain or smoldering infection / pneumonia? Waxing and waning.

## 2017-03-26 NOTE — ASSESSMENT & PLAN NOTE
Appears compensated despite elevated BNP. Patient with recent history of poor oral intake.   Plan for gentle hydration overnight due to PARVEEN.  Discontinued IV fluids, recheck BNP.

## 2017-03-26 NOTE — SUBJECTIVE & OBJECTIVE
Interval History: Patient with no events overnight, no new complaints. Continues to have significant right hip pain -- ESR and CRP are elevated. PARVEEN resolved, BNP elevated but patient has   No shortness of breath or hypoxia. Good UOP.  Data Review: Results recorded below were reviewed 3/26/2017.    Review of Systems   Constitutional: Negative for fever.   Respiratory: Positive for cough.    Cardiovascular: Negative for chest pain.   Psychiatric/Behavioral: Negative for hallucinations.     Objective:     Vital Signs (Most Recent):  Temp: 99.5 °F (37.5 °C) (03/26/17 1541)  Pulse: 83 (03/26/17 1554)  Resp: 17 (03/26/17 1554)  BP: 137/63 (03/26/17 1541)  SpO2: 98 % (03/26/17 1554) Vital Signs (24h Range):  Temp:  [98 °F (36.7 °C)-99.5 °F (37.5 °C)] 99.5 °F (37.5 °C)  Pulse:  [69-98] 83  Resp:  [16-18] 17  SpO2:  [93 %-98 %] 98 %  BP: (137-167)/(63-91) 137/63     Weight: 65.8 kg (145 lb)  Body mass index is 31.38 kg/(m^2).  No intake or output data in the 24 hours ending 03/26/17 1738   Physical Exam   Constitutional: She appears well-developed.   HENT:   Head: Normocephalic.   Mouth/Throat: Mucous membranes are not pale and not cyanotic.   Eyes: Conjunctivae and lids are normal. Pupils are equal.   Neck: Neck supple.   Cardiovascular: Normal rate, regular rhythm, S1 normal and S2 normal.    Pulmonary/Chest: Effort normal. She has decreased breath sounds.   Abdominal: Soft. Bowel sounds are normal. There is no tenderness.   Musculoskeletal: She exhibits no edema.   Neurological: She is alert. She is not disoriented.   Skin: Skin is warm and dry. She is not diaphoretic. No cyanosis. Nails show no clubbing.   Psychiatric: She has a normal mood and affect. She is not actively hallucinating.       Significant Labs:     CBC:     Recent Labs  Lab 03/25/17  0523 03/26/17  0537   WBC 17.34* 10.17   HGB 8.6* 8.0*   HCT 26.5* 24.1*    292     CMP:     Recent Labs  Lab 03/25/17  0523 03/26/17  0537    140   K 3.8 3.8     110   CO2 20* 21*   GLU 78 94   BUN 41* 24*   CREATININE 1.2 0.9   CALCIUM 8.0* 8.3*   ALBUMIN 1.9* 1.8*   ANIONGAP 13 9   EGFRNONAA 40.4* 57.3*     Cardiac Markers:     Recent Labs  Lab 03/26/17  0537   *

## 2017-03-27 ENCOUNTER — OUTPATIENT CASE MANAGEMENT (OUTPATIENT)
Dept: ADMINISTRATIVE | Facility: OTHER | Age: 82
End: 2017-03-27

## 2017-03-27 LAB
ALBUMIN SERPL BCP-MCNC: 1.9 G/DL
ANION GAP SERPL CALC-SCNC: 9 MMOL/L
ANISOCYTOSIS BLD QL SMEAR: SLIGHT
BASOPHILS # BLD AUTO: ABNORMAL K/UL
BASOPHILS NFR BLD: 0 %
BUN SERPL-MCNC: 17 MG/DL
CALCIUM SERPL-MCNC: 8.5 MG/DL
CHLORIDE SERPL-SCNC: 109 MMOL/L
CK SERPL-CCNC: 79 U/L
CO2 SERPL-SCNC: 23 MMOL/L
CREAT SERPL-MCNC: 0.8 MG/DL
DIFFERENTIAL METHOD: ABNORMAL
EOSINOPHIL # BLD AUTO: ABNORMAL K/UL
EOSINOPHIL NFR BLD: 1 %
ERYTHROCYTE [DISTWIDTH] IN BLOOD BY AUTOMATED COUNT: 21.2 %
EST. GFR  (AFRICAN AMERICAN): >60 ML/MIN/1.73 M^2
EST. GFR  (NON AFRICAN AMERICAN): >60 ML/MIN/1.73 M^2
GLUCOSE SERPL-MCNC: 88 MG/DL
HCT VFR BLD AUTO: 24.8 %
HGB BLD-MCNC: 8.2 G/DL
L PNEUMO AG UR QL IA: NOT DETECTED
LYMPHOCYTES # BLD AUTO: ABNORMAL K/UL
LYMPHOCYTES NFR BLD: 8 %
MAGNESIUM SERPL-MCNC: 1.9 MG/DL
MCH RBC QN AUTO: 28.4 PG
MCHC RBC AUTO-ENTMCNC: 33.1 %
MCV RBC AUTO: 86 FL
MONOCYTES # BLD AUTO: ABNORMAL K/UL
MONOCYTES NFR BLD: 6 %
NEUTROPHILS NFR BLD: 85 %
OVALOCYTES BLD QL SMEAR: ABNORMAL
PHOSPHATE SERPL-MCNC: 2.4 MG/DL
PLATELET # BLD AUTO: 320 K/UL
PMV BLD AUTO: 10.3 FL
POIKILOCYTOSIS BLD QL SMEAR: SLIGHT
POLYCHROMASIA BLD QL SMEAR: ABNORMAL
POTASSIUM SERPL-SCNC: 4 MMOL/L
RBC # BLD AUTO: 2.89 M/UL
SODIUM SERPL-SCNC: 141 MMOL/L
WBC # BLD AUTO: 9.99 K/UL

## 2017-03-27 PROCEDURE — 99232 SBSQ HOSP IP/OBS MODERATE 35: CPT | Mod: ,,, | Performed by: INTERNAL MEDICINE

## 2017-03-27 PROCEDURE — 94761 N-INVAS EAR/PLS OXIMETRY MLT: CPT

## 2017-03-27 PROCEDURE — 85007 BL SMEAR W/DIFF WBC COUNT: CPT

## 2017-03-27 PROCEDURE — 36415 COLL VENOUS BLD VENIPUNCTURE: CPT

## 2017-03-27 PROCEDURE — 25000242 PHARM REV CODE 250 ALT 637 W/ HCPCS: Performed by: INTERNAL MEDICINE

## 2017-03-27 PROCEDURE — 83735 ASSAY OF MAGNESIUM: CPT

## 2017-03-27 PROCEDURE — 85027 COMPLETE CBC AUTOMATED: CPT

## 2017-03-27 PROCEDURE — 63600175 PHARM REV CODE 636 W HCPCS: Performed by: INTERNAL MEDICINE

## 2017-03-27 PROCEDURE — 94640 AIRWAY INHALATION TREATMENT: CPT

## 2017-03-27 PROCEDURE — 63600175 PHARM REV CODE 636 W HCPCS: Performed by: PHYSICIAN ASSISTANT

## 2017-03-27 PROCEDURE — 25000003 PHARM REV CODE 250: Performed by: INTERNAL MEDICINE

## 2017-03-27 PROCEDURE — 99900035 HC TECH TIME PER 15 MIN (STAT)

## 2017-03-27 PROCEDURE — 11000001 HC ACUTE MED/SURG PRIVATE ROOM

## 2017-03-27 PROCEDURE — 80069 RENAL FUNCTION PANEL: CPT

## 2017-03-27 PROCEDURE — 82550 ASSAY OF CK (CPK): CPT

## 2017-03-27 PROCEDURE — 94664 DEMO&/EVAL PT USE INHALER: CPT

## 2017-03-27 PROCEDURE — 99232 SBSQ HOSP IP/OBS MODERATE 35: CPT | Mod: GC,,, | Performed by: INTERNAL MEDICINE

## 2017-03-27 RX ORDER — FUROSEMIDE 20 MG/1
20 TABLET ORAL ONCE
Status: COMPLETED | OUTPATIENT
Start: 2017-03-27 | End: 2017-03-27

## 2017-03-27 RX ORDER — NYSTATIN 100000 [USP'U]/G
POWDER TOPICAL 2 TIMES DAILY
Status: DISCONTINUED | OUTPATIENT
Start: 2017-03-28 | End: 2017-03-30 | Stop reason: HOSPADM

## 2017-03-27 RX ADMIN — PREDNISONE 10 MG: 10 TABLET ORAL at 09:03

## 2017-03-27 RX ADMIN — FERROUS SULFATE TAB EC 325 MG (65 MG FE EQUIVALENT) 325 MG: 325 (65 FE) TABLET DELAYED RESPONSE at 09:03

## 2017-03-27 RX ADMIN — DONEPEZIL HYDROCHLORIDE 10 MG: 10 TABLET, FILM COATED ORAL at 09:03

## 2017-03-27 RX ADMIN — FERROUS SULFATE TAB EC 325 MG (65 MG FE EQUIVALENT) 325 MG: 325 (65 FE) TABLET DELAYED RESPONSE at 08:03

## 2017-03-27 RX ADMIN — HYDROCODONE BITARTRATE AND ACETAMINOPHEN 1 TABLET: 5; 325 TABLET ORAL at 01:03

## 2017-03-27 RX ADMIN — IPRATROPIUM BROMIDE AND ALBUTEROL SULFATE 3 ML: .5; 3 SOLUTION RESPIRATORY (INHALATION) at 12:03

## 2017-03-27 RX ADMIN — CLOPIDOGREL 75 MG: 75 TABLET, FILM COATED ORAL at 09:03

## 2017-03-27 RX ADMIN — DOCUSATE SODIUM 100 MG: 100 CAPSULE, LIQUID FILLED ORAL at 09:03

## 2017-03-27 RX ADMIN — GABAPENTIN 100 MG: 100 CAPSULE ORAL at 05:03

## 2017-03-27 RX ADMIN — DOCUSATE SODIUM 100 MG: 100 CAPSULE, LIQUID FILLED ORAL at 08:03

## 2017-03-27 RX ADMIN — IPRATROPIUM BROMIDE AND ALBUTEROL SULFATE 3 ML: .5; 3 SOLUTION RESPIRATORY (INHALATION) at 04:03

## 2017-03-27 RX ADMIN — Medication 3 ML: at 06:03

## 2017-03-27 RX ADMIN — GUAIFENESIN 400 MG: 100 SOLUTION ORAL at 01:03

## 2017-03-27 RX ADMIN — GABAPENTIN 100 MG: 100 CAPSULE ORAL at 01:03

## 2017-03-27 RX ADMIN — LOSARTAN POTASSIUM 100 MG: 50 TABLET, FILM COATED ORAL at 09:03

## 2017-03-27 RX ADMIN — CEFTRIAXONE 1 G: 1 INJECTION, SOLUTION INTRAVENOUS at 03:03

## 2017-03-27 RX ADMIN — Medication 3 ML: at 08:03

## 2017-03-27 RX ADMIN — GUAIFENESIN 400 MG: 100 SOLUTION ORAL at 05:03

## 2017-03-27 RX ADMIN — HYDRALAZINE HYDROCHLORIDE 25 MG: 25 TABLET ORAL at 05:03

## 2017-03-27 RX ADMIN — HYDRALAZINE HYDROCHLORIDE 25 MG: 25 TABLET ORAL at 08:03

## 2017-03-27 RX ADMIN — ATORVASTATIN CALCIUM 40 MG: 20 TABLET, FILM COATED ORAL at 08:03

## 2017-03-27 RX ADMIN — GABAPENTIN 100 MG: 100 CAPSULE ORAL at 08:03

## 2017-03-27 RX ADMIN — PANTOPRAZOLE SODIUM 40 MG: 40 TABLET, DELAYED RELEASE ORAL at 09:03

## 2017-03-27 RX ADMIN — GUAIFENESIN 400 MG: 100 SOLUTION ORAL at 06:03

## 2017-03-27 RX ADMIN — ASPIRIN 81 MG CHEWABLE TABLET 81 MG: 81 TABLET CHEWABLE at 09:03

## 2017-03-27 RX ADMIN — IPRATROPIUM BROMIDE AND ALBUTEROL SULFATE 3 ML: .5; 3 SOLUTION RESPIRATORY (INHALATION) at 08:03

## 2017-03-27 RX ADMIN — STANDARDIZED SENNA CONCENTRATE AND DOCUSATE SODIUM 1 TABLET: 8.6; 5 TABLET, FILM COATED ORAL at 09:03

## 2017-03-27 RX ADMIN — FUROSEMIDE 20 MG: 20 TABLET ORAL at 09:03

## 2017-03-27 RX ADMIN — ISOSORBIDE MONONITRATE 30 MG: 30 TABLET, EXTENDED RELEASE ORAL at 08:03

## 2017-03-27 RX ADMIN — HYDROCHLOROTHIAZIDE 12.5 MG: 12.5 TABLET ORAL at 09:03

## 2017-03-27 RX ADMIN — IPRATROPIUM BROMIDE AND ALBUTEROL SULFATE 3 ML: .5; 3 SOLUTION RESPIRATORY (INHALATION) at 01:03

## 2017-03-27 RX ADMIN — DULOXETINE 30 MG: 30 CAPSULE, DELAYED RELEASE ORAL at 09:03

## 2017-03-27 RX ADMIN — HYDRALAZINE HYDROCHLORIDE 25 MG: 25 TABLET ORAL at 01:03

## 2017-03-27 NOTE — PROGRESS NOTES
Please note the following patient has been assigned to Valeria Belcher RN CCM,  with Outpatient Complex Care Mgmt for screening.    Please contact [facility] at ext [phone] with questions.    Thank you    Cici Santiago, SSC

## 2017-03-27 NOTE — ASSESSMENT & PLAN NOTE
Work up has revealed only DJD; CT unremarkable, no fracture. ESR and CRP elevated. Patient had recent steroid injection that family relates was ineffective in controlling patient's hip pain. Patient known to Dr. Olivo, consulting Rheumatology.

## 2017-03-27 NOTE — PLAN OF CARE
03/27/17 1451   Discharge Assessment   Assessment Type Discharge Planning Assessment   Confirmed/corrected address and phone number on facesheet? Yes   Assessment information obtained from? Caregiver;Medical Record  (daughter at bedside)   Expected Length of Stay (days) 3   Communicated expected length of stay with patient/caregiver yes   Prior to hospitilization cognitive status: Alert/Oriented   Prior to hospitalization functional status: Independent   Current Functional Status: Needs Assistance   Arrived From home or self-care   Lives With child(sherry), adult  (son, Andrés and DIL, Cindy)   Able to Return to Prior Arrangements unable to determine at this time (comments)   Is patient able to care for self after discharge? No   How many people do you have in your home that can help with your care after discharge? 2   Patient's perception of discharge disposition other (comments)  (SNF vs HH)   Readmission Within The Last 30 Days no previous admission in last 30 days   Patient currently being followed by outpatient case management? No   Patient currently receives home health services? No   Does the patient currently use HME? No   Patient currently receives private duty nursing? No   Patient currently receives any other outside agency services? No   Equipment Currently Used at Home none   Do you have any problems affording any of your prescribed medications? No   Is the patient taking medications as prescribed? yes   Do you have any financial concerns preventing you from receiving the healthcare you need? No   Does the patient have transportation to healthcare appointments? Yes   Transportation Available family or friend will provide   On Dialysis? No   Does the patient receive services at the Coumadin Clinic? No   Are there any open cases? No   Discharge Plan A Skilled Nursing Facility   Discharge Plan B Home Health   Patient/Family In Agreement With Plan yes

## 2017-03-27 NOTE — PHYSICIAN QUERY
PT Name: Danyelle Jacobs  MR #: [id number]    Physician Query Form - Neurological Condition Clarification       CDS/: Ashleigh Jones               Contact information: [phone]    This form is a permanent document in the medical record.     Query Date: March 27, 2017    By submitting this query, we are merely seeking further clarification of documentation. Please utilize your independent clinical judgment when addressing the question(s) below.    The Medical record contains the following:   Indicators   Supporting Clinical Findings Location in Medical Record   x AMS, Confusion, LOC, etc. She was in her usual state of stable health until the gradual onset of intermittent confusion and hallucinations beginning 10 days prior to admission with gradually worsening course   Progress Note 3/26   x Acute / Chronic Illness history of MG, severe RA, hypertension, diastolic HF, CAD, PAOD, mild dementia.     Progress Note 3/26    Radiology Findings      Electrolyte Imbalance      Medication      Treatment      Other       Provider, please specify the diagnosis or diagnoses associated with above clinical findings.    [  ] Alcoholic Encephalopathy    [  ] Hepatic Encephalopathy    [  ] Hypertensive Encephalopathy    [ xxx ] Metabolic Encephalopathy    [  ] Toxic Encephalopathy    [  ] Traumatic Encephalopathy    [  ] Wernickes Encephalopathy    [  ] Other Encephalopathy    [  ] Unspecified Encephalopathy    [  ] Other (please specify): _____________________________________    [  ] Clinically Undetermined      Please document in your progress notes daily for the duration of treatment until resolved, and  include in your discharge summary.

## 2017-03-27 NOTE — PLAN OF CARE
Problem: Patient Care Overview  Goal: Plan of Care Review  Outcome: Ongoing (interventions implemented as appropriate)  No acute events throughout shift. VS and assessment performed per orders. Patient progressing toward goals as tolerated. No new skin tears. pt free of injury or any falls. Family at bedside.

## 2017-03-27 NOTE — CONSULTS
History and Physical Exam  Rheumatology Initial Consult       Patient Name: Danyelle Jacobs MRN: [id number]  Date 1928 Age 88 y.o.  Location: [address] [place] [address] A    Admit Date: 3/24/2017                     LOS: 3    SUBJECTIVE:     Consult:  PMH of MG and RA with recent right hip pain, elevated CRP and ESR but imaging negative.  No improvement with recent OP injection.      HPI  Danyelle Jacobs is a 88 y.o. years old female with a past medical history significant for myasthenia gravis, severe rheumatoid arthritis, HTN, diastolic HF, CAD, PAD s/p iliac stent ~ and mild dementia who initially presented to IM clinic on 3/24 with respiratory distress.  Due to rales/rhonchi and low O2 sat on exam, pt was referred to the ED.  Lab work revealed elevated WBC count, procalcitonin, ESR and CRP.  Though CXR without obvious focal consolidation, pt was started on ceftriaxone for suspicion of PNA.  Pt also complained of right hip pain and CT demonstrated mild degenerative changes without acute fracture.  In setting of hip pain and elevated ESR/CRP, rheumatology has been consulted for further recommendations.       Ms. Jacobs is a pt of Dr. Olivo and was seen by him in clinic on 3/13 for hip pain.  She was given a kenalog injection with little improvement in pain.  She was also started on gabapentin at that time.  Of note, pt was also seen in ED on 3/5 for R hip pain.  X-ray at that time was consistent with DJD.      Per daughter at bedside, pain has been progressively worsening over past month.  Pt normally able to ambulate independently with some tactile assistance.  Pt now unwilling to bear weight and requiring two person assist.  At baseline pt can dress herself and toilets independently.  Pt has had acutely worsening confusion and speaks to  relatives.  Pt has been followed by vascular for many years.  Daughter states pt had similar right leg pain prior to stent placement in ~.      REVIEW OF SYSTEMS  General:  No headaches,  fatigue, fevers, chills or unexpected weight loss  HEENT:  No change in vision or hearing  Cardiac:  No chest pain, palpitations, orthopnea  Pulmonary:  +Productive cough and +SOB  GI:   No abdominal pain, nausea, vomiting, constipation, diarrhea or blood in stool  Urinary:  +Incontinence (acute).  No dysuria or hematuria  Heme:   +Easy bruising.  No night sweats or LAD  MSK:   +Poorly defined right leg pain  Derm:   No rashes or itching  Neuro:   No syncope or numbness  Psych:  +Confusion and auditory hallucinations    Past Medical History:   Diagnosis Date    Adverse effect of oral bisphosphonates     Anemia     Angiodysplasia of stomach: see EGD 1/17 2/13/2017    Anxiety     Aortic atherosclerosis     Benign hypertension 10/5/2015    Carcinoma, renal cell     Carotid arterial disease 1/20/2014    Cerebellar stroke, acute     Chronic kidney disease (CKD), stage I 2/18/2016    Coronary artery disease     Current chronic use of systemic steroids 10/4/2016    Degenerative disc disease     Depression     Diverticulosis     Former smoker 6/13/2016    Gastroesophageal reflux disease without esophagitis 10/5/2015    GERD (gastroesophageal reflux disease)     Goiter, nontoxic, multinodular     IBS (irritable bowel syndrome)     Left ventricular diastolic dysfunction with preserved systolic function 10/5/2015    Lung nodule     right    MGUS (monoclonal gammopathy of unknown significance) 6/13/2016    Myasthenia gravis, adult form     Osteoporosis 8/10/2015    Osteoporosis, unspecified     PAOD (peripheral arterial occlusive disease)     RA (rheumatoid arthritis)     Smoker 1/20/2014    Stroke     Ulcer     Vitamin D deficiency disease 1/20/2014       Past Surgical History:   Procedure Laterality Date    CATARACT EXTRACTION W/  INTRAOCULAR LENS IMPLANT Bilateral     CHOLECYSTECTOMY      COLONOSCOPY N/A 1/24/2017    Procedure: COLONOSCOPY;  Surgeon: Kristofer Worthington MD;  Location: [facility]  "(4TH FLR);  Service: Endoscopy;  Laterality: N/A;  for chronic anemia ; needs to be off plavix x 5 days.     Off Plavix since recent hospitalization 1/19/17.    Speaks [place]/needs .    HYSTERECTOMY      KIDNEY SURGERY  2003    NEPHRECTOMY         Current Facility-Administered Medications   Medication    acetaminophen tablet 500 mg    albuterol-ipratropium 2.5mg-0.5mg/3mL nebulizer solution 3 mL    alprazolam tablet 0.5 mg    aspirin chewable tablet 81 mg    atorvastatin tablet 40 mg    cefTRIAXone (ROCEPHIN) 1 g in dextrose 5 % 50 mL IVPB    clopidogrel tablet 75 mg    docusate sodium capsule 100 mg    donepezil tablet 10 mg    duloxetine DR capsule 30 mg    ferrous sulfate EC tablet 325 mg    gabapentin capsule 100 mg    guaifenesin 100 mg/5 ml syrup 400 mg    hydrALAZINE tablet 25 mg    hydrochlorothiazide tablet 12.5 mg    hydrocodone-acetaminophen 5-325mg per tablet 1 tablet    isosorbide mononitrate 24 hr tablet 30 mg    losartan tablet 100 mg    ondansetron disintegrating tablet 8 mg    pantoprazole EC tablet 40 mg    predniSONE tablet 10 mg    senna-docusate 8.6-50 mg per tablet 1 tablet    sodium chloride 0.9% flush 3 mL       Review of patient's allergies indicates:   Allergen Reactions    Norvasc  [amlodipine]      Other reaction(s): Edema       Family History   Problem Relation Age of Onset    Stroke Mother     Heart disease Father     Alzheimer's disease Sister     Thyroid cancer Neg Hx        Social History   Substance Use Topics    Smoking status: Former Smoker     Quit date: 1/19/2015    Smokeless tobacco: Never Used    Alcohol use No       OBJECTIVE:     VITALS  Most Recent Range (Last 24H)   BP (!) 140/63 (BP Location: Right arm, Patient Position: Lying, BP Method: Automatic)  Pulse 87  Temp 98.3 °F (36.8 °C) (Oral)   Resp 20  Ht 4' 9" (1.448 m)  Wt 65.8 kg (145 lb)  SpO2 (!) 93%  Breastfeeding? No  BMI 31.38 kg/m2   Temp:  [98.1 °F (36.7 " °C)-99.5 °F (37.5 °C)]   Pulse:  [69-90]   Resp:  [14-20]   BP: (137-154)/(60-78)   SpO2:  [92 %-98 %]       PHYSICAL EXAM  General:  Well developed female.  No apparent distress.  Head:   Normocephalic and atraumatic.    Eyes:   Normal conjunctivae. No scleral icterus.   Neck:   Normal range of motion. No tracheal deviation.    Cardiac:  +Irregularly irregular rhythm  Pulses:  +2 DP pulses on left, difficult to palpate DP or PT pulses on right  Pulmonary:  +Gurgling cough.  Normal respiratory effort, no respiratory distress. CTAB.    Abdominal:  Soft, non-tender, non-distended. No guarding or rebound. +BS.   Extremities:  +Ulnar deviation of left hand.  +Diffusely TTP right lower extremity.  Normal hip flexion, but pain with manipulation of right knee and ankle.  Nonfocal pain.   Skin:   +Extensive bruising to upper extremities R>L.  +Chronic skin changes to shins bilat R>L. No rashes.  Neurological:  No focal motor or sensory defects.  Psychiatric:  Normal mood and affect.      LABS    Recent Results (from the past 24 hour(s))   2D echo with color flow doppler    Collection Time: 03/26/17 10:00 AM   Result Value Ref Range    EF 68 55 - 65    Diastolic Dysfunction Yes (A)     Est. PA Systolic Pressure 43.56 (A)     Mitral Valve Mobility NORMAL     Tricuspid Valve Regurgitation TRIVIAL    Blood culture    Collection Time: 03/26/17 11:30 AM   Result Value Ref Range    Blood Culture, Routine No Growth to date    Renal function panel    Collection Time: 03/27/17  7:23 AM   Result Value Ref Range    Glucose 88 70 - 110 mg/dL    Sodium 141 136 - 145 mmol/L    Potassium 4.0 3.5 - 5.1 mmol/L    Chloride 109 95 - 110 mmol/L    CO2 23 23 - 29 mmol/L    BUN, Bld 17 8 - 23 mg/dL    Calcium 8.5 (L) 8.7 - 10.5 mg/dL    Creatinine 0.8 0.5 - 1.4 mg/dL    Albumin 1.9 (L) 3.5 - 5.2 g/dL    Phosphorus 2.4 (L) 2.7 - 4.5 mg/dL    eGFR if African American >60.0 >60 mL/min/1.73 m^2    eGFR if non African American >60.0 >60 mL/min/1.73  m^2    Anion Gap 9 8 - 16 mmol/L   Magnesium    Collection Time: 03/27/17  7:23 AM   Result Value Ref Range    Magnesium 1.9 1.6 - 2.6 mg/dL   CBC auto differential    Collection Time: 03/27/17  7:23 AM   Result Value Ref Range    WBC 9.99 3.90 - 12.70 K/uL    RBC 2.89 (L) 4.00 - 5.40 M/uL    Hemoglobin 8.2 (L) 12.0 - 16.0 g/dL    Hematocrit 24.8 (L) 37.0 - 48.5 %    MCV 86 82 - 98 fL    MCH 28.4 27.0 - 31.0 pg    MCHC 33.1 32.0 - 36.0 %    RDW 21.2 (H) 11.5 - 14.5 %    Platelets 320 150 - 350 K/uL    MPV 10.3 9.2 - 12.9 fL    Lymph # CANCELED 1.0 - 4.8 K/uL    Mono # CANCELED 0.3 - 1.0 K/uL    Eos # CANCELED 0.0 - 0.5 K/uL    Baso # CANCELED 0.00 - 0.20 K/uL    Gran% 85.0 (H) 38.0 - 73.0 %    Lymph% 8.0 (L) 18.0 - 48.0 %    Mono% 6.0 4.0 - 15.0 %    Eosinophil% 1.0 0.0 - 8.0 %    Basophil% 0.0 0.0 - 1.9 %    Aniso Slight     Poik Slight     Poly Occasional     Ovalocytes Occasional     Differential Method Manual        IMAGING    CT lower extremity 3/24  There is no evidence of fracture or dislocation.  Joints maintain appropriate alignment without evidence of effusion.  There is mild degenerative changes about the right hip with superior acetabular spurring and sclerotic change.  There is mild superior joint space narrowing.  Bony mineralization within normal limits.  Soft tissues are unremarkable.  Large vascular stent with right common/external iliac artery.      ASSESSMENT/PLAN:     Danyelle Jacobs is a 88 y.o. years old female being evaluated for right leg pain that appears myopathic in nature.    Right  Leg Pain  -On exam today, pain not focal to right hip, more diffuse right leg pain of muscles and not the joints, possibly enhanced given fibromyalgia.   -No evidence of fracture on CT or x-ray, no hx of trauma  -Suspect ESR/CRP elevated due to infectious etiology (treating for presumptive pneumonia with ceftriaxone)  -No improvement with kenalog injection makes inflammatory arthritis etiology less  likely  -Continue prednisone 10 mg daily, would not increase dose  -Continue gabapentin 100 mg tid but would cautiously increase as tolerated, max daily dose 2400 mg.   -Continue supportive care  - Could also try Aspercreme topical therapy while at home.   -Would obtain CPK, aldolase  -PT/OT  - will sign off and f/u above labs. Please call if questions or changes.       Signing Physician:    Marisela Shearer MD, PGY1    .  RHEUMATOLOGY ATTENDING:  Patient presented, personally interviewed and examined, medical records reviewed.  88 yr old lady followed by Dr. Olivo, with known sero+RA, myasthenia gravis and fibromyalgia admitted with hypoxia. Rheumatology consult obtained due to worsening R sided hip and thigh pain ongoing since at least the beginning of March. Patient has an increase in her usual elevation of ESR & CRP. She did not respond to parenteral steroids, but has had a mild response to low dose gabapentin.  Examined after she received hydrocodone and was not c/o pain unless palpated. On exam she is diffusely very tender over both thighs R>L. Passive hip ROM unremarkable without pain. Rest of exam without metatacarpalgia or metatarsalgia; Imaging with age appropriate OA of hips on x-ray and CT (& with vascular stenting on R iliac aa)  Suggest conservative rx. May gradually & cautiously increase gabapentin as she is at risk for seratonin syndrome.  At home topicals may be of benefit. No change in RA rx indicated at present.  Findings discussed with patient's daughter and Dr. Campos whose note and assessment I agree with.

## 2017-03-27 NOTE — ASSESSMENT & PLAN NOTE
Patient noted to have mild hypoxia. Unclear etiology but likely pneumonia. Monitoring; persistent.

## 2017-03-27 NOTE — ASSESSMENT & PLAN NOTE
Unclear etiology; possibly due to hip pain or smoldering infection / pneumonia? Waxing and waning.  Improving.

## 2017-03-27 NOTE — ASSESSMENT & PLAN NOTE
Appears compensated despite elevated BNP. Patient with recent history of poor oral intake.   Plan for gentle hydration due to PARVEEN.  Discontinued IV fluids, recheck of BNP is elevated. Patient is asymptomatic and with good UOP; monitoring without diuresis at this point.

## 2017-03-27 NOTE — PROGRESS NOTES
"[facility] Hospital Medicine  Progress Note    Patient Name: Danyelle Jacobs  MRN: [id number]  Patient Class: IP- Inpatient   Admission Date: 3/24/2017  Length of Stay: 2 days  Attending Physician: Arpita Carbone MD  Primary Care Provider: Bianca Dykes MD    Hospital Medicine Team: [facility] HOSP MED  Arpita Carbone MD    Subjective:     Principal Problem:Acute respiratory failure with hypoxia    HPI:  88 y.o. female presented to the ER with past medical history of MG, severe RA, hypertension, diastolic HF, CAD, PAOD, mild dementia.  She was in her usual state of stable health until the gradual onset of intermittent confusion and hallucinations beginning 10 days prior to admission with gradually worsening course. Pertinent associated symptoms include cough x 5 days, poor appetite. She developed the acute onset of right hip pain 3 weeks ago, OP work up has been unrevealing. She has been treated with Tramadol and Kenalog but confusion predates these interventions and they were ineffective in controlling her pain. She has not "been herself" for the past month, with decrease in activities and loss of interest. This has been associated with fecal incontinence and poor hygiene. Patient was seen in IM Clinic today and sent to ED for hypoxia.      Hospital Course:       Interval History: Patient with no events overnight, no new complaints. Continues to have significant right hip pain -- ESR and CRP are elevated. PARVEEN resolved, BNP elevated but patient has no shortness of breath or hypoxia. Good UOP.  Data Review: Results recorded below were reviewed 3/26/2017.    Review of Systems   Constitutional: Negative for fever.   Respiratory: Positive for cough.    Cardiovascular: Negative for chest pain.   Psychiatric/Behavioral: Negative for hallucinations.     Objective:     Vital Signs (Most Recent):  Temp: 99.5 °F (37.5 °C) (03/26/17 1541)  Pulse: 83 (03/26/17 1554)  Resp: 17 (03/26/17 1554)  BP: 137/63 " (03/26/17 1541)  SpO2: 98 % (03/26/17 1554) Vital Signs (24h Range):  Temp:  [98 °F (36.7 °C)-99.5 °F (37.5 °C)] 99.5 °F (37.5 °C)  Pulse:  [69-98] 83  Resp:  [16-18] 17  SpO2:  [93 %-98 %] 98 %  BP: (137-167)/(63-91) 137/63     Weight: 65.8 kg (145 lb)  Body mass index is 31.38 kg/(m^2).  No intake or output data in the 24 hours ending 03/26/17 1738   Physical Exam   Constitutional: She appears well-developed.   HENT:   Head: Normocephalic.   Mouth/Throat: Mucous membranes are not pale and not cyanotic.   Eyes: Conjunctivae and lids are normal. Pupils are equal.   Neck: Neck supple.   Cardiovascular: Normal rate, regular rhythm, S1 normal and S2 normal.    Pulmonary/Chest: Effort normal. She has decreased breath sounds.   Abdominal: Soft. Bowel sounds are normal. There is no tenderness.   Musculoskeletal: She exhibits no edema.   Neurological: She is alert. She is not disoriented.   Skin: Skin is warm and dry. She is not diaphoretic. No cyanosis. Nails show no clubbing.   Psychiatric: She has a normal mood and affect. She is not actively hallucinating.       Significant Labs:     CBC:     Recent Labs  Lab 03/25/17  0523 03/26/17  0537   WBC 17.34* 10.17   HGB 8.6* 8.0*   HCT 26.5* 24.1*    292     CMP:     Recent Labs  Lab 03/25/17  0523 03/26/17  0537    140   K 3.8 3.8    110   CO2 20* 21*   GLU 78 94   BUN 41* 24*   CREATININE 1.2 0.9   CALCIUM 8.0* 8.3*   ALBUMIN 1.9* 1.8*   ANIONGAP 13 9   EGFRNONAA 40.4* 57.3*     Cardiac Markers:     Recent Labs  Lab 03/26/17  0537   *     Assessment/Plan:     Current Hospital Problem List:    Active Hospital Problems    Diagnosis  POA    *Acute respiratory failure with hypoxia [J96.01]  Yes     Priority: 1 - High    Pneumonia due to infectious organism [J18.9]  Yes     Priority: 1 - High    Hip pain, right [M25.551]  Yes     Priority: 2     Delirium [R41.0]  Yes     Priority: 2     Benign hypertension [I10]  Yes     Priority: 3     Left  ventricular diastolic dysfunction with preserved systolic function [I51.9]  Yes     Priority: 3     CAD (coronary artery disease) [I25.10]  Yes     Priority: 4     Neuropathic pain of both legs [G57.91, G57.92]  Yes     Priority: 5      Chronic     Presumed due to PAOD      PAOD (peripheral arterial occlusive disease) [I77.9]  Yes     Priority: 5     MG (myasthenia gravis) [G70.00]  Yes     Priority: 6     Rheumatoid arthritis involving multiple sites with positive rheumatoid factor [M05.79]  Yes     Priority: 7     Current chronic use of systemic steroids [Z79.52]  Not Applicable     Priority: 8     MCI (mild cognitive impairment) with memory loss [G31.84]  Yes     Priority: 9     PARVEEN (acute kidney injury) [N17.9]  Yes      Resolved Hospital Problems    Diagnosis Date Resolved POA   No resolved problems to display.       Ordered Medications for management of current problems:    albuterol-ipratropium 2.5mg-0.5mg/3mL  3 mL Nebulization Q4H    aspirin  81 mg Oral Daily    atorvastatin  40 mg Oral QHS    cefTRIAXone (ROCEPHIN) IVPB  1 g Intravenous Q24H    clopidogrel  75 mg Oral Daily    docusate sodium  100 mg Oral BID    donepezil  10 mg Oral Daily    duloxetine  30 mg Oral Daily    ferrous sulfate  325 mg Oral BID    gabapentin  100 mg Oral TID    guaifenesin 100 mg/5 ml  400 mg Oral Q6H    hydrALAZINE  25 mg Oral Q8H    hydrochlorothiazide  12.5 mg Oral Daily    isosorbide mononitrate  30 mg Oral QHS    losartan  100 mg Oral Daily    pantoprazole  40 mg Oral Daily    predniSONE  10 mg Oral Daily    senna-docusate 8.6-50 mg  1 tablet Oral Daily    sodium chloride 0.9%  3 mL Intravenous Q8H       IV Fluids: IV push only, no IV fluids    Risk  Patient has a condition that poses threat to life and bodily function: Acute Renal Failure  Patient has an abrupt change in neurologic status: Delirium    [place]tic[place] [facility]    Assessment and Plan by Problem:    * Acute  respiratory failure with hypoxia  Patient noted to have mild hypoxia. Unclear etiology but likely pneumonia. Monitoring; persistent.    Pneumonia due to infectious organism  Treating with ceftriaxone, Nebs and Robitussin.    Hip pain, right  Work up has revealed only DJD; CT unremarkable, no fracture. ESR and CRP elevated. Patient had recent steroid injection that family relates was ineffective in controlling patient's hip pain. Patient known to Dr. Olivo, consulting Rheumatology.    Delirium  Unclear etiology; possibly due to hip pain or smoldering infection / pneumonia? Waxing and waning.  Improving.    Benign hypertension  Continuing home medications.    Left ventricular diastolic dysfunction with preserved systolic function  Appears compensated despite elevated BNP. Patient with recent history of poor oral intake.   Plan for gentle hydration due to PARVEEN.  Discontinued IV fluids, recheck of BNP is elevated. Patient is asymptomatic and with good UOP; monitoring without diuresis at this point.    CAD (coronary artery disease)  Continuing home medications.      MG (myasthenia gravis)  Continue Prednisone.    Rheumatoid arthritis involving multiple sites with positive rheumatoid factor  Controlled with Prednisone.    PARVEEN (acute kidney injury)  Improved with IV hydration.    VTE Risk Mitigation         Ordered     Place ISHMAEL hose  Until discontinued      03/24/17 1650     Place sequential compression device  Until discontinued      03/24/17 1650     Medium Risk of VTE  Once      03/24/17 1650          Arpita Carbone MD  Department of Hospital Medicine   [facility]

## 2017-03-28 LAB
ALBUMIN SERPL BCP-MCNC: 2 G/DL
ANION GAP SERPL CALC-SCNC: 12 MMOL/L
ANISOCYTOSIS BLD QL SMEAR: SLIGHT
BACTERIA BLD CULT: NORMAL
BASOPHILS # BLD AUTO: ABNORMAL K/UL
BASOPHILS NFR BLD: 0 %
BUN SERPL-MCNC: 23 MG/DL
CALCIUM SERPL-MCNC: 8.9 MG/DL
CHLORIDE SERPL-SCNC: 106 MMOL/L
CO2 SERPL-SCNC: 20 MMOL/L
CREAT SERPL-MCNC: 0.9 MG/DL
DIFFERENTIAL METHOD: ABNORMAL
EOSINOPHIL # BLD AUTO: ABNORMAL K/UL
EOSINOPHIL NFR BLD: 0 %
ERYTHROCYTE [DISTWIDTH] IN BLOOD BY AUTOMATED COUNT: 21.1 %
EST. GFR  (AFRICAN AMERICAN): >60 ML/MIN/1.73 M^2
EST. GFR  (NON AFRICAN AMERICAN): 57.3 ML/MIN/1.73 M^2
GLUCOSE SERPL-MCNC: 89 MG/DL
HCT VFR BLD AUTO: 24 %
HGB BLD-MCNC: 8 G/DL
HYPOCHROMIA BLD QL SMEAR: ABNORMAL
LYMPHOCYTES # BLD AUTO: ABNORMAL K/UL
LYMPHOCYTES NFR BLD: 6 %
MAGNESIUM SERPL-MCNC: 1.7 MG/DL
MCH RBC QN AUTO: 28.2 PG
MCHC RBC AUTO-ENTMCNC: 33.3 %
MCV RBC AUTO: 85 FL
MONOCYTES # BLD AUTO: ABNORMAL K/UL
MONOCYTES NFR BLD: 2 %
MYELOCYTES NFR BLD MANUAL: 1 %
NEUTROPHILS NFR BLD: 91 %
OB PNL STL: POSITIVE
OVALOCYTES BLD QL SMEAR: ABNORMAL
PHOSPHATE SERPL-MCNC: 3.2 MG/DL
PLATELET # BLD AUTO: 379 K/UL
PLATELET BLD QL SMEAR: ABNORMAL
PMV BLD AUTO: 11.1 FL
POIKILOCYTOSIS BLD QL SMEAR: SLIGHT
POLYCHROMASIA BLD QL SMEAR: ABNORMAL
POTASSIUM SERPL-SCNC: 3.5 MMOL/L
RBC # BLD AUTO: 2.84 M/UL
SODIUM SERPL-SCNC: 138 MMOL/L
WBC # BLD AUTO: 11.95 K/UL

## 2017-03-28 PROCEDURE — 85027 COMPLETE CBC AUTOMATED: CPT

## 2017-03-28 PROCEDURE — 87070 CULTURE OTHR SPECIMN AEROBIC: CPT

## 2017-03-28 PROCEDURE — 87205 SMEAR GRAM STAIN: CPT

## 2017-03-28 PROCEDURE — 94640 AIRWAY INHALATION TREATMENT: CPT

## 2017-03-28 PROCEDURE — 63600175 PHARM REV CODE 636 W HCPCS: Performed by: INTERNAL MEDICINE

## 2017-03-28 PROCEDURE — 63600175 PHARM REV CODE 636 W HCPCS: Performed by: PHYSICIAN ASSISTANT

## 2017-03-28 PROCEDURE — 25000242 PHARM REV CODE 250 ALT 637 W/ HCPCS: Performed by: INTERNAL MEDICINE

## 2017-03-28 PROCEDURE — 80069 RENAL FUNCTION PANEL: CPT

## 2017-03-28 PROCEDURE — 82272 OCCULT BLD FECES 1-3 TESTS: CPT

## 2017-03-28 PROCEDURE — 82085 ASSAY OF ALDOLASE: CPT

## 2017-03-28 PROCEDURE — 99232 SBSQ HOSP IP/OBS MODERATE 35: CPT | Mod: ,,, | Performed by: INTERNAL MEDICINE

## 2017-03-28 PROCEDURE — 85007 BL SMEAR W/DIFF WBC COUNT: CPT

## 2017-03-28 PROCEDURE — 11000001 HC ACUTE MED/SURG PRIVATE ROOM

## 2017-03-28 PROCEDURE — 89220 SPUTUM SPECIMEN COLLECTION: CPT

## 2017-03-28 PROCEDURE — 36415 COLL VENOUS BLD VENIPUNCTURE: CPT

## 2017-03-28 PROCEDURE — 83735 ASSAY OF MAGNESIUM: CPT

## 2017-03-28 PROCEDURE — 25000003 PHARM REV CODE 250: Performed by: INTERNAL MEDICINE

## 2017-03-28 PROCEDURE — 94761 N-INVAS EAR/PLS OXIMETRY MLT: CPT

## 2017-03-28 PROCEDURE — 97530 THERAPEUTIC ACTIVITIES: CPT

## 2017-03-28 PROCEDURE — 25000003 PHARM REV CODE 250: Performed by: NURSE PRACTITIONER

## 2017-03-28 PROCEDURE — 94664 DEMO&/EVAL PT USE INHALER: CPT

## 2017-03-28 RX ADMIN — LOSARTAN POTASSIUM 100 MG: 50 TABLET, FILM COATED ORAL at 08:03

## 2017-03-28 RX ADMIN — HYDROCHLOROTHIAZIDE 12.5 MG: 12.5 TABLET ORAL at 08:03

## 2017-03-28 RX ADMIN — HYDRALAZINE HYDROCHLORIDE 25 MG: 25 TABLET ORAL at 06:03

## 2017-03-28 RX ADMIN — GUAIFENESIN 400 MG: 100 SOLUTION ORAL at 12:03

## 2017-03-28 RX ADMIN — PANTOPRAZOLE SODIUM 40 MG: 40 TABLET, DELAYED RELEASE ORAL at 08:03

## 2017-03-28 RX ADMIN — GABAPENTIN 100 MG: 100 CAPSULE ORAL at 06:03

## 2017-03-28 RX ADMIN — Medication 3 ML: at 06:03

## 2017-03-28 RX ADMIN — NYSTATIN: 100000 POWDER TOPICAL at 10:03

## 2017-03-28 RX ADMIN — CLOPIDOGREL 75 MG: 75 TABLET, FILM COATED ORAL at 08:03

## 2017-03-28 RX ADMIN — IPRATROPIUM BROMIDE AND ALBUTEROL SULFATE 3 ML: .5; 3 SOLUTION RESPIRATORY (INHALATION) at 11:03

## 2017-03-28 RX ADMIN — GABAPENTIN 100 MG: 100 CAPSULE ORAL at 08:03

## 2017-03-28 RX ADMIN — GUAIFENESIN 400 MG: 100 SOLUTION ORAL at 11:03

## 2017-03-28 RX ADMIN — ISOSORBIDE MONONITRATE 30 MG: 30 TABLET, EXTENDED RELEASE ORAL at 08:03

## 2017-03-28 RX ADMIN — CEFTRIAXONE 1 G: 1 INJECTION, SOLUTION INTRAVENOUS at 04:03

## 2017-03-28 RX ADMIN — GABAPENTIN 100 MG: 100 CAPSULE ORAL at 04:03

## 2017-03-28 RX ADMIN — IPRATROPIUM BROMIDE AND ALBUTEROL SULFATE 3 ML: .5; 3 SOLUTION RESPIRATORY (INHALATION) at 12:03

## 2017-03-28 RX ADMIN — Medication 3 ML: at 04:03

## 2017-03-28 RX ADMIN — FERROUS SULFATE TAB EC 325 MG (65 MG FE EQUIVALENT) 325 MG: 325 (65 FE) TABLET DELAYED RESPONSE at 08:03

## 2017-03-28 RX ADMIN — DULOXETINE 30 MG: 30 CAPSULE, DELAYED RELEASE ORAL at 08:03

## 2017-03-28 RX ADMIN — IPRATROPIUM BROMIDE AND ALBUTEROL SULFATE 3 ML: .5; 3 SOLUTION RESPIRATORY (INHALATION) at 04:03

## 2017-03-28 RX ADMIN — HYDRALAZINE HYDROCHLORIDE 25 MG: 25 TABLET ORAL at 08:03

## 2017-03-28 RX ADMIN — DOCUSATE SODIUM 100 MG: 100 CAPSULE, LIQUID FILLED ORAL at 08:03

## 2017-03-28 RX ADMIN — STANDARDIZED SENNA CONCENTRATE AND DOCUSATE SODIUM 1 TABLET: 8.6; 5 TABLET, FILM COATED ORAL at 08:03

## 2017-03-28 RX ADMIN — HYDRALAZINE HYDROCHLORIDE 25 MG: 25 TABLET ORAL at 04:03

## 2017-03-28 RX ADMIN — DONEPEZIL HYDROCHLORIDE 10 MG: 10 TABLET, FILM COATED ORAL at 08:03

## 2017-03-28 RX ADMIN — ASPIRIN 81 MG CHEWABLE TABLET 81 MG: 81 TABLET CHEWABLE at 08:03

## 2017-03-28 RX ADMIN — IPRATROPIUM BROMIDE AND ALBUTEROL SULFATE 3 ML: .5; 3 SOLUTION RESPIRATORY (INHALATION) at 07:03

## 2017-03-28 RX ADMIN — GUAIFENESIN 400 MG: 100 SOLUTION ORAL at 05:03

## 2017-03-28 RX ADMIN — ATORVASTATIN CALCIUM 40 MG: 20 TABLET, FILM COATED ORAL at 08:03

## 2017-03-28 RX ADMIN — NYSTATIN: 100000 POWDER TOPICAL at 08:03

## 2017-03-28 RX ADMIN — GUAIFENESIN 400 MG: 100 SOLUTION ORAL at 06:03

## 2017-03-28 RX ADMIN — HYDROCODONE BITARTRATE AND ACETAMINOPHEN 1 TABLET: 5; 325 TABLET ORAL at 08:03

## 2017-03-28 RX ADMIN — PREDNISONE 10 MG: 10 TABLET ORAL at 08:03

## 2017-03-28 RX ADMIN — IPRATROPIUM BROMIDE AND ALBUTEROL SULFATE 3 ML: .5; 3 SOLUTION RESPIRATORY (INHALATION) at 09:03

## 2017-03-28 NOTE — PLAN OF CARE
Problem: Patient Care Overview  Goal: Plan of Care Review  Outcome: Ongoing (interventions implemented as appropriate)  Alert/oriented. Mobility limited. Skin bruised-yeasty under breasts. Working w/therapy. Tic-staff care. No falls/trauma this shift.

## 2017-03-28 NOTE — PLAN OF CARE
Problem: Patient Care Overview  Goal: Plan of Care Review  Outcome: Ongoing (interventions implemented as appropriate)  Patient remained ins table condition today. No injuries noted. Family remained at bedside through shift. Appetite fair, consumes 50% of meals. Worked with POT today and sat up in chair twice.  US of legs  Completed.  Complained of pain this mroning that was relieved by PRN hydrocodone.  VSS

## 2017-03-28 NOTE — PLAN OF CARE
Problem: Physical Therapy Goal  Goal: Physical Therapy Goal  Goals to be met by: 2017     Patient will increase functional independence with mobility by performin. Supine to sit with Contact Guard Assistance  2. Sit to supine with Contact Guard Assistance  3. Sit to stand transfer with Minimal Assistance  4. Bed to chair transfer with Minimal Assistance  5. Gait x 20 feet with Minimal Assistance using appropriate AD.    Outcome: Ongoing (interventions implemented as appropriate)  Pt goals remain appropriate.      ANDI SILVA, PT  3/28/2017

## 2017-03-28 NOTE — PROGRESS NOTES
"[facility] Hospital Medicine  Progress Note    Patient Name: Danyelle Jacobs  MRN: [id number]  Patient Class: IP- Inpatient   Admission Date: 3/24/2017  Length of Stay: 4 days  Attending Physician: Jose Thrasher MD  Primary Care Provider: Bianca Dykes MD    [place] [facility]  Jose Thrasher MD    Subjective:     Principal Problem:Acute respiratory failure with hypoxia    HPI:  88 y.o. female presented to the ER with past medical history of MG, severe RA, hypertension, diastolic HF, CAD, PAOD, mild dementia.  She was in her usual state of stable health until the gradual onset of intermittent confusion and hallucinations beginning 10 days prior to admission with gradually worsening course. Pertinent associated symptoms include cough x 5 days, poor appetite. She developed the acute onset of right hip pain 3 weeks ago, OP work up has been unrevealing. She has been treated with Tramadol and Kenalog but confusion predates these interventions and they were ineffective in controlling her pain. She has not "been herself" for the past month, with decrease in activities and loss of interest. This has been associated with fecal incontinence and poor hygiene. Patient was seen in IM Clinic today and sent to ED for hypoxia.      Hospital Course:       Interval History:     Hx limited by language barrier but daughter says her mother continues to have pain in the right leg.  Has been ongoing since March 3.      Review of Systems   Unable to perform ROS: Other     Objective:     Vital Signs (Most Recent):  Temp: 98.6 °F (37 °C) (03/28/17 1257)  Pulse: 84 (03/28/17 1257)  Resp: 16 (03/28/17 1257)  BP: (!) 141/65 (03/28/17 1257)  SpO2: (!) 94 % (03/28/17 1257) Vital Signs (24h Range):  Temp:  [98.3 °F (36.8 °C)-98.7 °F (37.1 °C)] 98.6 °F (37 °C)  Pulse:  [60-98] 84  Resp:  [16-19] 16  SpO2:  [92 %-97 %] 94 %  BP: (102-141)/(54-81) 141/65     Weight: 65.8 kg (145 lb)  Body mass index is 31.38 " kg/(m^2).    Intake/Output Summary (Last 24 hours) at 03/28/17 1434  Last data filed at 03/28/17 0529   Gross per 24 hour   Intake              120 ml   Output                0 ml   Net              120 ml      Physical Exam   Constitutional: She appears well-nourished.   HENT:   Head: Atraumatic.   Cardiovascular: Normal rate.    Pulmonary/Chest: Effort normal.   Abdominal: Soft.   Neurological: She is alert.   Skin: Skin is warm.   Psychiatric: She has a normal mood and affect. Her behavior is normal.   Nursing note and vitals reviewed.      Significant Labs:   BMP:   Recent Labs  Lab 03/28/17  0606   GLU 89      K 3.5      CO2 20*   BUN 23   CREATININE 0.9   CALCIUM 8.9   MG 1.7     CBC:   Recent Labs  Lab 03/27/17  0723 03/28/17  0606   WBC 9.99 11.95   HGB 8.2* 8.0*   HCT 24.8* 24.0*    379*       Significant Imaging: None    Assessment/Plan:      MG (myasthenia gravis)  Continue Prednisone.    Neuropathic pain of both legs  Neurontin, increase as tolerated.    Hip pain, right  Work up has revealed only DJD; CT unremarkable, no fracture. ESR and CRP elevated but non-specific. Patient had recent steroid injection that family relates was ineffective in controlling patient's hip pain.   Patient known to Dr. Olivo, consulted Rheumatology. No inflammatory etiology found. They feel it is 2/2 to a myopathy with some component of fibromyalgia  Leg is warm and well perfused.  Will check PARAM in the right leg to see if chronic vascular disease may be contributing     VTE Risk Mitigation         Ordered     Place ISHMAEL hose  Until discontinued      03/24/17 1650     Place sequential compression device  Until discontinued      03/24/17 1650     Medium Risk of VTE  Once      03/24/17 1650          Jose Thrasher MD  Department of Hospital Medicine   [facility]

## 2017-03-28 NOTE — ASSESSMENT & PLAN NOTE
Work up has revealed only DJD; CT unremarkable, no fracture. ESR and CRP elevated but non-specific. Patient had recent steroid injection that family relates was ineffective in controlling patient's hip pain.   Patient known to Dr. Olivo, consulted Rheumatology. No inflammatory etiology found. They feel it is 2/2 to a myopathy with some component of fibromyalgia  Leg is warm and well perfused.  Will check PARAM in the right leg to see if chronic vascular disease may be contributing

## 2017-03-28 NOTE — ASSESSMENT & PLAN NOTE
Work up has revealed only DJD; CT unremarkable, no fracture. ESR and CRP elevated but non-specific. Patient had recent steroid injection that family relates was ineffective in controlling patient's hip pain.   Patient known to Dr. Olivo, consulted Rheumatology. No inflammatory etiology found.

## 2017-03-28 NOTE — PROGRESS NOTES
"[facility] Hospital Medicine  Progress Note    Patient Name: Danyelle Jacobs  MRN: [id number]  Patient Class: IP- Inpatient   Admission Date: 3/24/2017  Length of Stay: 3 days  Attending Physician: Arpita Carbone MD  Primary Care Provider: Bianca Dykes MD    [facility] Medicine Team: [facility]  Arpita Carbone MD    Subjective:     Principal Problem:Acute respiratory failure with hypoxia    HPI:  88 y.o. female presented to the ER with past medical history of MG, severe RA, hypertension, diastolic HF, CAD, PAOD, mild dementia.  She was in her usual state of stable health until the gradual onset of intermittent confusion and hallucinations beginning 10 days prior to admission with gradually worsening course. Pertinent associated symptoms include cough x 5 days, poor appetite. She developed the acute onset of right hip pain 3 weeks ago, OP work up has been unrevealing. She has been treated with Tramadol and Kenalog but confusion predates these interventions and they were ineffective in controlling her pain. She has not "been herself" for the past month, with decrease in activities and loss of interest. This has been associated with fecal incontinence and poor hygiene. Patient was seen in IM Clinic today and sent to ED for hypoxia.      Hospital Course:       Interval History: Patient with no events overnight, no new complaints. Continues to have significant right hip pain with movement or WB. Cough is improved.  Data Review: Results recorded below were reviewed 3/27/2017.    Review of Systems   Constitutional: Negative for fever.   Respiratory: Positive for cough.    Cardiovascular: Negative for chest pain.   Psychiatric/Behavioral: Negative for hallucinations.     Objective:     Vital Signs (Most Recent):  Temp: 98.4 °F (36.9 °C) (03/27/17 2000)  Pulse: 78 (03/27/17 2018)  Resp: 18 (03/27/17 2018)  BP: 102/70 (03/27/17 2000)  SpO2: 97 % (03/27/17 2018) Vital Signs (24h Range):  Temp:  [97.7 " °F (36.5 °C)-98.4 °F (36.9 °C)] 98.4 °F (36.9 °C)  Pulse:  [73-98] 78  Resp:  [14-20] 18  SpO2:  [92 %-97 %] 97 %  BP: (102-167)/(63-78) 102/70     Weight: 65.8 kg (145 lb)  Body mass index is 31.38 kg/(m^2).  No intake or output data in the 24 hours ending 03/27/17 2301   Physical Exam   Constitutional: She appears well-developed.   HENT:   Head: Normocephalic.   Mouth/Throat: Mucous membranes are not pale and not cyanotic.   Eyes: Conjunctivae and lids are normal. Pupils are equal.   Neck: Neck supple.   Cardiovascular: Normal rate, regular rhythm, S1 normal and S2 normal.    Pulmonary/Chest: Effort normal. She has decreased breath sounds.   Abdominal: Soft. Bowel sounds are normal. There is no tenderness.   Musculoskeletal: She exhibits no edema.   Neurological: She is alert.   Skin: Skin is warm and dry. She is not diaphoretic. No cyanosis. Nails show no clubbing.   Psychiatric: She has a normal mood and affect. She is not actively hallucinating.       Significant Labs:     CBC:     Recent Labs  Lab 03/26/17  0537 03/27/17  0723   WBC 10.17 9.99   HGB 8.0* 8.2*   HCT 24.1* 24.8*    320     CMP:     Recent Labs  Lab 03/26/17  0537 03/27/17  0723    141   K 3.8 4.0    109   CO2 21* 23   GLU 94 88   BUN 24* 17   CREATININE 0.9 0.8   CALCIUM 8.3* 8.5*   ALBUMIN 1.8* 1.9*   ANIONGAP 9 9   EGFRNONAA 57.3* >60.0     Cardiac Markers:     Recent Labs  Lab 03/26/17  0537   *     Assessment/Plan:      Current Hospital Problem List:    Active Hospital Problems    Diagnosis  POA    *Acute respiratory failure with hypoxia [J96.01]  Yes     Priority: 1 - High    Pneumonia due to infectious organism [J18.9]  Yes     Priority: 1 - High    Hip pain, right [M25.551]  Yes     Priority: 2     Delirium [R41.0]  Yes     Priority: 2     Benign hypertension [I10]  Yes     Priority: 3     Left ventricular diastolic dysfunction with preserved systolic function [I51.9]  Yes     Priority: 3     CAD (coronary  artery disease) [I25.10]  Yes     Priority: 4     Neuropathic pain of both legs [G57.91, G57.92]  Yes     Priority: 5      Chronic     Presumed due to PAOD      PAOD (peripheral arterial occlusive disease) [I77.9]  Yes     Priority: 5     MG (myasthenia gravis) [G70.00]  Yes     Priority: 6     Rheumatoid arthritis involving multiple sites with positive rheumatoid factor [M05.79]  Yes     Priority: 7     Current chronic use of systemic steroids [Z79.52]  Not Applicable     Priority: 8     MCI (mild cognitive impairment) with memory loss [G31.84]  Yes     Priority: 9     PARVEEN (acute kidney injury) [N17.9]  Yes      Resolved Hospital Problems    Diagnosis Date Resolved POA   No resolved problems to display.       Ordered Medications for management of current problems:    albuterol-ipratropium 2.5mg-0.5mg/3mL  3 mL Nebulization Q4H    aspirin  81 mg Oral Daily    atorvastatin  40 mg Oral QHS    cefTRIAXone (ROCEPHIN) IVPB  1 g Intravenous Q24H    clopidogrel  75 mg Oral Daily    docusate sodium  100 mg Oral BID    donepezil  10 mg Oral Daily    duloxetine  30 mg Oral Daily    ferrous sulfate  325 mg Oral BID    gabapentin  100 mg Oral TID    guaifenesin 100 mg/5 ml  400 mg Oral Q6H    hydrALAZINE  25 mg Oral Q8H    hydrochlorothiazide  12.5 mg Oral Daily    isosorbide mononitrate  30 mg Oral QHS    losartan  100 mg Oral Daily    pantoprazole  40 mg Oral Daily    predniSONE  10 mg Oral Daily    senna-docusate 8.6-50 mg  1 tablet Oral Daily    sodium chloride 0.9%  3 mL Intravenous Q8H       IV Fluids: IV push only, no IV fluids    Risk  Patient has a condition that poses threat to life and bodily function: Acute Renal Failure  Patient has an abrupt change in neurologic status: Delirium    Anticipated Disposition: Home-Health Care Svc -- Hip pain is major barrier to discharge.    Assessment and Plan by Problem:    * Acute respiratory failure with hypoxia  Patient noted to have mild hypoxia.  Unclear etiology but likely pneumonia. Monitoring; improving.    Pneumonia due to infectious organism  Treating with ceftriaxone, Nebs and Robitussin.  Slowly improving, mental status improved.    Hip pain, right  Work up has revealed only DJD; CT unremarkable, no fracture. ESR and CRP elevated but non-specific. Patient had recent steroid injection that family relates was ineffective in controlling patient's hip pain.   Patient known to Dr. Olivo, consulted Rheumatology. No inflammatory etiology found.    Delirium  Unclear etiology; possibly due to hip pain or smoldering infection / pneumonia? Waxing and waning.  Improving.    Benign hypertension  Continuing home medications.    Left ventricular diastolic dysfunction with preserved systolic function  Appears compensated despite elevated BNP. Patient with recent history of poor oral intake.   Received gentle hydration due to PARVEEN.  Discontinued IV fluids, recheck of BNP was elevated. Patient is asymptomatic and with good UOP; monitoring without diuresis at this point.    CAD (coronary artery disease)  Continuing home medications.      Neuropathic pain of both legs  Neurontin, increase as tolerated.    MG (myasthenia gravis)  Continue Prednisone.    Rheumatoid arthritis involving multiple sites with positive rheumatoid factor  Controlled with Prednisone.    PARVEEN (acute kidney injury)  Improved with IV hydration. Resolved.    VTE Risk Mitigation         Ordered     Place ISHMAEL hose  Until discontinued      03/24/17 1650     Place sequential compression device  Until discontinued      03/24/17 1650     Medium Risk of VTE  Once      03/24/17 1650          Arpita Carbone MD  Department of Hospital Medicine   [facility]

## 2017-03-28 NOTE — SUBJECTIVE & OBJECTIVE
Interval History: Patient with no events overnight, no new complaints. Continues to have significant right hip pain with movement or WB. Cough is improved.  Data Review: Results recorded below were reviewed 3/27/2017.    Review of Systems   Constitutional: Negative for fever.   Respiratory: Positive for cough.    Cardiovascular: Negative for chest pain.   Psychiatric/Behavioral: Negative for hallucinations.     Objective:     Vital Signs (Most Recent):  Temp: 98.4 °F (36.9 °C) (03/27/17 2000)  Pulse: 78 (03/27/17 2018)  Resp: 18 (03/27/17 2018)  BP: 102/70 (03/27/17 2000)  SpO2: 97 % (03/27/17 2018) Vital Signs (24h Range):  Temp:  [97.7 °F (36.5 °C)-98.4 °F (36.9 °C)] 98.4 °F (36.9 °C)  Pulse:  [73-98] 78  Resp:  [14-20] 18  SpO2:  [92 %-97 %] 97 %  BP: (102-167)/(63-78) 102/70     Weight: 65.8 kg (145 lb)  Body mass index is 31.38 kg/(m^2).  No intake or output data in the 24 hours ending 03/27/17 2301   Physical Exam   Constitutional: She appears well-developed.   HENT:   Head: Normocephalic.   Mouth/Throat: Mucous membranes are not pale and not cyanotic.   Eyes: Conjunctivae and lids are normal. Pupils are equal.   Neck: Neck supple.   Cardiovascular: Normal rate, regular rhythm, S1 normal and S2 normal.    Pulmonary/Chest: Effort normal. She has decreased breath sounds.   Abdominal: Soft. Bowel sounds are normal. There is no tenderness.   Musculoskeletal: She exhibits no edema.   Neurological: She is alert.   Skin: Skin is warm and dry. She is not diaphoretic. No cyanosis. Nails show no clubbing.   Psychiatric: She has a normal mood and affect. She is not actively hallucinating.       Significant Labs:     CBC:     Recent Labs  Lab 03/26/17 0537 03/27/17  0723   WBC 10.17 9.99   HGB 8.0* 8.2*   HCT 24.1* 24.8*    320     CMP:     Recent Labs  Lab 03/26/17 0537 03/27/17  0723    141   K 3.8 4.0    109   CO2 21* 23   GLU 94 88   BUN 24* 17   CREATININE 0.9 0.8   CALCIUM 8.3* 8.5*   ALBUMIN  1.8* 1.9*   ANIONGAP 9 9   EGFRNONAA 57.3* >60.0     Cardiac Markers:     Recent Labs  Lab 03/26/17  0537   *

## 2017-03-28 NOTE — SUBJECTIVE & OBJECTIVE
Interval History:     Hx limited by language barrier but daughter says her mother continues to have pain in the right leg.  Has been ongoing since March 3.      Review of Systems   Unable to perform ROS: Other     Objective:     Vital Signs (Most Recent):  Temp: 98.6 °F (37 °C) (03/28/17 1257)  Pulse: 84 (03/28/17 1257)  Resp: 16 (03/28/17 1257)  BP: (!) 141/65 (03/28/17 1257)  SpO2: (!) 94 % (03/28/17 1257) Vital Signs (24h Range):  Temp:  [98.3 °F (36.8 °C)-98.7 °F (37.1 °C)] 98.6 °F (37 °C)  Pulse:  [60-98] 84  Resp:  [16-19] 16  SpO2:  [92 %-97 %] 94 %  BP: (102-141)/(54-81) 141/65     Weight: 65.8 kg (145 lb)  Body mass index is 31.38 kg/(m^2).    Intake/Output Summary (Last 24 hours) at 03/28/17 1434  Last data filed at 03/28/17 0529   Gross per 24 hour   Intake              120 ml   Output                0 ml   Net              120 ml      Physical Exam   Constitutional: She appears well-nourished.   HENT:   Head: Atraumatic.   Cardiovascular: Normal rate.    Pulmonary/Chest: Effort normal.   Abdominal: Soft.   Neurological: She is alert.   Skin: Skin is warm.   Psychiatric: She has a normal mood and affect. Her behavior is normal.   Nursing note and vitals reviewed.      Significant Labs:   BMP:   Recent Labs  Lab 03/28/17  0606   GLU 89      K 3.5      CO2 20*   BUN 23   CREATININE 0.9   CALCIUM 8.9   MG 1.7     CBC:   Recent Labs  Lab 03/27/17  0723 03/28/17  0606   WBC 9.99 11.95   HGB 8.2* 8.0*   HCT 24.8* 24.0*    379*       Significant Imaging: None

## 2017-03-28 NOTE — PT/OT/SLP PROGRESS
Physical Therapy  Treatment    Danyelle Jacobs   MRN: [id number]   Admitting Diagnosis: Acute respiratory failure with hypoxia    PT Received On: 03/28/17  PT Start Time: 1332     PT Stop Time: 1351    PT Total Time (min): 19 min       Billable Minutes:  Therapeutic Activity 19    Treatment Type: Treatment  PT/PTA: PT     PTA Visit Number: 0       General Precautions: Standard, fall  Orthopedic Precautions: N/A   Braces: N/A         Subjective:  Communicated with RN prior to session; pt daughter present and translated during session  Pt and daughter agreeable to therapy session.    Pain Rating: other (see comments) (pt did not rate pain)  Location - Side: Right  Location - Orientation: generalized  Location: hip  Pain Addressed: Reposition, Distraction, Nurse notified  Pain Rating Post-Intervention: other (see comments) (pt did not rate pain)    Objective:        Functional Mobility:  Bed Mobility:   Supine to Sit: Minimum Assistance    Transfers:  Sit <> Stand Assistance: Maximum Assistance  Sit <> Stand Assistive Device: No Assistive Device  Bed <> Chair Technique: Stand Pivot  Bed <> Chair Assistance: Maximum Assistance  Bed <> Chair Assistive Device: No Assistive Device    Gait:   Gait Distance: 2 side steps to bedside chair; limited by R LE pain  Assistance 1: Maximum assistance  Gait Assistive Device: No device  Gait Deviation(s): decreased toe-to-floor clearance, decreased weight-shifting ability, decreased velocity of limb motion    Balance:   Static Sit: FAIR-: Maintains without assist but inconsistent   Dynamic Sit: POOR: N/A  Static Stand: 0: Needs MAXIMAL assist to maintain   Dynamic stand: 0: N/A     Therapeutic Activities and Exercises:  Pt daughter present; reports pt PLOF- pt mod (I) with ADLs other than bathing- S for tub transfers and ensure safety and mod (I) with amb (increased time and furniture walking).   Pt sat EOB with CGA/min A for L lat lean 2* off weighting R hip due to pain.  Pt reported nausea  after seated in chair; RN notified and daughter present.  Pt safe to perform transfers with RN staff.     AM-PAC 6 CLICK MOBILITY  How much help from another person does this patient currently need?   1 = Unable, Total/Dependent Assistance  2 = A lot, Maximum/Moderate Assistance  3 = A little, Minimum/Contact Guard/Supervision  4 = None, Modified [place]/Independent    Turning over in bed (including adjusting bedclothes, sheets and blankets)?: 3  Sitting down on and standing up from a chair with arms (e.g., wheelchair, bedside commode, etc.): 2  Moving from lying on back to sitting on the side of the bed?: 2  Moving to and from a bed to a chair (including a wheelchair)?: 2  Need to walk in hospital room?: 2  Climbing 3-5 steps with a railing?: 1  Total Score: 12    AM-PAC Raw Score CMS G-Code Modifier Level of Impairment Assistance   6 % Total / Unable   7 - 9 CM 80 - 100% Maximal Assist   10 - 14 CL 60 - 80% Moderate Assist   15 - 19 CK 40 - 60% Moderate Assist   20 - 22 CJ 20 - 40% Minimal Assist   23 CI 1-20% SBA / CGA   24 CH 0% Independent/ Mod I     Patient left up in chair with all lines intact, call button in reach, RN notified and daughter present.    Assessment:  Danyelle Jacobs is a 88 y.o. female with a medical diagnosis of Acute respiratory failure with hypoxia and presents with increased pain and decreased strength, endurance, balance and overall functional mobility. Pt performed bed mobility min A and transfers max A. Pt took 2 side steps to bedside chair max A without AD; limited by R LE pain. Pt will continue to benefit from skilled PT to improve deficits and increase overall functional mobility. Pt daughter present and clarified pt PLOF reporting mod (I) with ADLs other than bathing- S for tub transfers and ensure safety and mod (I) with amb (increased time and furniture walking). Pt will benefit from SNF to return to PLOF and ensure safety upon d/c.    Rehab identified problem  list/impairments: Rehab identified problem list/impairments: weakness, impaired balance, decreased safety awareness, impaired endurance, impaired functional mobilty, gait instability, decreased lower extremity function, pain    Rehab potential is good.    Activity tolerance: Fair    Discharge recommendations: Discharge Facility/Level Of Care Needs: nursing facility, skilled     Barriers to discharge: Barriers to Discharge: Decreased caregiver support (pt requires increased assist at this time)    Equipment recommendations: Equipment Needed After Discharge: bedside commode, bath bench     GOALS:   Physical Therapy Goals        Problem: Physical Therapy Goal    Goal Priority Disciplines Outcome Goal Variances Interventions   Physical Therapy Goal     PT/OT, PT Ongoing (interventions implemented as appropriate)     Description:  Goals to be met by: 2017     Patient will increase functional independence with mobility by performin. Supine to sit with Contact Guard Assistance  2. Sit to supine with Contact Guard Assistance  3. Sit to stand transfer with Minimal Assistance  4. Bed to chair transfer with Minimal Assistance  5. Gait  x 20 feet with Minimal Assistance using appropriate AD.                 PLAN:    Patient to be seen 4 x/week  to address the above listed problems via gait training, therapeutic activities, therapeutic exercises  Plan of Care expires: 17  Plan of Care reviewed with: patient, daughter         ANDI SILVA, PT  2017

## 2017-03-28 NOTE — ASSESSMENT & PLAN NOTE
Appears compensated despite elevated BNP. Patient with recent history of poor oral intake.   Received gentle hydration due to PARVEEN.  Discontinued IV fluids, recheck of BNP was elevated. Patient is asymptomatic and with good UOP; monitoring without diuresis at this point.

## 2017-03-29 LAB
ALBUMIN SERPL BCP-MCNC: 2 G/DL
ANION GAP SERPL CALC-SCNC: 10 MMOL/L
ANISOCYTOSIS BLD QL SMEAR: SLIGHT
BACTERIA BLD CULT: NORMAL
BASOPHILS # BLD AUTO: ABNORMAL K/UL
BASOPHILS NFR BLD: 0 %
BUN SERPL-MCNC: 24 MG/DL
CALCIUM SERPL-MCNC: 8.5 MG/DL
CHLORIDE SERPL-SCNC: 107 MMOL/L
CO2 SERPL-SCNC: 22 MMOL/L
CREAT SERPL-MCNC: 1 MG/DL
DIFFERENTIAL METHOD: ABNORMAL
EOSINOPHIL # BLD AUTO: ABNORMAL K/UL
EOSINOPHIL NFR BLD: 1 %
ERYTHROCYTE [DISTWIDTH] IN BLOOD BY AUTOMATED COUNT: 21.2 %
EST. GFR  (AFRICAN AMERICAN): 58.1 ML/MIN/1.73 M^2
EST. GFR  (NON AFRICAN AMERICAN): 50.4 ML/MIN/1.73 M^2
GLUCOSE SERPL-MCNC: 87 MG/DL
HCT VFR BLD AUTO: 24.5 %
HGB BLD-MCNC: 7.7 G/DL
LYMPHOCYTES # BLD AUTO: ABNORMAL K/UL
LYMPHOCYTES NFR BLD: 11 %
MAGNESIUM SERPL-MCNC: 1.7 MG/DL
MCH RBC QN AUTO: 27.4 PG
MCHC RBC AUTO-ENTMCNC: 31.4 %
MCV RBC AUTO: 87 FL
METAMYELOCYTES NFR BLD MANUAL: 3 %
MONOCYTES # BLD AUTO: ABNORMAL K/UL
MONOCYTES NFR BLD: 9 %
NEUTROPHILS NFR BLD: 76 %
PHOSPHATE SERPL-MCNC: 3.7 MG/DL
PLATELET # BLD AUTO: 400 K/UL
PLATELET BLD QL SMEAR: ABNORMAL
PMV BLD AUTO: 10.9 FL
POLYCHROMASIA BLD QL SMEAR: ABNORMAL
POTASSIUM SERPL-SCNC: 3.7 MMOL/L
RBC # BLD AUTO: 2.81 M/UL
SODIUM SERPL-SCNC: 139 MMOL/L
WBC # BLD AUTO: 12.89 K/UL

## 2017-03-29 PROCEDURE — 85027 COMPLETE CBC AUTOMATED: CPT

## 2017-03-29 PROCEDURE — 97530 THERAPEUTIC ACTIVITIES: CPT

## 2017-03-29 PROCEDURE — 25000003 PHARM REV CODE 250: Performed by: INTERNAL MEDICINE

## 2017-03-29 PROCEDURE — 97110 THERAPEUTIC EXERCISES: CPT

## 2017-03-29 PROCEDURE — 63600175 PHARM REV CODE 636 W HCPCS: Performed by: INTERNAL MEDICINE

## 2017-03-29 PROCEDURE — 11000001 HC ACUTE MED/SURG PRIVATE ROOM

## 2017-03-29 PROCEDURE — 36415 COLL VENOUS BLD VENIPUNCTURE: CPT

## 2017-03-29 PROCEDURE — 83735 ASSAY OF MAGNESIUM: CPT

## 2017-03-29 PROCEDURE — 97116 GAIT TRAINING THERAPY: CPT

## 2017-03-29 PROCEDURE — 99232 SBSQ HOSP IP/OBS MODERATE 35: CPT | Mod: ,,, | Performed by: INTERNAL MEDICINE

## 2017-03-29 PROCEDURE — 94640 AIRWAY INHALATION TREATMENT: CPT

## 2017-03-29 PROCEDURE — 85007 BL SMEAR W/DIFF WBC COUNT: CPT

## 2017-03-29 PROCEDURE — 63600175 PHARM REV CODE 636 W HCPCS: Performed by: PHYSICIAN ASSISTANT

## 2017-03-29 PROCEDURE — 80069 RENAL FUNCTION PANEL: CPT

## 2017-03-29 PROCEDURE — 94664 DEMO&/EVAL PT USE INHALER: CPT

## 2017-03-29 PROCEDURE — 25000242 PHARM REV CODE 250 ALT 637 W/ HCPCS: Performed by: INTERNAL MEDICINE

## 2017-03-29 RX ORDER — HEPARIN SODIUM 5000 [USP'U]/ML
5000 INJECTION, SOLUTION INTRAVENOUS; SUBCUTANEOUS EVERY 8 HOURS
Status: DISCONTINUED | OUTPATIENT
Start: 2017-03-29 | End: 2017-03-30 | Stop reason: HOSPADM

## 2017-03-29 RX ADMIN — IPRATROPIUM BROMIDE AND ALBUTEROL SULFATE 3 ML: .5; 3 SOLUTION RESPIRATORY (INHALATION) at 03:03

## 2017-03-29 RX ADMIN — NYSTATIN: 100000 POWDER TOPICAL at 09:03

## 2017-03-29 RX ADMIN — DOCUSATE SODIUM 100 MG: 100 CAPSULE, LIQUID FILLED ORAL at 08:03

## 2017-03-29 RX ADMIN — HYDRALAZINE HYDROCHLORIDE 25 MG: 25 TABLET ORAL at 01:03

## 2017-03-29 RX ADMIN — CLOPIDOGREL 75 MG: 75 TABLET, FILM COATED ORAL at 08:03

## 2017-03-29 RX ADMIN — GUAIFENESIN 400 MG: 100 SOLUTION ORAL at 11:03

## 2017-03-29 RX ADMIN — IPRATROPIUM BROMIDE AND ALBUTEROL SULFATE 3 ML: .5; 3 SOLUTION RESPIRATORY (INHALATION) at 07:03

## 2017-03-29 RX ADMIN — DULOXETINE 30 MG: 30 CAPSULE, DELAYED RELEASE ORAL at 08:03

## 2017-03-29 RX ADMIN — HYDRALAZINE HYDROCHLORIDE 25 MG: 25 TABLET ORAL at 05:03

## 2017-03-29 RX ADMIN — ASPIRIN 81 MG CHEWABLE TABLET 81 MG: 81 TABLET CHEWABLE at 08:03

## 2017-03-29 RX ADMIN — IPRATROPIUM BROMIDE AND ALBUTEROL SULFATE 3 ML: .5; 3 SOLUTION RESPIRATORY (INHALATION) at 04:03

## 2017-03-29 RX ADMIN — Medication 3 ML: at 06:03

## 2017-03-29 RX ADMIN — GUAIFENESIN 400 MG: 100 SOLUTION ORAL at 05:03

## 2017-03-29 RX ADMIN — HYDRALAZINE HYDROCHLORIDE 25 MG: 25 TABLET ORAL at 10:03

## 2017-03-29 RX ADMIN — PREDNISONE 10 MG: 10 TABLET ORAL at 08:03

## 2017-03-29 RX ADMIN — CEFTRIAXONE 1 G: 1 INJECTION, SOLUTION INTRAVENOUS at 04:03

## 2017-03-29 RX ADMIN — ATORVASTATIN CALCIUM 40 MG: 20 TABLET, FILM COATED ORAL at 10:03

## 2017-03-29 RX ADMIN — LOSARTAN POTASSIUM 100 MG: 50 TABLET, FILM COATED ORAL at 08:03

## 2017-03-29 RX ADMIN — STANDARDIZED SENNA CONCENTRATE AND DOCUSATE SODIUM 1 TABLET: 8.6; 5 TABLET, FILM COATED ORAL at 08:03

## 2017-03-29 RX ADMIN — ISOSORBIDE MONONITRATE 30 MG: 30 TABLET, EXTENDED RELEASE ORAL at 10:03

## 2017-03-29 RX ADMIN — HEPARIN SODIUM 5000 UNITS: 5000 INJECTION, SOLUTION INTRAVENOUS; SUBCUTANEOUS at 10:03

## 2017-03-29 RX ADMIN — FERROUS SULFATE TAB EC 325 MG (65 MG FE EQUIVALENT) 325 MG: 325 (65 FE) TABLET DELAYED RESPONSE at 10:03

## 2017-03-29 RX ADMIN — GABAPENTIN 100 MG: 100 CAPSULE ORAL at 05:03

## 2017-03-29 RX ADMIN — Medication 3 ML: at 10:03

## 2017-03-29 RX ADMIN — IPRATROPIUM BROMIDE AND ALBUTEROL SULFATE 3 ML: .5; 3 SOLUTION RESPIRATORY (INHALATION) at 12:03

## 2017-03-29 RX ADMIN — HYDROCODONE BITARTRATE AND ACETAMINOPHEN 1 TABLET: 5; 325 TABLET ORAL at 10:03

## 2017-03-29 RX ADMIN — GABAPENTIN 100 MG: 100 CAPSULE ORAL at 10:03

## 2017-03-29 RX ADMIN — Medication 3 ML: at 01:03

## 2017-03-29 RX ADMIN — IPRATROPIUM BROMIDE AND ALBUTEROL SULFATE 3 ML: .5; 3 SOLUTION RESPIRATORY (INHALATION) at 08:03

## 2017-03-29 RX ADMIN — GABAPENTIN 100 MG: 100 CAPSULE ORAL at 01:03

## 2017-03-29 RX ADMIN — NYSTATIN: 100000 POWDER TOPICAL at 08:03

## 2017-03-29 RX ADMIN — HYDROCHLOROTHIAZIDE 12.5 MG: 12.5 TABLET ORAL at 08:03

## 2017-03-29 RX ADMIN — FERROUS SULFATE TAB EC 325 MG (65 MG FE EQUIVALENT) 325 MG: 325 (65 FE) TABLET DELAYED RESPONSE at 08:03

## 2017-03-29 RX ADMIN — DOCUSATE SODIUM 100 MG: 100 CAPSULE, LIQUID FILLED ORAL at 10:03

## 2017-03-29 RX ADMIN — PANTOPRAZOLE SODIUM 40 MG: 40 TABLET, DELAYED RELEASE ORAL at 08:03

## 2017-03-29 RX ADMIN — DONEPEZIL HYDROCHLORIDE 10 MG: 10 TABLET, FILM COATED ORAL at 08:03

## 2017-03-29 NOTE — ASSESSMENT & PLAN NOTE
Work up has revealed only DJD; CT unremarkable, no fracture. ESR and CRP elevated but non-specific. Patient had recent steroid injection that family relates was ineffective in controlling patient's hip pain.   Patient known to Dr. Olivo, consulted Rheumatology. No inflammatory etiology found. They feel it is 2/2 to a myopathy with some component of fibromyalgia  Leg is warm and well perfused.  Cards feels occluded stent is not contributing

## 2017-03-29 NOTE — SUBJECTIVE & OBJECTIVE
Interval History: No improvement in leg pain.      Review of Systems   All other systems reviewed and are negative.    Objective:     Vital Signs (Most Recent):  Temp: 98.6 °F (37 °C) (03/29/17 1608)  Pulse: 88 (03/29/17 1608)  Resp: 16 (03/29/17 1608)  BP: (!) 100/53 (03/29/17 1608)  SpO2: (!) 94 % (03/29/17 1608) Vital Signs (24h Range):  Temp:  [97.5 °F (36.4 °C)-98.8 °F (37.1 °C)] 98.6 °F (37 °C)  Pulse:  [] 88  Resp:  [15-24] 16  SpO2:  [90 %-96 %] 94 %  BP: ()/(53-58) 100/53     Weight: 65.8 kg (145 lb)  Body mass index is 31.38 kg/(m^2).    Intake/Output Summary (Last 24 hours) at 03/29/17 1647  Last data filed at 03/29/17 0500   Gross per 24 hour   Intake                0 ml   Output                0 ml   Net                0 ml      Physical Exam   Constitutional: She appears well-nourished.   Cardiovascular: Normal rate.    Pulmonary/Chest: Effort normal.   Abdominal: Soft.   Neurological: She is alert.   Psychiatric: She has a normal mood and affect.   Nursing note and vitals reviewed.      Significant Labs:   BMP:   Recent Labs  Lab 03/29/17  0449   GLU 87      K 3.7      CO2 22*   BUN 24*   CREATININE 1.0   CALCIUM 8.5*   MG 1.7     CBC:   Recent Labs  Lab 03/28/17  0606 03/29/17  0449   WBC 11.95 12.89*   HGB 8.0* 7.7*   HCT 24.0* 24.5*   * 400*       Significant Imaging: None

## 2017-03-29 NOTE — PLAN OF CARE
Problem: Patient Care Overview  Goal: Plan of Care Review  Outcome: Ongoing (interventions implemented as appropriate)  Pt awake, alert, oriented to situation. Bed in low locked pos, call light within reach. Pt w/ persistent cough, resp treatments continued as ordered. No c/o pain. Family member at bedside.

## 2017-03-29 NOTE — PT/OT/SLP PROGRESS
"Physical Therapy  Treatment    Danyelle Jacobs   MRN: [id number]   Admitting Diagnosis: Acute respiratory failure with hypoxia    PT Received On: 03/29/17  PT Start Time: 1410     PT Stop Time: 1435    PT Total Time (min): 25 min       Billable Minutes:  Gait Training 15 and Therapeutic Activity 10    Treatment Type: Treatment  PT/PTA: PTA     PTA Visit Number: 1       General Precautions: Standard, fall  Orthopedic Precautions: N/A   Braces:           Subjective:  Communicated with NSG prior to session.  Patient's daughter states " She was fine and walking at the beginning of this month, but now she can't even stand straight"    Pain Rating:  (Unable to rate, but pain with movement of the R leg.)  Location - Side: Right  Location - Orientation: generalized  Location: hip  Pain Addressed: Pre-medicate for activity, Reposition, Distraction  Pain Rating Post-Intervention: 3/10    Objective:        Functional Mobility:  Bed Mobility:   Rolling/Turning to Left: Minimum assistance  Rolling/Turning Right: Minimum assistance  Scooting/Bridging: Minimum Assistance  Supine to Sit: Minimum Assistance    Transfers:  Sit <> Stand Assistance: Moderate Assistance  Sit <> Stand Assistive Device: Rolling Walker  Bed <> Chair Technique: Stand Pivot  Bed <> Chair Assistance: Moderate Assistance  Bed <> Chair Assistive Device: No Assistive Device    Gait:   Gait Distance: 20 ft and 24 ft with chair follow and mod cues to correct anterior lean.  Assistance 1: Moderate assistance  Gait Assistive Device: Rolling walker  Gait Pattern: 3-point gait  Gait Deviation(s): decreased pedro, increased time in double stance, decreased velocity of limb motion, decreased step length, decreased stride length, decreased swing-to-stance ratio, decreased toe-to-floor clearance, decreased weight-shifting ability, forward lean    Stairs:      Balance:   Static Sit: FAIR-: Maintains without assist but inconsistent   Dynamic Sit: POOR: N/A  Static Stand: POOR+: " Needs MINIMAL assist to maintain  Dynamic stand: POOR: N/A     Therapeutic Activities and Exercises:  Donned a gown. Patient sat at EOB x 10 minutes to prepare for treatment. Patient transferred from EOB to bedside chair using stand pivot transfer and mod assist.      AM-PAC 6 CLICK MOBILITY  How much help from another person does this patient currently need?   1 = Unable, Total/Dependent Assistance  2 = A lot, Maximum/Moderate Assistance  3 = A little, Minimum/Contact Guard/Supervision  4 = None, Modified [place]/Independent    Turning over in bed (including adjusting bedclothes, sheets and blankets)?: 3  Sitting down on and standing up from a chair with arms (e.g., wheelchair, bedside commode, etc.): 2  Moving from lying on back to sitting on the side of the bed?: 2  Moving to and from a bed to a chair (including a wheelchair)?: 2  Need to walk in hospital room?: 2  Climbing 3-5 steps with a railing?: 1  Total Score: 12    AM-PAC Raw Score CMS G-Code Modifier Level of Impairment Assistance   6 % Total / Unable   7 - 9 CM 80 - 100% Maximal Assist   10 - 14 CL 60 - 80% Moderate Assist   15 - 19 CK 40 - 60% Moderate Assist   20 - 22 CJ 20 - 40% Minimal Assist   23 CI 1-20% SBA / CGA   24 CH 0% Independent/ Mod I     Patient left up in chair with all lines intact, call button in reach and daughter present.    Assessment:  Danyelle Jacobs is a 88 y.o. female with a medical diagnosis of Acute respiratory failure with hypoxia and presents with decreased functional mobility. Patient ambulates with antalgic gait pattern, fwd/flexed posture and had a tendency to veer to the left, but was able to initiate gait training in the hallway with max. Encouragement. Patient would benefit from continued P.T. To address deficits .     Rehab identified problem list/impairments: Rehab identified problem list/impairments: weakness, impaired endurance, impaired self care skills, impaired functional mobilty, impaired balance, gait  instability, decreased lower extremity function, decreased safety awareness, pain, decreased ROM    Rehab potential is fair.    Activity tolerance: Fair    Discharge recommendations: Discharge Facility/Level Of Care Needs: nursing facility, skilled     Barriers to discharge: Barriers to Discharge: Decreased caregiver support    Equipment recommendations: Equipment Needed After Discharge: bedside commode, bath bench     GOALS:   Physical Therapy Goals        Problem: Physical Therapy Goal    Goal Priority Disciplines Outcome Goal Variances Interventions   Physical Therapy Goal     PT/OT, PT Ongoing (interventions implemented as appropriate)     Description:  Goals to be met by: 2017     Patient will increase functional independence with mobility by performin. Supine to sit with Contact Guard Assistance  2. Sit to supine with Contact Guard Assistance  3. Sit to stand transfer with Minimal Assistance  4. Bed to chair transfer with Minimal Assistance  5. Gait  x 20 feet with Minimal Assistance using appropriate AD.                 PLAN:    Patient to be seen 4 x/week  to address the above listed problems via gait training, therapeutic activities, therapeutic exercises  Plan of Care expires: 17  Plan of Care reviewed with: patient, daughter         Olayinka Shea, PTA  2017

## 2017-03-29 NOTE — PROGRESS NOTES
"[facility] Hospital Medicine  Progress Note    Patient Name: Danyelle Jacobs  MRN: [id number]  Patient Class: IP- Inpatient   Admission Date: 3/24/2017  Length of Stay: 5 days  Attending Physician: Jose Thrasher MD  Primary Care Provider: Bianca Dykes MD    Hospital Medicine Team: [facility]  Jose Thrasher MD    Subjective:     Principal Problem:Acute respiratory failure with hypoxia    HPI:  88 y.o. female presented to the ER with past medical history of MG, severe RA, hypertension, diastolic HF, CAD, PAOD, mild dementia.  She was in her usual state of stable health until the gradual onset of intermittent confusion and hallucinations beginning 10 days prior to admission with gradually worsening course. Pertinent associated symptoms include cough x 5 days, poor appetite. She developed the acute onset of right hip pain 3 weeks ago, OP work up has been unrevealing. She has been treated with Tramadol and Kenalog but confusion predates these interventions and they were ineffective in controlling her pain. She has not "been herself" for the past month, with decrease in activities and loss of interest. This has been associated with fecal incontinence and poor hygiene. Patient was seen in IM Clinic today and sent to ED for hypoxia.      Hospital Course:       Interval History: No improvement in leg pain.      Review of Systems   All other systems reviewed and are negative.    Objective:     Vital Signs (Most Recent):  Temp: 98.6 °F (37 °C) (03/29/17 1608)  Pulse: 88 (03/29/17 1608)  Resp: 16 (03/29/17 1608)  BP: (!) 100/53 (03/29/17 1608)  SpO2: (!) 94 % (03/29/17 1608) Vital Signs (24h Range):  Temp:  [97.5 °F (36.4 °C)-98.8 °F (37.1 °C)] 98.6 °F (37 °C)  Pulse:  [] 88  Resp:  [15-24] 16  SpO2:  [90 %-96 %] 94 %  BP: ()/(53-58) 100/53     Weight: 65.8 kg (145 lb)  Body mass index is 31.38 kg/(m^2).    Intake/Output Summary (Last 24 hours) at 03/29/17 1647  Last data filed at 03/29/17 " 0500   Gross per 24 hour   Intake                0 ml   Output                0 ml   Net                0 ml      Physical Exam   Constitutional: She appears well-nourished.   Cardiovascular: Normal rate.    Pulmonary/Chest: Effort normal.   Abdominal: Soft.   Neurological: She is alert.   Psychiatric: She has a normal mood and affect.   Nursing note and vitals reviewed.      Significant Labs:   BMP:   Recent Labs  Lab 03/29/17  0449   GLU 87      K 3.7      CO2 22*   BUN 24*   CREATININE 1.0   CALCIUM 8.5*   MG 1.7     CBC:   Recent Labs  Lab 03/28/17  0606 03/29/17  0449   WBC 11.95 12.89*   HGB 8.0* 7.7*   HCT 24.0* 24.5*   * 400*       Significant Imaging: None    Assessment/Plan:      CAD (coronary artery disease)  Continuing home medications.      Neuropathic pain of both legs  Neurontin, increase as tolerated.    Rheumatoid arthritis involving multiple sites with positive rheumatoid factor  Controlled with Prednisone.    Hip pain, right  Work up has revealed only DJD; CT unremarkable, no fracture. ESR and CRP elevated but non-specific. Patient had recent steroid injection that family relates was ineffective in controlling patient's hip pain.   Patient known to Dr. Olivo, consulted Rheumatology. No inflammatory etiology found. They feel it is 2/2 to a myopathy with some component of fibromyalgia  Leg is warm and well perfused.  Cards feels occluded stent is not contributing    Pneumonia due to infectious organism  Treating with ceftriaxone, Nebs and Robitussin.  Slowly improving, mental status improved.    VTE Risk Mitigation         Ordered     Place ISHMAEL hose  Until discontinued      03/24/17 1650     Place sequential compression device  Until discontinued      03/24/17 1650     Medium Risk of VTE  Once      03/24/17 1650          Jose Thrasher MD  Department of Hospital Medicine   [facility]

## 2017-03-29 NOTE — PT/OT/SLP PROGRESS
Occupational Therapy  Treatment    Danyelle Jacobs   MRN: [id number]   Admitting Diagnosis: Acute respiratory failure with hypoxia    OT Date of Treatment: 17   OT Start Time: 1048  OT Stop Time: 1114  OT Total Time (min): 26 min    Billable Minutes:  Therapeutic Activity 16 and Therapeutic Exercise 10    General Precautions: Standard, fall  Orthopedic Precautions:    Braces:           Subjective:  Communicated with nurse prior to session.  Daughter states that she just walked pt to the bathroom to use the toilet and she did well.    Pain Ratin/10        Objective:        Functional Mobility:  Bed Mobility:  Rolling/Turning to Left: Moderate assistance  Scooting/Bridging: Maximum Assistance  Supine to Sit: Moderate Assistance  Sit to Supine: Maximum Assistance    Transfers:   Sit <> Stand Assistance: Moderate Assistance  Sit <> Stand Assistive Device: Rolling Walker  Bed <> Chair Technique: Stand Pivot  Bed <> Chair Transfer Assistance: Moderate Assistance  Bed <> Chair Assistive Device: Rolling Walker    Functional Ambulation: Bed<>chair stand/pivot Mod A with RW and instruction on proper walker management.    Activities of Daily Living:       Balance:   Static Sit: FAIR-: Maintains without assist but inconsistent   Dynamic Sit: FAIR: Cannot move trunk without losing balance  Static Stand: FAIR: Maintains without assist but unable to take challenges  Dynamic stand: POOR: N/A    Therapeutic Activities and Exercises:  Bed mobility; static standing at RW x 4 mins getting cleaned with forward flexed posture; BUE therex from chair: shld/elbow flexion/extension & chest presses, x 10 reps each.    AM-PAC 6 CLICK ADL   How much help from another person does this patient currently need?   1 = Unable, Total/Dependent Assistance  2 = A lot, Maximum/Moderate Assistance  3 = A little, Minimum/Contact Guard/Supervision  4 = None, Modified [place]/Independent    Putting on and taking off regular lower body clothing? :  1  Bathing (including washing, rinsing, drying)?: 2  Toileting, which includes using toilet, bedpan, or urinal? : 2  Putting on and taking off regular upper body clothing?: 2  Taking care of personal grooming such as brushing teeth?: 3  Eating meals?: 3  Total Score: 13     AM-PAC Raw Score CMS G-Code Modifier Level of Impairment Assistance   6 % Total / Unable   7 - 9 CM 80 - 100% Maximal Assist   10 - 14 CL 60 - 80% Moderate Assist   15 - 19 CK 40 - 60% Moderate Assist   20 - 22 CJ 20 - 40% Minimal Assist   23 CI 1-20% SBA / CGA   24 CH 0% Independent/ Mod I     Patient left supine with call button in reach and daughter present    ASSESSMENT:  Danyelle Jacobs is a 88 y.o. female with a medical diagnosis of Acute respiratory failure with hypoxia and presents with icreased RLE pain and weakness which is limiting pt's participation in daily activities. Pt's daughter states that they just walked to the bathroom to use the toilet but upon arrival pt just had a bowel mvt in bed. Pt. Was able to stand at bedside with RW x 4 mins while being cleaned with a 90 deg forward leaning posture - she had difficulty standing upright even with verbal/tactile cues due to increased trunk weakness. Pt. Exhibits decreased ability to engage in daily activities at the same level PTA and would continue to benefit from OT services to improve independence in ADLs, functional mobility & t/fs and to improve overall strength, balance and endurance.    Rehab identified problem list/impairments: Rehab identified problem list/impairments: weakness, impaired endurance, impaired self care skills, impaired functional mobilty, impaired balance, decreased lower extremity function, decreased upper extremity function, pain    Rehab potential is good.    Activity tolerance: Fair    Discharge recommendations: Discharge Facility/Level Of Care Needs: nursing facility, skilled     Barriers to discharge: Barriers to Discharge: Decreased caregiver  support    Equipment recommendations: bedside commode, bath bench     GOALS:   Occupational Therapy Goals        Problem: Occupational Therapy Goal    Goal Priority Disciplines Outcome Interventions   Occupational Therapy Goal     OT, PT/OT Ongoing (interventions implemented as appropriate)    Description:  Goals to be met by: 04/03/17     Patient will increase functional independence with ADLs by performing:    UE Dressing with Minimal Assistance.  LE Dressing with Maximum Assistance.  Grooming while seated with Modified [place].  Toileting from bedside commode with Moderate Assistance for hygiene and clothing management.   Toilet transfer to bedside commode with Moderate Assistance.  Upper extremity exercise program x10 reps per handout, with supervision.                Plan:  Patient to be seen 4 x/week to address the above listed problems via self-care/home management, therapeutic activities, therapeutic exercises  Plan of Care expires: 04/22/17  Plan of Care reviewed with: patient, daughter         Devaughn STARKS MARIA T Villegas  03/29/2017

## 2017-03-29 NOTE — PLAN OF CARE
Spoke with patient's son Andrés/caregiver ([phone]) regarding discharge plan, notified patient's son that SNF list is at bedside, will follow and update.

## 2017-03-29 NOTE — CONSULTS
Interventional Cardiology Consult Note  Attending Physician: Jose Thrasher MD  Reason for Consult: RLE pain, chronically occluded right iliac stent. Evaluate for claudication     HPI:   The patient is Czech speaking. History obtained from the patient's daughter at bedside.    88 y.o. woman with PMH of myasthenia gravis on prednisone, severe RA (not on DMARDS), HTN, chronic diastolic CHF, CAD, PAD s/p R JOSE/EIA stent (chronic occlusion with distal reconstitution since 06/2011), mild dementia, osteoporosis, asymptomatic carotid stenosis, remote hx of CVA who presented to the hospital for c/o AMS on 3/24/17. She as seen in the IM clinic and found to be hypoxic (88% RA) and her PCP recommended to go to the ER. She was diagnosed with pneumonia/UTI and has been on ABx. She was also found to have PARVEEN and was given IV fluids.    The patient's daughter states that since beginning of March, she has reported right hip pain/lateral thigh pain that has been getting progressively worse prohibiting her from being able to ambulate. At baseline she is able to walk slowly w/o any assist device. She carries out her ADLs herself except she needs assistance with showering. She lives with her son. She reports prior to 3 weeks ago, she was able to ambulate w/o any cramps in her thighs/legs. She denies any new or chronic LE wounds/ulcerations.    She saw her rheumatologist on 3/13/17 who gave her a CS injection and recommended gabapentin in addition to her prior medications for pain relief. Work-up in the hospital consisted of CT Right hip which r/o any acute displaced fracture, + DJD. LE Arterial US showed R EIA occlusive thrombosis (stenosis?) with distal reconstitution which is similar to findings noted on CTA Aorta with run off done in 06/2011. No focal stenosis in B/L LE. No resting ABIs were done. , , CPK 79.    ROS:    Constitution: Negative for fever, chills, weight loss or gain.   HENT: Negative for sore  throat, rhinorrhea, or headache.  Eyes: Negative for blurred or double vision.   Cardiovascular: See above  Pulmonary: Negative for SOB   Gastrointestinal: Negative for abdominal pain, nausea, vomiting, or diarrhea.   : Negative for dysuria.   Neurological: Negative for focal weakness or sensory changes.  PMH:     Past Medical History:   Diagnosis Date    Adverse effect of oral bisphosphonates     Anemia     Angiodysplasia of stomach: see EGD 1/17 2/13/2017    Anxiety     Aortic atherosclerosis     Benign hypertension 10/5/2015    Carcinoma, renal cell     Carotid arterial disease 1/20/2014    Cerebellar stroke, acute     Chronic kidney disease (CKD), stage I 2/18/2016    Coronary artery disease     Current chronic use of systemic steroids 10/4/2016    Degenerative disc disease     Depression     Diverticulosis     Former smoker 6/13/2016    Gastroesophageal reflux disease without esophagitis 10/5/2015    GERD (gastroesophageal reflux disease)     Goiter, nontoxic, multinodular     IBS (irritable bowel syndrome)     Left ventricular diastolic dysfunction with preserved systolic function 10/5/2015    Lung nodule     right    MGUS (monoclonal gammopathy of unknown significance) 6/13/2016    Myasthenia gravis, adult form     Osteoporosis 8/10/2015    Osteoporosis, unspecified     PAOD (peripheral arterial occlusive disease)     RA (rheumatoid arthritis)     Smoker 1/20/2014    Stroke     Ulcer     Vitamin D deficiency disease 1/20/2014     Past Surgical History:   Procedure Laterality Date    CATARACT EXTRACTION W/  INTRAOCULAR LENS IMPLANT Bilateral     CHOLECYSTECTOMY      COLONOSCOPY N/A 1/24/2017    Procedure: COLONOSCOPY;  Surgeon: Kristofer Worthington MD;  Location: [address]);  Service: Endoscopy;  Laterality: N/A;  for chronic anemia ; needs to be off plavix x 5 days.     Off Plavix since recent hospitalization 1/19/17.    Speaks Turkish/needs .     HYSTERECTOMY      KIDNEY SURGERY  2003    NEPHRECTOMY       Allergies:     Review of patient's allergies indicates:   Allergen Reactions    Norvasc  [amlodipine]      Other reaction(s): Edema     Medications:     No current facility-administered medications on file prior to encounter.      Current Outpatient Prescriptions on File Prior to Encounter   Medication Sig Dispense Refill    alprazolam (XANAX) 0.5 MG tablet Take 1 tablet (0.5 mg total) by mouth daily as needed. 90 tablet 0    aspirin 81 mg Tab Take 81 mg by mouth. 1 Tablet Oral Every day      atorvastatin (LIPITOR) 40 MG tablet Take 1 tablet (40 mg total) by mouth every evening. 90 tablet 3    clopidogrel (PLAVIX) 75 mg tablet Take 75 mg by mouth once daily.       mg capsule TAKE 1 CAPSULE BY MOUTH TWICE DAILY 180 capsule 0    donepezil (ARICEPT) 10 MG tablet Take 1 tablet (10 mg total) by mouth once daily. 90 tablet 3    duloxetine (CYMBALTA) 30 MG capsule Take 1 capsule (30 mg total) by mouth once daily. 90 capsule 3    ferrous sulfate 325 (65 FE) MG EC tablet Take 1 tablet (325 mg total) by mouth 2 (two) times daily. 60 tablet 12    ferrous sulfate 325 mg (65 mg iron) Tab tablet TAKE 1 TABLET BY MOUTH TWICE DAILY 180 tablet 0    gabapentin (NEURONTIN) 100 MG capsule Take 1 capsule (100 mg total) by mouth 3 (three) times daily. 90 capsule 11    hydrALAZINE (APRESOLINE) 25 MG tablet Take 1 tablet (25 mg total) by mouth every 8 (eight) hours. 270 tablet 3    hydrochlorothiazide (MICROZIDE) 12.5 mg capsule Take 1 capsule (12.5 mg total) by mouth once daily. 90 capsule 3    hydrocodone-acetaminophen 5-325mg (NORCO) 5-325 mg per tablet Take 1 tablet by mouth 3 (three) times daily as needed for Pain. 90 tablet 0    hydrocodone-acetaminophen 5-325mg (NORCO) 5-325 mg per tablet Take 1 tablet by mouth 3 (three) times daily as needed for Pain. 90 tablet 0    [START ON 4/18/2017] hydrocodone-acetaminophen 5-325mg (NORCO) 5-325 mg per tablet  Take 1 tablet by mouth 3 (three) times daily as needed for Pain. 90 tablet 0    isosorbide mononitrate (IMDUR) 30 MG 24 hr tablet Take 1 tablet (30 mg total) by mouth every evening. 90 tablet 0    losartan (COZAAR) 100 MG tablet Take 1 tablet (100 mg total) by mouth once daily. 90 tablet 3    pantoprazole (PROTONIX) 40 MG tablet Take 1 tablet (40 mg total) by mouth once daily. 90 tablet 3    predniSONE (DELTASONE) 10 MG tablet Take 1 tablet (10 mg total) by mouth once daily. 90 tablet 3    tolterodine (DETROL LA) 2 MG Cp24 Take 1 capsule (2 mg total) by mouth once daily. 90 capsule 3       Inpatient Medications   Continuous Infusions:   Scheduled Meds:   albuterol-ipratropium 2.5mg-0.5mg/3mL  3 mL Nebulization Q4H    aspirin  81 mg Oral Daily    atorvastatin  40 mg Oral QHS    cefTRIAXone (ROCEPHIN) IVPB  1 g Intravenous Q24H    clopidogrel  75 mg Oral Daily    docusate sodium  100 mg Oral BID    donepezil  10 mg Oral Daily    duloxetine  30 mg Oral Daily    ferrous sulfate  325 mg Oral BID    gabapentin  100 mg Oral TID    guaifenesin 100 mg/5 ml  400 mg Oral Q6H    hydrALAZINE  25 mg Oral Q8H    hydrochlorothiazide  12.5 mg Oral Daily    isosorbide mononitrate  30 mg Oral QHS    losartan  100 mg Oral Daily    nystatin   Topical (Top) BID    pantoprazole  40 mg Oral Daily    predniSONE  10 mg Oral Daily    senna-docusate 8.6-50 mg  1 tablet Oral Daily    sodium chloride 0.9%  3 mL Intravenous Q8H     PRN Meds:acetaminophen, alprazolam, hydrocodone-acetaminophen 5-325mg, ondansetron     Social History:     Social History   Substance Use Topics    Smoking status: Former Smoker     Quit date: 1/19/2015    Smokeless tobacco: Never Used    Alcohol use No     Family History:     Family History   Problem Relation Age of Onset    Stroke Mother     Heart disease Father     Alzheimer's disease Sister     Thyroid cancer Neg Hx      Physical Exam:     Vitals:  Temp:  [97.7 °F (36.5 °C)-98.8 °F  "(37.1 °C)]   Pulse:  []   Resp:  [16-24]   BP: (108-141)/(53-65)   SpO2:  [90 %-96 %]     BP (!) 112/53 (BP Location: Left arm, Patient Position: Lying, BP Method: Automatic)  Pulse 93  Temp 97.7 °F (36.5 °C) (Oral)   Resp (!) 24  Ht 4' 9" (1.448 m)  Wt 65.8 kg (145 lb)  SpO2 96%  Breastfeeding? No  BMI 31.38 kg/m2  I/O's:    Intake/Output Summary (Last 24 hours) at 03/29/17 0812  Last data filed at 03/29/17 0500   Gross per 24 hour   Intake                0 ml   Output                0 ml   Net                0 ml        Constitutional: NAD, conversant  HEENT: Sclera anicteric, PERRLA, EOMI  Neck: No JVD  CV: RRR, no murmur, normal S1/S2, No Pericardial rub  Pulm: wheezing, rhonchi  GI: Abdomen soft, NTND, +BS  Extremities: No LE edema, warm and well perfused, No cyanosis, No clubbing   Joints: Right hip pain reproduced on active and passive ROM (flexion, Ext rotation, internal rotation, abduction). No erythema, swelling of joint noted.  Skin: ecchymoses noted on B/L legs, no ulcerations  Vascular:   LEFT RIGHT   RADIAL 2+ 2+   BRACHIAL 2+ 2+   FEMORAL Doppler - Biphasic Doppler - Biphasic   DP Doppler - Biphasic Doppler - Biphasic   TP Doppler - Biphasic Doppler - Biphasic   Corbin's Test Normal Normal       Labs:       Recent Labs  Lab 03/27/17  0723 03/28/17  0606 03/29/17  0449    138 139   K 4.0 3.5 3.7    106 107   CO2 23 20* 22*   BUN 17 23 24*   CREATININE 0.8 0.9 1.0   ANIONGAP 9 12 10       Recent Labs  Lab 03/24/17  1353  03/29/17  0449   AST 46*  --   --    ALT 24  --   --    ALKPHOS 115  --   --    BILITOT 0.3  --   --    ALBUMIN 2.3*  < > 2.0*   < > = values in this interval not displayed.    Recent Labs  Lab 03/24/17  1353 03/26/17  0537   * 920*      Recent Labs  Lab 03/26/17  0537 03/27/17  0723 03/28/17  0606 03/29/17  0449   WBC 10.17 9.99 11.95  --    HGB 8.0* 8.2* 8.0* 7.7*   HCT 24.1* 24.8* 24.0* 24.5*    320 379* 400*   GRAN 95.0* 85.0* 91.0*  --      No " results for input(s): PTT, INR in the last 168 hours.  Lab Results   Component Value Date    CHOL 151 10/04/2016    HDL 71 10/04/2016    LDLCALC 69.4 10/04/2016    TRIG 53 10/04/2016     Lab Results   Component Value Date    HGBA1C 5.3 03/21/2011        Micro:  Blood Cultures  Lab Results   Component Value Date    LABBLOO No Growth to date 03/26/2017    LABBLOO No Growth to date 03/26/2017    LABBLOO No Growth to date 03/26/2017    LABBLOO Gram stain papa bottle: Gram positive cocci  03/24/2017    LABBLOO  03/24/2017     Results called to and read back by:Keli Crowe RN 03/26/2017  06:33    LABBLOO  03/24/2017     COAGULASE-NEGATIVE STAPHYLOCOCCUS SPECIES  Organism is a probable contaminant       Urine Cultures  Lab Results   Component Value Date    LABURIN No growth 03/24/2017    LABURIN NO GROWTH AFTER 48 HOURS. 04/20/2010    LABURIN NO GROWTH AFTER 48 HOURS. 12/07/2009    LABURIN  08/15/2007     MULTIPLE ORGANISMS ISOLATED. NONE IN PREDOMINANCE REPEAT IF CLINICALLY NECESSARY.    LABURIN  04/02/2004     MULTIPLE ORGANISMS ISOLATED. NONE IN PREDOMINANCE REPEAT IF CLINICALLY NECESSARY.       Imaging:   TTE (03/26/17):  1 - Normal left ventricular systolic function (EF 65-70%).     2 - Normal right ventricular systolic function .     3 - Left ventricular diastolic dysfunction.     4 - Severe left atrial enlargement.     5 - Pulmonary hypertension. The estimated PA systolic pressure is greater than 44 mmHg.     LE Arterial US (03/27/17):  Diffuse atherosclerotic disease bilaterally .    Occlusive thrombosis of the patient's right iliac artery stent with distal reconstitution.    No sonographic evidence of a focal arterial stenosis within the left or right lower extremities.  The waveforms seen on the left could be indicative of a hemodynamically significant stenosis more proximally.  Consider further evaluation with angiographic imaging if warranted.    CT Right Hip (03/24/17):  No acute displaced fracture or  dislocation.  An MRI pelvis may be performed if clinical concern for fracture persists.      Mild degenerative changes without joint effusion.    Incidental findings include diverticulosis coli in the right common/external iliac artery stent.    EF   Date Value Ref Range Status   03/26/2017 68 55 - 65        Assessment:   88 y.o. woman with PMH of myasthenia gravis on prednisone, severe RA (not on DMARDS), HTN, chronic diastolic CHF, CAD, PAD s/p R JOSE/EIA stent (chronic occlusion with distal reconstitution since 06/2011), mild dementia, osteoporosis, asymptomatic carotid stenosis, remote hx of CVA who presented to the hospital for c/o AMS on 3/24/17. She as seen in the IM clinic and found to be hypoxic (88% RA) and her PCP recommended to go to the ER. She was diagnosed with pneumonia/UTI and has been on ABx. The patient also reports RLE pain for the last 3 weeks.    Plan:   R hip/thigh pain - related to underlying OA/RA, myalgias related to chronic CS therapy/chronic pain syndrome  PAD with chronically occluded R JOSE/EIA stent with distal reconstitution w/o claudication  [place]: Stage I  [place]: Category 0    - c/w medical therapy for PAD - ASA, plavix, lipitor  - pain control regimen as per primary/rheumatology team  - physical therapy    Case discussed with Dr. Valerio.    Signed:  Neal Aragon MD  Cardiology Fellow  Pager: [phone]  3/29/2017 8:12 AM

## 2017-03-29 NOTE — PLAN OF CARE
Problem: Occupational Therapy Goal  Goal: Occupational Therapy Goal  Goals to be met by: 04/03/17     Patient will increase functional independence with ADLs by performing:    UE Dressing with Minimal Assistance.  LE Dressing with Maximum Assistance.  Grooming while seated with Modified [place].  Toileting from bedside commode with Moderate Assistance for hygiene and clothing management.   Toilet transfer to bedside commode with Moderate Assistance.  Upper extremity exercise program x10 reps per handout, with supervision.   Outcome: Ongoing (interventions implemented as appropriate)  No goals met this session - POC remains appropriate.  MARIA T Meyers

## 2017-03-30 VITALS
SYSTOLIC BLOOD PRESSURE: 90 MMHG | OXYGEN SATURATION: 97 % | HEIGHT: 57 IN | RESPIRATION RATE: 16 BRPM | HEART RATE: 83 BPM | TEMPERATURE: 98 F | BODY MASS INDEX: 31.28 KG/M2 | WEIGHT: 145 LBS | DIASTOLIC BLOOD PRESSURE: 55 MMHG

## 2017-03-30 LAB
ALBUMIN SERPL BCP-MCNC: 2.1 G/DL
ALDOLASE SERPL-CCNC: 6.6 U/L
ANION GAP SERPL CALC-SCNC: 13 MMOL/L
ANISOCYTOSIS BLD QL SMEAR: SLIGHT
BACTERIA SPEC AEROBE CULT: NORMAL
BASOPHILS # BLD AUTO: ABNORMAL K/UL
BASOPHILS NFR BLD: 0 %
BUN SERPL-MCNC: 28 MG/DL
CALCIUM SERPL-MCNC: 8.6 MG/DL
CHLORIDE SERPL-SCNC: 106 MMOL/L
CO2 SERPL-SCNC: 20 MMOL/L
CREAT SERPL-MCNC: 1.2 MG/DL
DIFFERENTIAL METHOD: ABNORMAL
EOSINOPHIL # BLD AUTO: ABNORMAL K/UL
EOSINOPHIL NFR BLD: 1 %
ERYTHROCYTE [DISTWIDTH] IN BLOOD BY AUTOMATED COUNT: 21.6 %
EST. GFR  (AFRICAN AMERICAN): 46.6 ML/MIN/1.73 M^2
EST. GFR  (NON AFRICAN AMERICAN): 40.4 ML/MIN/1.73 M^2
GLUCOSE SERPL-MCNC: 82 MG/DL
GRAM STN SPEC: NORMAL
HCT VFR BLD AUTO: 24.1 %
HGB BLD-MCNC: 7.8 G/DL
LYMPHOCYTES # BLD AUTO: ABNORMAL K/UL
LYMPHOCYTES NFR BLD: 16 %
MAGNESIUM SERPL-MCNC: 1.8 MG/DL
MCH RBC QN AUTO: 28 PG
MCHC RBC AUTO-ENTMCNC: 32.4 %
MCV RBC AUTO: 86 FL
MONOCYTES # BLD AUTO: ABNORMAL K/UL
MONOCYTES NFR BLD: 9 %
NEUTROPHILS NFR BLD: 73 %
NEUTS BAND NFR BLD MANUAL: 1 %
NRBC BLD-RTO: ABNORMAL /100 WBC
OVALOCYTES BLD QL SMEAR: ABNORMAL
PHOSPHATE SERPL-MCNC: 4.2 MG/DL
PLATELET # BLD AUTO: 415 K/UL
PMV BLD AUTO: 10.8 FL
POIKILOCYTOSIS BLD QL SMEAR: SLIGHT
POLYCHROMASIA BLD QL SMEAR: ABNORMAL
POTASSIUM SERPL-SCNC: 4 MMOL/L
RBC # BLD AUTO: 2.79 M/UL
SODIUM SERPL-SCNC: 139 MMOL/L
WBC # BLD AUTO: 10.85 K/UL

## 2017-03-30 PROCEDURE — 25000242 PHARM REV CODE 250 ALT 637 W/ HCPCS: Performed by: INTERNAL MEDICINE

## 2017-03-30 PROCEDURE — 36415 COLL VENOUS BLD VENIPUNCTURE: CPT

## 2017-03-30 PROCEDURE — 94640 AIRWAY INHALATION TREATMENT: CPT

## 2017-03-30 PROCEDURE — 85027 COMPLETE CBC AUTOMATED: CPT

## 2017-03-30 PROCEDURE — 25000003 PHARM REV CODE 250: Performed by: INTERNAL MEDICINE

## 2017-03-30 PROCEDURE — 94664 DEMO&/EVAL PT USE INHALER: CPT

## 2017-03-30 PROCEDURE — 83735 ASSAY OF MAGNESIUM: CPT

## 2017-03-30 PROCEDURE — 99232 SBSQ HOSP IP/OBS MODERATE 35: CPT | Mod: ,,, | Performed by: INTERNAL MEDICINE

## 2017-03-30 PROCEDURE — 80069 RENAL FUNCTION PANEL: CPT

## 2017-03-30 PROCEDURE — 63600175 PHARM REV CODE 636 W HCPCS: Performed by: INTERNAL MEDICINE

## 2017-03-30 PROCEDURE — 63600175 PHARM REV CODE 636 W HCPCS: Performed by: PHYSICIAN ASSISTANT

## 2017-03-30 PROCEDURE — 85007 BL SMEAR W/DIFF WBC COUNT: CPT

## 2017-03-30 PROCEDURE — 99900035 HC TECH TIME PER 15 MIN (STAT)

## 2017-03-30 PROCEDURE — 94761 N-INVAS EAR/PLS OXIMETRY MLT: CPT

## 2017-03-30 RX ORDER — PREGABALIN 50 MG/1
50 CAPSULE ORAL 2 TIMES DAILY
Qty: 60 CAPSULE | Refills: 3 | Status: ON HOLD | OUTPATIENT
Start: 2017-03-30 | End: 2017-04-23 | Stop reason: HOSPADM

## 2017-03-30 RX ORDER — HYDROCODONE BITARTRATE AND ACETAMINOPHEN 5; 325 MG/1; MG/1
1 TABLET ORAL EVERY 6 HOURS PRN
Qty: 30 TABLET | Refills: 0 | Status: ON HOLD | OUTPATIENT
Start: 2017-03-30 | End: 2017-04-24

## 2017-03-30 RX ORDER — PREGABALIN 50 MG/1
50 CAPSULE ORAL 2 TIMES DAILY
Status: DISCONTINUED | OUTPATIENT
Start: 2017-03-30 | End: 2017-03-30 | Stop reason: HOSPADM

## 2017-03-30 RX ADMIN — ASPIRIN 81 MG CHEWABLE TABLET 81 MG: 81 TABLET CHEWABLE at 10:03

## 2017-03-30 RX ADMIN — HEPARIN SODIUM 5000 UNITS: 5000 INJECTION, SOLUTION INTRAVENOUS; SUBCUTANEOUS at 06:03

## 2017-03-30 RX ADMIN — IPRATROPIUM BROMIDE AND ALBUTEROL SULFATE 3 ML: .5; 3 SOLUTION RESPIRATORY (INHALATION) at 08:03

## 2017-03-30 RX ADMIN — HYDRALAZINE HYDROCHLORIDE 25 MG: 25 TABLET ORAL at 06:03

## 2017-03-30 RX ADMIN — IPRATROPIUM BROMIDE AND ALBUTEROL SULFATE 3 ML: .5; 3 SOLUTION RESPIRATORY (INHALATION) at 03:03

## 2017-03-30 RX ADMIN — LOSARTAN POTASSIUM 100 MG: 50 TABLET, FILM COATED ORAL at 10:03

## 2017-03-30 RX ADMIN — PANTOPRAZOLE SODIUM 40 MG: 40 TABLET, DELAYED RELEASE ORAL at 10:03

## 2017-03-30 RX ADMIN — HYDROCODONE BITARTRATE AND ACETAMINOPHEN 1 TABLET: 5; 325 TABLET ORAL at 10:03

## 2017-03-30 RX ADMIN — ACETAMINOPHEN 500 MG: 500 TABLET ORAL at 07:03

## 2017-03-30 RX ADMIN — IPRATROPIUM BROMIDE AND ALBUTEROL SULFATE 3 ML: .5; 3 SOLUTION RESPIRATORY (INHALATION) at 07:03

## 2017-03-30 RX ADMIN — IPRATROPIUM BROMIDE AND ALBUTEROL SULFATE 3 ML: .5; 3 SOLUTION RESPIRATORY (INHALATION) at 12:03

## 2017-03-30 RX ADMIN — GABAPENTIN 100 MG: 100 CAPSULE ORAL at 06:03

## 2017-03-30 RX ADMIN — STANDARDIZED SENNA CONCENTRATE AND DOCUSATE SODIUM 1 TABLET: 8.6; 5 TABLET, FILM COATED ORAL at 10:03

## 2017-03-30 RX ADMIN — PREDNISONE 10 MG: 10 TABLET ORAL at 10:03

## 2017-03-30 RX ADMIN — NYSTATIN: 100000 POWDER TOPICAL at 10:03

## 2017-03-30 RX ADMIN — IPRATROPIUM BROMIDE AND ALBUTEROL SULFATE 3 ML: .5; 3 SOLUTION RESPIRATORY (INHALATION) at 11:03

## 2017-03-30 RX ADMIN — CEFTRIAXONE 1 G: 1 INJECTION, SOLUTION INTRAVENOUS at 03:03

## 2017-03-30 RX ADMIN — CLOPIDOGREL 75 MG: 75 TABLET, FILM COATED ORAL at 10:03

## 2017-03-30 RX ADMIN — HEPARIN SODIUM 5000 UNITS: 5000 INJECTION, SOLUTION INTRAVENOUS; SUBCUTANEOUS at 02:03

## 2017-03-30 RX ADMIN — DONEPEZIL HYDROCHLORIDE 10 MG: 10 TABLET, FILM COATED ORAL at 10:03

## 2017-03-30 RX ADMIN — Medication 3 ML: at 02:03

## 2017-03-30 RX ADMIN — HYDROCHLOROTHIAZIDE 12.5 MG: 12.5 TABLET ORAL at 10:03

## 2017-03-30 RX ADMIN — GUAIFENESIN 400 MG: 100 SOLUTION ORAL at 06:03

## 2017-03-30 RX ADMIN — DOCUSATE SODIUM 100 MG: 100 CAPSULE, LIQUID FILLED ORAL at 10:03

## 2017-03-30 RX ADMIN — Medication 3 ML: at 06:03

## 2017-03-30 RX ADMIN — DULOXETINE 30 MG: 30 CAPSULE, DELAYED RELEASE ORAL at 10:03

## 2017-03-30 RX ADMIN — FERROUS SULFATE TAB EC 325 MG (65 MG FE EQUIVALENT) 325 MG: 325 (65 FE) TABLET DELAYED RESPONSE at 10:03

## 2017-03-30 NOTE — PLAN OF CARE
[facility]    HOME HEALTH ORDERS  FACE TO FACE ENCOUNTER    Patient Name: Danyelle Jacobs  YOB: 1928    PCP: Bianca Dykes MD   PCP Address: [address] / [place] [address]  PCP Phone Number: [phone]  PCP Fax: [phone]    Discharging Team(s): [facility]    Encounter Date: 03/30/2017    Admit to Home Health    Diagnoses:  Active Hospital Problems    Diagnosis  POA    *Acute respiratory failure with hypoxia [J96.01]  Yes    Hip pain, right [M25.551]  Yes    Delirium [R41.0]  Yes    Pneumonia due to infectious organism [J18.9]  Yes    PARVEEN (acute kidney injury) [N17.9]  Yes    Current chronic use of systemic steroids [Z79.52]  Not Applicable    Rheumatoid arthritis involving multiple sites with positive rheumatoid factor [M05.79]  Yes    Benign hypertension [I10]  Yes    Left ventricular diastolic dysfunction with preserved systolic function [I51.9]  Yes    MCI (mild cognitive impairment) with memory loss [G31.84]  Yes    Neuropathic pain of both legs [G57.91, G57.92]  Yes     Chronic     Presumed due to PAOD      MG (myasthenia gravis) [G70.00]  Yes    CAD (coronary artery disease) [I25.10]  Yes    PAOD (peripheral arterial occlusive disease) [I77.9]  Yes      Resolved Hospital Problems    Diagnosis Date Resolved POA   No resolved problems to display.       Future Appointments  Date Time Provider Department Center   3/30/2017 4:30 PM [facility] MRI4 [facility] MRI [facility]   5/22/2017 9:30 AM Eddie Murillo MD [facility] PERVASC [facility]   5/22/2017 10:20 AM LAB, APPOINTMENT [facility] INTMED [facility] LAB [facility] PCW   5/22/2017 10:30 AM Bianca Dykes MD [facility]           I have seen and examined this patient face to face today. My clinical findings that support the need for the home health skilled services and home bound status are the following:  Weakness/numbness causing balance and gait disturbance due to Weakness/Debility making it taxing  to leave home.    Allergies:  Review of patient's allergies indicates:   Allergen Reactions    Norvasc  [amlodipine]      Other reaction(s): Edema       Diet: 2 gram sodium diet    Activities: activity as tolerated    Nursing:   SN to complete comprehensive assessment including routine vital signs. Instruct on disease process and s/s of complications to report to MD. Review/verify medication list sent home with the patient at time of discharge  and instruct patient/caregiver as needed. Frequency may be adjusted depending on start of care date.    Notify MD if SBP > 160 or < 90; DBP > 90 or < 50; HR > 120 or < 50; Temp > 101; Other:      CONSULTS:    Physical Therapy to evaluate and treat. Evaluate for home safety and equipment needs; Establish/upgrade home exercise program. Perform / instruct on therapeutic exercises, gait training, transfer training, and Range of Motion.  Occupational Therapy to evaluate and treat. Evaluate home environment for safety and equipment needs. Perform/Instruct on transfers, ADL training, ROM, and therapeutic exercises.   to evaluate for community resources/long-range planning.    MISCELLANEOUS CARE:  N/A    WOUND CARE ORDERS  n/a      Medications: Review discharge medications with patient and family and provide education.      Current Discharge Medication List      START taking these medications    Details   pregabalin (LYRICA) 50 MG capsule Take 1 capsule (50 mg total) by mouth 2 (two) times daily.  Qty: 60 capsule, Refills: 3         CONTINUE these medications which have NOT CHANGED    Details   alprazolam (XANAX) 0.5 MG tablet Take 1 tablet (0.5 mg total) by mouth daily as needed.  Qty: 90 tablet, Refills: 0      aspirin 81 mg Tab Take 81 mg by mouth. 1 Tablet Oral Every day      atorvastatin (LIPITOR) 40 MG tablet Take 1 tablet (40 mg total) by mouth every evening.  Qty: 90 tablet, Refills: 3      clopidogrel (PLAVIX) 75 mg tablet Take 75 mg by mouth once daily.        mg capsule TAKE 1 CAPSULE BY MOUTH TWICE DAILY  Qty: 180 capsule, Refills: 0      donepezil (ARICEPT) 10 MG tablet Take 1 tablet (10 mg total) by mouth once daily.  Qty: 90 tablet, Refills: 3      duloxetine (CYMBALTA) 30 MG capsule Take 1 capsule (30 mg total) by mouth once daily.  Qty: 90 capsule, Refills: 3      ferrous sulfate 325 (65 FE) MG EC tablet Take 1 tablet (325 mg total) by mouth 2 (two) times daily.  Qty: 60 tablet, Refills: 12      ferrous sulfate 325 mg (65 mg iron) Tab tablet TAKE 1 TABLET BY MOUTH TWICE DAILY  Qty: 180 tablet, Refills: 0    Associated Diagnoses: Iron deficiency anemia      hydrALAZINE (APRESOLINE) 25 MG tablet Take 1 tablet (25 mg total) by mouth every 8 (eight) hours.  Qty: 270 tablet, Refills: 3    Associated Diagnoses: Coronary artery disease due to calcified coronary lesion      hydrochlorothiazide (MICROZIDE) 12.5 mg capsule Take 1 capsule (12.5 mg total) by mouth once daily.  Qty: 90 capsule, Refills: 3      !! hydrocodone-acetaminophen 5-325mg (NORCO) 5-325 mg per tablet Take 1 tablet by mouth 3 (three) times daily as needed for Pain.  Qty: 90 tablet, Refills: 0      !! hydrocodone-acetaminophen 5-325mg (NORCO) 5-325 mg per tablet Take 1 tablet by mouth 3 (three) times daily as needed for Pain.  Qty: 90 tablet, Refills: 0      !! hydrocodone-acetaminophen 5-325mg (NORCO) 5-325 mg per tablet Take 1 tablet by mouth 3 (three) times daily as needed for Pain.  Qty: 90 tablet, Refills: 0      isosorbide mononitrate (IMDUR) 30 MG 24 hr tablet Take 1 tablet (30 mg total) by mouth every evening.  Qty: 90 tablet, Refills: 0    Associated Diagnoses: Coronary artery disease due to calcified coronary lesion      losartan (COZAAR) 100 MG tablet Take 1 tablet (100 mg total) by mouth once daily.  Qty: 90 tablet, Refills: 3    Associated Diagnoses: Essential hypertension      pantoprazole (PROTONIX) 40 MG tablet Take 1 tablet (40 mg total) by mouth once daily.  Qty: 90 tablet,  Refills: 3    Associated Diagnoses: Gastroesophageal reflux disease without esophagitis      predniSONE (DELTASONE) 10 MG tablet Take 1 tablet (10 mg total) by mouth once daily.  Qty: 90 tablet, Refills: 3    Associated Diagnoses: MG (myasthenia gravis)      tolterodine (DETROL LA) 2 MG Cp24 Take 1 capsule (2 mg total) by mouth once daily.  Qty: 90 capsule, Refills: 3    Associated Diagnoses: OAB (overactive bladder)       !! - Potential duplicate medications found. Please discuss with provider.      STOP taking these medications       gabapentin (NEURONTIN) 100 MG capsule Comments:   Reason for Stopping:               I certify that this patient is confined to her home and needs intermittent skilled nursing care, physical therapy and occupational therapy.

## 2017-03-30 NOTE — PLAN OF CARE
Problem: Patient Care Overview  Goal: Plan of Care Review  Outcome: Ongoing (interventions implemented as appropriate)  POC reviewed with pt and family.  Pt verbalizes understanding of plan of care. Pt remained free of injury/trauma throughout the shift. Safety precautions maintained. Bedrails upx2, bed in lowest position and locked. Call bell in reach at all times. . Telemetry moniotring in process. Pt able to ambulate to the bathroom with assistance from family and nurse. Pt c/o pain and was medicated with PRN pain meds. Pt able to reposition self in bed. No acute events throughout the shift.

## 2017-03-30 NOTE — PLAN OF CARE
Spoke with patient's family, family is indecisive regarding home health vs SNF, home health choice is [facility] and SNF choices are [facility], [facility] and [facility].  Will start to process SNF referrals while family decides on SNF vs HH, will follow.

## 2017-03-30 NOTE — PLAN OF CARE
Problem: Patient Care Overview  Goal: Discharge Needs Assessment  Outcome: Ongoing (interventions implemented as appropriate)  No acute events throughout the shift. VS and assessment performed per orders. Patient progressing towards goals as tolerated. Plan of care reviewed with pt, all concerns addressed. Will continue to monitor

## 2017-03-30 NOTE — SUBJECTIVE & OBJECTIVE
Interval History:     Persistent leg pain per daughter.  No other symptoms that are noew    Review of Systems   Unable to perform ROS: Other   Constitutional: Positive for activity change.     Objective:     Vital Signs (Most Recent):  Temp: 98.1 °F (36.7 °C) (03/30/17 1200)  Pulse: 75 (03/30/17 1530)  Resp: 20 (03/30/17 1200)  BP: (!) 90/55 (03/30/17 1530)  SpO2: 95 % (03/30/17 1200) Vital Signs (24h Range):  Temp:  [97.9 °F (36.6 °C)-98.7 °F (37.1 °C)] 98.1 °F (36.7 °C)  Pulse:  [62-96] 75  Resp:  [16-20] 20  SpO2:  [93 %-99 %] 95 %  BP: ()/(53-74) 90/55     Weight: 65.8 kg (145 lb)  Body mass index is 31.38 kg/(m^2).    Intake/Output Summary (Last 24 hours) at 03/30/17 1637  Last data filed at 03/30/17 0300   Gross per 24 hour   Intake              120 ml   Output                0 ml   Net              120 ml      Physical Exam   Constitutional: She appears well-nourished. She appears distressed.   Eyes: No scleral icterus.   Cardiovascular: Normal rate.    Pulmonary/Chest: Effort normal.   Abdominal: Soft. Bowel sounds are normal.   Neurological: She is alert.   Skin: Skin is warm.   Psychiatric: She has a normal mood and affect.   Nursing note and vitals reviewed.      Significant Labs:   BMP:   Recent Labs  Lab 03/30/17  0532   GLU 82      K 4.0      CO2 20*   BUN 28*   CREATININE 1.2   CALCIUM 8.6*   MG 1.8     CBC:   Recent Labs  Lab 03/29/17  0449 03/30/17  0532   WBC 12.89* 10.85   HGB 7.7* 7.8*   HCT 24.5* 24.1*   * 415*       Significant Imaging: None

## 2017-03-30 NOTE — PROGRESS NOTES
"[facility] Hospital Medicine  Progress Note    Patient Name: Danyelle Jacobs  MRN: [id number]  Patient Class: IP- Inpatient   Admission Date: 3/24/2017  Length of Stay: 6 days  Attending Physician: Jose Thrasher MD  Primary Care Provider: Bianca Dykes MD    Hospital Medicine Team: [facility] [place], [place] HOSP MED  Jose Thrasher MD    Subjective:     Principal Problem:Acute respiratory failure with hypoxia    HPI:  88 y.o. female presented to the ER with past medical history of MG, severe RA, hypertension, diastolic HF, CAD, PAOD, mild dementia.  She was in her usual state of stable health until the gradual onset of intermittent confusion and hallucinations beginning 10 days prior to admission with gradually worsening course. Pertinent associated symptoms include cough x 5 days, poor appetite. She developed the acute onset of right hip pain 3 weeks ago, OP work up has been unrevealing. She has been treated with Tramadol and Kenalog but confusion predates these interventions and they were ineffective in controlling her pain. She has not "been herself" for the past month, with decrease in activities and loss of interest. This has been associated with fecal incontinence and poor hygiene. Patient was seen in IM Clinic today and sent to ED for hypoxia.      Hospital Course:       Interval History:     Persistent leg pain per daughter.  No other symptoms that are noew    Review of Systems   Unable to perform ROS: Other   Constitutional: Positive for activity change.     Objective:     Vital Signs (Most Recent):  Temp: 98.1 °F (36.7 °C) (03/30/17 1200)  Pulse: 75 (03/30/17 1530)  Resp: 20 (03/30/17 1200)  BP: (!) 90/55 (03/30/17 1530)  SpO2: 95 % (03/30/17 1200) Vital Signs (24h Range):  Temp:  [97.9 °F (36.6 °C)-98.7 °F (37.1 °C)] 98.1 °F (36.7 °C)  Pulse:  [62-96] 75  Resp:  [16-20] 20  SpO2:  [93 %-99 %] 95 %  BP: ()/(53-74) 90/55     Weight: 65.8 kg (145 lb)  Body mass index is 31.38 kg/(m^2).    Intake/Output " Summary (Last 24 hours) at 03/30/17 1637  Last data filed at 03/30/17 0300   Gross per 24 hour   Intake              120 ml   Output                0 ml   Net              120 ml      Physical Exam   Constitutional: She appears well-nourished. She appears distressed.   Eyes: No scleral icterus.   Cardiovascular: Normal rate.    Pulmonary/Chest: Effort normal.   Abdominal: Soft. Bowel sounds are normal.   Neurological: She is alert.   Skin: Skin is warm.   Psychiatric: She has a normal mood and affect.   Nursing note and vitals reviewed.      Significant Labs:   BMP:   Recent Labs  Lab 03/30/17  0532   GLU 82      K 4.0      CO2 20*   BUN 28*   CREATININE 1.2   CALCIUM 8.6*   MG 1.8     CBC:   Recent Labs  Lab 03/29/17  0449 03/30/17  0532   WBC 12.89* 10.85   HGB 7.7* 7.8*   HCT 24.5* 24.1*   * 415*       Significant Imaging: None    Assessment/Plan:      Neuropathic pain of both legs  Neurontin, increase as tolerated.    Hip pain, right  Work up has revealed only DJD; CT unremarkable, no fracture. ESR and CRP elevated but non-specific. Patient had recent steroid injection that family relates was ineffective in controlling patient's hip pain.   Patient known to Dr. Olivo, consulted Rheumatology. No inflammatory etiology found. They feel it is 2/2 to a myopathy with some component of fibromyalgia  Leg is warm and well perfused.  Cards feels occluded stent is not contributing  Will get MRI to definitively rule out fx    Pneumonia due to infectious organism  Treated with ceftriaxone, Nebs and Robitussin.  Slowly improving, mental status improved.    VTE Risk Mitigation         Ordered     heparin (porcine) injection 5,000 Units  Every 8 hours     Route:  Subcutaneous        03/29/17 1649     Place ISHMAEL hose  Until discontinued      03/24/17 1650     Place sequential compression device  Until discontinued      03/24/17 1650     Medium Risk of VTE  Once      03/24/17 1650          Jose Thrasher,  MD  Department of Hospital Medicine   [facility]

## 2017-03-30 NOTE — ASSESSMENT & PLAN NOTE
Work up has revealed only DJD; CT unremarkable, no fracture. ESR and CRP elevated but non-specific. Patient had recent steroid injection that family relates was ineffective in controlling patient's hip pain.   Patient known to Dr. Olivo, consulted Rheumatology. No inflammatory etiology found. They feel it is 2/2 to a myopathy with some component of fibromyalgia  Leg is warm and well perfused.  Cards feels occluded stent is not contributing  Will get MRI to definitively rule out fx

## 2017-03-31 LAB — BACTERIA BLD CULT: NORMAL

## 2017-03-31 RX ORDER — FLUCONAZOLE 100 MG/1
TABLET ORAL
Qty: 7 TABLET | Refills: 0 | Status: ON HOLD | OUTPATIENT
Start: 2017-03-31 | End: 2017-04-10 | Stop reason: HOSPADM

## 2017-03-31 NOTE — NURSING
Discharge instructions and Rxs given to pt and family. Instructions also given on mobility, diet, and follow up care. Pt and family verbalizes understanding of instructions. All questions answered. PIV dc'ed per MD order, catheter tip intact. Pressure applied to site for 5 minutes. PIERRE. VSS. Pt waiting on pt escort to leave the unit with family.

## 2017-03-31 NOTE — NURSING
Pt son states MD said they can go today or tomorrow. Pt calling his siter to see if they can come  his mother because he has a big truck. Reported to oncoming shift.

## 2017-04-01 ENCOUNTER — PATIENT OUTREACH (OUTPATIENT)
Dept: ADMINISTRATIVE | Facility: CLINIC | Age: 82
End: 2017-04-01
Payer: MEDICARE

## 2017-04-01 NOTE — PROGRESS NOTES
C3 nurse attempted to contact patient. No answer. The following message was left for the patient to return the call:  Good day I am a nurse calling on behalf of [facility] from the [facility].  This is a Transitional Care Call for Danyelle Jacobs. When you have a moment please contact us at [phone] or [phone] Monday through Friday, between the hours of 8 am to 4 pm. We look forward to speaking with you. On behalf of [facility] have a nice day.    The patient does not have a scheduled HOSFU appointment within 7-14 days post hospital discharge date 3/30/17. Message sent to Physician staff to assist with HOSFU appointment scheduling.

## 2017-04-01 NOTE — DISCHARGE SUMMARY
"[facility] Hospital Medicine  Discharge Summary      Patient Name: Danyelle Jacobs  MRN: [id number]  Admission Date: 3/24/2017  Hospital Length of Stay: 6 days  Discharge Date and Time: 3/30/2017  8:51 PM  Attending Physician: No att. providers found   Discharging Provider: Jose Thrasher MD  Primary Care Provider: Bianca Dykes MD    Hospital Medicine Team[place] [facility] [place] [place]  Jose Thrasher MD    HPI:     88 y.o. female presented to the ER with past medical history of MG, severe RA, hypertension, diastolic HF, CAD, PAOD, mild dementia.  She was in her usual state of stable health until the gradual onset of intermittent confusion and hallucinations beginning 10 days prior to admission with gradually worsening course. Pertinent associated symptoms include cough x 5 days, poor appetite. She developed the acute onset of right hip pain 3 weeks ago, OP work up has been unrevealing. She has been treated with Tramadol and Kenalog but confusion predates these interventions and they were ineffective in controlling her pain. She has not "been herself" for the past month, with decrease in activities and loss of interest. This has been associated with fecal incontinence and poor hygiene. Patient was seen in IM Clinic today and sent to ED for hypoxia.    * No surgery found *      Indwelling Lines/Drains at time of discharge:   Lines/Drains/Airways          No matching active lines, drains, or airways        Hospital Course:     Her respiratory failure resolved with abx.  She had persistent right leg pain for three weeks PTA.  She had an exhaustive search for etiology including CT/MRI as well as vascular studies.  She was seen by rheumatology who felt that the pain may be due to some myopathy or fibromyalgia and she was treated with gabapentin and then lyrica.  Her family refused SNF and wanted to take her home and she was discharged into their care.  She will follow with her PCP and outpatient rheumatologist.  "     Consults:   Consults         Status Ordering Provider     Inpatient consult to Interventional Cardiology  Once     Provider:  (Not yet assigned)    Completed VICTORINO SULTANA     Inpatient consult to PICC team (RUSTS)  Once     Provider:  (Not yet assigned)    Completed GUSTABO LAMA     Inpatient consult to Rheumatology  Once     Provider:  Vicente Olivo MD    Completed GUSTABO LAMA            Pending Diagnostic Studies:     None        Final Active Diagnoses:    Diagnosis Date Noted POA    PRINCIPAL PROBLEM:  Acute respiratory failure with hypoxia [J96.01] 03/24/2017 Yes    Hip pain, right [M25.551] 03/24/2017 Yes    Delirium [R41.0] 03/24/2017 Yes    Pneumonia due to infectious organism [J18.9] 03/24/2017 Yes    PARVEEN (acute kidney injury) [N17.9] 03/24/2017 Yes    Current chronic use of systemic steroids [Z79.52] 10/04/2016 Not Applicable    Rheumatoid arthritis involving multiple sites with positive rheumatoid factor [M05.79] 02/18/2016 Yes    Benign hypertension [I10] 10/05/2015 Yes    Left ventricular diastolic dysfunction with preserved systolic function [I51.9] 10/05/2015 Yes    MCI (mild cognitive impairment) with memory loss [G31.84] 07/22/2015 Yes    Neuropathic pain of both legs [G57.91, G57.92] 01/20/2014 Yes     Chronic    MG (myasthenia gravis) [G70.00] 08/13/2012 Yes    CAD (coronary artery disease) [I25.10] 08/13/2012 Yes    PAOD (peripheral arterial occlusive disease) [I77.9] 08/13/2012 Yes      Problems Resolved During this Admission:    Diagnosis Date Noted Date Resolved POA      Discharged Condition: good    Disposition: Home or Self Care    Follow Up:    Patient Instructions:     Ambulatory referral to Outpatient Case Management   Referral Priority: Routine Referral Type: Consultation   Referral Reason: Specialty Services Required    Number of Visits Requested: 1      Diet general       Medications:  Reconciled Home Medications:   Discharge Medication List as of  3/30/2017  6:51 PM      START taking these medications    Details   pregabalin (LYRICA) 50 MG capsule Take 1 capsule (50 mg total) by mouth 2 (two) times daily., Starting 3/30/2017, Until Thu 9/28/17, Print         CONTINUE these medications which have CHANGED    Details   hydrocodone-acetaminophen 5-325mg (NORCO) 5-325 mg per tablet Take 1 tablet by mouth every 6 (six) hours as needed for Pain., Starting 3/30/2017, Until Discontinued, Print         CONTINUE these medications which have NOT CHANGED    Details   alprazolam (XANAX) 0.5 MG tablet Take 1 tablet (0.5 mg total) by mouth daily as needed., Starting 1/17/2017, Until Discontinued, Print      aspirin 81 mg Tab Take 81 mg by mouth. 1 Tablet Oral Every day, Until Discontinued, Historical Med      atorvastatin (LIPITOR) 40 MG tablet Take 1 tablet (40 mg total) by mouth every evening., Starting 1/17/2017, Until Discontinued, Normal      clopidogrel (PLAVIX) 75 mg tablet Take 75 mg by mouth once daily., Until Discontinued, Historical Med       mg capsule TAKE 1 CAPSULE BY MOUTH TWICE DAILY, Normal      donepezil (ARICEPT) 10 MG tablet Take 1 tablet (10 mg total) by mouth once daily., Starting 1/17/2017, Until Wed 1/17/18, Normal      duloxetine (CYMBALTA) 30 MG capsule Take 1 capsule (30 mg total) by mouth once daily., Starting 1/17/2017, Until Discontinued, Normal      ferrous sulfate 325 (65 FE) MG EC tablet Take 1 tablet (325 mg total) by mouth 2 (two) times daily., Starting 1/19/2017, Until Discontinued, No Print      ferrous sulfate 325 mg (65 mg iron) Tab tablet TAKE 1 TABLET BY MOUTH TWICE DAILY, Normal      hydrALAZINE (APRESOLINE) 25 MG tablet Take 1 tablet (25 mg total) by mouth every 8 (eight) hours., Starting 1/17/2017, Until Discontinued, Normal      hydrochlorothiazide (MICROZIDE) 12.5 mg capsule Take 1 capsule (12.5 mg total) by mouth once daily., Starting 10/4/2016, Until Discontinued, Normal      isosorbide mononitrate (IMDUR) 30 MG 24 hr  tablet Take 1 tablet (30 mg total) by mouth every evening., Starting 1/17/2017, Until Discontinued, Normal      losartan (COZAAR) 100 MG tablet Take 1 tablet (100 mg total) by mouth once daily., Starting 1/17/2017, Until Discontinued, Normal      pantoprazole (PROTONIX) 40 MG tablet Take 1 tablet (40 mg total) by mouth once daily., Starting 1/17/2017, Until Discontinued, Normal      predniSONE (DELTASONE) 10 MG tablet Take 1 tablet (10 mg total) by mouth once daily., Starting 1/17/2017, Until Discontinued, Normal      tolterodine (DETROL LA) 2 MG Cp24 Take 1 capsule (2 mg total) by mouth once daily., Starting 1/17/2017, Until Discontinued, Normal         STOP taking these medications       gabapentin (NEURONTIN) 100 MG capsule Comments:   Reason for Stopping:             Time spent on the discharge of patient: 35 minutes    Jose Thrasher MD  Department of Hospital Medicine  [facility]

## 2017-04-02 NOTE — PATIENT INSTRUCTIONS
Dementia: Coping Tips for Caregivers  When someone you love has dementia, its normal to want to do as much as you can to help. But you cant take good care of someone else if you dont take care of yourself, too. So be sure to take breaks when you need them. Its not selfish. Its essential. Get out to see friends. Eat right. And be sure to visit your own doctor for regular checkups. Most of all, accept that you cant do everything yourself.    Make time for you  Its vital for you to spend time outside your role as caregiver. It may not feel right at first. But even simple things can ease stress and keep you refreshed. Try the following:  · Go to a movie or concert.  · Go to the gym.  · Have a meal with friends.  · Take a walk.  · Read a book or write in a journal.  · Pursue a hobby.  Talk to others  Talking to others is often a big stress reliever. Sometimes you just need a friend or family member to listen. At other times, you may want to talk to a professional you trust. This could be a counselor, , , or therapist. Another good option is joining a local support group for caregivers. Joining a support group can help you feel that you arent alone. Its also reassuring to share thoughts and ideas with others who are going through the same things as you.  Get temporary help  Finding time away isnt always easy. But you do have options for respite care (temporary help). Draw on support from family and friends. And accept help when its offered. People who care about you and your loved one really do want to help. Try these tips:  · Ask a friend to spend the evening with your loved one.  · Hire a home healthcare worker for regular breaks.  · Take your loved one to adult day care.  · Have family or friends help by shopping or bringing over a meal once a week.  · Contact local support agencies or a  for respite care recommendations.  Accept your emotions  The stress of  caregiving can seem overwhelming at times. You may feel frustrated, sad, or resentful. This isnt a sign youre doing something wrong. Its completely normal. So accept these emotions as they come. However, if you find yourself feeling hopeless, tired, sad, or guilty most of the time, talk to your healthcare provider. These feelings may be signs of depression, which can and should be treated.  Know when to make a change  The time may come when you can no longer care for your loved one safely. It may be that he or she requires more supervision. Or, you may find it too hard to cope with the daily stresses of caregiving. No matter the reason, its OK to make a change. It doesnt mean youve failed. Changing the situation may be best for everyone. Youll still be able to spend plenty of quality time with your loved one.  Date Last Reviewed: 7/1/2016  © 7982-9263 [employer]. [address] [place] [address], [place] [address]. All rights reserved. This information is not intended as a substitute for professional medical care. Always follow your healthcare professional's instructions.

## 2017-04-03 ENCOUNTER — TELEPHONE (OUTPATIENT)
Dept: ADMINISTRATIVE | Facility: CLINIC | Age: 82
End: 2017-04-03

## 2017-04-03 NOTE — PT/OT/SLP DISCHARGE
Occupational Therapy Discharge Summary    Danyelle Jacobs  MRN: [id number]   Acute respiratory failure with hypoxia   Patient Discharged from acute Occupational Therapy on 3/30/17.  Please refer to prior OT note dated on 3/29/17 for functional status.     Assessment:   Patient was discharge unexpectedly.  Information required to complete and accurate discharge summary is unknown.  Refer to therapy initial evaluation and last progress note for initial and most recent functional status and goal achievement.  Recommendations made may be found in medical record.  GOALS:   Occupational Therapy Goals        Problem: Occupational Therapy Goal    Goal Priority Disciplines Outcome Interventions   Occupational Therapy Goal     OT, PT/OT Ongoing (interventions implemented as appropriate)    Description:  Goals to be met by: 04/03/17     Patient will increase functional independence with ADLs by performing:    UE Dressing with Minimal Assistance.  LE Dressing with Maximum Assistance.  Grooming while seated with Modified [place].  Toileting from bedside commode with Moderate Assistance for hygiene and clothing management.   Toilet transfer to bedside commode with Moderate Assistance.  Upper extremity exercise program x10 reps per handout, with supervision.              Reasons for Discontinuation of Therapy Services  Transfer to alternate level of care.      Plan:  Patient Discharged to: Skilled Nursing Facility.

## 2017-04-03 NOTE — PT/OT/SLP DISCHARGE
Physical Therapy Discharge Summary    Danyelle Jacobs  MRN: [id number]   Acute respiratory failure with hypoxia   Patient Discharged from acute Physical Therapy on 2017.  Please refer to prior PT noted date on 2017 for functional status.     Assessment:   Patient appropriate for care in another setting.  GOALS:   Physical Therapy Goals        Problem: Physical Therapy Goal    Goal Priority Disciplines Outcome Goal Variances Interventions   Physical Therapy Goal     PT/OT, PT Ongoing (interventions implemented as appropriate)     Description:  Goals to be met by: 2017     Patient will increase functional independence with mobility by performin. Supine to sit with Contact Guard Assistance  2. Sit to supine with Contact Guard Assistance  3. Sit to stand transfer with Minimal Assistance  4. Bed to chair transfer with Minimal Assistance  5. Gait  x 20 feet with Minimal Assistance using appropriate AD.               Reasons for Discontinuation of Therapy Services  Transfer to alternate level of care.      Plan:  Patient Discharged to: Home with Home Health Service.    ANDI SILVA, PT  4/3/2017

## 2017-04-04 NOTE — PHYSICIAN QUERY
PT Name: Danyelle Jacobs  MR #: [id number]     Physician Query Form - Documentation Clarification      CDS/: Sheryl Domingo               Contact information: [email or web address]    This form is a permanent document in the medical record.     Query Date: April 4, 2017    By submitting this query, we are merely seeking further clarification of documentation. Please utilize your independent clinical judgment when addressing the question(s) below.    The Medical record reflects the following:    Supporting Clinical Findings Location in Medical Record     Pneumonia due to infectious organism               Few gram positive cocci   Moderate gram positive rods   Many gram negative rods    Treatment--IV Rocephin 1 g      Respiratory culture results 3-28-17         Medication                                                                            Doctor, Please further specify pneumonia organism treating associated with above clinical findings.    Provider Use Only      [  ]  Pneumonia due to gram negative rods      [  ]  Pneumonia due to gram positive rods      [  ]  Pneumonia, please specify organism___________      [x  ]  Pneumonia, type unknown      [  ] Clinically undetermined

## 2017-04-04 NOTE — PHYSICIAN QUERY
PT Name: Danyelle Jacobs  MR #: [id number]     Physician Query Form - Documentation Clarification      CDS/: Sheryl Domingo               Contact information: [email or web address]    This form is a permanent document in the medical record.     Query Date: April 4, 2017    By submitting this query, we are merely seeking further clarification of documentation. Please utilize your independent clinical judgment when addressing the question(s) below.    The Medical record reflects the following:    Supporting Clinical Findings Location in Medical Record         Urinary tract  infection       Cardiology consult and notes        Urine culture--no growth        Cardiology consult and notes                                                                            Doctor, Do you agree with the cardiology consult that urinary tract infection is being treated?  Provider Use Only        [  ]  Yes      [  ]  No      [  ]  Other, please specify      [ x ] Clinically undetermined

## 2017-04-06 ENCOUNTER — HOSPITAL ENCOUNTER (INPATIENT)
Facility: HOSPITAL | Age: 82
LOS: 18 days | Discharge: NURSING, REHAB OR LONG-TERM CARE | DRG: 811 | End: 2017-04-24
Attending: EMERGENCY MEDICINE | Admitting: HOSPITALIST
Payer: MEDICARE

## 2017-04-06 ENCOUNTER — TELEPHONE (OUTPATIENT)
Dept: INTERNAL MEDICINE | Facility: CLINIC | Age: 82
End: 2017-04-06

## 2017-04-06 DIAGNOSIS — K92.2 GASTROINTESTINAL HEMORRHAGE: ICD-10-CM

## 2017-04-06 DIAGNOSIS — K92.2 GASTROINTESTINAL HEMORRHAGE, UNSPECIFIED GASTROINTESTINAL HEMORRHAGE TYPE: Primary | ICD-10-CM

## 2017-04-06 DIAGNOSIS — R78.81 BACTEREMIA: ICD-10-CM

## 2017-04-06 DIAGNOSIS — R78.81 BACTEREMIA DUE TO STAPHYLOCOCCUS AUREUS: ICD-10-CM

## 2017-04-06 DIAGNOSIS — B95.61 BACTEREMIA DUE TO STAPHYLOCOCCUS AUREUS: ICD-10-CM

## 2017-04-06 DIAGNOSIS — E78.2 MIXED HYPERLIPIDEMIA: ICD-10-CM

## 2017-04-06 DIAGNOSIS — A41.01 MSSA (METHICILLIN SUSCEPTIBLE STAPHYLOCOCCUS AUREUS) SEPTICEMIA: ICD-10-CM

## 2017-04-06 DIAGNOSIS — I10 ESSENTIAL HYPERTENSION: ICD-10-CM

## 2017-04-06 DIAGNOSIS — R06.02 SOB (SHORTNESS OF BREATH): ICD-10-CM

## 2017-04-06 DIAGNOSIS — R00.0 TACHYCARDIA: ICD-10-CM

## 2017-04-06 DIAGNOSIS — K68.12 PSOAS ABSCESS, RIGHT: ICD-10-CM

## 2017-04-06 DIAGNOSIS — I10 BENIGN HYPERTENSION: ICD-10-CM

## 2017-04-06 DIAGNOSIS — K68.12 PSOAS ABSCESS: ICD-10-CM

## 2017-04-06 DIAGNOSIS — A41.9 SEVERE SEPSIS: ICD-10-CM

## 2017-04-06 DIAGNOSIS — A41.50 SEVERE SEPSIS WITH ACUTE ORGAN DYSFUNCTION DUE TO GRAM NEGATIVE BACTERIA: ICD-10-CM

## 2017-04-06 DIAGNOSIS — I25.10 CORONARY ARTERY DISEASE INVOLVING NATIVE HEART WITHOUT ANGINA PECTORIS, UNSPECIFIED VESSEL OR LESION TYPE: ICD-10-CM

## 2017-04-06 DIAGNOSIS — R65.20 SEVERE SEPSIS WITH ACUTE ORGAN DYSFUNCTION DUE TO GRAM NEGATIVE BACTERIA: ICD-10-CM

## 2017-04-06 DIAGNOSIS — R65.20 SEVERE SEPSIS: ICD-10-CM

## 2017-04-06 LAB
ABO + RH BLD: NORMAL
ALBUMIN SERPL BCP-MCNC: 2.5 G/DL
ALLENS TEST: ABNORMAL
ALP SERPL-CCNC: 121 U/L
ALT SERPL W/O P-5'-P-CCNC: 26 U/L
ANION GAP SERPL CALC-SCNC: 14 MMOL/L
ANISOCYTOSIS BLD QL SMEAR: SLIGHT
AST SERPL-CCNC: 35 U/L
BASOPHILS # BLD AUTO: 0.01 K/UL
BASOPHILS # BLD AUTO: 0.04 K/UL
BASOPHILS NFR BLD: 0.1 %
BASOPHILS NFR BLD: 0.2 %
BILIRUB SERPL-MCNC: 0.4 MG/DL
BILIRUB UR QL STRIP: NEGATIVE
BLD GP AB SCN CELLS X3 SERPL QL: NORMAL
BUN SERPL-MCNC: 50 MG/DL
BURR CELLS BLD QL SMEAR: ABNORMAL
CALCIUM SERPL-MCNC: 7.9 MG/DL
CHLORIDE SERPL-SCNC: 108 MMOL/L
CLARITY UR REFRACT.AUTO: ABNORMAL
CO2 SERPL-SCNC: 17 MMOL/L
COLOR UR AUTO: YELLOW
CREAT SERPL-MCNC: 2 MG/DL
DELSYS: ABNORMAL
DIFFERENTIAL METHOD: ABNORMAL
DIFFERENTIAL METHOD: ABNORMAL
EOSINOPHIL # BLD AUTO: 0 K/UL
EOSINOPHIL # BLD AUTO: 0.1 K/UL
EOSINOPHIL NFR BLD: 0 %
EOSINOPHIL NFR BLD: 0.4 %
ERYTHROCYTE [DISTWIDTH] IN BLOOD BY AUTOMATED COUNT: 21.7 %
ERYTHROCYTE [DISTWIDTH] IN BLOOD BY AUTOMATED COUNT: 21.8 %
EST. GFR  (AFRICAN AMERICAN): 25.1 ML/MIN/1.73 M^2
EST. GFR  (NON AFRICAN AMERICAN): 21.8 ML/MIN/1.73 M^2
FIO2: 45
FLOW: 12
GLUCOSE SERPL-MCNC: 91 MG/DL
GLUCOSE UR QL STRIP: NEGATIVE
HCO3 UR-SCNC: 17.7 MMOL/L (ref 24–28)
HCT VFR BLD AUTO: 26.1 %
HCT VFR BLD AUTO: 26.2 %
HCT VFR BLD CALC: 19 %PCV (ref 36–54)
HGB BLD-MCNC: 8.3 G/DL
HGB BLD-MCNC: 8.3 G/DL
HGB UR QL STRIP: NEGATIVE
HYPOCHROMIA BLD QL SMEAR: ABNORMAL
INR PPP: 1
KETONES UR QL STRIP: NEGATIVE
LACTATE SERPL-SCNC: 1.6 MMOL/L
LEUKOCYTE ESTERASE UR QL STRIP: NEGATIVE
LIPASE SERPL-CCNC: 12 U/L
LYMPHOCYTES # BLD AUTO: 0.5 K/UL
LYMPHOCYTES # BLD AUTO: 0.8 K/UL
LYMPHOCYTES NFR BLD: 2.9 %
LYMPHOCYTES NFR BLD: 5.8 %
MCH RBC QN AUTO: 28.3 PG
MCH RBC QN AUTO: 28.4 PG
MCHC RBC AUTO-ENTMCNC: 31.7 %
MCHC RBC AUTO-ENTMCNC: 31.8 %
MCV RBC AUTO: 89 FL
MCV RBC AUTO: 90 FL
MODE: ABNORMAL
MONOCYTES # BLD AUTO: 0.8 K/UL
MONOCYTES # BLD AUTO: 1.1 K/UL
MONOCYTES NFR BLD: 5.6 %
MONOCYTES NFR BLD: 6.1 %
NEUTROPHILS # BLD AUTO: 12 K/UL
NEUTROPHILS # BLD AUTO: 15.7 K/UL
NEUTROPHILS NFR BLD: 88.1 %
NEUTROPHILS NFR BLD: 90.8 %
NITRITE UR QL STRIP: NEGATIVE
OVALOCYTES BLD QL SMEAR: ABNORMAL
PCO2 BLDA: 33.1 MMHG (ref 35–45)
PH SMN: 7.33 [PH] (ref 7.35–7.45)
PH UR STRIP: 5 [PH] (ref 5–8)
PLATELET # BLD AUTO: 389 K/UL
PLATELET # BLD AUTO: 523 K/UL
PLATELET BLD QL SMEAR: ABNORMAL
PLATELET BLD QL SMEAR: ABNORMAL
PMV BLD AUTO: 10.5 FL
PMV BLD AUTO: 10.7 FL
PO2 BLDA: 148 MMHG (ref 80–100)
POC BE: -8 MMOL/L
POC IONIZED CALCIUM: 1.1 MMOL/L (ref 1.06–1.42)
POC SATURATED O2: 99 % (ref 95–100)
POC TCO2: 19 MMOL/L (ref 23–27)
POCT GLUCOSE: 101 MG/DL (ref 70–110)
POIKILOCYTOSIS BLD QL SMEAR: SLIGHT
POLYCHROMASIA BLD QL SMEAR: ABNORMAL
POTASSIUM BLD-SCNC: 4.3 MMOL/L (ref 3.5–5.1)
POTASSIUM SERPL-SCNC: 4.3 MMOL/L
PROCALCITONIN SERPL IA-MCNC: 1.98 NG/ML
PROT SERPL-MCNC: 6.1 G/DL
PROT UR QL STRIP: NEGATIVE
PROTHROMBIN TIME: 10.7 SEC
RBC # BLD AUTO: 2.92 M/UL
RBC # BLD AUTO: 2.93 M/UL
SAMPLE: ABNORMAL
SCHISTOCYTES BLD QL SMEAR: ABNORMAL
SITE: ABNORMAL
SODIUM BLD-SCNC: 137 MMOL/L (ref 136–145)
SODIUM SERPL-SCNC: 139 MMOL/L
SP GR UR STRIP: 1.02 (ref 1–1.03)
SP02: 100
TROPONIN I SERPL DL<=0.01 NG/ML-MCNC: 0.07 NG/ML
TROPONIN I SERPL DL<=0.01 NG/ML-MCNC: 0.07 NG/ML
TROPONIN I SERPL DL<=0.01 NG/ML-MCNC: 0.08 NG/ML
URN SPEC COLLECT METH UR: ABNORMAL
UROBILINOGEN UR STRIP-ACNC: NEGATIVE EU/DL
WBC # BLD AUTO: 13.75 K/UL
WBC # BLD AUTO: 17.42 K/UL

## 2017-04-06 PROCEDURE — 84484 ASSAY OF TROPONIN QUANT: CPT | Mod: 91

## 2017-04-06 PROCEDURE — 63600175 PHARM REV CODE 636 W HCPCS: Performed by: GENERAL PRACTICE

## 2017-04-06 PROCEDURE — 96376 TX/PRO/DX INJ SAME DRUG ADON: CPT

## 2017-04-06 PROCEDURE — 84300 ASSAY OF URINE SODIUM: CPT

## 2017-04-06 PROCEDURE — 82330 ASSAY OF CALCIUM: CPT

## 2017-04-06 PROCEDURE — 63600175 PHARM REV CODE 636 W HCPCS: Performed by: STUDENT IN AN ORGANIZED HEALTH CARE EDUCATION/TRAINING PROGRAM

## 2017-04-06 PROCEDURE — 99285 EMERGENCY DEPT VISIT HI MDM: CPT | Mod: ,,, | Performed by: EMERGENCY MEDICINE

## 2017-04-06 PROCEDURE — P9021 RED BLOOD CELLS UNIT: HCPCS

## 2017-04-06 PROCEDURE — 82962 GLUCOSE BLOOD TEST: CPT

## 2017-04-06 PROCEDURE — 96366 THER/PROPH/DIAG IV INF ADDON: CPT

## 2017-04-06 PROCEDURE — 96365 THER/PROPH/DIAG IV INF INIT: CPT

## 2017-04-06 PROCEDURE — 82570 ASSAY OF URINE CREATININE: CPT

## 2017-04-06 PROCEDURE — 99285 EMERGENCY DEPT VISIT HI MDM: CPT | Mod: 25

## 2017-04-06 PROCEDURE — 84132 ASSAY OF SERUM POTASSIUM: CPT

## 2017-04-06 PROCEDURE — 25000003 PHARM REV CODE 250: Performed by: STUDENT IN AN ORGANIZED HEALTH CARE EDUCATION/TRAINING PROGRAM

## 2017-04-06 PROCEDURE — 86900 BLOOD TYPING SEROLOGIC ABO: CPT

## 2017-04-06 PROCEDURE — 93010 ELECTROCARDIOGRAM REPORT: CPT | Mod: ,,, | Performed by: INTERNAL MEDICINE

## 2017-04-06 PROCEDURE — 80053 COMPREHEN METABOLIC PANEL: CPT

## 2017-04-06 PROCEDURE — 85014 HEMATOCRIT: CPT

## 2017-04-06 PROCEDURE — 84295 ASSAY OF SERUM SODIUM: CPT

## 2017-04-06 PROCEDURE — 36600 WITHDRAWAL OF ARTERIAL BLOOD: CPT

## 2017-04-06 PROCEDURE — 86850 RBC ANTIBODY SCREEN: CPT

## 2017-04-06 PROCEDURE — 87086 URINE CULTURE/COLONY COUNT: CPT

## 2017-04-06 PROCEDURE — 81003 URINALYSIS AUTO W/O SCOPE: CPT

## 2017-04-06 PROCEDURE — 25000003 PHARM REV CODE 250: Performed by: GENERAL PRACTICE

## 2017-04-06 PROCEDURE — C9113 INJ PANTOPRAZOLE SODIUM, VIA: HCPCS | Performed by: STUDENT IN AN ORGANIZED HEALTH CARE EDUCATION/TRAINING PROGRAM

## 2017-04-06 PROCEDURE — 82803 BLOOD GASES ANY COMBINATION: CPT

## 2017-04-06 PROCEDURE — 84145 PROCALCITONIN (PCT): CPT

## 2017-04-06 PROCEDURE — 85610 PROTHROMBIN TIME: CPT

## 2017-04-06 PROCEDURE — 93005 ELECTROCARDIOGRAM TRACING: CPT

## 2017-04-06 PROCEDURE — 87040 BLOOD CULTURE FOR BACTERIA: CPT

## 2017-04-06 PROCEDURE — 83690 ASSAY OF LIPASE: CPT

## 2017-04-06 PROCEDURE — 96375 TX/PRO/DX INJ NEW DRUG ADDON: CPT

## 2017-04-06 PROCEDURE — 20000000 HC ICU ROOM

## 2017-04-06 PROCEDURE — 99223 1ST HOSP IP/OBS HIGH 75: CPT | Mod: ,,, | Performed by: INTERNAL MEDICINE

## 2017-04-06 PROCEDURE — 84484 ASSAY OF TROPONIN QUANT: CPT

## 2017-04-06 PROCEDURE — 85025 COMPLETE CBC W/AUTO DIFF WBC: CPT | Mod: 91

## 2017-04-06 PROCEDURE — 36430 TRANSFUSION BLD/BLD COMPNT: CPT

## 2017-04-06 PROCEDURE — 86920 COMPATIBILITY TEST SPIN: CPT

## 2017-04-06 PROCEDURE — 96361 HYDRATE IV INFUSION ADD-ON: CPT

## 2017-04-06 PROCEDURE — 93010 ELECTROCARDIOGRAM REPORT: CPT | Mod: 76,,, | Performed by: INTERNAL MEDICINE

## 2017-04-06 PROCEDURE — 83605 ASSAY OF LACTIC ACID: CPT

## 2017-04-06 RX ORDER — INSULIN ASPART 100 [IU]/ML
0-5 INJECTION, SOLUTION INTRAVENOUS; SUBCUTANEOUS
Status: DISCONTINUED | OUTPATIENT
Start: 2017-04-06 | End: 2017-04-06

## 2017-04-06 RX ORDER — PREDNISONE 10 MG/1
10 TABLET ORAL DAILY
Status: DISCONTINUED | OUTPATIENT
Start: 2017-04-07 | End: 2017-04-15

## 2017-04-06 RX ORDER — DULOXETIN HYDROCHLORIDE 30 MG/1
30 CAPSULE, DELAYED RELEASE ORAL DAILY
Status: DISCONTINUED | OUTPATIENT
Start: 2017-04-07 | End: 2017-04-24 | Stop reason: HOSPADM

## 2017-04-06 RX ORDER — INSULIN ASPART 100 [IU]/ML
0-5 INJECTION, SOLUTION INTRAVENOUS; SUBCUTANEOUS
Status: DISCONTINUED | OUTPATIENT
Start: 2017-04-06 | End: 2017-04-13

## 2017-04-06 RX ORDER — ONDANSETRON 8 MG/1
8 TABLET, ORALLY DISINTEGRATING ORAL EVERY 8 HOURS PRN
Status: DISCONTINUED | OUTPATIENT
Start: 2017-04-06 | End: 2017-04-24 | Stop reason: HOSPADM

## 2017-04-06 RX ORDER — GLUCAGON 1 MG
1 KIT INJECTION
Status: DISCONTINUED | OUTPATIENT
Start: 2017-04-06 | End: 2017-04-13

## 2017-04-06 RX ORDER — IBUPROFEN 200 MG
16 TABLET ORAL
Status: DISCONTINUED | OUTPATIENT
Start: 2017-04-06 | End: 2017-04-13

## 2017-04-06 RX ORDER — PANTOPRAZOLE SODIUM 40 MG/10ML
80 INJECTION, POWDER, LYOPHILIZED, FOR SOLUTION INTRAVENOUS ONCE
Status: COMPLETED | OUTPATIENT
Start: 2017-04-06 | End: 2017-04-06

## 2017-04-06 RX ORDER — ONDANSETRON 2 MG/ML
4 INJECTION INTRAMUSCULAR; INTRAVENOUS
Status: COMPLETED | OUTPATIENT
Start: 2017-04-06 | End: 2017-04-06

## 2017-04-06 RX ORDER — IBUPROFEN 200 MG
24 TABLET ORAL
Status: DISCONTINUED | OUTPATIENT
Start: 2017-04-06 | End: 2017-04-13

## 2017-04-06 RX ORDER — ONDANSETRON 2 MG/ML
4 INJECTION INTRAMUSCULAR; INTRAVENOUS EVERY 8 HOURS PRN
Status: DISCONTINUED | OUTPATIENT
Start: 2017-04-06 | End: 2017-04-24 | Stop reason: HOSPADM

## 2017-04-06 RX ORDER — DONEPEZIL HYDROCHLORIDE 5 MG/1
10 TABLET, FILM COATED ORAL DAILY
Status: DISCONTINUED | OUTPATIENT
Start: 2017-04-07 | End: 2017-04-24 | Stop reason: HOSPADM

## 2017-04-06 RX ORDER — HYDROCODONE BITARTRATE AND ACETAMINOPHEN 500; 5 MG/1; MG/1
TABLET ORAL
Status: DISCONTINUED | OUTPATIENT
Start: 2017-04-06 | End: 2017-04-13

## 2017-04-06 RX ADMIN — SODIUM CHLORIDE 500 ML: 0.9 INJECTION, SOLUTION INTRAVENOUS at 01:04

## 2017-04-06 RX ADMIN — PANTOPRAZOLE SODIUM 80 MG: 40 INJECTION, POWDER, FOR SOLUTION INTRAVENOUS at 03:04

## 2017-04-06 RX ADMIN — PANTOPRAZOLE SODIUM 8 MG/HR: 40 INJECTION, POWDER, FOR SOLUTION INTRAVENOUS at 05:04

## 2017-04-06 RX ADMIN — SODIUM CHLORIDE 500 ML: 0.9 INJECTION, SOLUTION INTRAVENOUS at 04:04

## 2017-04-06 RX ADMIN — SODIUM CHLORIDE 1000 ML: 0.9 INJECTION, SOLUTION INTRAVENOUS at 12:04

## 2017-04-06 RX ADMIN — PANTOPRAZOLE SODIUM 8 MG/HR: 40 INJECTION, POWDER, FOR SOLUTION INTRAVENOUS at 10:04

## 2017-04-06 RX ADMIN — ONDANSETRON 4 MG: 2 INJECTION INTRAMUSCULAR; INTRAVENOUS at 12:04

## 2017-04-06 NOTE — H&P
[facility] Medicine  History & Physical    Patient Name: Danyelle Jacobs  MRN: [id number]  Admission Date: 4/6/2017  Attending Physician: Enrique Felix MD   Primary Care Provider: Bianca Dykes MD    [facility] Medicine Team: [facility]OSP MED 1 Rj Plascencia MD     Patient information was obtained from relative(s), past medical records and ER records.     Subjective:     Principal Problem:Gastrointestinal hemorrhage    Chief Complaint:   Chief Complaint   Patient presents with    Emesis     c/o vomiting and diarrhea X 2 days    Diarrhea        HPI: Danyelle Jacobs is a 88 y.o. female w/ a significant PMHx of HTN, HLD, HFpEF, CAD, PAD, dementia, rheumatoid arthritis who presented to [facility] ED after a recent admission for respiratory failure with hypoxia. Per patient's son, who is at bedside, the patient has had 6 episodes of yellow/green emesis as well as five loose bowel movements which started off as dark red and turned bright red by the last bowel movement over the past 24H. Patient has a significant history of 'bleeding angiodysplastic lesion found on EGD with hemostasis achieved' performed in 01/2017. Patient also underwent colonoscopy at the same time which showed 'prominent vessels in the cecum but no definite AVM; raised bluish lesion likely a hematoma in the rectum.'    The son also comments that the patient has continued to decline of the past two months and has started to have multiple falls. Patient was previously able to understand English and respond in [place]ag[place] but now can only communicate in Malagasy. The patiently currently denies and vision changes, palpitations, chest pain, nausea/vomiting, however she does endorse shortness of breath.       Past Medical History:   Diagnosis Date    Adverse effect of oral bisphosphonates     Anemia     Angiodysplasia of stomach: see EGD 1/17 2/13/2017    Anxiety     Aortic atherosclerosis     Benign hypertension 10/5/2015    Carcinoma,  renal cell     Carotid arterial disease 1/20/2014    Cerebellar stroke, acute     Chronic kidney disease (CKD), stage I 2/18/2016    Coronary artery disease     Current chronic use of systemic steroids 10/4/2016    Degenerative disc disease     Depression     Diverticulosis     Former smoker 6/13/2016    Gastroesophageal reflux disease without esophagitis 10/5/2015    GERD (gastroesophageal reflux disease)     Goiter, nontoxic, multinodular     IBS (irritable bowel syndrome)     Left ventricular diastolic dysfunction with preserved systolic function 10/5/2015    Lung nodule     right    MGUS (monoclonal gammopathy of unknown significance) 6/13/2016    Myasthenia gravis, adult form     Osteoporosis 8/10/2015    Osteoporosis, unspecified     PAOD (peripheral arterial occlusive disease)     RA (rheumatoid arthritis)     Smoker 1/20/2014    Stroke     Ulcer     Vitamin D deficiency disease 1/20/2014       Past Surgical History:   Procedure Laterality Date    CATARACT EXTRACTION W/  INTRAOCULAR LENS IMPLANT Bilateral     CHOLECYSTECTOMY      COLONOSCOPY N/A 1/24/2017    Procedure: COLONOSCOPY;  Surgeon: Kristofer Worthington MD;  Location: [address];  Service: Endoscopy;  Laterality: N/A;  for chronic anemia ; needs to be off plavix x 5 days.     Off Plavix since recent hospitalization 1/19/17.    Speaks [place]/needs .    HYSTERECTOMY      KIDNEY SURGERY  2003    NEPHRECTOMY         Review of patient's allergies indicates:   Allergen Reactions    Norvasc  [amlodipine]      Other reaction(s): Edema       No current facility-administered medications on file prior to encounter.      Current Outpatient Prescriptions on File Prior to Encounter   Medication Sig    alprazolam (XANAX) 0.5 MG tablet Take 1 tablet (0.5 mg total) by mouth daily as needed.    aspirin 81 mg Tab Take 81 mg by mouth. 1 Tablet Oral Every day    atorvastatin (LIPITOR) 40 MG tablet Take 1 tablet (40 mg  total) by mouth every evening.    clopidogrel (PLAVIX) 75 mg tablet Take 75 mg by mouth once daily.     mg capsule TAKE 1 CAPSULE BY MOUTH TWICE DAILY    donepezil (ARICEPT) 10 MG tablet Take 1 tablet (10 mg total) by mouth once daily.    duloxetine (CYMBALTA) 30 MG capsule Take 1 capsule (30 mg total) by mouth once daily.    ferrous sulfate 325 (65 FE) MG EC tablet Take 1 tablet (325 mg total) by mouth 2 (two) times daily.    fluconazole (DIFLUCAN) 100 MG tablet TAKE 1 TABLET(100 MG) BY MOUTH EVERY DAY    hydrALAZINE (APRESOLINE) 25 MG tablet Take 1 tablet (25 mg total) by mouth every 8 (eight) hours.    hydrochlorothiazide (MICROZIDE) 12.5 mg capsule Take 1 capsule (12.5 mg total) by mouth once daily.    hydrocodone-acetaminophen 5-325mg (NORCO) 5-325 mg per tablet Take 1 tablet by mouth every 6 (six) hours as needed for Pain.    isosorbide mononitrate (IMDUR) 30 MG 24 hr tablet Take 1 tablet (30 mg total) by mouth every evening.    losartan (COZAAR) 100 MG tablet Take 1 tablet (100 mg total) by mouth once daily.    pantoprazole (PROTONIX) 40 MG tablet Take 1 tablet (40 mg total) by mouth once daily.    predniSONE (DELTASONE) 10 MG tablet Take 1 tablet (10 mg total) by mouth once daily.    pregabalin (LYRICA) 50 MG capsule Take 1 capsule (50 mg total) by mouth 2 (two) times daily.    tolterodine (DETROL LA) 2 MG Cp24 Take 1 capsule (2 mg total) by mouth once daily.     Family History     Problem Relation (Age of Onset)    Alzheimer's disease Sister    Heart disease Father    Stroke Mother        Social History Main Topics    Smoking status: Former Smoker     Quit date: 1/19/2015    Smokeless tobacco: Never Used    Alcohol use No    Drug use: No    Sexual activity: No     Review of Systems   Reason unable to perform ROS: 2/2 to baseline dementia, and language barrier.     Objective:     Vital Signs (Most Recent):  Temp: 97.6 °F (36.4 °C) (04/06/17 1617)  Pulse: 76 (04/06/17 1617)  Resp:  20 (04/06/17 1617)  BP: 139/60 (04/06/17 1617)  SpO2: 100 % (04/06/17 1617) Vital Signs (24h Range):  Temp:  [97.6 °F (36.4 °C)-99.1 °F (37.3 °C)] 97.6 °F (36.4 °C)  Pulse:  [72-93] 76  Resp:  [18-28] 20  SpO2:  [88 %-100 %] 100 %  BP: (127-173)/(60-70) 139/60        There is no height or weight on file to calculate BMI.    Physical Exam   Constitutional: She appears well-developed and well-nourished. She appears listless. She has a sickly appearance. She appears distressed. Face mask in place.   HENT:   Head: Normocephalic and atraumatic.   Right Ear: External ear normal.   Left Ear: External ear normal.   Eyes: Conjunctivae and EOM are normal. Pupils are equal, round, and reactive to light. Right eye exhibits no discharge. Left eye exhibits no discharge. No scleral icterus.   Neck: Normal range of motion. Neck supple. No JVD present. No tracheal deviation present. No thyromegaly present.   Cardiovascular: Normal rate, regular rhythm and normal heart sounds.  Exam reveals no gallop and no friction rub.    No murmur heard.  Pulmonary/Chest: Effort normal and breath sounds normal. No stridor. No respiratory distress. She has no wheezes. She has no rales. She exhibits no tenderness.   Abdominal: Soft. Bowel sounds are normal. She exhibits no distension and no mass. There is no tenderness. There is no rebound and no guarding. No hernia.   Musculoskeletal: Normal range of motion. She exhibits tenderness (lower extremity). She exhibits no edema or deformity.   Neurological: She appears listless. No cranial nerve deficit. She exhibits normal muscle tone. Coordination normal.   Alert to person, not place, or time.   Skin: Skin is warm and dry. Bruising and ecchymosis noted. No rash noted. No erythema. No pallor.   Nursing note and vitals reviewed.       Significant Labs:   CBC:   Recent Labs  Lab 04/06/17  1213 04/06/17  1509 04/06/17  1559   WBC 13.75*  --   --    HGB 8.3*  --  8.3*   HCT 26.1* 19* 26.2*   *  --    --      CMP:   Recent Labs  Lab 04/06/17  1213      K 4.3      CO2 17*   GLU 91   BUN 50*   CREATININE 2.0*   CALCIUM 7.9*   PROT 6.1   ALBUMIN 2.5*   BILITOT 0.4   ALKPHOS 121   AST 35   ALT 26   ANIONGAP 14   EGFRNONAA 21.8*     Cardiac Markers: No results for input(s): CKMB, MYOGLOBIN, BNP, TROPISTAT in the last 48 hours.  Coagulation:   Recent Labs  Lab 04/06/17  1213   INR 1.0     Lactic Acid:   Recent Labs  Lab 04/06/17  1253   LACTATE 1.6     POCT Glucose: No results for input(s): POCTGLUCOSE in the last 48 hours.  Troponin:   Recent Labs  Lab 04/06/17  1213   TROPONINI 0.081*     Urine Studies:   Recent Labs  Lab 04/06/17  1316   COLORU Yellow   APPEARANCEUA Hazy*   PHUR 5.0   SPECGRAV 1.020   PROTEINUA Negative   GLUCUA Negative   KETONESU Negative   BILIRUBINUA Negative   OCCULTUA Negative   NITRITE Negative   UROBILINOGEN Negative   LEUKOCYTESUR Negative     All pertinent labs within the past 24 hours have been reviewed.    Significant Imaging: I have reviewed all pertinent imaging results/findings within the past 24 hours.    Assessment/Plan:     * Gastrointestinal hemorrhage  - Patient presenting with complaint of multiple episodes of blood bowel movements  - Patient's H//H currently stable at 8.3//26.2  - Protonix 80 mg IV x1  - Protonix 8 mg/hr gtt started  - Likely a lower GI bleed given stable H/H but cannot rule out brisk upper GI bleed given findings on previous EGD  - Consult placed to GI for further investigation  - Will check CBCs Q8H for first 24H to monitor bleed  - Consult placed to ICU given GI Hx, medicated on ASA/Plavix, and other comorbidities  - Tranfusing 1u pRBCs given patient significant CAD, troponinemia, and possible active bleed  - Will be placed in the ICU overnight for closer observation    CAD (coronary artery disease)  - ASA 81 mg PO daily  - Plavix 75 mg PO daily  - Will hold at this time 2/2 to concern for GI bleed    Mixed hyperlipidemia  - Lipitor 40 mg PO  daily    Gastroesophageal reflux disease without esophagitis  - Concern for GI bleed  - Protonix 80 mg IV x1  - Protonix gtt @ 8mg/hr pending GI bleed workup    Benign hypertension  - Holding home antihypertensives at this time 2/2 to concern for GI bleed  - Will continue when patient is HDS    Chronic kidney disease (CKD), stage I  - Patient w/ Hx of CKD now presenting w/ PARVEEN  - PARVEEN with creatinine of 2.0 up from 1.2 last week  - See plan under PARVEEN    Anemia associated with acute blood loss  - Transfusing 1u pRBCs on admission given Hx of CAD and troponinemia  - Will aim for goal Hb of > 9.0 at this time given active bleed and comorbidities  - See GI hemorrhage for further detail    Angiodysplasia of stomach: see EGD 1/17  - EGD: 'bleeding angiodysplastic lesion found with hemostasis achieved'  - Colonoscopy: 'prominent vessels in the cecum but no definite AVM; raised bluish lesion likely a hematoma in the rectum.'  - Multiple episodes of yellow/green emesis w/ five episodes of diarrhea, dark red --> bright red  - Patient currently HDS, H/H stable at this point  - Consult placed to ICU given Hx and being on ASA/Plavix  - Consult placed to GI given findings on previous EGD and colonoscopy    PARVEEN (acute kidney injury)  - PARVEEN on CKD I  - Hx of loose bloody bowel movements with episodes of vomiting   - Likely prerenal in etiology with a component on hypoxia  - Patient given 2L NS in the ED  - Will continue to monitor at this time      VTE Risk Mitigation         Ordered     Medium Risk of VTE  Once      04/06/17 1535     Place ISHMAEL hose  Until discontinued      04/06/17 1535     Reason for No Pharmacological VTE Prophylaxis  Once      04/06/17 1535     Place sequential compression device  Until discontinued      04/06/17 1504            Rj Plascencia MD  Department of Hospital Medicine   [facility]

## 2017-04-06 NOTE — ASSESSMENT & PLAN NOTE
- PARVEEN on CKD I  - Hx of loose bloody bowel movements with episodes of vomiting   - Likely prerenal in etiology with a component on hypoxia  - Patient given 2L NS in the ED  - Will continue to monitor at this time

## 2017-04-06 NOTE — TELEPHONE ENCOUNTER
----- Message from Evelyn Lee sent at 4/6/2017 10:36 AM CDT -----  Contact: [facility] - [facility] at [phone]  Pt has been having diarrhea,vomiting, and not eating for about 2 days. [facility] is currently sending pt to ER at [facility].

## 2017-04-06 NOTE — ASSESSMENT & PLAN NOTE
- EGD: 'bleeding angiodysplastic lesion found with hemostasis achieved'  - Colonoscopy: 'prominent vessels in the cecum but no definite AVM; raised bluish lesion likely a hematoma in the rectum.'  - Multiple episodes of yellow/green emesis w/ five episodes of diarrhea, dark red --> bright red  - Patient currently HDS, H/H stable at this point  - Consult placed to ICU given Hx and being on ASA/Plavix  - Consult placed to GI given findings on previous EGD and colonoscopy

## 2017-04-06 NOTE — ASSESSMENT & PLAN NOTE
- Transfusing 1u pRBCs in ED  - Holding ASA and plavix  - Will follow up with CBCs q8  - See GI hemorrhage for further detail

## 2017-04-06 NOTE — ASSESSMENT & PLAN NOTE
- Holding home antihypertensives at this time 2/2 to concern for GI bleed  - Will continue when patient is HDS

## 2017-04-06 NOTE — ASSESSMENT & PLAN NOTE
- Patient w/ Hx of CKD now presenting w/ PARVEEN  - PARVEEN with creatinine of 2.0 up from 1.2 last week  - See plan under PARVEEN

## 2017-04-06 NOTE — SUBJECTIVE & OBJECTIVE
Past Medical History:   Diagnosis Date    Adverse effect of oral bisphosphonates     Anemia     Angiodysplasia of stomach: see EGD 1/17 2/13/2017    Anxiety     Aortic atherosclerosis     Benign hypertension 10/5/2015    Carcinoma, renal cell     Carotid arterial disease 1/20/2014    Cerebellar stroke, acute     Chronic kidney disease (CKD), stage I 2/18/2016    Coronary artery disease     Current chronic use of systemic steroids 10/4/2016    Degenerative disc disease     Depression     Diverticulosis     Former smoker 6/13/2016    Gastroesophageal reflux disease without esophagitis 10/5/2015    GERD (gastroesophageal reflux disease)     Goiter, nontoxic, multinodular     IBS (irritable bowel syndrome)     Left ventricular diastolic dysfunction with preserved systolic function 10/5/2015    Lung nodule     right    MGUS (monoclonal gammopathy of unknown significance) 6/13/2016    Myasthenia gravis, adult form     Osteoporosis 8/10/2015    Osteoporosis, unspecified     PAOD (peripheral arterial occlusive disease)     RA (rheumatoid arthritis)     Smoker 1/20/2014    Stroke     Ulcer     Vitamin D deficiency disease 1/20/2014       Past Surgical History:   Procedure Laterality Date    CATARACT EXTRACTION W/  INTRAOCULAR LENS IMPLANT Bilateral     CHOLECYSTECTOMY      COLONOSCOPY N/A 1/24/2017    Procedure: COLONOSCOPY;  Surgeon: Kristofer Worthington MD;  Location: [address];  Service: Endoscopy;  Laterality: N/A;  for chronic anemia ; needs to be off plavix x 5 days.     Off Plavix since recent hospitalization 1/19/17.    Speaks Citizen of [place]/needs .    HYSTERECTOMY      KIDNEY SURGERY  2003    NEPHRECTOMY         Review of patient's allergies indicates:   Allergen Reactions    Norvasc  [amlodipine]      Other reaction(s): Edema       No current facility-administered medications on file prior to encounter.      Current Outpatient Prescriptions on File Prior to Encounter    Medication Sig    alprazolam (XANAX) 0.5 MG tablet Take 1 tablet (0.5 mg total) by mouth daily as needed.    aspirin 81 mg Tab Take 81 mg by mouth. 1 Tablet Oral Every day    atorvastatin (LIPITOR) 40 MG tablet Take 1 tablet (40 mg total) by mouth every evening.    clopidogrel (PLAVIX) 75 mg tablet Take 75 mg by mouth once daily.     mg capsule TAKE 1 CAPSULE BY MOUTH TWICE DAILY    donepezil (ARICEPT) 10 MG tablet Take 1 tablet (10 mg total) by mouth once daily.    duloxetine (CYMBALTA) 30 MG capsule Take 1 capsule (30 mg total) by mouth once daily.    ferrous sulfate 325 (65 FE) MG EC tablet Take 1 tablet (325 mg total) by mouth 2 (two) times daily.    fluconazole (DIFLUCAN) 100 MG tablet TAKE 1 TABLET(100 MG) BY MOUTH EVERY DAY    hydrALAZINE (APRESOLINE) 25 MG tablet Take 1 tablet (25 mg total) by mouth every 8 (eight) hours.    hydrochlorothiazide (MICROZIDE) 12.5 mg capsule Take 1 capsule (12.5 mg total) by mouth once daily.    hydrocodone-acetaminophen 5-325mg (NORCO) 5-325 mg per tablet Take 1 tablet by mouth every 6 (six) hours as needed for Pain.    isosorbide mononitrate (IMDUR) 30 MG 24 hr tablet Take 1 tablet (30 mg total) by mouth every evening.    losartan (COZAAR) 100 MG tablet Take 1 tablet (100 mg total) by mouth once daily.    pantoprazole (PROTONIX) 40 MG tablet Take 1 tablet (40 mg total) by mouth once daily.    predniSONE (DELTASONE) 10 MG tablet Take 1 tablet (10 mg total) by mouth once daily.    pregabalin (LYRICA) 50 MG capsule Take 1 capsule (50 mg total) by mouth 2 (two) times daily.    tolterodine (DETROL LA) 2 MG Cp24 Take 1 capsule (2 mg total) by mouth once daily.     Family History     Problem Relation (Age of Onset)    Alzheimer's disease Sister    Heart disease Father    Stroke Mother        Social History Main Topics    Smoking status: Former Smoker     Quit date: 1/19/2015    Smokeless tobacco: Never Used    Alcohol use No    Drug use: No    Sexual  activity: No     Review of Systems   Reason unable to perform ROS: 2/2 to baseline dementia, and language barrier.     Objective:     Vital Signs (Most Recent):  Temp: 97.6 °F (36.4 °C) (04/06/17 1617)  Pulse: 76 (04/06/17 1617)  Resp: 20 (04/06/17 1617)  BP: 139/60 (04/06/17 1617)  SpO2: 100 % (04/06/17 1617) Vital Signs (24h Range):  Temp:  [97.6 °F (36.4 °C)-99.1 °F (37.3 °C)] 97.6 °F (36.4 °C)  Pulse:  [72-93] 76  Resp:  [18-28] 20  SpO2:  [88 %-100 %] 100 %  BP: (127-173)/(60-70) 139/60        There is no height or weight on file to calculate BMI.    Physical Exam   Constitutional: She appears well-developed and well-nourished. She appears listless. She has a sickly appearance. She appears distressed. Face mask in place.   HENT:   Head: Normocephalic and atraumatic.   Right Ear: External ear normal.   Left Ear: External ear normal.   Eyes: Conjunctivae and EOM are normal. Pupils are equal, round, and reactive to light. Right eye exhibits no discharge. Left eye exhibits no discharge. No scleral icterus.   Neck: Normal range of motion. Neck supple. No JVD present. No tracheal deviation present. No thyromegaly present.   Cardiovascular: Normal rate, regular rhythm and normal heart sounds.  Exam reveals no gallop and no friction rub.    No murmur heard.  Pulmonary/Chest: Effort normal and breath sounds normal. No stridor. No respiratory distress. She has no wheezes. She has no rales. She exhibits no tenderness.   Abdominal: Soft. Bowel sounds are normal. She exhibits no distension and no mass. There is no tenderness. There is no rebound and no guarding. No hernia.   Musculoskeletal: Normal range of motion. She exhibits tenderness (lower extremity). She exhibits no edema or deformity.   Neurological: She appears listless. No cranial nerve deficit. She exhibits normal muscle tone. Coordination normal.   Alert to person, not place, or time.   Skin: Skin is warm and dry. Bruising and ecchymosis noted. No rash noted. No  erythema. No pallor.   Nursing note and vitals reviewed.       Significant Labs:   CBC:   Recent Labs  Lab 04/06/17  1213 04/06/17  1509 04/06/17  1559   WBC 13.75*  --   --    HGB 8.3*  --  8.3*   HCT 26.1* 19* 26.2*   *  --   --      CMP:   Recent Labs  Lab 04/06/17  1213      K 4.3      CO2 17*   GLU 91   BUN 50*   CREATININE 2.0*   CALCIUM 7.9*   PROT 6.1   ALBUMIN 2.5*   BILITOT 0.4   ALKPHOS 121   AST 35   ALT 26   ANIONGAP 14   EGFRNONAA 21.8*     Cardiac Markers: No results for input(s): CKMB, MYOGLOBIN, BNP, TROPISTAT in the last 48 hours.  Coagulation:   Recent Labs  Lab 04/06/17  1213   INR 1.0     Lactic Acid:   Recent Labs  Lab 04/06/17  1253   LACTATE 1.6     POCT Glucose: No results for input(s): POCTGLUCOSE in the last 48 hours.  Troponin:   Recent Labs  Lab 04/06/17  1213   TROPONINI 0.081*     Urine Studies:   Recent Labs  Lab 04/06/17  1316   COLORU Yellow   APPEARANCEUA Hazy*   PHUR 5.0   SPECGRAV 1.020   PROTEINUA Negative   GLUCUA Negative   KETONESU Negative   BILIRUBINUA Negative   OCCULTUA Negative   NITRITE Negative   UROBILINOGEN Negative   LEUKOCYTESUR Negative     All pertinent labs within the past 24 hours have been reviewed.    Significant Imaging: I have reviewed all pertinent imaging results/findings within the past 24 hours.

## 2017-04-06 NOTE — ED PROVIDER NOTES
Encounter Date: 4/6/2017    SCRIBE #1 NOTE: I, Sameer Olivo, am scribing for, and in the presence of,  Enrique Felix MD. I have scribed the following portions of the note - the Resident attestation.       History     Chief Complaint   Patient presents with    Emesis     c/o vomiting and diarrhea X 2 days    Diarrhea     Review of patient's allergies indicates:   Allergen Reactions    Norvasc  [amlodipine]      Other reaction(s): Edema     HPI Comments:  Ms. Jacobs  presents today with diarrhea and vomiting.  She has a history of hypertension, diastolic heart failure, coronary artery disease, mild dimentia, and severe RA.  Yesterday, she had 5 episodes of diarrhea, which progressed in color from dark to bright red.  Today she has had one episode of bright red diarrhea.  Yesterday she also had 3 episodes of emesis.  The family notes she has grown progressively weaker over the last month, and has started to have multiple falls while walking.  She is on Plavix.  They note that she bruises extremely easily.  The patient complains of feeling dizzy, and of chest tightness.    Ms. Jacobs is [place]-speaking.  I spoke with her in [place], and also got history from her bilingual family, who are her care takers.    Past Medical History:   Diagnosis Date    Adverse effect of oral bisphosphonates     Anemia     Angiodysplasia of stomach: see EGD 1/17 2/13/2017    Anxiety     Aortic atherosclerosis     Benign hypertension 10/5/2015    Carcinoma, renal cell     Carotid arterial disease 1/20/2014    Cerebellar stroke, acute     Chronic kidney disease (CKD), stage I 2/18/2016    Coronary artery disease     Current chronic use of systemic steroids 10/4/2016    Degenerative disc disease     Depression     Diverticulosis     Former smoker 6/13/2016    Gastroesophageal reflux disease without esophagitis 10/5/2015    GERD (gastroesophageal reflux disease)     Goiter, nontoxic, multinodular     IBS (irritable bowel  syndrome)     Left ventricular diastolic dysfunction with preserved systolic function 10/5/2015    Lung nodule     right    MGUS (monoclonal gammopathy of unknown significance) 6/13/2016    Myasthenia gravis, adult form     Osteoporosis 8/10/2015    Osteoporosis, unspecified     PAOD (peripheral arterial occlusive disease)     RA (rheumatoid arthritis)     Smoker 1/20/2014    Stroke     Ulcer     Vitamin D deficiency disease 1/20/2014     Past Surgical History:   Procedure Laterality Date    CATARACT EXTRACTION W/  INTRAOCULAR LENS IMPLANT Bilateral     CHOLECYSTECTOMY      COLONOSCOPY N/A 1/24/2017    Procedure: COLONOSCOPY;  Surgeon: Kristofer Worthington MD;  Location: [address]);  Service: Endoscopy;  Laterality: N/A;  for chronic anemia ; needs to be off plavix x 5 days.     Off Plavix since recent hospitalization 1/19/17.    Speaks [place]/needs .    HYSTERECTOMY      KIDNEY SURGERY  2003    NEPHRECTOMY       Family History   Problem Relation Age of Onset    Stroke Mother     Heart disease Father     Alzheimer's disease Sister     Thyroid cancer Neg Hx      Social History   Substance Use Topics    Smoking status: Former Smoker     Quit date: 1/19/2015    Smokeless tobacco: Never Used    Alcohol use No     Review of Systems   Constitutional: Negative for chills, diaphoresis and fever.   HENT: Negative for sore throat.    Respiratory: Positive for chest tightness. Negative for shortness of breath.    Cardiovascular: Negative for chest pain.   Gastrointestinal: Positive for blood in stool, diarrhea and vomiting. Negative for abdominal pain and nausea.   Genitourinary: Negative for dysuria.   Musculoskeletal: Negative for back pain.   Skin: Positive for wound (bruising).   Neurological: Positive for dizziness, weakness (generalized) and light-headedness.   Hematological: Does not bruise/bleed easily.       Physical Exam   Initial Vitals   BP Pulse Resp Temp SpO2   04/06/17  1144 04/06/17 1144 04/06/17 1144 04/06/17 1144 04/06/17 1144   127/60 93 21 99.1 °F (37.3 °C) 95 %     Physical Exam    Nursing note and vitals reviewed.  Constitutional: No distress.   HENT:   Head: Normocephalic and atraumatic.   Eyes: Conjunctivae are normal. Pupils are equal, round, and reactive to light.   Neck: Normal range of motion.   Cardiovascular: Normal rate. An irregularly irregular rhythm present.   Pulmonary/Chest: Breath sounds normal. No respiratory distress.   Abdominal: Soft. She exhibits no distension. There is no tenderness. There is no rebound and no guarding.   Genitourinary: Rectal exam shows guaiac positive stool (red stool heme positive). Guaiac positive stool (red stool heme positive). : Acceptable.  Neurological: She is alert. No sensory deficit.   Oriented to self and place, thinks [place] is president  Seen to move all four extremities   Skin: Skin is warm.   Ecchymoses across all limbs consistent with blood thinner use         ED Course   Procedures  Labs Reviewed   CBC W/ AUTO DIFFERENTIAL - Abnormal; Notable for the following:        Result Value    WBC 13.75 (*)     RBC 2.93 (*)     Hemoglobin 8.3 (*)     Hematocrit 26.1 (*)     MCHC 31.8 (*)     RDW 21.8 (*)     Platelets 523 (*)     Gran # 12.0 (*)     Lymph # 0.8 (*)     Gran% 88.1 (*)     Lymph% 5.8 (*)     Platelet Estimate Increased (*)     All other components within normal limits   COMPREHENSIVE METABOLIC PANEL - Abnormal; Notable for the following:     CO2 17 (*)     BUN, Bld 50 (*)     Creatinine 2.0 (*)     Calcium 7.9 (*)     Albumin 2.5 (*)     eGFR if  25.1 (*)     eGFR if non  21.8 (*)     All other components within normal limits   URINALYSIS - Abnormal; Notable for the following:     Appearance, UA Hazy (*)     All other components within normal limits   TROPONIN I - Abnormal; Notable for the following:     Troponin I 0.081 (*)     All other components within normal  limits   CBC W/ AUTO DIFFERENTIAL - Abnormal; Notable for the following:     WBC 17.42 (*)     RBC 2.92 (*)     Hemoglobin 8.3 (*)     Hematocrit 26.2 (*)     MCHC 31.7 (*)     RDW 21.7 (*)     Platelets 389 (*)     Gran # 15.7 (*)     Lymph # 0.5 (*)     Mono # 1.1 (*)     Gran% 90.8 (*)     Lymph% 2.9 (*)     Platelet Estimate Increased (*)     All other components within normal limits   TROPONIN I - Abnormal; Notable for the following:     Troponin I 0.068 (*)     All other components within normal limits   PROCALCITONIN - Abnormal; Notable for the following:     Procalcitonin 1.98 (*)     All other components within normal limits    Narrative:     LIPASE ADD ON PER DR KARINA MCALLISTER ORDER # [id number]   04/06/2017  14:25   PCAL ADD ON PER DR JASON WHITTAKER ORDER # [id number]   04/06/2017  15:47    TROPONIN I - Abnormal; Notable for the following:     Troponin I 0.071 (*)     All other components within normal limits   ISTAT PROCEDURE - Abnormal; Notable for the following:     POC PH 7.335 (*)     POC PCO2 33.1 (*)     POC PO2 148 (*)     POC HCO3 17.7 (*)     POC TCO2 19 (*)     POC Hematocrit 19 (*)     All other components within normal limits   CULTURE, URINE   CULTURE, BLOOD   CULTURE, URINE   PROTIME-INR   LACTIC ACID, PLASMA   LIPASE   LIPASE    Narrative:     LIPASE ADD ON PER DR KARINA MCALLISTER ORDER # [id number]   04/06/2017  14:25    PROCALCITONIN   CREATININE, URINE, RANDOM   SODIUM, URINE, RANDOM   TYPE & SCREEN   POCT GLUCOSE   POCT GLUCOSE MONITORING CONTINUOUS   PREPARE RBC SOFT             Medical Decision Making:   History:   Old Medical Records: I decided to obtain old medical records.  Clinical Tests:   Lab Tests: Ordered and Reviewed  Radiological Study: Ordered and Reviewed  Medical Tests: Ordered and Reviewed  Other:   I have discussed this case with another health care provider.       APC / Resident Notes:   Emergent evaluation of an 88-year-old female with multiple past medical issues including is  currently on Plavix who presents with bright red diarrhea and nausea.  She also complains of dizziness and chest tightness and the family notes weakness.  On exam she has multiple ecchymosis over her upper and lower extremities, she is oriented to self and place, and on ZAY she has red stool that is heme-positive.  Differential includes upper GI bleed secondary to PUD or GERD, lower GI bleed secondary to mesenteric ischemia, diverticulitis, malignancy, infection, structural defect, anemia, less likely is ACS or pneumonia.  I feel that this is most likely a lower GI bleed exacerbated by her current Plavix and causing symptomatic anemia.  I have ordered a GI bleed workup and have added on a chest x-ray and troponin.  I will follow-up  Kim Anton MD  12:15 PM    Patient will be admitted to medicine with a diagnosis of GI bleed. She also has an elevated white count, mildly elevated troponin, and acute kidney injury.  She is not acutely anemic.          Scribe Attestation:   Scribe #1: I performed the above scribed service and the documentation accurately describes the services I performed. I attest to the accuracy of the note.    Attending Attestation:   Physician Attestation Statement for Resident:  As the supervising MD   Physician Attestation Statement: I have personally seen and examined this patient.   I agree with the above history. -: 88 year old female with weakness, diarrhea, N/V and some blood in diarrhea. Multiple possible etiologies. Will admit to medicine for further workup.    As the supervising MD I agree with the above PE.    As the supervising MD I agree with the above treatment, course, plan, and disposition.  I have reviewed and agree with the residents interpretation of the following: EKG, x-rays and lab data.          Physician Attestation for Scribe:  Physician Attestation Statement for Scribe #1: I, Enrique Felix MD, reviewed documentation, as scribed by Sameer Olivo in my presence, and it is  both accurate and complete.                 ED Course     Clinical Impression:   The primary encounter diagnosis was Gastrointestinal hemorrhage, unspecified gastrointestinal hemorrhage type. Diagnoses of SOB (shortness of breath) and Gastrointestinal hemorrhage were also pertinent to this visit.          Enrique Felix MD  04/07/17 1142

## 2017-04-06 NOTE — ASSESSMENT & PLAN NOTE
- ASA 81 mg PO daily  - Plavix 75 mg PO daily  - Will hold at this time 2/2 to concern for GI bleed

## 2017-04-06 NOTE — ASSESSMENT & PLAN NOTE
- baseline creatinine 0.8-1.2, now 2.0  - received 2 L fluid in the ED  - urine studies and retroperitoneal US ordered  - will continue to monitor

## 2017-04-06 NOTE — ASSESSMENT & PLAN NOTE
- Patient presenting with complaint of multiple episodes of blood bowel movements  - Patient's H//H currently stable at 8.3//26.2  - Protonix 80 mg IV x1  - Protonix 8 mg/hr gtt started  - Likely a lower GI bleed given stable H/H but cannot rule out brisk upper GI bleed given findings on previous EGD  - Consult placed to GI for further investigation  - Will check CBCs Q8H for first 24H to monitor bleed  - Consult placed to ICU given GI Hx, medicated on ASA/Plavix, and other comorbidities  - Tranfusing 1u pRBCs given patient significant CAD, troponinemia, and possible active bleed  - Will be placed in the ICU overnight for closer observation

## 2017-04-06 NOTE — ED TRIAGE NOTES
Per pt's son around 5pm yesterday patient started having diarrhea (initially dark red followed by bright red) (5 total episodes diarrhea yesterday 1 this morning). Pt also with vomiting (threw up green emesis 3x yesterday (last 11pm last night)). Home nurse visited this morning and stated pt was dehydrated and needed to go to hospital BP at home 90/50.  Patient last ate yesterday morning. Patient on plavix.    Patient is a Lithuanian speaker.     Patient oriented to self and location.

## 2017-04-06 NOTE — SUBJECTIVE & OBJECTIVE
Past Medical History:   Diagnosis Date    Adverse effect of oral bisphosphonates     Anemia     Angiodysplasia of stomach: see EGD 1/17 2/13/2017    Anxiety     Aortic atherosclerosis     Benign hypertension 10/5/2015    Carcinoma, renal cell     Carotid arterial disease 1/20/2014    Cerebellar stroke, acute     Chronic kidney disease (CKD), stage I 2/18/2016    Coronary artery disease     Current chronic use of systemic steroids 10/4/2016    Degenerative disc disease     Depression     Diverticulosis     Former smoker 6/13/2016    Gastroesophageal reflux disease without esophagitis 10/5/2015    GERD (gastroesophageal reflux disease)     Goiter, nontoxic, multinodular     IBS (irritable bowel syndrome)     Left ventricular diastolic dysfunction with preserved systolic function 10/5/2015    Lung nodule     right    MGUS (monoclonal gammopathy of unknown significance) 6/13/2016    Myasthenia gravis, adult form     Osteoporosis 8/10/2015    Osteoporosis, unspecified     PAOD (peripheral arterial occlusive disease)     RA (rheumatoid arthritis)     Smoker 1/20/2014    Stroke     Ulcer     Vitamin D deficiency disease 1/20/2014       Past Surgical History:   Procedure Laterality Date    CATARACT EXTRACTION W/  INTRAOCULAR LENS IMPLANT Bilateral     CHOLECYSTECTOMY      COLONOSCOPY N/A 1/24/2017    Procedure: COLONOSCOPY;  Surgeon: Kristofer Worthington MD;  Location: [address];  Service: Endoscopy;  Laterality: N/A;  for chronic anemia ; needs to be off plavix x 5 days.     Off Plavix since recent hospitalization 1/19/17.    Speaks Haitian/needs .    HYSTERECTOMY      KIDNEY SURGERY  2003    NEPHRECTOMY         Review of patient's allergies indicates:   Allergen Reactions    Norvasc  [amlodipine]      Other reaction(s): Edema       Family History     Problem Relation (Age of Onset)    Alzheimer's disease Sister    Heart disease Father    Stroke Mother        Social  History Main Topics    Smoking status: Former Smoker     Quit date: 1/19/2015    Smokeless tobacco: Never Used    Alcohol use No    Drug use: No    Sexual activity: No      Review of Systems   Unable to perform ROS: Dementia     Objective:     Vital Signs (Most Recent):  Temp: 98.4 °F (36.9 °C) (04/06/17 1648)  Pulse: 66 (04/06/17 1800)  Resp: (!) 24 (04/06/17 1800)  BP: (!) 160/69 (04/06/17 1800)  SpO2: 100 % (04/06/17 1800) Vital Signs (24h Range):  Temp:  [97.6 °F (36.4 °C)-99.1 °F (37.3 °C)] 98.4 °F (36.9 °C)  Pulse:  [66-93] 66  Resp:  [18-28] 24  SpO2:  [88 %-100 %] 100 %  BP: (127-173)/(54-70) 160/69      There is no height or weight on file to calculate BMI.      Intake/Output Summary (Last 24 hours) at 04/06/17 1843  Last data filed at 04/06/17 1617   Gross per 24 hour   Intake              264 ml   Output              150 ml   Net              114 ml       Physical Exam   Constitutional: She appears well-developed and well-nourished. She appears distressed.   HENT:   Head: Normocephalic and atraumatic.   Mucous membranes dry   Eyes: EOM are normal. Pupils are equal, round, and reactive to light. No scleral icterus.   Cardiovascular: Normal rate and regular rhythm.    No murmur heard.  Pulses 1+ bilaterally   Pulmonary/Chest: Effort normal and breath sounds normal. No respiratory distress. She has no wheezes. She has no rales.   Abdominal: Soft. Bowel sounds are normal. She exhibits distension. There is tenderness.   Musculoskeletal: She exhibits no edema or tenderness.   Neurological: She is alert. No cranial nerve deficit.   Responsive, answers some questions on exam   Skin: Skin is warm and dry. Bruising and ecchymosis noted.          Lines/Drains/Airways          No matching active lines, drains, or airways        Significant Labs:    CBC/Anemia Profile:    Recent Labs  Lab 04/06/17  1213 04/06/17  1509 04/06/17  1559   WBC 13.75*  --  17.42*   HGB 8.3*  --  8.3*   HCT 26.1* 19* 26.2*   *   --  389*   MCV 89  --  90   RDW 21.8*  --  21.7*        Chemistries:    Recent Labs  Lab 04/06/17  1213      K 4.3      CO2 17*   BUN 50*   CREATININE 2.0*   CALCIUM 7.9*   ALBUMIN 2.5*   PROT 6.1   BILITOT 0.4   ALKPHOS 121   ALT 26   AST 35       ABGs:   Recent Labs  Lab 04/06/17  1509   PH 7.335*   PCO2 33.1*   HCO3 17.7*   POCSATURATED 99   BE -8     Coagulation:   Recent Labs  Lab 04/06/17  1213   INR 1.0     Lactic Acid:   Recent Labs  Lab 04/06/17  1253   LACTATE 1.6     Troponin:   Recent Labs  Lab 04/06/17  1806   TROPONINI 0.071*     Urine Studies:   Recent Labs  Lab 04/06/17  1316   COLORU Yellow   APPEARANCEUA Hazy*   PHUR 5.0   SPECGRAV 1.020   PROTEINUA Negative   GLUCUA Negative   KETONESU Negative   BILIRUBINUA Negative   OCCULTUA Negative   NITRITE Negative   UROBILINOGEN Negative   LEUKOCYTESUR Negative     All pertinent labs within the past 24 hours have been reviewed.    Significant Imaging: I have reviewed all pertinent imaging results/findings within the past 24 hours.

## 2017-04-06 NOTE — ASSESSMENT & PLAN NOTE
- Transfusing 1u pRBCs on admission given Hx of CAD and troponinemia  - Will aim for goal Hb of > 9.0 at this time given active bleed and comorbidities  - See GI hemorrhage for further detail

## 2017-04-06 NOTE — ASSESSMENT & PLAN NOTE
- EGD: 'bleeding angiodysplastic lesion found with hemostasis achieved'  - Colonoscopy: 'prominent vessels in the cecum but no definite AVM; raised bluish lesion likely a hematoma in the rectum.'  - Multiple episodes of yellow/green emesis w/ five episodes of diarrhea, dark red --> bright red  - Patient currently HDS, H/H stable at this point  - Consult placed to GI given findings on previous EGD and colonoscopy

## 2017-04-07 PROBLEM — S32.010A COMPRESSION FRACTURE OF FIRST LUMBAR VERTEBRA: Status: ACTIVE | Noted: 2017-04-07

## 2017-04-07 LAB
ALBUMIN SERPL BCP-MCNC: 2.1 G/DL
ALP SERPL-CCNC: 97 U/L
ALT SERPL W/O P-5'-P-CCNC: 23 U/L
ANION GAP SERPL CALC-SCNC: 9 MMOL/L
ANISOCYTOSIS BLD QL SMEAR: SLIGHT
AST SERPL-CCNC: 31 U/L
BACTERIA UR CULT: NO GROWTH
BASOPHILS # BLD AUTO: 0.01 K/UL
BASOPHILS # BLD AUTO: 0.02 K/UL
BASOPHILS NFR BLD: 0.1 %
BASOPHILS NFR BLD: 0.1 %
BILIRUB SERPL-MCNC: 0.2 MG/DL
BUN SERPL-MCNC: 38 MG/DL
BURR CELLS BLD QL SMEAR: ABNORMAL
CALCIUM SERPL-MCNC: 6.8 MG/DL
CHLORIDE SERPL-SCNC: 115 MMOL/L
CO2 SERPL-SCNC: 15 MMOL/L
CREAT SERPL-MCNC: 1.2 MG/DL
CREAT UR-MCNC: 139 MG/DL
DIFFERENTIAL METHOD: ABNORMAL
DIFFERENTIAL METHOD: ABNORMAL
EOSINOPHIL # BLD AUTO: 0.1 K/UL
EOSINOPHIL # BLD AUTO: 0.1 K/UL
EOSINOPHIL NFR BLD: 0.4 %
EOSINOPHIL NFR BLD: 0.6 %
ERYTHROCYTE [DISTWIDTH] IN BLOOD BY AUTOMATED COUNT: 21.7 %
ERYTHROCYTE [DISTWIDTH] IN BLOOD BY AUTOMATED COUNT: 22.1 %
EST. GFR  (AFRICAN AMERICAN): 46.6 ML/MIN/1.73 M^2
EST. GFR  (NON AFRICAN AMERICAN): 40.4 ML/MIN/1.73 M^2
GLUCOSE SERPL-MCNC: 129 MG/DL
HCT VFR BLD AUTO: 26.4 %
HCT VFR BLD AUTO: 26.8 %
HCT VFR BLD AUTO: 28.3 %
HCT VFR BLD AUTO: 28.3 %
HGB BLD-MCNC: 8.5 G/DL
HGB BLD-MCNC: 8.6 G/DL
HGB BLD-MCNC: 9 G/DL
LYMPHOCYTES # BLD AUTO: 0.4 K/UL
LYMPHOCYTES # BLD AUTO: 0.6 K/UL
LYMPHOCYTES NFR BLD: 3.2 %
LYMPHOCYTES NFR BLD: 4.1 %
MAGNESIUM SERPL-MCNC: 2.2 MG/DL
MCH RBC QN AUTO: 27.7 PG
MCH RBC QN AUTO: 28.1 PG
MCHC RBC AUTO-ENTMCNC: 31.7 %
MCHC RBC AUTO-ENTMCNC: 31.8 %
MCV RBC AUTO: 87 FL
MCV RBC AUTO: 88 FL
MONOCYTES # BLD AUTO: 0.9 K/UL
MONOCYTES # BLD AUTO: 0.9 K/UL
MONOCYTES NFR BLD: 6.5 %
MONOCYTES NFR BLD: 6.8 %
NEUTROPHILS # BLD AUTO: 11.5 K/UL
NEUTROPHILS # BLD AUTO: 12.3 K/UL
NEUTROPHILS NFR BLD: 88.7 %
NEUTROPHILS NFR BLD: 89.5 %
OVALOCYTES BLD QL SMEAR: ABNORMAL
PHOSPHATE SERPL-MCNC: 2.9 MG/DL
PLATELET # BLD AUTO: 371 K/UL
PLATELET # BLD AUTO: 406 K/UL
PMV BLD AUTO: 10.5 FL
PMV BLD AUTO: 10.8 FL
POCT GLUCOSE: 216 MG/DL (ref 70–110)
POCT GLUCOSE: 67 MG/DL (ref 70–110)
POCT GLUCOSE: 77 MG/DL (ref 70–110)
POCT GLUCOSE: 93 MG/DL (ref 70–110)
POIKILOCYTOSIS BLD QL SMEAR: SLIGHT
POLYCHROMASIA BLD QL SMEAR: ABNORMAL
POTASSIUM SERPL-SCNC: 4.3 MMOL/L
PROT SERPL-MCNC: 5.1 G/DL
RBC # BLD AUTO: 3.07 M/UL
RBC # BLD AUTO: 3.2 M/UL
SCHISTOCYTES BLD QL SMEAR: ABNORMAL
SODIUM SERPL-SCNC: 139 MMOL/L
SODIUM UR-SCNC: <20 MMOL/L
TROPONIN I SERPL DL<=0.01 NG/ML-MCNC: 0.09 NG/ML
WBC # BLD AUTO: 12.88 K/UL
WBC # BLD AUTO: 13.94 K/UL

## 2017-04-07 PROCEDURE — 25000003 PHARM REV CODE 250: Performed by: STUDENT IN AN ORGANIZED HEALTH CARE EDUCATION/TRAINING PROGRAM

## 2017-04-07 PROCEDURE — 84100 ASSAY OF PHOSPHORUS: CPT

## 2017-04-07 PROCEDURE — 99233 SBSQ HOSP IP/OBS HIGH 50: CPT | Mod: GC,,, | Performed by: INTERNAL MEDICINE

## 2017-04-07 PROCEDURE — 63600175 PHARM REV CODE 636 W HCPCS: Performed by: STUDENT IN AN ORGANIZED HEALTH CARE EDUCATION/TRAINING PROGRAM

## 2017-04-07 PROCEDURE — 85014 HEMATOCRIT: CPT

## 2017-04-07 PROCEDURE — 97802 MEDICAL NUTRITION INDIV IN: CPT

## 2017-04-07 PROCEDURE — 11000001 HC ACUTE MED/SURG PRIVATE ROOM

## 2017-04-07 PROCEDURE — 85018 HEMOGLOBIN: CPT

## 2017-04-07 PROCEDURE — 84484 ASSAY OF TROPONIN QUANT: CPT

## 2017-04-07 PROCEDURE — C9113 INJ PANTOPRAZOLE SODIUM, VIA: HCPCS | Performed by: STUDENT IN AN ORGANIZED HEALTH CARE EDUCATION/TRAINING PROGRAM

## 2017-04-07 PROCEDURE — 36415 COLL VENOUS BLD VENIPUNCTURE: CPT

## 2017-04-07 PROCEDURE — 80053 COMPREHEN METABOLIC PANEL: CPT

## 2017-04-07 PROCEDURE — 83735 ASSAY OF MAGNESIUM: CPT

## 2017-04-07 PROCEDURE — 85025 COMPLETE CBC W/AUTO DIFF WBC: CPT | Mod: 91

## 2017-04-07 RX ORDER — LOSARTAN POTASSIUM 50 MG/1
100 TABLET ORAL DAILY
Status: DISCONTINUED | OUTPATIENT
Start: 2017-04-07 | End: 2017-04-07

## 2017-04-07 RX ORDER — ISOSORBIDE MONONITRATE 30 MG/1
30 TABLET, EXTENDED RELEASE ORAL NIGHTLY
Status: DISCONTINUED | OUTPATIENT
Start: 2017-04-07 | End: 2017-04-13

## 2017-04-07 RX ORDER — PREGABALIN 50 MG/1
50 CAPSULE ORAL 2 TIMES DAILY
Status: DISCONTINUED | OUTPATIENT
Start: 2017-04-07 | End: 2017-04-13

## 2017-04-07 RX ORDER — LABETALOL 100 MG/1
100 TABLET, FILM COATED ORAL EVERY 12 HOURS PRN
Status: DISCONTINUED | OUTPATIENT
Start: 2017-04-07 | End: 2017-04-07

## 2017-04-07 RX ORDER — PANTOPRAZOLE SODIUM 40 MG/10ML
40 INJECTION, POWDER, LYOPHILIZED, FOR SOLUTION INTRAVENOUS DAILY
Status: DISCONTINUED | OUTPATIENT
Start: 2017-04-07 | End: 2017-04-08

## 2017-04-07 RX ORDER — HYDROCODONE BITARTRATE AND ACETAMINOPHEN 5; 325 MG/1; MG/1
1 TABLET ORAL EVERY 6 HOURS PRN
Status: DISCONTINUED | OUTPATIENT
Start: 2017-04-07 | End: 2017-04-09

## 2017-04-07 RX ORDER — HYDRALAZINE HYDROCHLORIDE 25 MG/1
25 TABLET, FILM COATED ORAL EVERY 8 HOURS
Status: DISCONTINUED | OUTPATIENT
Start: 2017-04-07 | End: 2017-04-14

## 2017-04-07 RX ORDER — LOSARTAN POTASSIUM 50 MG/1
100 TABLET ORAL DAILY
Status: DISCONTINUED | OUTPATIENT
Start: 2017-04-07 | End: 2017-04-13

## 2017-04-07 RX ORDER — HYDRALAZINE HYDROCHLORIDE 25 MG/1
25 TABLET, FILM COATED ORAL EVERY 8 HOURS
Status: DISCONTINUED | OUTPATIENT
Start: 2017-04-07 | End: 2017-04-07

## 2017-04-07 RX ADMIN — HYDRALAZINE HYDROCHLORIDE 25 MG: 25 TABLET, FILM COATED ORAL at 09:04

## 2017-04-07 RX ADMIN — PREGABALIN 50 MG: 50 CAPSULE ORAL at 09:04

## 2017-04-07 RX ADMIN — PREDNISONE 10 MG: 10 TABLET ORAL at 08:04

## 2017-04-07 RX ADMIN — PANTOPRAZOLE SODIUM 40 MG: 40 INJECTION, POWDER, FOR SOLUTION INTRAVENOUS at 12:04

## 2017-04-07 RX ADMIN — ISOSORBIDE MONONITRATE 30 MG: 30 TABLET, EXTENDED RELEASE ORAL at 09:04

## 2017-04-07 RX ADMIN — DEXTROSE MONOHYDRATE 12.5 G: 500 INJECTION PARENTERAL at 01:04

## 2017-04-07 RX ADMIN — LOSARTAN POTASSIUM 100 MG: 50 TABLET, FILM COATED ORAL at 12:04

## 2017-04-07 RX ADMIN — DONEPEZIL HYDROCHLORIDE 10 MG: 10 TABLET, FILM COATED ORAL at 08:04

## 2017-04-07 RX ADMIN — HYDRALAZINE HYDROCHLORIDE 25 MG: 25 TABLET, FILM COATED ORAL at 01:04

## 2017-04-07 RX ADMIN — PANTOPRAZOLE SODIUM 8 MG/HR: 40 INJECTION, POWDER, FOR SOLUTION INTRAVENOUS at 08:04

## 2017-04-07 RX ADMIN — PREGABALIN 50 MG: 50 CAPSULE ORAL at 12:04

## 2017-04-07 RX ADMIN — HYDRALAZINE HYDROCHLORIDE 25 MG: 25 TABLET, FILM COATED ORAL at 08:04

## 2017-04-07 RX ADMIN — DULOXETINE 30 MG: 30 CAPSULE, DELAYED RELEASE ORAL at 08:04

## 2017-04-07 NOTE — PHYSICIAN QUERY
"PT Name: Danyelle Jacobs  MR #: [id number]    Physician Query Form - Musculoskeletal Clarification       CDS/: Jessy Wilkes               Contact information:     This form is a permanent document in the medical record.     Query Date: April 7, 2017    By submitting this query, we are merely seeking further clarification of documentation. Please utilize your independent clinical judgment when addressing the question(s) below.    The Medical record contains the following:     Indicators   Supporting Clinical Findings   Location in Medical Record   x "Fracture" documented Severe compression fracture of the L1 vertebral body, with retropulsion of bone fragments into the spinal canal resulting in moderate spinal canal stenosis.    There is a compression fracture of the L1 vertebral body, with greater than 75% vertebral body height loss.    CT abdomen W/O significant for diverticulosis, adnexal cystic structure, L1 compression  fx, and lung opacities (atelectasis v infection/inflammation). CT Abdomen / Pelvis 4/6                                H & P 4/6   x Chronic conditions (Osteoporosis, Malignancy, etc.) Osteoporosis ; Degenerative disc disease ; Vitamin D deficiency disease    H & P 4/6   x Xray findings There is a compression fracture of the L1 vertebral body, with greater than 75% vertebral body height loss.   CT Abdomen / Pelvis 4/6    Medication:      Treatment:     x Other:  The son also comments that the patient has continued to decline of the past two months and has started to have multiple falls. H & P 4/6     Provider, please specify the diagnosis or diagnoses associated with above clinical findings.    Traumatic Fracture (Specify)  [  ] Fracture due to a trauma  [  ] Compression fracture, trauma  [  ] Pathologic, trauma    Non-Traumatic Fracture (Specify)  [  ] Compression fracture secondary to osteoporosis  [x  ] Compression fracture, nontraumatic  [  ] Osteoporotic  [  ] Pathological fracture    [  ] " Other Musculoskeletal Diagnosis (please specify) _______________________________  [  ] Clinically Undetermined

## 2017-04-07 NOTE — ASSESSMENT & PLAN NOTE
- EGD: 'bleeding angiodysplastic lesion found with hemostasis achieved'  - Colonoscopy: 'prominent vessels in the cecum but no definite AVM; raised bluish lesion likely a hematoma in the rectum.'  - Multiple episodes of yellow/green emesis w/ five episodes of diarrhea, dark red --> bright red  - Patient currently HDS, H/H stable at this point  - Consult placed to GI given findings on previous EGD and colonoscopy; believe bleeding may be from lower source and related to diverticulosis

## 2017-04-07 NOTE — PROGRESS NOTES
Patient admitted to floor via stretcher. Awake and Alert very pale at this time. Family at bedside.  Continue to monitor.

## 2017-04-07 NOTE — ASSESSMENT & PLAN NOTE
- Transfused 1u pRBCs in ED  - Holding ASA and plavix  - H/H stable; will follow up with CBCs daily  - See GI hemorrhage for further detail

## 2017-04-07 NOTE — SUBJECTIVE & OBJECTIVE
Interval History/Significant Events: NAEON. No further episodes of vomiting or diarrhea. Pt reports having her chronic RLE pain this AM.    Review of Systems  Objective:     Vital Signs (Most Recent):  Temp: 98 °F (36.7 °C) (04/07/17 1100)  Pulse: 62 (04/07/17 1100)  Resp: (!) 22 (04/07/17 1100)  BP: (!) 175/68 (04/07/17 1000)  SpO2: 100 % (04/07/17 1100) Vital Signs (24h Range):  Temp:  [97.6 °F (36.4 °C)-98.6 °F (37 °C)] 98 °F (36.7 °C)  Pulse:  [61-78] 62  Resp:  [16-36] 22  SpO2:  [85 %-100 %] 100 %  BP: (138-200)/(54-82) 175/68   Weight: 56 kg (123 lb 7.3 oz)  Body mass index is 26.72 kg/(m^2).      Intake/Output Summary (Last 24 hours) at 04/07/17 1234  Last data filed at 04/07/17 1100   Gross per 24 hour   Intake              869 ml   Output              150 ml   Net              719 ml       Physical Exam   Constitutional: She appears well-developed and well-nourished. No distress.   HENT:   Head: Normocephalic and atraumatic.   Mucous membranes dry   Eyes: EOM are normal. Pupils are equal, round, and reactive to light. No scleral icterus.   Cardiovascular: Normal rate and regular rhythm.    No murmur heard.  Pulses 1+ bilaterally   Pulmonary/Chest: Effort normal and breath sounds normal. No respiratory distress. She has no wheezes. She has no rales.   Abdominal: Soft. Bowel sounds are normal. She exhibits distension. There is no tenderness.   Musculoskeletal: She exhibits no edema.   RLE pain with movement   Neurological: She is alert. No cranial nerve deficit.   Responsive, answers questions   Skin: Skin is warm and dry. Bruising and ecchymosis noted.       Vents:     Lines/Drains/Airways     Peripheral Intravenous Line                 Peripheral IV - Single Lumen 04/06/17 Left Antecubital 1 day         Peripheral IV - Single Lumen 04/06/17 Left Hand 1 day              Significant Labs:    CBC/Anemia Profile:    Recent Labs  Lab 04/06/17  1559 04/07/17  0003 04/07/17  0342 04/07/17  0829   WBC 17.42*  --   12.88* 13.94*   HGB 8.3* 8.6* 8.5* 9.0*   HCT 26.2* 26.4* 26.8* 28.3*  28.3*   *  --  371* 406*   MCV 90  --  87 88   RDW 21.7*  --  21.7* 22.1*        Chemistries:    Recent Labs  Lab 04/06/17  1213 04/07/17  0342    139   K 4.3 4.3    115*   CO2 17* 15*   BUN 50* 38*   CREATININE 2.0* 1.2   CALCIUM 7.9* 6.8*   ALBUMIN 2.5* 2.1*   PROT 6.1 5.1*   BILITOT 0.4 0.2   ALKPHOS 121 97   ALT 26 23   AST 35 31   MG  --  2.2   PHOS  --  2.9       POCT Glucose:   Recent Labs  Lab 04/06/17  1802 04/07/17  0002   POCTGLUCOSE 101 216*     Troponin:   Recent Labs  Lab 04/07/17  0342   TROPONINI 0.089*     All pertinent labs within the past 24 hours have been reviewed.    Significant Imaging:  I have reviewed all pertinent imaging results/findings within the past 24 hours.

## 2017-04-07 NOTE — CONSULTS
Gastroenterology  Consult note      SUBJECTIVE:     Reason for Consult: Hematochezia    History of Present Illness:  Ms. Jacobs is a 88 F with a history of HTN, HLD, CAD, PAD, HFpEF, dementia, and rheumatoid arthritis who presents to the ED with hematochezia.     Per family, the patient began vomiting Wednesday night.  The vomitus was nonbloody.  Shortly thereafter she had a dark/maroon bowel movement, which was followed by bright red blood per rectum.  The patient has denied abdominal pain throughout.     The patient was recently admitted from 3/24-4/1 with pneumonia.  Her last hb prior to discharge was 7.8.  Hb on this admission initially was 8.3.    No bowel movements overnight.  Hypertense but hemodynamically stable.    The patient is on ASA+Plavix with last dose of plavix 2 days ago.     Endoscopic Hx:  EGD 1/24/17: 1 AVM treated with APC  Colonoscopy 1/24/17: diverticulosis throughout colon          PTA Medications   Medication Sig    alprazolam (XANAX) 0.5 MG tablet Take 1 tablet (0.5 mg total) by mouth daily as needed.    aspirin 81 mg Tab Take 81 mg by mouth. 1 Tablet Oral Every day    atorvastatin (LIPITOR) 40 MG tablet Take 1 tablet (40 mg total) by mouth every evening.    clopidogrel (PLAVIX) 75 mg tablet Take 75 mg by mouth once daily.     mg capsule TAKE 1 CAPSULE BY MOUTH TWICE DAILY    donepezil (ARICEPT) 10 MG tablet Take 1 tablet (10 mg total) by mouth once daily.    duloxetine (CYMBALTA) 30 MG capsule Take 1 capsule (30 mg total) by mouth once daily.    ferrous sulfate 325 (65 FE) MG EC tablet Take 1 tablet (325 mg total) by mouth 2 (two) times daily.    fluconazole (DIFLUCAN) 100 MG tablet TAKE 1 TABLET(100 MG) BY MOUTH EVERY DAY    hydrALAZINE (APRESOLINE) 25 MG tablet Take 1 tablet (25 mg total) by mouth every 8 (eight) hours.    hydrochlorothiazide (MICROZIDE) 12.5 mg capsule Take 1 capsule (12.5 mg total) by mouth once daily.    hydrocodone-acetaminophen 5-325mg (NORCO) 5-325  mg per tablet Take 1 tablet by mouth every 6 (six) hours as needed for Pain.    isosorbide mononitrate (IMDUR) 30 MG 24 hr tablet Take 1 tablet (30 mg total) by mouth every evening.    losartan (COZAAR) 100 MG tablet Take 1 tablet (100 mg total) by mouth once daily.    pantoprazole (PROTONIX) 40 MG tablet Take 1 tablet (40 mg total) by mouth once daily.    predniSONE (DELTASONE) 10 MG tablet Take 1 tablet (10 mg total) by mouth once daily.    pregabalin (LYRICA) 50 MG capsule Take 1 capsule (50 mg total) by mouth 2 (two) times daily.    tolterodine (DETROL LA) 2 MG Cp24 Take 1 capsule (2 mg total) by mouth once daily.       Review of patient's allergies indicates:   Allergen Reactions    Norvasc  [amlodipine]      Other reaction(s): Edema        Past Medical History:   Diagnosis Date    Adverse effect of oral bisphosphonates     Anemia     Angiodysplasia of stomach: see EGD 1/17 2/13/2017    Anxiety     Aortic atherosclerosis     Benign hypertension 10/5/2015    Carcinoma, renal cell     Carotid arterial disease 1/20/2014    Cerebellar stroke, acute     Chronic kidney disease (CKD), stage I 2/18/2016    Coronary artery disease     Current chronic use of systemic steroids 10/4/2016    Degenerative disc disease     Depression     Diverticulosis     Former smoker 6/13/2016    Gastroesophageal reflux disease without esophagitis 10/5/2015    GERD (gastroesophageal reflux disease)     Goiter, nontoxic, multinodular     IBS (irritable bowel syndrome)     Left ventricular diastolic dysfunction with preserved systolic function 10/5/2015    Lung nodule     right    MGUS (monoclonal gammopathy of unknown significance) 6/13/2016    Myasthenia gravis, adult form     Osteoporosis 8/10/2015    Osteoporosis, unspecified     PAOD (peripheral arterial occlusive disease)     RA (rheumatoid arthritis)     Smoker 1/20/2014    Stroke     Ulcer     Vitamin D deficiency disease 1/20/2014     Past  Surgical History:   Procedure Laterality Date    CATARACT EXTRACTION W/  INTRAOCULAR LENS IMPLANT Bilateral     CHOLECYSTECTOMY      COLONOSCOPY N/A 1/24/2017    Procedure: COLONOSCOPY;  Surgeon: Kristofer Worthington MD;  Location: [facility] ([address] [place] [address]);  Service: Endoscopy;  Laterality: N/A;  for chronic anemia ; needs to be off plavix x 5 days.     Off Plavix since recent hospitalization 1/19/17.    Speaks Cameroonian/needs .    HYSTERECTOMY      KIDNEY SURGERY  2003    NEPHRECTOMY       Family History   Problem Relation Age of Onset    Stroke Mother     Heart disease Father     Alzheimer's disease Sister     Thyroid cancer Neg Hx      Social History   Substance Use Topics    Smoking status: Former Smoker     Quit date: 1/19/2015    Smokeless tobacco: Never Used    Alcohol use No       Review of Systems:  Could not obtain 2/2 patient condition    OBJECTIVE:     Vital Signs (Most Recent)  Temp: 98 °F (36.7 °C) (04/07/17 0700)  Pulse: 65 (04/07/17 0900)  Resp: (!) 26 (04/07/17 0900)  BP: (!) 166/69 (04/07/17 0906)  SpO2: 100 % (04/07/17 0900)    Physical Exam:  General appearance: well developed, well nourished, no acute distress  Eyes: anicteric sclerae  Throat: lips, mucosa, and tongue normal, Mallampati II  Lungs: breath sounds vesicular in nature, air entry equal bilaterally,   Heart: regular rate and rhythm, S1, S2 clearly audible,   Abdomen: soft, non-tender, non-distended; no rebound tenderness, rigidity, or guarding  Pulses: 2+ and symmetric  Skin: Skin color, texture, turgor normal.   Neurologic: No focal deficits noted      Laboratory    CBC:   Recent Labs  Lab 04/06/17  1559 04/07/17  0003 04/07/17  0342 04/07/17  0829   WBC 17.42*  --  12.88* 13.94*   RBC 2.92*  --  3.07* 3.20*   HGB 8.3* 8.6* 8.5* 9.0*   HCT 26.2* 26.4* 26.8* 28.3*  28.3*   *  --  371* 406*   MCV 90  --  87 88   MCH 28.4  --  27.7 28.1   MCHC 31.7*  --  31.7* 31.8*     BMP:   Recent Labs  Lab 04/06/17  1218  04/07/17  0342   GLU 91 129*    139   K 4.3 4.3    115*   CO2 17* 15*   BUN 50* 38*   CREATININE 2.0* 1.2   CALCIUM 7.9* 6.8*   MG  --  2.2     Coagulation:   Recent Labs  Lab 04/06/17  1213   INR 1.0           ASSESSMENT/PLAN:     Hematochezia  - Clinically consistent with diverticular source vs other colonic source.  Unlikely to be an upper GI bleed given a)clinical stability, b)Hb at baseline/higher than previous, c)nonbloody emesis, and d)recent endoscopy showing diminutive nonbleeding AVM treated with APC and colonoscopy showing extensive diverticulosis      Recommendations:   Supportive care  Would not recommend endoscopic intervention at this point, especially given recent Plavix.  Anticipate cessation of bleeding with supportive care.  If bleed recurs and becomes hemodynamically significant, would recommend stat CTA    Pedro Beltran   Gastroenterology Fellow PGY V  [phone]

## 2017-04-07 NOTE — PLAN OF CARE
Problem: Patient Care Overview  Goal: Plan of Care Review  Outcome: Ongoing (interventions implemented as appropriate)  No acute events today.  VSS, afebrile.  Patient German speaking only, daughter and son have remained at bedside today to facilitate translation.  H/H trending up, now 9/28.3.  No BM's/emesis or signs of bleeding.  Protonix discontinued.  Plan of care to step down out of ICU to regular floor today when bed becomes available.  Plan of care discussed with patient and her children at bedside.  All questions/concerns addressed.  Will continue to monitor.    Goal: Individualization & Mutuality  Outcome: Ongoing (interventions implemented as appropriate)  Past Medical History:   Diagnosis Date    Adverse effect of oral bisphosphonates      Anemia      Angiodysplasia of stomach: see EGD 1/17 2/13/2017    Anxiety      Aortic atherosclerosis      Benign hypertension 10/5/2015    Carcinoma, renal cell      Carotid arterial disease 1/20/2014    Cerebellar stroke, acute      Chronic kidney disease (CKD), stage I 2/18/2016    Coronary artery disease      Current chronic use of systemic steroids 10/4/2016    Degenerative disc disease      Depression      Diverticulosis      Former smoker 6/13/2016    Gastroesophageal reflux disease without esophagitis 10/5/2015    GERD (gastroesophageal reflux disease)      Goiter, nontoxic, multinodular      IBS (irritable bowel syndrome)      Left ventricular diastolic dysfunction with preserved systolic function 10/5/2015    Lung nodule       right    MGUS (monoclonal gammopathy of unknown significance) 6/13/2016    Myasthenia gravis, adult form      Osteoporosis 8/10/2015    Osteoporosis, unspecified      PAOD (peripheral arterial occlusive disease)      RA (rheumatoid arthritis)      Smoker 1/20/2014    Stroke      Ulcer      Vitamin D deficiency disease 1/20/2014     Past Surgical History:   Procedure Laterality Date    CATARACT EXTRACTION  W/  INTRAOCULAR LENS IMPLANT Bilateral      CHOLECYSTECTOMY        COLONOSCOPY N/A 1/24/2017     Procedure: COLONOSCOPY;  Surgeon: Kristofer Worthington MD;  Location: [facility] ([address] [place] [address]);  Service: Endoscopy;  Laterality: N/A;  for chronic anemia ; needs to be off plavix x 5 days.      Off Plavix since recent hospitalization 1/19/17.     Speaks Citizen of [place]/needs .    HYSTERECTOMY        KIDNEY SURGERY   2003    NEPHRECTOMY         Diagnosis:  SOB     Patient likes:  Patient Irish speaking only, likes family at bedside to facilitate translation.    Patient likes room warmed with blanket.

## 2017-04-07 NOTE — ASSESSMENT & PLAN NOTE
- CT abdomen 4/6 showed age indeterminate compression fracture of L1 vertebrae with retropulsion of bone fragments causing moderate spinal stenosis  - Possible cause of pt's RLE pain  - home norco PRN and lyrica continued

## 2017-04-07 NOTE — ASSESSMENT & PLAN NOTE
- Home BP meds held yesterday as pt was normotensive w concern for active bleed  - has been hypertensive overnight  - restarting home hydralazine, imdur, and losartan

## 2017-04-07 NOTE — CONSULTS
[facility]  Adult Nutrition  Consult Note    SUMMARY     Recommendations    Recommendation/Intervention:   1. Once medically able, initiate clear liquids and advance as tolerated to Cardiac diet.   2. If unable to start oral diet, recommend parenteral nutrition support (d/t GI bleed). If warranted, recommend 4.25/10 at 55mL/hr with 20% IVFE daily to provide 1173kcal, 56g protein, and 1320mL fluid. Will monitor.   Goals: Pt to receive nutrition within 48hrs.   Nutrition Goal Status: new  Communication of RD Recs: reviewed with RN    Reason for Assessment    Reason for Assessment: nurse/nurse practitioner consult  Diagnosis: other (see comments) (GI hemorrhage )  Relevent Medical History: HTN, HLD, CAD, HF, dementia   Nutrition Discharge Planning: Unclear at this time.     Nutrition Prescription Ordered    Current Diet Order: NPO    Nutrition Risk Screen     Nutrition Risk Screen: reduced oral intake over the last month    Nutrition/Diet History    Typical Food/Fluid Intake: Pt currently NPO.   Factors Affecting Nutritional Intake: NPO    Labs/Tests/Procedures/Meds    Pertinent Labs Reviewed: reviewed  Pertinent Labs Comments: Ca 6.8, Alb 2.1  Pertinent Medications Reviewed: reviewed  Pertinent Medications Comments: prednisone    Physical Findings    Overall Physical Appearance: overweight  Oral/Mouth Cavity: WDL  Skin: intact    Anthropometrics    Height (inches): 57.01 in  Weight Method: Bed Scale  Weight (kg): 56 kg  Ideal Body Weight (IBW), Female: 85.05 lb  % Ideal Body Weight, Female (lb): 145.16   BMI (kg/m2): 26.71  BMI Grade: 25 - 29.9 - overweight    Estimated/Assessed Needs    Weight Used For Calorie Calculations: 56 kg (123 lb 7.3 oz)   Height (cm): 144.8 cm  Energy Need Method: Yates-St Jeor (1.25 PAL: 1087kcal)  RMR (Yates-St. Jeor Equation): 870.48  Weight Used For Protein Calculations: 56 kg (123 lb 7.3 oz)  Protein Requirements: 56-67g (1-1.2g/kg)  Fluid Need Method: RDA Method  (or per MD)    Monitor and Evaluation    Food and Nutrient Intake: energy intake  Food and Nutrient Adminstration: diet order  Anthropometric Measurements: weight, weight change  Biochemical Data, Medical Tests and Procedures: other (specify) (All labs)  Nutrition-Focused Physical Findings: overall appearance    Nutrition Risk    Level of Risk: other (see comments) (2X/week)    Nutrition Follow-Up    RD Follow-up?: Yes    Assessment and Plan    Inadequate energy intake r/t decreased ability to consume adequate energy AEB NPO status, no form of nutrition at this time - new.

## 2017-04-07 NOTE — ASSESSMENT & PLAN NOTE
- baseline creatinine 0.8-1.2   - Cr 2.0 on presentation, now 1.2  - received 2 L fluid in the ED  - US RP showed chronic medical renal disease, acute component could not be ruled out  - will continue to monitor

## 2017-04-07 NOTE — PROGRESS NOTES
In room performing wound care on multiple skin tears. Charge states pt will be transferred to ICU immediately bed ready [id number]. Family at bedside . Son stated he would notify the other family members.

## 2017-04-07 NOTE — PROGRESS NOTES
[facility]  Critical Care Medicine  Progress Note    Patient Name: Danyelle Jacobs  MRN: [id number]  Admission Date: 2017  Hospital Length of Stay: 1 days  Code Status: Full Code  Attending Provider: Joe Yarbrough MD  Primary Care Provider: Bianca Dykes MD   Principal Problem: Gastrointestinal hemorrhage    Subjective:     HPI:  Ms. Jacobs is a 88 F with a history of HTN, HLD, CAD, PAD, HFpEF, dementia, and rheumatoid arthritis who presents to the ED with GI bleeding. Her son reports that her symptoms began yesterday evening with bilious vomiting and diarrhea. Since then, she has had 6 episodes of emesis and 5 episodes of diarrhea which started out as black and became bright red by her last BM which was at 6 AM. She currently denies abdominal pain, SOB, or nausea; however, her son reports that she will usually deny symptoms even if they are present.     She had a recent admission from 3/24- for respiratory failure with hypoxia (presumed 2/2 to PNA, resolved with abx) and RLE pain. Also admitted - and transfused at that time. Patient has a significant history of 'bleeding angiodysplastic lesion found on EGD with hemostasis achieved' performed in 2017. Patient also underwent colonoscopy at the same time which showed 'prominent vessels in the cecum but no definite AVM; raised bluish lesion likely a hematoma in the rectum.' Son reports that she has declined both mentally and physically in the last few months and has become progressively weak, less responsive, and has had hallucinations including speaking to  relatives. Also, she has developed urinary and fecal incontinence recently.       Hospital/ICU Course:  17: Upon initial evaluation patient's initial oxygen requirements appeared to be increasing. ABG was performed which showed adequate oxygenation. Hct was also obtained with the arterial stick which showed a Hct of 19. Based on this result and her presenting  H/H of 8.3/26.2, 1 U PRBCs was ordered. GI and ICU were consulted for further management. Repeat hgb and hemodynamics are stable. Protonix drip started. CT abdomen W/O significant for diverticulosis, adnexal cystic structure, L1 compression fx, and lung opacities (atelectasis v infection/inflammation).    Interval History/Significant Events: NAEON. No further episodes of vomiting or diarrhea. Pt reports having her chronic RLE pain this AM.    Review of Systems  Objective:     Vital Signs (Most Recent):  Temp: 98 °F (36.7 °C) (04/07/17 1100)  Pulse: 62 (04/07/17 1100)  Resp: (!) 22 (04/07/17 1100)  BP: (!) 175/68 (04/07/17 1000)  SpO2: 100 % (04/07/17 1100) Vital Signs (24h Range):  Temp:  [97.6 °F (36.4 °C)-98.6 °F (37 °C)] 98 °F (36.7 °C)  Pulse:  [61-78] 62  Resp:  [16-36] 22  SpO2:  [85 %-100 %] 100 %  BP: (138-200)/(54-82) 175/68   Weight: 56 kg (123 lb 7.3 oz)  Body mass index is 26.72 kg/(m^2).      Intake/Output Summary (Last 24 hours) at 04/07/17 1234  Last data filed at 04/07/17 1100   Gross per 24 hour   Intake              869 ml   Output              150 ml   Net              719 ml       Physical Exam   Constitutional: She appears well-developed and well-nourished. No distress.   HENT:   Head: Normocephalic and atraumatic.   Mucous membranes dry   Eyes: EOM are normal. Pupils are equal, round, and reactive to light. No scleral icterus.   Cardiovascular: Normal rate and regular rhythm.    No murmur heard.  Pulses 1+ bilaterally   Pulmonary/Chest: Effort normal and breath sounds normal. No respiratory distress. She has no wheezes. She has no rales.   Abdominal: Soft. Bowel sounds are normal. She exhibits distension. There is no tenderness.   Musculoskeletal: She exhibits no edema.   RLE pain with movement   Neurological: She is alert. No cranial nerve deficit.   Responsive, answers questions   Skin: Skin is warm and dry. Bruising and ecchymosis noted.       Vents:     Lines/Drains/Airways     Peripheral  Intravenous Line                 Peripheral IV - Single Lumen 04/06/17 Left Antecubital 1 day         Peripheral IV - Single Lumen 04/06/17 Left Hand 1 day              Significant Labs:    CBC/Anemia Profile:    Recent Labs  Lab 04/06/17  1559 04/07/17  0003 04/07/17  0342 04/07/17  0829   WBC 17.42*  --  12.88* 13.94*   HGB 8.3* 8.6* 8.5* 9.0*   HCT 26.2* 26.4* 26.8* 28.3*  28.3*   *  --  371* 406*   MCV 90  --  87 88   RDW 21.7*  --  21.7* 22.1*        Chemistries:    Recent Labs  Lab 04/06/17  1213 04/07/17  0342    139   K 4.3 4.3    115*   CO2 17* 15*   BUN 50* 38*   CREATININE 2.0* 1.2   CALCIUM 7.9* 6.8*   ALBUMIN 2.5* 2.1*   PROT 6.1 5.1*   BILITOT 0.4 0.2   ALKPHOS 121 97   ALT 26 23   AST 35 31   MG  --  2.2   PHOS  --  2.9       POCT Glucose:   Recent Labs  Lab 04/06/17  1802 04/07/17  0002   POCTGLUCOSE 101 216*     Troponin:   Recent Labs  Lab 04/07/17  0342   TROPONINI 0.089*     All pertinent labs within the past 24 hours have been reviewed.    Significant Imaging:  I have reviewed all pertinent imaging results/findings within the past 24 hours.    Assessment/Plan:     Other  * Gastrointestinal hemorrhage  - Patient presenting with complaint of multiple episodes of bloody bowel movements  - Patient's H/H currently stable at 9.0/28.3  - Protonix gtt switched to IV daily  - Consult placed to GI for further investigation; believe bleed is from lower source and no indication for scope at this time  - H/H stable; will check CBCs daily  - Tranfused 1u pRBCs in ED    CAD (coronary artery disease)  - holding home plavix and ASA in the setting of GI bleed  - troponins flat, no ST changes on EKG    Gastroesophageal reflux disease without esophagitis  - protonix gtt transitioned to IV daily    Benign hypertension  - Home BP meds held yesterday as pt was normotensive w concern for active bleed  - has been hypertensive overnight  - restarting home hydralazine, imdur, and losartan    Anemia  associated with acute blood loss  - Transfused 1u pRBCs in ED  - Holding ASA and plavix  - H/H stable; will follow up with CBCs daily  - See GI hemorrhage for further detail    Angiodysplasia of stomach: see EGD 1/17  - EGD: 'bleeding angiodysplastic lesion found with hemostasis achieved'  - Colonoscopy: 'prominent vessels in the cecum but no definite AVM; raised bluish lesion likely a hematoma in the rectum.'  - Multiple episodes of yellow/green emesis w/ five episodes of diarrhea, dark red --> bright red  - Patient currently HDS, H/H stable at this point  - Consult placed to GI given findings on previous EGD and colonoscopy; believe bleeding may be from lower source and related to diverticulosis    PARVEEN (acute kidney injury)  - baseline creatinine 0.8-1.2   - Cr 2.0 on presentation, now 1.2  - received 2 L fluid in the ED  - US RP showed chronic medical renal disease, acute component could not be ruled out  - will continue to monitor    Compression fracture of first lumbar vertebra  - CT abdomen 4/6 showed age indeterminate compression fracture of L1 vertebrae with retropulsion of bone fragments causing moderate spinal stenosis  - Possible cause of pt's RLE pain  - home norco PRN and lyrica continued     Critical Care Medicine Daily Checklist:    A: Awake: RASS Goal/Actual Goal: RASS Goal: 0-->alert and calm  Actual: Moyer Agitation Sedation Scale (RASS): Drowsy   B: Spontaneous Breathing Trial Performed?  n/a   C: SAT & SBT Coordinated?  n/a                      D: Delirium: CAM-ICU Overall CAM-ICU: Positive   E: Early Mobility Performed? No   F: Feeding Goal: Goals: Pt to receive nutrition within 48hrs.   Status: Nutrition Goal Status: new   Current Diet Order   Procedures    Diet NPO      AS: Analgesia/Sedation norco PRN   T: Thromboembolic Prophylaxis mechanical   H: HOB > 300 Yes   U: Stress Ulcer Prophylaxis (if needed) protonix   G: Glucose Control    B: Bowel Function     I: Indwelling Catheter (Lines &  Stephanie) Necessity none   D: De-escalation of Antimicrobials/Pharmacotherapies n/a    Plan for the day/ETD Step down to floor    Code Status:  Family/Goals of Care: Full Code          Raul Nash MD  Critical Care Medicine  [facility]

## 2017-04-07 NOTE — ASSESSMENT & PLAN NOTE
- Patient presenting with complaint of multiple episodes of bloody bowel movements  - Patient's H/H currently stable at 9.0/28.3  - Protonix gtt switched to IV daily  - Consult placed to GI for further investigation; believe bleed is from lower source and no indication for scope at this time  - H/H stable; will check CBCs daily  - Tranfused 1u pRBCs in ED

## 2017-04-07 NOTE — PLAN OF CARE
04/07/17 1133   Discharge Assessment   Assessment Type Discharge Planning Assessment   Confirmed/corrected address and phone number on facesheet? Yes   Assessment information obtained from? Other  (daughter, pt speaks Sri Lankan)   Expected Length of Stay (days) 3   Communicated expected length of stay with patient/caregiver yes   Prior to hospitilization cognitive status: Unable to Assess   Prior to hospitalization functional status: Assistive Equipment;Needs Assistance   Current cognitive status: Unable to Assess   Current Functional Status: Completely Dependent   Arrived From admitted as an inpatient   Lives With child(sherry), adult  (son Andrés, [phone] and wife )   Able to Return to Prior Arrangements yes   Is patient able to care for self after discharge? No   How many people do you have in your home that can help with your care after discharge? 2   Who are your caregiver(s) and their phone number(s)? Andrés and wife, [phone]   Patient's perception of discharge disposition admitted as an inpatient;skilled nursing facility  (daughter states that patient has come back in hospital within a week, she came in walking, and when discharged was needed assistance and  now in a diaper)   Readmission Within The Last 30 Days previous discharge plan unsuccessful  (they stated were told to pick out SNF and they were discharged the next day)   Patient currently being followed by outpatient case management? No   Patient currently receives home health services? Yes   Patient previously received home health services and would like to resume services if necessary? Yes   Does the patient currently use HME? Yes   Patient currently receives private duty nursing? N/A   Patient currently receives any other outside agency services? No   Equipment Currently Used at Home (pt has  a walker)   Do you have any problems affording any of your prescribed medications? No   Is the patient taking medications as prescribed? yes   Do you have any  financial concerns preventing you from receiving the healthcare you need? No   Does the patient have transportation to healthcare appointments? Yes   Transportation Available family or friend will provide   On Dialysis? No   Does the patient receive services at the [facility]? No   Are there any open cases? No   Discharge Plan A Home with family;Home Health   Discharge Plan B Skilled Nursing Facility   Patient/Family In Agreement With Plan yes     Bianca Dykes MD  [address] [facility] [address]  Extended Emergency Contact Information  Primary Emergency Contact: Cindy Jacobs   [facility]  Home Phone: [phone]  Mobile Phone: [phone]  Relation: Relative  Secondary Emergency Contact: Andrés Jacobs Sr  Address: [address] [place] [address] [id number] [place] [address]  Home Phone: [phone]  Mobile Phone: [phone]  Relation: Son  Payor: [employer] MANAGED MEDICARE / Plan: [employer] MEDICARE HMO / Product Type: Capitation /     [facility] [address] [place] [address] JOSE KIMBALL  [address] [place] [address] [place] AT [facility] & [address] [place] [address]  Phone: [phone] Fax: [phone]      Family stated they have a list of SNF's, and will make 3 choices, in the event it is recommended they go to SNF

## 2017-04-07 NOTE — PLAN OF CARE
Problem: Patient Care Overview  Goal: Plan of Care Review  Outcome: Ongoing (interventions implemented as appropriate)  POC reviewed with patient and son at bedside.     Patient arrived from [facility] around 2300 on protonix gtt. Patient if confused but per patient's son, this is her baseline due to dementia. Labs continue to show slightly low H/H but no dramatic changes. All vital signs stable. Continuing to montior closely.     POC is to continue to closely monitor labs and mental status.

## 2017-04-07 NOTE — H&P
[facility]  Critical Care Medicine  History & Physical    Patient Name: Danyelle Jacobs  MRN: [id number]  Admission Date: 2017  Hospital Length of Stay: 0 days  Code Status: Full Code  Attending Physician: Enrique Felix MD   Primary Care Provider: Bianca Dykes MD   Principal Problem: Gastrointestinal hemorrhage    Subjective:     HPI:  Ms. Jacobs is a 88 F with a history of HTN, HLD, CAD, PAD, HFpEF, dementia, and rheumatoid arthritis who presents to the ED with GI bleeding. Her son reports that her symptoms began yesterday evening with bilious vomiting and diarrhea. Since then, she has had 6 episodes of emesis and 5 episodes of diarrhea which started out as black and became bright red by her last BM which was at 6 AM. She currently denies abdominal pain, SOB, or nausea; however, her son reports that she will usually deny symptoms even if they are present.     She had a recent admission from 3/24- for respiratory failure with hypoxia (presumed 2/2 to PNA, resolved with abx) and RLE pain. Also admitted - and transfused at that time. Patient has a significant history of 'bleeding angiodysplastic lesion found on EGD with hemostasis achieved' performed in 2017. Patient also underwent colonoscopy at the same time which showed 'prominent vessels in the cecum but no definite AVM; raised bluish lesion likely a hematoma in the rectum.' Son reports that she has declined both mentally and physically in the last few months and has become progressively weak, less responsive, and has had hallucinations including speaking to  relatives. Also, she has developed urinary and fecal incontinence recently.       Hospital/ICU Course:  17: Upon initial evaluation patient's initial oxygen requirements appeared to be increasing. ABG was performed which showed adequate oxygenation. Hct was also obtained with the arterial stick which showed a Hct of 19. Based on this result and her presenting  H/H of 8.3/26.2, 1 U PRBCs was ordered. GI and ICU were consulted for further management. Repeat hgb and hemodynamics are stable. Protonix drip started. CT abdomen W/O significant for diverticulosis, adnexal cystic structure, L1 compression fx, and lung opacities (atelectasis v infection/inflammation).     Past Medical History:   Diagnosis Date    Adverse effect of oral bisphosphonates     Anemia     Angiodysplasia of stomach: see EGD 1/17 2/13/2017    Anxiety     Aortic atherosclerosis     Benign hypertension 10/5/2015    Carcinoma, renal cell     Carotid arterial disease 1/20/2014    Cerebellar stroke, acute     Chronic kidney disease (CKD), stage I 2/18/2016    Coronary artery disease     Current chronic use of systemic steroids 10/4/2016    Degenerative disc disease     Depression     Diverticulosis     Former smoker 6/13/2016    Gastroesophageal reflux disease without esophagitis 10/5/2015    GERD (gastroesophageal reflux disease)     Goiter, nontoxic, multinodular     IBS (irritable bowel syndrome)     Left ventricular diastolic dysfunction with preserved systolic function 10/5/2015    Lung nodule     right    MGUS (monoclonal gammopathy of unknown significance) 6/13/2016    Myasthenia gravis, adult form     Osteoporosis 8/10/2015    Osteoporosis, unspecified     PAOD (peripheral arterial occlusive disease)     RA (rheumatoid arthritis)     Smoker 1/20/2014    Stroke     Ulcer     Vitamin D deficiency disease 1/20/2014       Past Surgical History:   Procedure Laterality Date    CATARACT EXTRACTION W/  INTRAOCULAR LENS IMPLANT Bilateral     CHOLECYSTECTOMY      COLONOSCOPY N/A 1/24/2017    Procedure: COLONOSCOPY;  Surgeon: Kristofer Worthington MD;  Location: [address]);  Service: Endoscopy;  Laterality: N/A;  for chronic anemia ; needs to be off plavix x 5 days.     Off Plavix since recent hospitalization 1/19/17.    Speaks Burmese/needs .    HYSTERECTOMY       KIDNEY SURGERY  2003    NEPHRECTOMY         Review of patient's allergies indicates:   Allergen Reactions    Norvasc  [amlodipine]      Other reaction(s): Edema       Family History     Problem Relation (Age of Onset)    Alzheimer's disease Sister    Heart disease Father    Stroke Mother        Social History Main Topics    Smoking status: Former Smoker     Quit date: 1/19/2015    Smokeless tobacco: Never Used    Alcohol use No    Drug use: No    Sexual activity: No      Review of Systems   Unable to perform ROS: Dementia     Objective:     Vital Signs (Most Recent):  Temp: 98.4 °F (36.9 °C) (04/06/17 1648)  Pulse: 66 (04/06/17 1800)  Resp: (!) 24 (04/06/17 1800)  BP: (!) 160/69 (04/06/17 1800)  SpO2: 100 % (04/06/17 1800) Vital Signs (24h Range):  Temp:  [97.6 °F (36.4 °C)-99.1 °F (37.3 °C)] 98.4 °F (36.9 °C)  Pulse:  [66-93] 66  Resp:  [18-28] 24  SpO2:  [88 %-100 %] 100 %  BP: (127-173)/(54-70) 160/69      There is no height or weight on file to calculate BMI.      Intake/Output Summary (Last 24 hours) at 04/06/17 1843  Last data filed at 04/06/17 1617   Gross per 24 hour   Intake              264 ml   Output              150 ml   Net              114 ml       Physical Exam   Constitutional: She appears well-developed and well-nourished. She appears distressed.   HENT:   Head: Normocephalic and atraumatic.   Mucous membranes dry   Eyes: EOM are normal. Pupils are equal, round, and reactive to light. No scleral icterus.   Cardiovascular: Normal rate and regular rhythm.    No murmur heard.  Pulses 1+ bilaterally   Pulmonary/Chest: Effort normal and breath sounds normal. No respiratory distress. She has no wheezes. She has no rales.   Abdominal: Soft. Bowel sounds are normal. She exhibits distension. There is tenderness.   Musculoskeletal: She exhibits no edema or tenderness.   Neurological: She is alert. No cranial nerve deficit.   Responsive, answers some questions on exam   Skin: Skin is warm and dry.  Bruising and ecchymosis noted.          Lines/Drains/Airways          No matching active lines, drains, or airways        Significant Labs:    CBC/Anemia Profile:    Recent Labs  Lab 04/06/17  1213 04/06/17  1509 04/06/17  1559   WBC 13.75*  --  17.42*   HGB 8.3*  --  8.3*   HCT 26.1* 19* 26.2*   *  --  389*   MCV 89  --  90   RDW 21.8*  --  21.7*        Chemistries:    Recent Labs  Lab 04/06/17  1213      K 4.3      CO2 17*   BUN 50*   CREATININE 2.0*   CALCIUM 7.9*   ALBUMIN 2.5*   PROT 6.1   BILITOT 0.4   ALKPHOS 121   ALT 26   AST 35       ABGs:   Recent Labs  Lab 04/06/17  1509   PH 7.335*   PCO2 33.1*   HCO3 17.7*   POCSATURATED 99   BE -8     Coagulation:   Recent Labs  Lab 04/06/17  1213   INR 1.0     Lactic Acid:   Recent Labs  Lab 04/06/17  1253   LACTATE 1.6     Troponin:   Recent Labs  Lab 04/06/17  1806   TROPONINI 0.071*     Urine Studies:   Recent Labs  Lab 04/06/17  1316   COLORU Yellow   APPEARANCEUA Hazy*   PHUR 5.0   SPECGRAV 1.020   PROTEINUA Negative   GLUCUA Negative   KETONESU Negative   BILIRUBINUA Negative   OCCULTUA Negative   NITRITE Negative   UROBILINOGEN Negative   LEUKOCYTESUR Negative     All pertinent labs within the past 24 hours have been reviewed.    Significant Imaging: I have reviewed all pertinent imaging results/findings within the past 24 hours.    Assessment/Plan:     Other  * Gastrointestinal hemorrhage  - Patient presenting with complaint of multiple episodes of bloody bowel movements  - Patient's H/H currently stable at 8.3/26.2  - Likely a lower GI bleed given stable H/H but cannot rule out brisk upper GI bleed given findings on previous EGD and melena  - Protonix 80 mg IV x1 in ED; protonix gtt started  - Consult placed to GI for further investigation  - Will check CBCs Q8H  - Tranfusing 1u pRBCs    - Will be placed in the ICU overnight for closer observation    CAD (coronary artery disease)  - holding home plavix and ASA in the setting of GI bleed  -  troponins trending down    Gastroesophageal reflux disease without esophagitis  - Protonix 80 mg IV x1 in ED, now on protonix gtt    Benign hypertension  - Holding home antihypertensives at this time 2/2 to concern for GI bleed  - Will continue when patient is HDS    Anemia associated with acute blood loss  - Transfusing 1u pRBCs in ED  - Holding ASA and plavix  - Will follow up with CBCs q8  - See GI hemorrhage for further detail    Angiodysplasia of stomach: see EGD 1/17  - EGD: 'bleeding angiodysplastic lesion found with hemostasis achieved'  - Colonoscopy: 'prominent vessels in the cecum but no definite AVM; raised bluish lesion likely a hematoma in the rectum.'  - Multiple episodes of yellow/green emesis w/ five episodes of diarrhea, dark red --> bright red  - Patient currently HDS, H/H stable at this point  - Consult placed to GI given findings on previous EGD and colonoscopy    PARVEEN (acute kidney injury)  - baseline creatinine 0.8-1.2, now 2.0  - received 2 L fluid in the ED  - urine studies and retroperitoneal US ordered  - will continue to monitor      Critical Care Medicine Daily Checklist:    A: Awake: RASS Goal/Actual Goal:  0  Actual:  -1   B: Spontaneous Breathing Trial Performed?  n/a   C: SAT & SBT Coordinated?  n/a                      D: Delirium: CAM-ICU     E: Early Mobility Performed? No   F: Feeding Goal:    Status:     Current Diet Order   Procedures    Diet NPO      AS: Analgesia/Sedation none   T: Thromboembolic Prophylaxis mechanical   H: HOB > 300 Yes   U: Stress Ulcer Prophylaxis (if needed) protonix   G: Glucose Control SSI   B: Bowel Function     I: Indwelling Catheter (Lines & Brown) Necessity Peripheral IV   D: De-escalation of Antimicrobials/Pharmacotherapies n/a    Plan for the day/ETD GI consult, RBC txfusion, CBCs, protonix    Code Status:  Family/Goals of Care: Full Code       Raul Nash MD  Critical Care Medicine  [facility]

## 2017-04-07 NOTE — ED NOTES
Pt resting in bed, VSS, no signs or symptoms of distress noted. Pt denying pain at this time. Bed low, locked, call bell in reach. Family at bedside.  Will continue to assess.

## 2017-04-08 LAB
ALBUMIN SERPL BCP-MCNC: 2.1 G/DL
ALP SERPL-CCNC: 95 U/L
ALT SERPL W/O P-5'-P-CCNC: 19 U/L
ANION GAP SERPL CALC-SCNC: 8 MMOL/L
AST SERPL-CCNC: 22 U/L
BASOPHILS # BLD AUTO: 0.03 K/UL
BASOPHILS NFR BLD: 0.2 %
BILIRUB SERPL-MCNC: 0.3 MG/DL
BUN SERPL-MCNC: 26 MG/DL
CALCIUM SERPL-MCNC: 7.1 MG/DL
CHLORIDE SERPL-SCNC: 113 MMOL/L
CO2 SERPL-SCNC: 19 MMOL/L
CREAT SERPL-MCNC: 0.9 MG/DL
DIFFERENTIAL METHOD: ABNORMAL
EOSINOPHIL # BLD AUTO: 0.1 K/UL
EOSINOPHIL NFR BLD: 0.8 %
ERYTHROCYTE [DISTWIDTH] IN BLOOD BY AUTOMATED COUNT: 22.5 %
EST. GFR  (AFRICAN AMERICAN): >60 ML/MIN/1.73 M^2
EST. GFR  (NON AFRICAN AMERICAN): 57.3 ML/MIN/1.73 M^2
GLUCOSE SERPL-MCNC: 65 MG/DL
HCT VFR BLD AUTO: 27.7 %
HGB BLD-MCNC: 8.5 G/DL
LYMPHOCYTES # BLD AUTO: 0.6 K/UL
LYMPHOCYTES NFR BLD: 4.5 %
MAGNESIUM SERPL-MCNC: 2.3 MG/DL
MCH RBC QN AUTO: 27.3 PG
MCHC RBC AUTO-ENTMCNC: 30.7 %
MCV RBC AUTO: 89 FL
MONOCYTES # BLD AUTO: 1 K/UL
MONOCYTES NFR BLD: 7.4 %
NEUTROPHILS # BLD AUTO: 11.8 K/UL
NEUTROPHILS NFR BLD: 86.5 %
PHOSPHATE SERPL-MCNC: 1.9 MG/DL
PLATELET # BLD AUTO: 373 K/UL
PMV BLD AUTO: 11.1 FL
POCT GLUCOSE: 129 MG/DL (ref 70–110)
POCT GLUCOSE: 72 MG/DL (ref 70–110)
POCT GLUCOSE: 89 MG/DL (ref 70–110)
POTASSIUM SERPL-SCNC: 3.8 MMOL/L
PROT SERPL-MCNC: 5 G/DL
RBC # BLD AUTO: 3.11 M/UL
SODIUM SERPL-SCNC: 140 MMOL/L
WBC # BLD AUTO: 13.68 K/UL

## 2017-04-08 PROCEDURE — C9113 INJ PANTOPRAZOLE SODIUM, VIA: HCPCS | Performed by: STUDENT IN AN ORGANIZED HEALTH CARE EDUCATION/TRAINING PROGRAM

## 2017-04-08 PROCEDURE — 11000001 HC ACUTE MED/SURG PRIVATE ROOM

## 2017-04-08 PROCEDURE — 25000003 PHARM REV CODE 250: Performed by: STUDENT IN AN ORGANIZED HEALTH CARE EDUCATION/TRAINING PROGRAM

## 2017-04-08 PROCEDURE — 85025 COMPLETE CBC W/AUTO DIFF WBC: CPT

## 2017-04-08 PROCEDURE — 84100 ASSAY OF PHOSPHORUS: CPT

## 2017-04-08 PROCEDURE — 63600175 PHARM REV CODE 636 W HCPCS: Performed by: STUDENT IN AN ORGANIZED HEALTH CARE EDUCATION/TRAINING PROGRAM

## 2017-04-08 PROCEDURE — 97162 PT EVAL MOD COMPLEX 30 MIN: CPT

## 2017-04-08 PROCEDURE — 36415 COLL VENOUS BLD VENIPUNCTURE: CPT

## 2017-04-08 PROCEDURE — 99232 SBSQ HOSP IP/OBS MODERATE 35: CPT | Mod: GC,,, | Performed by: HOSPITALIST

## 2017-04-08 PROCEDURE — 97165 OT EVAL LOW COMPLEX 30 MIN: CPT

## 2017-04-08 PROCEDURE — 83735 ASSAY OF MAGNESIUM: CPT

## 2017-04-08 PROCEDURE — 80053 COMPREHEN METABOLIC PANEL: CPT

## 2017-04-08 RX ORDER — NAPROXEN SODIUM 220 MG/1
81 TABLET, FILM COATED ORAL DAILY
Status: DISCONTINUED | OUTPATIENT
Start: 2017-04-09 | End: 2017-04-17

## 2017-04-08 RX ORDER — SODIUM,POTASSIUM PHOSPHATES 280-250MG
2 POWDER IN PACKET (EA) ORAL ONCE
Status: COMPLETED | OUTPATIENT
Start: 2017-04-08 | End: 2017-04-08

## 2017-04-08 RX ORDER — PANTOPRAZOLE SODIUM 40 MG/1
40 TABLET, DELAYED RELEASE ORAL DAILY
Status: DISCONTINUED | OUTPATIENT
Start: 2017-04-08 | End: 2017-04-24 | Stop reason: HOSPADM

## 2017-04-08 RX ADMIN — DONEPEZIL HYDROCHLORIDE 10 MG: 10 TABLET, FILM COATED ORAL at 08:04

## 2017-04-08 RX ADMIN — HYDRALAZINE HYDROCHLORIDE 25 MG: 25 TABLET, FILM COATED ORAL at 02:04

## 2017-04-08 RX ADMIN — DULOXETINE 30 MG: 30 CAPSULE, DELAYED RELEASE ORAL at 08:04

## 2017-04-08 RX ADMIN — SODIUM CHLORIDE 500 ML: 0.9 INJECTION, SOLUTION INTRAVENOUS at 02:04

## 2017-04-08 RX ADMIN — LOSARTAN POTASSIUM 100 MG: 50 TABLET, FILM COATED ORAL at 08:04

## 2017-04-08 RX ADMIN — HYDRALAZINE HYDROCHLORIDE 25 MG: 25 TABLET, FILM COATED ORAL at 05:04

## 2017-04-08 RX ADMIN — DIBASIC SODIUM PHOSPHATE, MONOBASIC POTASSIUM PHOSPHATE AND MONOBASIC SODIUM PHOSPHATE 2 PACKET: 852; 155; 130 TABLET ORAL at 10:04

## 2017-04-08 RX ADMIN — HYDROCODONE BITARTRATE AND ACETAMINOPHEN 1 TABLET: 5; 325 TABLET ORAL at 08:04

## 2017-04-08 RX ADMIN — PREGABALIN 50 MG: 50 CAPSULE ORAL at 08:04

## 2017-04-08 RX ADMIN — HYDRALAZINE HYDROCHLORIDE 25 MG: 25 TABLET, FILM COATED ORAL at 09:04

## 2017-04-08 RX ADMIN — PREGABALIN 50 MG: 50 CAPSULE ORAL at 09:04

## 2017-04-08 RX ADMIN — HYDROCODONE BITARTRATE AND ACETAMINOPHEN 1 TABLET: 5; 325 TABLET ORAL at 11:04

## 2017-04-08 RX ADMIN — PANTOPRAZOLE SODIUM 40 MG: 40 INJECTION, POWDER, FOR SOLUTION INTRAVENOUS at 08:04

## 2017-04-08 RX ADMIN — ISOSORBIDE MONONITRATE 30 MG: 30 TABLET, EXTENDED RELEASE ORAL at 09:04

## 2017-04-08 RX ADMIN — HYDROCODONE BITARTRATE AND ACETAMINOPHEN 1 TABLET: 5; 325 TABLET ORAL at 03:04

## 2017-04-08 RX ADMIN — PREDNISONE 10 MG: 10 TABLET ORAL at 08:04

## 2017-04-08 NOTE — ASSESSMENT & PLAN NOTE
- Patient presenting with complaint of multiple episodes of blood bowel movements  - Patient's H//H currently stable at 8.3//26.2  -s/p PPI gtt -> transitioned to PO pantoprazole  - GI consulted, recommended OP follow up  -CT abdo revealing diverticulosis, cystic mass in L adnexa (can be followed up with Pelvic U/S as OP)  - no further episodes of hematochezia

## 2017-04-08 NOTE — PLAN OF CARE
Problem: Patient Care Overview  Goal: Plan of Care Review  Outcome: Ongoing (interventions implemented as appropriate)  POC reviewed with pt and family at 1400. Wound care, blood sugar and blood pressure management.  Family verbalized understanding. Questions and concerns addressed. No acute events today. Pt progressing toward goals. Will continue to monitor. See flowsheets for full assessment and VS info.

## 2017-04-08 NOTE — PT/OT/SLP EVAL
Occupational Therapy  Evaluation    Danyelle Jacobs   MRN: [id number]   Admitting Diagnosis: Gastrointestinal hemorrhage    OT Date of Treatment: 04/08/17   OT Start Time: 0927  OT Stop Time: 0950  OT Total Time (min): 23 min    Billable Minutes:  Evaluation 23    Diagnosis: Gastrointestinal hemorrhage   L1 fx; RLE pain    Past Medical History:   Diagnosis Date    Adverse effect of oral bisphosphonates     Anemia     Angiodysplasia of stomach: see EGD 1/17 2/13/2017    Anxiety     Aortic atherosclerosis     Benign hypertension 10/5/2015    Carcinoma, renal cell     Carotid arterial disease 1/20/2014    Cerebellar stroke, acute     Chronic kidney disease (CKD), stage I 2/18/2016    Coronary artery disease     Current chronic use of systemic steroids 10/4/2016    Degenerative disc disease     Depression     Diverticulosis     Former smoker 6/13/2016    Gastroesophageal reflux disease without esophagitis 10/5/2015    GERD (gastroesophageal reflux disease)     Goiter, nontoxic, multinodular     IBS (irritable bowel syndrome)     Left ventricular diastolic dysfunction with preserved systolic function 10/5/2015    Lung nodule     right    MGUS (monoclonal gammopathy of unknown significance) 6/13/2016    Myasthenia gravis, adult form     Osteoporosis 8/10/2015    Osteoporosis, unspecified     PAOD (peripheral arterial occlusive disease)     RA (rheumatoid arthritis)     Smoker 1/20/2014    Stroke     Ulcer     Vitamin D deficiency disease 1/20/2014      Past Surgical History:   Procedure Laterality Date    CATARACT EXTRACTION W/  INTRAOCULAR LENS IMPLANT Bilateral     CHOLECYSTECTOMY      COLONOSCOPY N/A 1/24/2017    Procedure: COLONOSCOPY;  Surgeon: Kristofer Worthington MD;  Location: [address]);  Service: Endoscopy;  Laterality: N/A;  for chronic anemia ; needs to be off plavix x 5 days.     Off Plavix since recent hospitalization 1/19/17.    Speaks Kazakh/needs .     HYSTERECTOMY      KIDNEY SURGERY  2003    NEPHRECTOMY         Referring physician: Juan  Date referred to OT: 4/7/17    General Precautions: Standard, fall, aspiration  Orthopedic Precautions:    Braces:            Patient History:  Living Environment  Lives With: child(sherry), adult  Living Arrangements: house  Home Layout: Able to live on 1st floor  Living Environment Comment: Pt lives with son and his wife who give assistance with ADLs as she has been declining since recent D/C from [facility] on 4/01.  Equipment Currently Used at Home: wheelchair, walker, rolling, shower chair    Prior level of function:   Bed Mobility/Transfers: needs assist  Grooming: needs assist  Bathing: needs assist  Upper Body Dressing: needs assist  Lower Body Dressing: needs assist  Toileting: needs assist  Home Management Skills: unable to perform  Homemaking Responsibilities: No     Dominant hand: right    Subjective:  Communicated with nurse prior to session.  Son present bedside to provide history.   Chief Complaint: RLE pain  Patient/Family stated goals: To get back independence.    Pain Rating: other (see comments) (Pt moaning with RLE pain especially with mvt but unable to give a number)  Location - Side: Right  Location - Orientation: generalized  Location: leg  Pain Addressed: Pre-medicate for activity       Objective:  Patient found with: bed alarm, telemetry    Cognitive Exam:  Oriented to: Person  Follows Commands/attention: Easily distracted due to LE pain.  Communication: Speaks limited english - son able to translate.  Memory:  No Deficits noted  Safety awareness/insight to disability: impaired  Coping skills/emotional control: Appropriate to situation    Visual/perceptual:  Intact    Physical Exam:  Postural examination/scapula alignment: Lateral weight shift of hips  Skin integrity: Bruising of BLEs  Edema: None noted     Sensation:   Intact    Upper Extremity Range of Motion:  Right Upper Extremity: WFL  Left Upper Extremity:  "WFL    Upper Extremity Strength:  Right Upper Extremity: WFL, 4/5 throughout  Left Upper Extremity: WFL, 4/5 throughout   Strength: WFL    Fine motor coordination:   Intact    Gross motor coordination: WFL    Functional Mobility:  Bed Mobility:  Rolling/Turning to Left: Maximum assistance  Scooting/Bridging: Maximum Assistance  Supine to Sit: Maximum Assistance  Sit to Supine: Maximum Assistance    Transfers:       Functional Ambulation: Did not occur    Activities of Daily Living:  Feeding Level of Assistance: Activity did not occur  UE Dressing Level of Assistance: Activity did not occur  LE Dressing Level of Assistance: Activity did not occur  Toileting Level of Assistance: Activity did not occur     Bathing Level of Assistance: Activity did not occur    Balance:   Static Sit: POOR+: Needs MINIMAL assist to maintain  Dynamic Sit: 0: N/A  Static Stand: 0: Needs MAXIMAL assist to maintain   Dynamic stand: 0: N/A    Therapeutic Activities and Exercises:  Bed mobility; discussed POC and D/C options with son;  Assessed UE ROm/strength    AM-PAC 6 CLICK ADL  How much help from another person does this patient currently need?  1 = Unable, Total/Dependent Assistance  2 = A lot, Maximum/Moderate Assistance  3 = A little, Minimum/Contact Guard/Supervision  4 = None, [place]/Independent    Putting on and taking off regular lower body clothing? : 2  Bathing (including washing, rinsing, drying)?: 2  Toileting, which includes using toilet, bedpan, or urinal? : 1  Putting on and taking off regular upper body clothing?: 2  Taking care of personal grooming such as brushing teeth?: 3  Eating meals?: 3  Total Score: 13    AM-PAC Raw Score CMS "G-Code Modifier Level of Impairment Assistance   6 % Total / Unable   7 - 9 CM 80 - 100% Maximal Assist   10 - 14 CL 60 - 80% Moderate Assist   15 - 19 CK 40 - 60% Moderate Assist   20 - 22 CJ 20 - 40% Minimal Assist   23 CI 1-20% SBA / CGA   24 CH 0% Independent/ Mod " I       Patient left supine with call button in reach and son present    Assessment:  Danyelle Jacobs is a 88 y.o. female with a medical diagnosis of Gastrointestinal hemorrhage and presents with decreased cognition and significantly decreased ability to participate in ADLs or mobility. Son is present who states that after the recent [place] [facility] [place], pt has been quickly declining functionally - unable to perform self care tasks or ambulate without assistance. He also states that her cognition has become worse as well. Pt was difficult to sit EOB upright this session due to intense RLE pain so ADLs were not assessed.  Pt. currently has decreased (I) in multiple ADL areas, functional mobility & t/fs as well as decreased overall strength, ROM endurance and balance and would benefit from IP OT to address these deficits and to facilitate improving (I) with daily tasks.    Rehab identified problem list/impairments: Rehab identified problem list/impairments: weakness, impaired endurance, impaired self care skills, impaired functional mobilty, gait instability, decreased lower extremity function, decreased upper extremity function, decreased coordination, impaired cognition, impaired balance, decreased safety awareness, pain    Rehab potential is fair.    Activity tolerance: Poor    Discharge recommendations: Discharge Facility/Level Of Care Needs: nursing facility, skilled     Barriers to discharge: Barriers to Discharge: Decreased caregiver support    Equipment recommendations: bath bench, bedside commode     GOALS:   Occupational Therapy Goals        Problem: Occupational Therapy Goal    Goal Priority Disciplines Outcome Interventions   Occupational Therapy Goal     OT, PT/OT Ongoing (interventions implemented as appropriate)              PLAN:  Patient to be seen 5 x/week to address the above listed problems via self-care/home management, therapeutic activities, therapeutic exercises  Plan of Care expires:  05/08/17  Plan of Care reviewed with: patient, caregiver, son         MARIA T Meyers  04/08/2017

## 2017-04-08 NOTE — ASSESSMENT & PLAN NOTE
- PARVEEN on CKD I  - Hx of loose bloody bowel movements with episodes of vomiting   - Likely prerenal in etiology with a component on hypoxia  - s/p 2L of NS with subsequent resolution of PARVEEN

## 2017-04-08 NOTE — PROGRESS NOTES
[facility] Hospital Medicine  Progress Note    Patient Name: Danyelle Jacobs  MRN: [id number]  Patient Class: IP- Inpatient   Admission Date: 2017  Length of Stay: 2 days  Attending Physician: Pilar Wong MD  Primary Care Provider: Bianca Dykes MD    Hospital Medicine Team: [facility] MAURICIO Long    Subjective:     Principal Problem:Gastrointestinal hemorrhage    HPI:  Ms. Jacobs is a 88 F with a history of HTN, HLD, CAD, PAD, HFpEF, dementia, and rheumatoid arthritis who presents to the ED with GI bleeding. Her son reports that her symptoms began yesterday evening with bilious vomiting and diarrhea. Since then, she has had 6 episodes of emesis and 5 episodes of diarrhea which started out as black and became bright red by her last BM which was at 6 AM. She currently denies abdominal pain, SOB, or nausea; however, her son reports that she will usually deny symptoms even if they are present.     She had a recent admission from 3/24- for respiratory failure with hypoxia (presumed 2/2 to PNA, resolved with abx) and RLE pain. Also admitted - and transfused at that time. Patient has a significant history of 'bleeding angiodysplastic lesion found on EGD with hemostasis achieved' performed in 2017. Patient also underwent colonoscopy at the same time which showed 'prominent vessels in the cecum but no definite AVM; raised bluish lesion likely a hematoma in the rectum.' Son reports that she has declined both mentally and physically in the last few months and has become progressively weak, less responsive, and has had hallucinations including speaking to  relatives. Also, she has developed urinary and fecal incontinence recently.       Hospital Course:  17: Upon initial evaluation patient's initial oxygen requirements appeared to be increasing. ABG was performed which showed adequate oxygenation. Hct was also obtained with the arterial stick which showed a  Hct of 19. Based on this result and her presenting H/H of 8.3/26.2, 1 U PRBCs was ordered and received 2L of NS. GI and ICU were consulted for further management. Repeat hgb and hemodynamics are stable. Protonix drip started. CT abdomen W/O significant for diverticulosis, adnexal cystic structure, L1 compression fx, and lung opacities (atelectasis v infection/inflammation).    4/7: Hgb stable, no further episodes of vomiting or diarrhea. Pt c/o RLE pain. Hypertensive to 200 systolic, restarting home BP meds stepwise. GI recs supportive care. PARVEEN improved.     4/8: stepped down to hospital medicine. Hemodynamically stable.       Interval History: NAEON. Stepped down to hospital medicine this morning. Per son at bedside, no further BMs since Thursday. Patient denied abdo pain, CP, palpitations, and SOB. PTOT to evaluate patient today. Diet started.     Review of Systems   Respiratory: Negative for cough.    Cardiovascular: Negative for chest pain and palpitations.   Gastrointestinal: Negative for abdominal pain, blood in stool, diarrhea and nausea.     Objective:     Vital Signs (Most Recent):  Temp: 98.4 °F (36.9 °C) (04/08/17 0722)  Pulse: 71 (04/08/17 0722)  Resp: 18 (04/08/17 0722)  BP: (!) 122/57 (04/08/17 0722)  SpO2: (!) 94 % (04/08/17 0722) Vital Signs (24h Range):  Temp:  [98 °F (36.7 °C)-98.5 °F (36.9 °C)] 98.4 °F (36.9 °C)  Pulse:  [60-71] 71  Resp:  [16-35] 18  SpO2:  [94 %-100 %] 94 %  BP: (122-191)/(57-75) 122/57     Weight: 56 kg (123 lb 7.3 oz)  Body mass index is 26.72 kg/(m^2).    Intake/Output Summary (Last 24 hours) at 04/08/17 0917  Last data filed at 04/07/17 1300   Gross per 24 hour   Intake               80 ml   Output                0 ml   Net               80 ml      Physical Exam   Constitutional: No distress.   HENT:   Head: Normocephalic and atraumatic.   On NC     Eyes: Right eye exhibits no discharge. Left eye exhibits no discharge. No scleral icterus.   Cardiovascular: Normal rate and  regular rhythm.    Pulmonary/Chest: Effort normal and breath sounds normal.   Abdominal: Soft. Bowel sounds are normal. There is no tenderness.   Neurological: She is alert.   Skin: Skin is warm and dry.       Significant Labs:   CBC:   Recent Labs  Lab 04/07/17  0342 04/07/17  0829 04/08/17  0617   WBC 12.88* 13.94* 13.68*   HGB 8.5* 9.0* 8.5*   HCT 26.8* 28.3*  28.3* 27.7*   * 406* 373*     CMP:   Recent Labs  Lab 04/06/17  1213 04/07/17  0342 04/08/17  0617    139 140   K 4.3 4.3 3.8    115* 113*   CO2 17* 15* 19*   GLU 91 129* 65*   BUN 50* 38* 26*   CREATININE 2.0* 1.2 0.9   CALCIUM 7.9* 6.8* 7.1*   PROT 6.1 5.1* 5.0*   ALBUMIN 2.5* 2.1* 2.1*   BILITOT 0.4 0.2 0.3   ALKPHOS 121 97 95   AST 35 31 22   ALT 26 23 19   ANIONGAP 14 9 8   EGFRNONAA 21.8* 40.4* 57.3*     Lactic Acid:   Recent Labs  Lab 04/06/17  1253   LACTATE 1.6       Significant Imaging: I have reviewed all pertinent imaging results/findings within the past 24 hours.    Assessment/Plan:      * Gastrointestinal hemorrhage  - Patient presenting with complaint of multiple episodes of blood bowel movements  - Patient's H//H currently stable at 8.3//26.2  -s/p PPI gtt -> transitioned to PO pantoprazole  - GI consulted, recommended OP follow up  -CT abdo revealing diverticulosis, cystic mass in L adnexa (will need TVUS - son requested this be done on Monday)  - no further episodes of hematochezia    CAD (coronary artery disease)  - ASA 81 mg PO daily  - Plavix 75 mg PO daily  - no signs of further bleeding, Hb stable. Will consider restarting today.     Mixed hyperlipidemia  - Lipitor 40 mg PO daily    Benign hypertension  - will continue home meds as patient has not exhibited further bleed/hemodynamic instability    Anemia associated with acute blood loss  - s/p 1u pRBCs on admission given Hx of CAD and troponinemia (likely demand ischemia)  -H/H largely stable  - See GI hemorrhage for further detail  -leukocytosis improved with  fluid and blood, likely 2/2 to blood loss/volume contraction    Angiodysplasia of stomach: see EGD 1/17  - EGD1/2017: 'bleeding angiodysplastic lesion found with hemostasis achieved'  - Colonoscopy: 'prominent vessels in the cecum but no definite AVM; raised bluish lesion likely a hematoma in the rectum.'  - Multiple episodes of yellow/green emesis w/ five episodes of diarrhea, dark red --> bright red  - Patient currently HDS, H/H stable at this point  - GI consulted, recommend OP follow up    PARVEEN (acute kidney injury)  - PARVEEN on CKD I  - Hx of loose bloody bowel movements with episodes of vomiting   - Likely prerenal in etiology with a component on hypoxia  - s/p 2L of NS with subsequent resolution of PARVEEN    R hip pain  -likely 2/2 to neuropathic pain/sciatica in setting of lumbar compression fracture seen on CT.   -PTOT  -continue lyrica    VTE Risk Mitigation         Ordered     Medium Risk of VTE  Once      04/06/17 1535     Place ISHMAEL hose  Until discontinued      04/06/17 1535     Reason for No Pharmacological VTE Prophylaxis  Once      04/06/17 1535     Place sequential compression device  Until discontinued      04/06/17 1504          MAURICIO Long  Department of Hospital Medicine   [facility]

## 2017-04-08 NOTE — ASSESSMENT & PLAN NOTE
- s/p 1u pRBCs on admission given Hx of CAD and troponinemia (likely demand ischemia)  -H/H largely stable  - See GI hemorrhage for further detail

## 2017-04-08 NOTE — PLAN OF CARE
Problem: Physical Therapy Goal  Goal: Physical Therapy Goal  Goals to be met by: 4/15/2017     Patient will increase functional independence with mobility by performin. Supine to sit with MInimal Assistance  2. Sit to supine with MInimal Assistance  3. Sit to stand transfer with Moderate Assistance  4. Bed to chair transfer with Moderate Assistance using AD or No AD  5. Gait x15 feet with Moderate Assistance using AD or No AD  6. Sitting at edge of bed x15 minutes with Contact Guard Assistance  7. Stand for x2 minutes with Moderate Assistance using AD or No AD  8. Lower extremity exercise program x15 reps per handout, with assistance as needed  Outcome: Ongoing (interventions implemented as appropriate)     PT Evaluation complete. Recommending SNF in [place] upon D/C. Pt's family interested in long term care if necessary.     Bekah Prescott, PT, DPT  [phone]  10/5/2016

## 2017-04-08 NOTE — PT/OT/SLP EVAL
Physical Therapy  Evaluation    Danyelle Jacobs   MRN: [id number]   Admitting Diagnosis: Gastrointestinal hemorrhage    PT Received On: 04/08/17  PT Start Time: 1405     PT Stop Time: 1423    PT Total Time (min): 18 min       Billable Minutes:  Evaluation 15    Diagnosis: Gastrointestinal hemorrhage    Past Medical History:   Diagnosis Date    Adverse effect of oral bisphosphonates     Anemia     Angiodysplasia of stomach: see EGD 1/17 2/13/2017    Anxiety     Aortic atherosclerosis     Benign hypertension 10/5/2015    Carcinoma, renal cell     Carotid arterial disease 1/20/2014    Cerebellar stroke, acute     Chronic kidney disease (CKD), stage I 2/18/2016    Coronary artery disease     Current chronic use of systemic steroids 10/4/2016    Degenerative disc disease     Depression     Diverticulosis     Former smoker 6/13/2016    Gastroesophageal reflux disease without esophagitis 10/5/2015    GERD (gastroesophageal reflux disease)     Goiter, nontoxic, multinodular     IBS (irritable bowel syndrome)     Left ventricular diastolic dysfunction with preserved systolic function 10/5/2015    Lung nodule     right    MGUS (monoclonal gammopathy of unknown significance) 6/13/2016    Myasthenia gravis, adult form     Osteoporosis 8/10/2015    Osteoporosis, unspecified     PAOD (peripheral arterial occlusive disease)     RA (rheumatoid arthritis)     Smoker 1/20/2014    Stroke     Ulcer     Vitamin D deficiency disease 1/20/2014      Past Surgical History:   Procedure Laterality Date    CATARACT EXTRACTION W/  INTRAOCULAR LENS IMPLANT Bilateral     CHOLECYSTECTOMY      COLONOSCOPY N/A 1/24/2017    Procedure: COLONOSCOPY;  Surgeon: Kristofer Worthington MD;  Location: [address];  Service: Endoscopy;  Laterality: N/A;  for chronic anemia ; needs to be off plavix x 5 days.     Off Plavix since recent hospitalization 1/19/17.    Speaks Syriac/needs .    HYSTERECTOMY      KIDNEY  SURGERY  2003    NEPHRECTOMY       Referring physician: Marvin  Date referred to PT: 4/7/2017    General Precautions: Standard, fall, aspiration    Do you have any cultural, spiritual, Yarsanism conflicts, given your current situation?: None noted    Patient History:  Lives With: child(sherry), adult  Living Arrangements: house  Living Environment Comment: Pt lives in [facility] with 0STE with her adult son and his spouse. PTA pt was accepting assist for all mobilitiy and ADLs 2/2 decline in status since recent admission. Pt has had several falls upon D/C home; of note pt's son is also caring for sick brother and has become overwhelmed with caring for both his mother and brother. Pt enjoys watching television.  Equipment Currently Used at Home: wheelchair, walker, standard, shower chair, bedside commode  DME owned (not currently used): none    Previous Level of Function:  Ambulation Skills: needs device and assist  Transfer Skills: needs device and assist  ADL Skills: needs device and assist  Work/Leisure Activity: needs device and assist    Subjective:  Communicated with RN (Tomasa) prior to session. RN attempting to find bandaging for open wound to R UE. Son at bedside; agreeable to bed level eval with exercise.    Chief Complaint: None stated  Patient goals: None stated    Pain Rating:  (NAD; resting comfortably)   Pain Rating Post-Intervention:  (NAD; pt reporting R LE and R UE with movement)    Objective:   Patient found with: bed alarm, telemetry     Cognitive Exam:  Oriented to: Person; daily orientation provided; pt orientated to self at baseline.    Follows Commands/attention: Follows one-step commands  Communication: clear/fluent  Safety awareness/insight to disability: intact    Physical Exam:  Postural examination/scapula alignment: Rounded shoulder, Head forward and Posterior pelvic tilt    Skin integrity: Bruising of B UE and B LE, Tear of R UE and Wound B LE  Edema: Mild B UE and B LE    Sensation:   Intact ;  upon evaluation and per pt report, no changes in sensation to B LE.    Lower Extremity Range of Motion:  Right Lower Extremity: WFL  Left Lower Extremity: WFL    Lower Extremity Strength:  Right Lower Extremity: WFL  Left Lower Extremity: WFL     Gross motor coordination: WFL    Functional Mobility:  Bed Mobility:  Rolling/Turning to Left: Minimum assistance  Rolling/Turning Right: Minimum assistance    Balance: LISA this date    Therapeutic Activities and Exercises:  PT arrived to pt's room to find RN leaving to find bandage for R UE skin tear; wound open to air and seeping. Pt participated in bed level evaluation. PT reviewed  BLE exercises to perform with pt this later PM and tomorrow in absence of PT. Verbalized understanding. PT performed AAROM to B LE in all available planes of motion through full available ROM 2x8 repetitions with VCs for participation from patient to maintain and improve B LE strength and ROM.    RN arrived to dress wound. Questions/concerns addressed within PT scope of practice.     AM-PAC 6 CLICK MOBILITY  How much help from another person does this patient currently need?   1 = Unable, Total/Dependent Assistance  2 = A lot, Maximum/Moderate Assistance  3 = A little, Minimum/Contact Guard/Supervision  4 = None, Modified [place]/Independent    Turning over in bed (including adjusting bedclothes, sheets and blankets)?: 3  Sitting down on and standing up from a chair with arms (e.g., wheelchair, bedside commode, etc.): 2  Moving from lying on back to sitting on the side of the bed?: 2  Moving to and from a bed to a chair (including a wheelchair)?: 2  Need to walk in hospital room?: 2  Climbing 3-5 steps with a railing?: 2  Total Score: 13     AM-PAC Raw Score CMS G-Code Modifier Level of Impairment Assistance   6 % Total / Unable   7 - 9 CM 80 - 100% Maximal Assist   10 - 14 CL 60 - 80% Moderate Assist   15 - 19 CK 40 - 60% Moderate Assist   20 - 22 CJ 20 - 40% Minimal Assist   23  CI 1-20% SBA / CGA   24 CH 0% Independent/ Mod I     Patient left HOB elevated with all lines intact, call button in reach, RN notified and RN and pt's son present.    Assessment:   Danyelle Jacobs is a 88 y.o. female with a medical diagnosis of Gastrointestinal hemorrhage and presents with decreased activity tolerance and impaired functional mobility. Pt's son reports pt has had significant decline in functional status since [place] [facility] [place] Pt appropriate for SNF placement to address high fall risk and functional impairments. Patient will benefit from continued PT services to address:    Rehab identified problem list/impairments: Rehab identified problem list/impairments: weakness, impaired endurance, impaired self care skills, impaired balance, gait instability, impaired functional mobilty, impaired cognition, decreased upper extremity function, decreased lower extremity function, pain, decreased safety awareness    Rehab potential is good.    Activity tolerance: Good    Discharge recommendations: Discharge Facility/Level Of Care Needs: nursing facility, skilled     Barriers to discharge: Barriers to Discharge: Inaccessible home environment, Decreased caregiver support    Equipment recommendations: Equipment Needed After Discharge:  (TBD next level of care)     GOALS:   Physical Therapy Goals        Problem: Physical Therapy Goal    Goal Priority Disciplines Outcome Goal Variances Interventions   Physical Therapy Goal     PT/OT, PT Ongoing (interventions implemented as appropriate)     Description:  Goals to be met by: 4/15/2017     Patient will increase functional independence with mobility by performin. Supine to sit with MInimal Assistance  2. Sit to supine with MInimal Assistance  3. Sit to stand transfer with Moderate Assistance  4. Bed to chair transfer with Moderate Assistance using AD or No AD  5. Gait x15 feet with Moderate Assistance using AD or No AD  6. Sitting at edge of bed x15 minutes with  Contact Guard Assistance  7. Stand for x2 minutes with Moderate Assistance using AD or No AD  8. Lower extremity exercise program x15 reps per handout, with assistance as needed              PLAN:    Patient to be seen 4 x/week to address the above listed problems via gait training, therapeutic activities, therapeutic exercises, neuromuscular re-education  Plan of Care expires: 05/07/17  Plan of Care reviewed with: patient, nicolas CADETOrlin Prescott, PT, DPT  [phone]  4/8/2017

## 2017-04-08 NOTE — PLAN OF CARE
Problem: Occupational Therapy Goal  Goal: Occupational Therapy Goal  Outcome: Ongoing (interventions implemented as appropriate)  Goals to be met by: 4/19/17     Patient will increase functional independence with ADLs by performing:     UE Dressing with Stand-by Assistance.  LE Dressing with Minimal Assistance.  Grooming while standing at sink with Contact Guard Assistance.  Toileting from bedside commode with Moderate Assistance for hygiene and clothing management.   Sitting at edge of bed x15 minutes with Supervision.  Supine to sit with Minimal Assistance.  Stand pivot transfers with Contact Guard Assistance.  Toilet transfer to bedside commode with Contact Guard Assistance.      MARIA T Meyers        Comments:   OT mary completed and POC established.     MARIA T Meyers

## 2017-04-08 NOTE — ASSESSMENT & PLAN NOTE
- EGD1/2017: 'bleeding angiodysplastic lesion found with hemostasis achieved'  - Colonoscopy: 'prominent vessels in the cecum but no definite AVM; raised bluish lesion likely a hematoma in the rectum.'  - Multiple episodes of yellow/green emesis w/ five episodes of diarrhea, dark red --> bright red  - Patient currently HDS, H/H stable at this point  - GI consulted, recommend OP follow up

## 2017-04-08 NOTE — SUBJECTIVE & OBJECTIVE
Interval History: NAEON. Stepped down to hospital medicine this morning. Per son at bedside, no further BMs since Thursday. Patient denied abdo pain, CP, palpitations, and SOB. PTOT to evaluate patient today. Diet started.     Review of Systems   Respiratory: Negative for cough.    Cardiovascular: Negative for chest pain and palpitations.   Gastrointestinal: Negative for abdominal pain, blood in stool, diarrhea and nausea.     Objective:     Vital Signs (Most Recent):  Temp: 98.4 °F (36.9 °C) (04/08/17 0722)  Pulse: 71 (04/08/17 0722)  Resp: 18 (04/08/17 0722)  BP: (!) 122/57 (04/08/17 0722)  SpO2: (!) 94 % (04/08/17 0722) Vital Signs (24h Range):  Temp:  [98 °F (36.7 °C)-98.5 °F (36.9 °C)] 98.4 °F (36.9 °C)  Pulse:  [60-71] 71  Resp:  [16-35] 18  SpO2:  [94 %-100 %] 94 %  BP: (122-191)/(57-75) 122/57     Weight: 56 kg (123 lb 7.3 oz)  Body mass index is 26.72 kg/(m^2).    Intake/Output Summary (Last 24 hours) at 04/08/17 0917  Last data filed at 04/07/17 1300   Gross per 24 hour   Intake               80 ml   Output                0 ml   Net               80 ml      Physical Exam   Constitutional: No distress.   HENT:   Head: Normocephalic and atraumatic.   On NC     Eyes: Right eye exhibits no discharge. Left eye exhibits no discharge. No scleral icterus.   Cardiovascular: Normal rate and regular rhythm.    Pulmonary/Chest: Effort normal and breath sounds normal.   Abdominal: Soft. Bowel sounds are normal. There is no tenderness.   Neurological: She is alert.   Skin: Skin is warm and dry.       Significant Labs:   CBC:   Recent Labs  Lab 04/07/17  0342 04/07/17  0829 04/08/17 0617   WBC 12.88* 13.94* 13.68*   HGB 8.5* 9.0* 8.5*   HCT 26.8* 28.3*  28.3* 27.7*   * 406* 373*     CMP:   Recent Labs  Lab 04/06/17  1213 04/07/17  0342 04/08/17  0617    139 140   K 4.3 4.3 3.8    115* 113*   CO2 17* 15* 19*   GLU 91 129* 65*   BUN 50* 38* 26*   CREATININE 2.0* 1.2 0.9   CALCIUM 7.9* 6.8* 7.1*   PROT  6.1 5.1* 5.0*   ALBUMIN 2.5* 2.1* 2.1*   BILITOT 0.4 0.2 0.3   ALKPHOS 121 97 95   AST 35 31 22   ALT 26 23 19   ANIONGAP 14 9 8   EGFRNONAA 21.8* 40.4* 57.3*     Lactic Acid:   Recent Labs  Lab 04/06/17  1253   LACTATE 1.6       Significant Imaging: I have reviewed all pertinent imaging results/findings within the past 24 hours.

## 2017-04-08 NOTE — ASSESSMENT & PLAN NOTE
- ASA 81 mg PO daily  - Plavix 75 mg PO daily  - no signs of further bleeding, Hb stable. Will consider restarting today.

## 2017-04-09 PROBLEM — K57.31 DIVERTICULOSIS LARGE INTESTINE W/O PERFORATION OR ABSCESS W/BLEEDING: Status: ACTIVE | Noted: 2017-04-09

## 2017-04-09 PROBLEM — M53.86 SCIATICA ASSOCIATED WITH DISORDER OF LUMBAR SPINE: Status: ACTIVE | Noted: 2017-04-09

## 2017-04-09 LAB
ALBUMIN SERPL BCP-MCNC: 2 G/DL
ALP SERPL-CCNC: 104 U/L
ALT SERPL W/O P-5'-P-CCNC: 16 U/L
ANION GAP SERPL CALC-SCNC: 7 MMOL/L
ANISOCYTOSIS BLD QL SMEAR: SLIGHT
AST SERPL-CCNC: 18 U/L
BASOPHILS # BLD AUTO: 0.01 K/UL
BASOPHILS NFR BLD: 0.1 %
BILIRUB SERPL-MCNC: 0.3 MG/DL
BUN SERPL-MCNC: 19 MG/DL
CALCIUM SERPL-MCNC: 7.1 MG/DL
CHLORIDE SERPL-SCNC: 112 MMOL/L
CO2 SERPL-SCNC: 19 MMOL/L
CREAT SERPL-MCNC: 0.8 MG/DL
DIFFERENTIAL METHOD: ABNORMAL
EOSINOPHIL # BLD AUTO: 0.1 K/UL
EOSINOPHIL NFR BLD: 0.6 %
ERYTHROCYTE [DISTWIDTH] IN BLOOD BY AUTOMATED COUNT: 22.2 %
EST. GFR  (AFRICAN AMERICAN): >60 ML/MIN/1.73 M^2
EST. GFR  (NON AFRICAN AMERICAN): >60 ML/MIN/1.73 M^2
GLUCOSE SERPL-MCNC: 73 MG/DL
HCT VFR BLD AUTO: 25.3 %
HGB BLD-MCNC: 8 G/DL
LYMPHOCYTES # BLD AUTO: 0.6 K/UL
LYMPHOCYTES NFR BLD: 5.4 %
MAGNESIUM SERPL-MCNC: 2 MG/DL
MCH RBC QN AUTO: 27.6 PG
MCHC RBC AUTO-ENTMCNC: 31.6 %
MCV RBC AUTO: 87 FL
MONOCYTES # BLD AUTO: 0.8 K/UL
MONOCYTES NFR BLD: 7.8 %
NEUTROPHILS # BLD AUTO: 9.2 K/UL
NEUTROPHILS NFR BLD: 86.1 %
OVALOCYTES BLD QL SMEAR: ABNORMAL
PHOSPHATE SERPL-MCNC: 2.2 MG/DL
PLATELET # BLD AUTO: 328 K/UL
PLATELET BLD QL SMEAR: ABNORMAL
PMV BLD AUTO: 10.9 FL
POCT GLUCOSE: 162 MG/DL (ref 70–110)
POCT GLUCOSE: 217 MG/DL (ref 70–110)
POCT GLUCOSE: 88 MG/DL (ref 70–110)
POIKILOCYTOSIS BLD QL SMEAR: SLIGHT
POLYCHROMASIA BLD QL SMEAR: ABNORMAL
POTASSIUM SERPL-SCNC: 4.5 MMOL/L
PROT SERPL-MCNC: 4.8 G/DL
RBC # BLD AUTO: 2.9 M/UL
SODIUM SERPL-SCNC: 138 MMOL/L
WBC # BLD AUTO: 10.8 K/UL

## 2017-04-09 PROCEDURE — 63600175 PHARM REV CODE 636 W HCPCS: Performed by: STUDENT IN AN ORGANIZED HEALTH CARE EDUCATION/TRAINING PROGRAM

## 2017-04-09 PROCEDURE — 25000003 PHARM REV CODE 250: Performed by: HOSPITALIST

## 2017-04-09 PROCEDURE — 25000003 PHARM REV CODE 250: Performed by: STUDENT IN AN ORGANIZED HEALTH CARE EDUCATION/TRAINING PROGRAM

## 2017-04-09 PROCEDURE — 80053 COMPREHEN METABOLIC PANEL: CPT

## 2017-04-09 PROCEDURE — 11000001 HC ACUTE MED/SURG PRIVATE ROOM

## 2017-04-09 PROCEDURE — 36415 COLL VENOUS BLD VENIPUNCTURE: CPT

## 2017-04-09 PROCEDURE — 84100 ASSAY OF PHOSPHORUS: CPT

## 2017-04-09 PROCEDURE — 83735 ASSAY OF MAGNESIUM: CPT

## 2017-04-09 PROCEDURE — 85025 COMPLETE CBC W/AUTO DIFF WBC: CPT

## 2017-04-09 RX ORDER — CALCIUM CARBONATE/VITAMIN D3 250-3.125
1 TABLET ORAL DAILY
Status: DISCONTINUED | OUTPATIENT
Start: 2017-04-09 | End: 2017-04-24 | Stop reason: HOSPADM

## 2017-04-09 RX ORDER — ACETAMINOPHEN 325 MG/1
650 TABLET ORAL EVERY 6 HOURS PRN
Status: DISCONTINUED | OUTPATIENT
Start: 2017-04-09 | End: 2017-04-13

## 2017-04-09 RX ORDER — TRAMADOL HYDROCHLORIDE 50 MG/1
50 TABLET ORAL EVERY 6 HOURS PRN
Status: DISCONTINUED | OUTPATIENT
Start: 2017-04-09 | End: 2017-04-24 | Stop reason: HOSPADM

## 2017-04-09 RX ORDER — DICLOFENAC SODIUM 10 MG/G
GEL TOPICAL DAILY
Status: DISCONTINUED | OUTPATIENT
Start: 2017-04-09 | End: 2017-04-24 | Stop reason: HOSPADM

## 2017-04-09 RX ORDER — ACETAMINOPHEN 325 MG/1
650 TABLET ORAL EVERY 6 HOURS PRN
Status: DISCONTINUED | OUTPATIENT
Start: 2017-04-09 | End: 2017-04-09

## 2017-04-09 RX ADMIN — PREDNISONE 10 MG: 10 TABLET ORAL at 10:04

## 2017-04-09 RX ADMIN — PREGABALIN 50 MG: 50 CAPSULE ORAL at 10:04

## 2017-04-09 RX ADMIN — DULOXETINE 30 MG: 30 CAPSULE, DELAYED RELEASE ORAL at 10:04

## 2017-04-09 RX ADMIN — HYDRALAZINE HYDROCHLORIDE 25 MG: 25 TABLET, FILM COATED ORAL at 06:04

## 2017-04-09 RX ADMIN — INSULIN ASPART 2 UNITS: 100 INJECTION, SOLUTION INTRAVENOUS; SUBCUTANEOUS at 06:04

## 2017-04-09 RX ADMIN — LOSARTAN POTASSIUM 100 MG: 50 TABLET, FILM COATED ORAL at 10:04

## 2017-04-09 RX ADMIN — PANTOPRAZOLE SODIUM 40 MG: 40 TABLET, DELAYED RELEASE ORAL at 10:04

## 2017-04-09 RX ADMIN — PREGABALIN 50 MG: 50 CAPSULE ORAL at 08:04

## 2017-04-09 RX ADMIN — DONEPEZIL HYDROCHLORIDE 10 MG: 10 TABLET, FILM COATED ORAL at 10:04

## 2017-04-09 RX ADMIN — DICLOFENAC: 10 GEL TOPICAL at 02:04

## 2017-04-09 RX ADMIN — ASPIRIN 81 MG CHEWABLE TABLET 81 MG: 81 TABLET CHEWABLE at 10:04

## 2017-04-09 RX ADMIN — ISOSORBIDE MONONITRATE 30 MG: 30 TABLET, EXTENDED RELEASE ORAL at 08:04

## 2017-04-09 RX ADMIN — CALCIUM CARBONATE-CHOLECALCIFEROL TAB 250 MG-125 UNIT 1 TABLET: 250-125 TAB at 11:04

## 2017-04-09 RX ADMIN — HYDRALAZINE HYDROCHLORIDE 25 MG: 25 TABLET, FILM COATED ORAL at 02:04

## 2017-04-09 RX ADMIN — HYDRALAZINE HYDROCHLORIDE 25 MG: 25 TABLET, FILM COATED ORAL at 10:04

## 2017-04-09 NOTE — PLAN OF CARE
Problem: Patient Care Overview  Goal: Plan of Care Review  Outcome: Ongoing (interventions implemented as appropriate)  POC reviewed with pt and family at 1400. Blood sugar management. Family. verbalized understanding. Questions and concerns addressed. No acute events today. Pt progressing toward goals. Will continue to monitor. See flowsheets for full assessment and VS info.

## 2017-04-09 NOTE — ASSESSMENT & PLAN NOTE
- Patient presenting with complaint of multiple episodes of blood bowel movements  - Patient's H//H largely stable  -s/p PPI gtt -> transitioned to PO pantoprazole  - GI consulted, recommended OP follow up  -CT abdo revealing diverticulosis, cystic mass in L adnexa (can be followed up with Pelvic U/S as OP)  - no further episodes of hematochezia

## 2017-04-09 NOTE — SUBJECTIVE & OBJECTIVE
Interval History: No acute events overnight, PTOT recommending SNF. Pelvic ultrasound ordered for Monday. No further episodes of hematochezia/emesis. H/H largely stable.     Review of Systems   Respiratory: Negative for cough.    Cardiovascular: Negative for chest pain and palpitations.   Gastrointestinal: Negative for abdominal pain, blood in stool, diarrhea and nausea.     Objective:     Vital Signs (Most Recent):  Temp: 98 °F (36.7 °C) (04/09/17 0429)  Pulse: 79 (04/09/17 0429)  Resp: 18 (04/09/17 0429)  BP: (!) 142/63 (04/09/17 0429)  SpO2: (!) 93 % (04/09/17 0429) Vital Signs (24h Range):  Temp:  [97.8 °F (36.6 °C)-98 °F (36.7 °C)] 98 °F (36.7 °C)  Pulse:  [71-79] 79  Resp:  [18-20] 18  SpO2:  [93 %-94 %] 93 %  BP: (122-148)/(57-70) 142/63     Weight: 56 kg (123 lb 7.3 oz)  Body mass index is 26.72 kg/(m^2).    Intake/Output Summary (Last 24 hours) at 04/09/17 0738  Last data filed at 04/09/17 0000   Gross per 24 hour   Intake             1290 ml   Output                0 ml   Net             1290 ml      Physical Exam   Constitutional: No distress.   HENT:   Head: Normocephalic and atraumatic.        Eyes: Right eye exhibits no discharge. Left eye exhibits no discharge. No scleral icterus.   Cardiovascular: Normal rate and regular rhythm.    Pulmonary/Chest: Effort normal and breath sounds normal.   Abdominal: Soft. Bowel sounds are normal. There is no tenderness.   Neurological: She is alert.   Skin: Skin is warm and dry.       Significant Labs:   BMP:   Recent Labs  Lab 04/09/17  0347   GLU 73      K 4.5   *   CO2 19*   BUN 19   CREATININE 0.8   CALCIUM 7.1*   MG 2.0     CBC:   Recent Labs  Lab 04/07/17  0829 04/08/17  0617 04/09/17 0347   WBC 13.94* 13.68*  --    HGB 9.0* 8.5* 8.0*   HCT 28.3*  28.3* 27.7* 25.3*   * 373* 328       Significant Imaging: I have reviewed and interpreted all pertinent imaging results/findings within the past 24 hours.

## 2017-04-09 NOTE — ASSESSMENT & PLAN NOTE
-likely 2/2 to osteoporosis, seen on CT abdo  -possible nerve impingement contributing to R hip pain  -continue lyrica and PTOT

## 2017-04-09 NOTE — PROGRESS NOTES
[facility] Hospital Medicine  Progress Note    Patient Name: Danyelle Jacobs  MRN: [id number]  Patient Class: IP- Inpatient   Admission Date: 2017  Length of Stay: 3 days  Attending Physician: Pilar Wong MD  Primary Care Provider: Biacna Dykes MD    Hospital Medicine Team: [facility] MED O MAURICIO Long    Subjective:     Principal Problem:Gastrointestinal hemorrhage    HPI:  Ms. Jacobs is a 88 F with a history of HTN, HLD, CAD, PAD, HFpEF, dementia, and rheumatoid arthritis who presents to the ED with GI bleeding. Her son reports that her symptoms began yesterday evening with bilious vomiting and diarrhea. Since then, she has had 6 episodes of emesis and 5 episodes of diarrhea which started out as black and became bright red by her last BM which was at 6 AM. She currently denies abdominal pain, SOB, or nausea; however, her son reports that she will usually deny symptoms even if they are present.     She had a recent admission from 3/24- for respiratory failure with hypoxia (presumed 2/2 to PNA, resolved with abx) and RLE pain. Also admitted - and transfused at that time. Patient has a significant history of 'bleeding angiodysplastic lesion found on EGD with hemostasis achieved' performed in 2017. Patient also underwent colonoscopy at the same time which showed 'prominent vessels in the cecum but no definite AVM; raised bluish lesion likely a hematoma in the rectum.' Son reports that she has declined both mentally and physically in the last few months and has become progressively weak, less responsive, and has had hallucinations including speaking to  relatives. Also, she has developed urinary and fecal incontinence recently.       Hospital Course:  17: Upon initial evaluation patient's initial oxygen requirements appeared to be increasing. ABG was performed which showed adequate oxygenation. Hct was also obtained with the arterial stick which showed a  Hct of 19. Based on this result and her presenting H/H of 8.3/26.2, 1 U PRBCs was ordered and received 2L of NS. GI and ICU were consulted for further management. Repeat hgb and hemodynamics are stable. Protonix drip started. CT abdomen W/O significant for diverticulosis, adnexal cystic structure, L1 compression fx, and lung opacities (atelectasis v infection/inflammation).    4/7: Hgb stable, no further episodes of vomiting or diarrhea. Pt c/o RLE pain. Hypertensive to 200 systolic, restarting home BP meds stepwise. GI recs supportive care. PARVEEN improved.     4/8: stepped down to hospital medicine. Hemodynamically stable.     4/9: No acute events, PTOT recommending SNF.       Interval History: No acute events overnight, PTOT recommending SNF. Pelvic ultrasound ordered for Monday. No further episodes of hematochezia/emesis. H/H largely stable.     Review of Systems   Respiratory: Negative for cough.    Cardiovascular: Negative for chest pain and palpitations.   Gastrointestinal: Negative for abdominal pain, blood in stool, diarrhea and nausea.     Objective:     Vital Signs (Most Recent):  Temp: 98 °F (36.7 °C) (04/09/17 0429)  Pulse: 79 (04/09/17 0429)  Resp: 18 (04/09/17 0429)  BP: (!) 142/63 (04/09/17 0429)  SpO2: (!) 93 % (04/09/17 0429) Vital Signs (24h Range):  Temp:  [97.8 °F (36.6 °C)-98 °F (36.7 °C)] 98 °F (36.7 °C)  Pulse:  [71-79] 79  Resp:  [18-20] 18  SpO2:  [93 %-94 %] 93 %  BP: (122-148)/(57-70) 142/63     Weight: 56 kg (123 lb 7.3 oz)  Body mass index is 26.72 kg/(m^2).    Intake/Output Summary (Last 24 hours) at 04/09/17 0738  Last data filed at 04/09/17 0000   Gross per 24 hour   Intake             1290 ml   Output                0 ml   Net             1290 ml      Physical Exam   Constitutional: No distress.   HENT:   Head: Normocephalic and atraumatic.        Eyes: Right eye exhibits no discharge. Left eye exhibits no discharge. No scleral icterus.   Cardiovascular: Normal rate and regular rhythm.     Pulmonary/Chest: Effort normal and breath sounds normal.   Abdominal: Soft. Bowel sounds are normal. There is no tenderness.   Neurological: She is alert.   Skin: Skin is warm and dry.       Significant Labs:   BMP:   Recent Labs  Lab 04/09/17  0347   GLU 73      K 4.5   *   CO2 19*   BUN 19   CREATININE 0.8   CALCIUM 7.1*   MG 2.0     CBC:   Recent Labs  Lab 04/07/17  0829 04/08/17  0617 04/09/17  0347   WBC 13.94* 13.68*  --    HGB 9.0* 8.5* 8.0*   HCT 28.3*  28.3* 27.7* 25.3*   * 373* 328       Significant Imaging: I have reviewed and interpreted all pertinent imaging results/findings within the past 24 hours.    Assessment/Plan:      * Gastrointestinal hemorrhage  - Patient presenting with complaint of multiple episodes of blood bowel movements  - Patient's H//H largely stable  -s/p PPI gtt -> transitioned to PO pantoprazole  - GI consulted, recommended OP follow up  -CT abdo revealing diverticulosis, cystic mass in L adnexa (can be followed up with Pelvic U/S as OP)  - no further episodes of hematochezia    CAD (coronary artery disease)  - ASA 81 mg PO daily  - Plavix 75 mg PO daily  - no signs of further bleeding, Hb stable.  -asa started today, will consider restarting plavix today    Mixed hyperlipidemia  - Lipitor 40 mg PO daily    Benign hypertension  - will continue home meds as patient has not exhibited further bleed/hemodynamic instability    Angiodysplasia of stomach: see EGD 1/17  - EGD1/2017: 'bleeding angiodysplastic lesion found with hemostasis achieved'  - Colonoscopy: 'prominent vessels in the cecum but no definite AVM; raised bluish lesion likely a hematoma in the rectum.'  - Multiple episodes of yellow/green emesis w/ five episodes of diarrhea, dark red --> bright red  - Patient currently HDS, H/H stable at this point  - GI consulted, recommend OP follow up    PARVEEN (acute kidney injury)  - PARVEEN on CKD I  - Hx of loose bloody bowel movements with episodes of vomiting   -  Likely prerenal in etiology with a component on hypoxia  - s/p 2L of NS with subsequent resolution of PARVEEN    Compression fracture of first lumbar vertebra  -likely 2/2 to osteoporosis, seen on CT abdo  -possible nerve impingement contributing to R hip pain  -continue lyrica and PTOT        VTE Risk Mitigation         Ordered     Medium Risk of VTE  Once      04/06/17 1535     Place ISHMAEL hose  Until discontinued      04/06/17 1535     Reason for No Pharmacological VTE Prophylaxis  Once      04/06/17 1535     Place sequential compression device  Until discontinued      04/06/17 1504          MAURICIO Long  Department of Hospital Medicine   [facility]

## 2017-04-09 NOTE — ASSESSMENT & PLAN NOTE
- ASA 81 mg PO daily  - Plavix 75 mg PO daily  - no signs of further bleeding, Hb stable.  -asa started today, will consider restarting plavix today

## 2017-04-10 PROBLEM — N94.89 ADNEXAL MASS: Status: ACTIVE | Noted: 2017-04-10

## 2017-04-10 LAB
ALBUMIN SERPL BCP-MCNC: 1.9 G/DL
ALP SERPL-CCNC: 90 U/L
ALT SERPL W/O P-5'-P-CCNC: 13 U/L
ANION GAP SERPL CALC-SCNC: 7 MMOL/L
ANISOCYTOSIS BLD QL SMEAR: ABNORMAL
AST SERPL-CCNC: 15 U/L
BASOPHILS # BLD AUTO: ABNORMAL K/UL
BASOPHILS NFR BLD: 0 %
BILIRUB SERPL-MCNC: 0.2 MG/DL
BLD PROD TYP BPU: NORMAL
BLOOD UNIT EXPIRATION DATE: NORMAL
BLOOD UNIT TYPE CODE: 5100
BLOOD UNIT TYPE: NORMAL
BUN SERPL-MCNC: 15 MG/DL
CALCIUM SERPL-MCNC: 7.8 MG/DL
CHLORIDE SERPL-SCNC: 111 MMOL/L
CO2 SERPL-SCNC: 19 MMOL/L
CODING SYSTEM: NORMAL
CREAT SERPL-MCNC: 0.7 MG/DL
DIFFERENTIAL METHOD: ABNORMAL
DISPENSE STATUS: NORMAL
EOSINOPHIL # BLD AUTO: ABNORMAL K/UL
EOSINOPHIL NFR BLD: 0 %
ERYTHROCYTE [DISTWIDTH] IN BLOOD BY AUTOMATED COUNT: 21.7 %
EST. GFR  (AFRICAN AMERICAN): >60 ML/MIN/1.73 M^2
EST. GFR  (NON AFRICAN AMERICAN): >60 ML/MIN/1.73 M^2
GLUCOSE SERPL-MCNC: 83 MG/DL
HCT VFR BLD AUTO: 24.9 %
HGB BLD-MCNC: 8.1 G/DL
LYMPHOCYTES # BLD AUTO: ABNORMAL K/UL
LYMPHOCYTES NFR BLD: 4 %
MAGNESIUM SERPL-MCNC: 1.9 MG/DL
MCH RBC QN AUTO: 27.5 PG
MCHC RBC AUTO-ENTMCNC: 32.5 %
MCV RBC AUTO: 84 FL
METAMYELOCYTES NFR BLD MANUAL: 2 %
MONOCYTES # BLD AUTO: ABNORMAL K/UL
MONOCYTES NFR BLD: 8 %
MYELOCYTES NFR BLD MANUAL: 1 %
NEUTROPHILS NFR BLD: 85 %
PHOSPHATE SERPL-MCNC: 2.6 MG/DL
PLATELET # BLD AUTO: 311 K/UL
PLATELET BLD QL SMEAR: ABNORMAL
PMV BLD AUTO: 10.7 FL
POCT GLUCOSE: 105 MG/DL (ref 70–110)
POCT GLUCOSE: 93 MG/DL (ref 70–110)
POCT GLUCOSE: 99 MG/DL (ref 70–110)
POLYCHROMASIA BLD QL SMEAR: ABNORMAL
POTASSIUM SERPL-SCNC: 4.6 MMOL/L
PROT SERPL-MCNC: 4.8 G/DL
RBC # BLD AUTO: 2.95 M/UL
SODIUM SERPL-SCNC: 137 MMOL/L
TRANS ERYTHROCYTES VOL PATIENT: NORMAL ML
WBC # BLD AUTO: 9.84 K/UL

## 2017-04-10 PROCEDURE — 99232 SBSQ HOSP IP/OBS MODERATE 35: CPT | Mod: GC,,, | Performed by: HOSPITALIST

## 2017-04-10 PROCEDURE — 85027 COMPLETE CBC AUTOMATED: CPT

## 2017-04-10 PROCEDURE — 25000003 PHARM REV CODE 250: Performed by: STUDENT IN AN ORGANIZED HEALTH CARE EDUCATION/TRAINING PROGRAM

## 2017-04-10 PROCEDURE — 63600175 PHARM REV CODE 636 W HCPCS: Performed by: STUDENT IN AN ORGANIZED HEALTH CARE EDUCATION/TRAINING PROGRAM

## 2017-04-10 PROCEDURE — 85007 BL SMEAR W/DIFF WBC COUNT: CPT

## 2017-04-10 PROCEDURE — 11000001 HC ACUTE MED/SURG PRIVATE ROOM

## 2017-04-10 PROCEDURE — 36415 COLL VENOUS BLD VENIPUNCTURE: CPT

## 2017-04-10 PROCEDURE — 25000003 PHARM REV CODE 250: Performed by: HOSPITALIST

## 2017-04-10 PROCEDURE — 84100 ASSAY OF PHOSPHORUS: CPT

## 2017-04-10 PROCEDURE — 25000003 PHARM REV CODE 250: Performed by: NURSE PRACTITIONER

## 2017-04-10 PROCEDURE — 80053 COMPREHEN METABOLIC PANEL: CPT

## 2017-04-10 PROCEDURE — 83735 ASSAY OF MAGNESIUM: CPT

## 2017-04-10 RX ORDER — HYDROCHLOROTHIAZIDE 12.5 MG/1
12.5 TABLET ORAL DAILY
Status: DISCONTINUED | OUTPATIENT
Start: 2017-04-10 | End: 2017-04-13

## 2017-04-10 RX ORDER — CLOPIDOGREL BISULFATE 75 MG/1
75 TABLET ORAL DAILY
Qty: 30 TABLET | Refills: 0 | OUTPATIENT
Start: 2017-04-10 | End: 2017-04-23 | Stop reason: HOSPADM

## 2017-04-10 RX ORDER — CALCIUM CARBONATE/VITAMIN D3 250-3.125
1 TABLET ORAL DAILY
COMMUNITY
Start: 2017-04-10 | End: 2018-04-10

## 2017-04-10 RX ORDER — CLONIDINE HYDROCHLORIDE 0.1 MG/1
0.1 TABLET ORAL ONCE
Status: COMPLETED | OUTPATIENT
Start: 2017-04-10 | End: 2017-04-10

## 2017-04-10 RX ADMIN — LOSARTAN POTASSIUM 100 MG: 50 TABLET, FILM COATED ORAL at 09:04

## 2017-04-10 RX ADMIN — ACETAMINOPHEN 650 MG: 325 TABLET ORAL at 09:04

## 2017-04-10 RX ADMIN — HYDRALAZINE HYDROCHLORIDE 25 MG: 25 TABLET, FILM COATED ORAL at 02:04

## 2017-04-10 RX ADMIN — HYDRALAZINE HYDROCHLORIDE 25 MG: 25 TABLET, FILM COATED ORAL at 09:04

## 2017-04-10 RX ADMIN — PREGABALIN 50 MG: 50 CAPSULE ORAL at 09:04

## 2017-04-10 RX ADMIN — HYDRALAZINE HYDROCHLORIDE 25 MG: 25 TABLET, FILM COATED ORAL at 06:04

## 2017-04-10 RX ADMIN — MICONAZOLE NITRATE: 20 OINTMENT TOPICAL at 09:04

## 2017-04-10 RX ADMIN — ISOSORBIDE MONONITRATE 30 MG: 30 TABLET, EXTENDED RELEASE ORAL at 09:04

## 2017-04-10 RX ADMIN — CALCIUM CARBONATE-CHOLECALCIFEROL TAB 250 MG-125 UNIT 1 TABLET: 250-125 TAB at 09:04

## 2017-04-10 RX ADMIN — PREDNISONE 10 MG: 10 TABLET ORAL at 09:04

## 2017-04-10 RX ADMIN — DULOXETINE 30 MG: 30 CAPSULE, DELAYED RELEASE ORAL at 09:04

## 2017-04-10 RX ADMIN — ASPIRIN 81 MG CHEWABLE TABLET 81 MG: 81 TABLET CHEWABLE at 09:04

## 2017-04-10 RX ADMIN — PANTOPRAZOLE SODIUM 40 MG: 40 TABLET, DELAYED RELEASE ORAL at 09:04

## 2017-04-10 RX ADMIN — DONEPEZIL HYDROCHLORIDE 10 MG: 10 TABLET, FILM COATED ORAL at 09:04

## 2017-04-10 RX ADMIN — CLONIDINE HYDROCHLORIDE 0.1 MG: 0.1 TABLET ORAL at 05:04

## 2017-04-10 RX ADMIN — HYDROCHLOROTHIAZIDE 12.5 MG: 12.5 TABLET ORAL at 09:04

## 2017-04-10 NOTE — PROGRESS NOTES
[facility] Hospital Medicine  Progress Note    Patient Name: Danyelle Jacobs  MRN: [id number]  Patient Class: IP- Inpatient   Admission Date: 2017  Length of Stay: 4 days  Attending Physician: Pilar Wong MD  Primary Care Provider: Bianca Dykes MD    Hospital Medicine Team: [facility]OSP MED O MAURICIO Long    Subjective:     Principal Problem:Gastrointestinal hemorrhage    HPI:  Ms. Jacobs is a 88 F with a history of HTN, HLD, CAD, PAD, HFpEF, dementia, and rheumatoid arthritis who presents to the ED with GI bleeding. Her son reports that her symptoms began yesterday evening with bilious vomiting and diarrhea. Since then, she has had 6 episodes of emesis and 5 episodes of diarrhea which started out as black and became bright red by her last BM which was at 6 AM. She currently denies abdominal pain, SOB, or nausea; however, her son reports that she will usually deny symptoms even if they are present.     She had a recent admission from 3/24- for respiratory failure with hypoxia (presumed 2/2 to PNA, resolved with abx) and RLE pain. Also admitted - and transfused at that time. Patient has a significant history of 'bleeding angiodysplastic lesion found on EGD with hemostasis achieved' performed in 2017. Patient also underwent colonoscopy at the same time which showed 'prominent vessels in the cecum but no definite AVM; raised bluish lesion likely a hematoma in the rectum.' Son reports that she has declined both mentally and physically in the last few months and has become progressively weak, less responsive, and has had hallucinations including speaking to  relatives. Also, she has developed urinary and fecal incontinence recently.       Hospital Course:  17: Upon initial evaluation patient's initial oxygen requirements appeared to be increasing. ABG was performed which showed adequate oxygenation. Hct was also obtained with the arterial stick which showed a  Hct of 19. Based on this result and her presenting H/H of 8.3/26.2, 1 U PRBCs was ordered and received 2L of NS. GI and ICU were consulted for further management. Repeat hgb and hemodynamics are stable. Protonix drip started. CT abdomen W/O significant for diverticulosis, adnexal cystic structure, L1 compression fx, and lung opacities (atelectasis v infection/inflammation).    4/7: Hgb stable, no further episodes of vomiting or diarrhea. Pt c/o RLE pain. Hypertensive to 200 systolic, restarting home BP meds stepwise. GI recs supportive care. PARVEEN improved.     4/8: stepped down to hospital medicine. Hemodynamically stable.     4/9: No acute events, PTOT recommending SNF.     4/10: Son requested pelvic ultrasound for patient since she is currently admitted. H/H stable. Awaiting eval from [facility].      Interval History: NAEON. Hb stable. Hypertensive overnight, received 1xdose of clonidine. Restarted HCTZ today. Pelvic U/S today and follow up wound care recs for skin tears. Awaiting SNF placement.     Review of Systems   Respiratory: Negative for cough.    Cardiovascular: Negative for chest pain and palpitations.   Gastrointestinal: Negative for abdominal pain, blood in stool, diarrhea and nausea.     Objective:     Vital Signs (Most Recent):  Temp: 98.5 °F (36.9 °C) (04/10/17 0442)  Pulse: 77 (04/10/17 0442)  Resp: 20 (04/10/17 0442)  BP: (!) 168/89 (04/10/17 0633)  SpO2: (!) 94 % (04/10/17 0442) Vital Signs (24h Range):  Temp:  [97.4 °F (36.3 °C)-98.6 °F (37 °C)] 98.5 °F (36.9 °C)  Pulse:  [77-85] 77  Resp:  [18-20] 20  SpO2:  [93 %-97 %] 94 %  BP: (113-198)/(53-89) 168/89     Weight: 56 kg (123 lb 7.3 oz)  Body mass index is 26.72 kg/(m^2).    Intake/Output Summary (Last 24 hours) at 04/10/17 0737  Last data filed at 04/09/17 1800   Gross per 24 hour   Intake              800 ml   Output                0 ml   Net              800 ml      Physical Exam   Constitutional: No distress.   HENT:   Head: Normocephalic  and atraumatic.        Eyes: Right eye exhibits no discharge. Left eye exhibits no discharge. No scleral icterus.   Cardiovascular: Normal rate and regular rhythm.    Pulmonary/Chest: Effort normal and breath sounds normal.   Abdominal: Soft. Bowel sounds are normal. There is no tenderness.   Neurological: She is alert.   Skin: Skin is warm and dry.   +paper thin skin with skin tears on elbows, R leg, and UE (bandaged)       Significant Labs:   BMP:   Recent Labs  Lab 04/10/17  0419   GLU 83      K 4.6   *   CO2 19*   BUN 15   CREATININE 0.7   CALCIUM 7.8*   MG 1.9     CBC:   Recent Labs  Lab 04/09/17  0347 04/10/17  0419   WBC 10.80 9.84   HGB 8.0* 8.1*   HCT 25.3* 24.9*    311       Significant Imaging: I have reviewed and interpreted all pertinent imaging results/findings within the past 24 hours.    Assessment/Plan:      * Gastrointestinal hemorrhage  - Patient presenting with complaint of multiple episodes of blood bowel movements  - Patient's H//H largely stable  -s/p PPI gtt -> transitioned to PO pantoprazole  - GI consulted, recommended OP follow up  -CT abdo revealing diverticulosis, cystic mass in L adnexa (can be followed up with Pelvic U/S as OP)  - no further episodes of hematochezia    CAD (coronary artery disease)  - ASA 81 mg PO daily  - Plavix 75 mg PO daily  - no signs of further bleeding, Hb stable.  - given age and hx of multiple GI bleeds, will defer restarting plavix as an outpatient.     Mixed hyperlipidemia  - Lipitor 40 mg PO daily    Benign hypertension  - will continue home meds as patient has not exhibited further bleed/hemodynamic instability      Anemia associated with acute blood loss  - s/p 1u pRBCs on admission given Hx of CAD and troponinemia (likely demand ischemia)  -H/H largely stable  - See GI hemorrhage for further detail    Angiodysplasia of stomach: see EGD 1/17  - EGD1/2017: 'bleeding angiodysplastic lesion found with hemostasis achieved'  - Colonoscopy:  'prominent vessels in the cecum but no definite AVM; raised bluish lesion likely a hematoma in the rectum.'  - Multiple episodes of yellow/green emesis w/ five episodes of diarrhea, dark red --> bright red  - Patient currently HDS, H/H stable at this point  - GI consulted, recommend OP follow up    PARVEEN (acute kidney injury)  - PARVEEN on CKD I  - Hx of loose bloody bowel movements with episodes of vomiting   - Likely prerenal in etiology with a component on hypoxia  - s/p 2L of NS with subsequent resolution of PARVEEN    Compression fracture of first lumbar vertebra  -likely 2/2 to osteoporosis, seen on CT abdo  -possible nerve impingement contributing to R hip pain  -continue lyrica and PTOT  -Volteran gel      Sciatica associated with disorder of lumbar spine  See above      Diverticulosis large intestine w/o perforation or abscess w/bleeding  -See GI hemorrhage      Adnexal mass  L cystic adnexal mass seen on CTabdo in setting of reported complete hysterectomy  -Pelvic U/S today      VTE Risk Mitigation         Ordered     Medium Risk of VTE  Once      04/06/17 1535     Place ISHMAEL hose  Until discontinued      04/06/17 1535     Reason for No Pharmacological VTE Prophylaxis  Once      04/06/17 1535     Place sequential compression device  Until discontinued      04/06/17 1504          MAURICIO Long  Department of Hospital Medicine   [facility]

## 2017-04-10 NOTE — PROGRESS NOTES
Consult received on patient's multiple partial thickness skin tears to bilateral upper arms and to right lower leg. See flow sheet and pictures below for additional documentation.  Recommendations: Daily  Right wrist/right lower leg skin tears: clean with normal saline, apply cavilon no sting barrier to periwound,apply medihoney and vaseline gauze to wound bed, secure with kerlix  Right upper/Left upper arm: clean with normal saline, apply cavilon no sting barrier to periowound, apply vaseline gauze to wound beds, secure with kerlix.    Discussed recommendations with Dr. Shawna Porter, telephone order received for nursing. Nursing to continue care.     04/10/17 1517       Wound 04/06/17 2330 Skin tear arm   Date First Assessed/Time First Assessed: 04/06/17 2330   Pre-existing: Yes  Wound Type: Skin tear  Side: Right  Location: arm   Wound WDL ex   Drainage Amount scant   Drainage Characteristics/Odor serosanguineous;no odor   Wound Base moist;pink;reddened   Periwound Area ecchymotic  (fragile)   Wound Edges open   Non-staged Wound Description Partial thickness   Dressing Dressing changed  (medihoney/vaseline gauze rwrist;vaseline gauze JEANIE)   Dressing Change Due 04/11/17       Wound 04/06/17 2330 Skin tear posterior arm   Date First Assessed/Time First Assessed: 04/06/17 2330   Pre-existing: Yes  Wound Type: Skin tear  Side: Left  Orientation: posterior  Location: arm   Wound WDL ex   Drainage Amount none   Drainage Characteristics/Odor no odor   Wound Base moist;pink   Periwound Area ecchymotic  (fragile)   Wound Edges open   Non-staged Wound Description Partial thickness   Dressing Dressing changed  (vaseline gauze)   Dressing Change Due 04/11/17       Wound 04/10/17 1517 Skin tear lower leg   Date First Assessed/Time First Assessed: 04/10/17 1517   Pre-existing: Yes  Wound Type: Skin tear  Side: Right  Orientation: lower  Location: leg   Wound WDL ex   Drainage Amount scant   Drainage Characteristics/Odor serous    Wound Base moist;pink;slough   Periwound Area ecchymotic  (fragile)   Wound Edges open   Non-staged Wound Description Partial thickness   Dressing Dressing changed  (medihoney and vaseline gauze)     Left elbow    Right elbow    Right wrist    Right lower leg    Consent was received from the patient's family members for the wound photograph.

## 2017-04-10 NOTE — ASSESSMENT & PLAN NOTE
- ASA 81 mg PO daily  - Plavix 75 mg PO daily  - no signs of further bleeding, Hb stable.  - will consider restarting plavix today

## 2017-04-10 NOTE — PLAN OF CARE
04/10/17 1400   Readmission Questionnaire   At the time of your discharge, did someone talk to you about what your health problems were? Yes   At the time of discharge, did someone talk to you about what to watch out for regarding worsening of your health problem? Yes   At the time of discharge, did someone talk to you about what to do if you experienced worsening of your health problem? Yes   At the time of discharge, did someone talk to you about which medication to take when you left the hospital and which ones to stop taking? Yes   At the time of discharge, did someone talk to you about when and where to follow up with a doctor after you left the hospital? Yes   Do you have problems taking your medications as prescribed? No   Do you have any problems affording any of  your prescribed medications? No   Do you have problems obtaining/receiving your medications? No   Does the patient have transportation to healthcare appointments? Yes   Lives With child(sherry), adult   Living Arrangements house   Does the patient have family/friends to help with healtcare needs after discharge? yes   Who are your caregiver(s) and their phone number(s)? Andrés Conteha - son [phone] and Cindy Jacobs - daughter-in-law [phone]   Does your caregiver provide all the help you need? Yes   Have you felt down, depressed, or hopeless? 0   Have you felt little interest or pleasure in doing things? 0

## 2017-04-10 NOTE — PLAN OF CARE
IMM signed by son, copy given to son and original placed in chart.     04/10/17 1400   Medicare Message   Important Message from Medicare regarding Discharge Appeal Rights Given to patient/caregiver;Explained to patient/caregiver;Signed/date by patient/caregiver   Date IMM was signed 04/10/17   Time IMM was signed 1400

## 2017-04-10 NOTE — SUBJECTIVE & OBJECTIVE
Interval History: NAEON. Hb stable. Hypertensive overnight, received 1xdose of clonidine. Restarted HCTZ today. Pelvic U/S today and follow up wound care recs for skin tears. Awaiting SNF placement.     Review of Systems   Respiratory: Negative for cough.    Cardiovascular: Negative for chest pain and palpitations.   Gastrointestinal: Negative for abdominal pain, blood in stool, diarrhea and nausea.     Objective:     Vital Signs (Most Recent):  Temp: 98.5 °F (36.9 °C) (04/10/17 0442)  Pulse: 77 (04/10/17 0442)  Resp: 20 (04/10/17 0442)  BP: (!) 168/89 (04/10/17 0633)  SpO2: (!) 94 % (04/10/17 0442) Vital Signs (24h Range):  Temp:  [97.4 °F (36.3 °C)-98.6 °F (37 °C)] 98.5 °F (36.9 °C)  Pulse:  [77-85] 77  Resp:  [18-20] 20  SpO2:  [93 %-97 %] 94 %  BP: (113-198)/(53-89) 168/89     Weight: 56 kg (123 lb 7.3 oz)  Body mass index is 26.72 kg/(m^2).    Intake/Output Summary (Last 24 hours) at 04/10/17 0737  Last data filed at 04/09/17 1800   Gross per 24 hour   Intake              800 ml   Output                0 ml   Net              800 ml      Physical Exam   Constitutional: No distress.   HENT:   Head: Normocephalic and atraumatic.        Eyes: Right eye exhibits no discharge. Left eye exhibits no discharge. No scleral icterus.   Cardiovascular: Normal rate and regular rhythm.    Pulmonary/Chest: Effort normal and breath sounds normal.   Abdominal: Soft. Bowel sounds are normal. There is no tenderness.   Neurological: She is alert.   Skin: Skin is warm and dry.   +paper thin skin with skin tears on elbows, R leg, and UE (bandaged)       Significant Labs:   BMP:   Recent Labs  Lab 04/10/17  0419   GLU 83      K 4.6   *   CO2 19*   BUN 15   CREATININE 0.7   CALCIUM 7.8*   MG 1.9     CBC:   Recent Labs  Lab 04/09/17  0347 04/10/17  0419   WBC 10.80 9.84   HGB 8.0* 8.1*   HCT 25.3* 24.9*    311       Significant Imaging: I have reviewed and interpreted all pertinent imaging results/findings within the  past 24 hours.

## 2017-04-10 NOTE — ASSESSMENT & PLAN NOTE
-likely 2/2 to osteoporosis, seen on CT abdo  -possible nerve impingement contributing to R hip pain  -continue lyrica and PTOT  -Volteran gel

## 2017-04-11 PROBLEM — B37.2 CANDIDAL INTERTRIGO: Status: ACTIVE | Noted: 2017-04-11

## 2017-04-11 LAB
ALBUMIN SERPL BCP-MCNC: 1.9 G/DL
ALP SERPL-CCNC: 92 U/L
ALT SERPL W/O P-5'-P-CCNC: 12 U/L
ANION GAP SERPL CALC-SCNC: 9 MMOL/L
ANISOCYTOSIS BLD QL SMEAR: SLIGHT
AST SERPL-CCNC: 14 U/L
BACTERIA BLD CULT: NORMAL
BASOPHILS # BLD AUTO: ABNORMAL K/UL
BASOPHILS NFR BLD: 0 %
BILIRUB SERPL-MCNC: 0.3 MG/DL
BUN SERPL-MCNC: 13 MG/DL
BURR CELLS BLD QL SMEAR: ABNORMAL
CALCIUM SERPL-MCNC: 8.1 MG/DL
CHLORIDE SERPL-SCNC: 108 MMOL/L
CO2 SERPL-SCNC: 20 MMOL/L
CREAT SERPL-MCNC: 0.7 MG/DL
DIFFERENTIAL METHOD: ABNORMAL
EOSINOPHIL # BLD AUTO: ABNORMAL K/UL
EOSINOPHIL NFR BLD: 0 %
ERYTHROCYTE [DISTWIDTH] IN BLOOD BY AUTOMATED COUNT: 21.2 %
EST. GFR  (AFRICAN AMERICAN): >60 ML/MIN/1.73 M^2
EST. GFR  (NON AFRICAN AMERICAN): >60 ML/MIN/1.73 M^2
GLUCOSE SERPL-MCNC: 86 MG/DL
HCT VFR BLD AUTO: 28.3 %
HGB BLD-MCNC: 8.6 G/DL
HYPOCHROMIA BLD QL SMEAR: ABNORMAL
LYMPHOCYTES # BLD AUTO: ABNORMAL K/UL
LYMPHOCYTES NFR BLD: 5 %
MAGNESIUM SERPL-MCNC: 1.5 MG/DL
MCH RBC QN AUTO: 27 PG
MCHC RBC AUTO-ENTMCNC: 30.4 %
MCV RBC AUTO: 89 FL
MONOCYTES # BLD AUTO: ABNORMAL K/UL
MONOCYTES NFR BLD: 6 %
MYELOCYTES NFR BLD MANUAL: 4 %
NEUTROPHILS NFR BLD: 85 %
PHOSPHATE SERPL-MCNC: 5 MG/DL
PLATELET # BLD AUTO: 274 K/UL
PLATELET BLD QL SMEAR: ABNORMAL
PMV BLD AUTO: 11.1 FL
POIKILOCYTOSIS BLD QL SMEAR: SLIGHT
POTASSIUM SERPL-SCNC: 4.3 MMOL/L
PROT SERPL-MCNC: 4.7 G/DL
RBC # BLD AUTO: 3.19 M/UL
SCHISTOCYTES BLD QL SMEAR: ABNORMAL
SODIUM SERPL-SCNC: 137 MMOL/L
WBC # BLD AUTO: 10.87 K/UL

## 2017-04-11 PROCEDURE — 63600175 PHARM REV CODE 636 W HCPCS: Performed by: STUDENT IN AN ORGANIZED HEALTH CARE EDUCATION/TRAINING PROGRAM

## 2017-04-11 PROCEDURE — 63600175 PHARM REV CODE 636 W HCPCS: Performed by: INTERNAL MEDICINE

## 2017-04-11 PROCEDURE — 25000003 PHARM REV CODE 250: Performed by: NURSE PRACTITIONER

## 2017-04-11 PROCEDURE — 25000003 PHARM REV CODE 250: Performed by: HOSPITALIST

## 2017-04-11 PROCEDURE — 99231 SBSQ HOSP IP/OBS SF/LOW 25: CPT | Mod: GC,,, | Performed by: HOSPITALIST

## 2017-04-11 PROCEDURE — 85007 BL SMEAR W/DIFF WBC COUNT: CPT

## 2017-04-11 PROCEDURE — 25000003 PHARM REV CODE 250: Performed by: STUDENT IN AN ORGANIZED HEALTH CARE EDUCATION/TRAINING PROGRAM

## 2017-04-11 PROCEDURE — 85027 COMPLETE CBC AUTOMATED: CPT

## 2017-04-11 PROCEDURE — 83735 ASSAY OF MAGNESIUM: CPT

## 2017-04-11 PROCEDURE — 11000001 HC ACUTE MED/SURG PRIVATE ROOM

## 2017-04-11 PROCEDURE — 97530 THERAPEUTIC ACTIVITIES: CPT

## 2017-04-11 PROCEDURE — 97803 MED NUTRITION INDIV SUBSEQ: CPT

## 2017-04-11 PROCEDURE — 80053 COMPREHEN METABOLIC PANEL: CPT

## 2017-04-11 PROCEDURE — 97116 GAIT TRAINING THERAPY: CPT

## 2017-04-11 PROCEDURE — 84100 ASSAY OF PHOSPHORUS: CPT

## 2017-04-11 PROCEDURE — 86580 TB INTRADERMAL TEST: CPT | Performed by: INTERNAL MEDICINE

## 2017-04-11 PROCEDURE — 97535 SELF CARE MNGMENT TRAINING: CPT

## 2017-04-11 PROCEDURE — 36415 COLL VENOUS BLD VENIPUNCTURE: CPT

## 2017-04-11 RX ORDER — LANOLIN ALCOHOL/MO/W.PET/CERES
400 CREAM (GRAM) TOPICAL ONCE
Status: COMPLETED | OUTPATIENT
Start: 2017-04-11 | End: 2017-04-11

## 2017-04-11 RX ORDER — CLONIDINE HYDROCHLORIDE 0.1 MG/1
0.1 TABLET ORAL ONCE
Status: COMPLETED | OUTPATIENT
Start: 2017-04-11 | End: 2017-04-11

## 2017-04-11 RX ORDER — GLYCERIN 1 G/1
1 SUPPOSITORY RECTAL ONCE
Status: COMPLETED | OUTPATIENT
Start: 2017-04-11 | End: 2017-04-11

## 2017-04-11 RX ADMIN — GLYCERIN 1 SUPPOSITORY: 2.1 SUPPOSITORY RECTAL at 09:04

## 2017-04-11 RX ADMIN — DONEPEZIL HYDROCHLORIDE 10 MG: 10 TABLET, FILM COATED ORAL at 09:04

## 2017-04-11 RX ADMIN — HYDROCHLOROTHIAZIDE 12.5 MG: 12.5 TABLET ORAL at 09:04

## 2017-04-11 RX ADMIN — ASPIRIN 81 MG CHEWABLE TABLET 81 MG: 81 TABLET CHEWABLE at 09:04

## 2017-04-11 RX ADMIN — Medication 5 UNITS: at 09:04

## 2017-04-11 RX ADMIN — PREGABALIN 50 MG: 50 CAPSULE ORAL at 09:04

## 2017-04-11 RX ADMIN — HYDRALAZINE HYDROCHLORIDE 25 MG: 25 TABLET, FILM COATED ORAL at 02:04

## 2017-04-11 RX ADMIN — CLONIDINE HYDROCHLORIDE 0.1 MG: 0.1 TABLET ORAL at 04:04

## 2017-04-11 RX ADMIN — PANTOPRAZOLE SODIUM 40 MG: 40 TABLET, DELAYED RELEASE ORAL at 09:04

## 2017-04-11 RX ADMIN — DULOXETINE 30 MG: 30 CAPSULE, DELAYED RELEASE ORAL at 09:04

## 2017-04-11 RX ADMIN — CALCIUM CARBONATE-CHOLECALCIFEROL TAB 250 MG-125 UNIT 1 TABLET: 250-125 TAB at 09:04

## 2017-04-11 RX ADMIN — MICONAZOLE NITRATE: 20 OINTMENT TOPICAL at 09:04

## 2017-04-11 RX ADMIN — MAGNESIUM OXIDE TAB 400 MG (241.3 MG ELEMENTAL MG) 400 MG: 400 (241.3 MG) TAB at 09:04

## 2017-04-11 RX ADMIN — ACETAMINOPHEN 650 MG: 325 TABLET ORAL at 10:04

## 2017-04-11 RX ADMIN — DICLOFENAC: 10 GEL TOPICAL at 09:04

## 2017-04-11 RX ADMIN — HYDRALAZINE HYDROCHLORIDE 25 MG: 25 TABLET, FILM COATED ORAL at 09:04

## 2017-04-11 RX ADMIN — PREDNISONE 10 MG: 10 TABLET ORAL at 09:04

## 2017-04-11 RX ADMIN — ISOSORBIDE MONONITRATE 30 MG: 30 TABLET, EXTENDED RELEASE ORAL at 09:04

## 2017-04-11 RX ADMIN — LOSARTAN POTASSIUM 100 MG: 50 TABLET, FILM COATED ORAL at 09:04

## 2017-04-11 RX ADMIN — HYDRALAZINE HYDROCHLORIDE 25 MG: 25 TABLET, FILM COATED ORAL at 04:04

## 2017-04-11 NOTE — ASSESSMENT & PLAN NOTE
L cystic adnexal mass seen on CTabdo in setting of reported complete hysterectomy  -Pelvic U/S revealing 2cm cystic structure, can be monitored with yearly U/S

## 2017-04-11 NOTE — PT/OT/SLP PROGRESS
Occupational Therapy  Co-Treatment with PT    Danyelle Jacobs   MRN: [id number]   Admitting Diagnosis: Gastrointestinal hemorrhage    OT Date of Treatment: 17   OT Start Time: 1010  OT Stop Time: 1055  OT Total Time (min): 45 min    Billable Minutes:  Self Care/Home Management 38 minutes    General Precautions: Standard, fall    Subjective:  Communicated with RN prior to session.    Pain Ratin/10  Location - Side: Right  Location - Orientation: generalized  Location: hip  Pain Addressed: Reposition, Distraction, Cessation of Activity, Nurse notified  Pain Rating Post-Intervention: 7/10    Objective:  Patient found with: peripheral IV, pt found supine in bed and agreeable to cotreatment with OT/PT     Functional Mobility:  Bed Mobility:  Rolling/Turning to Left: Moderate assistance  Supine to Sit: Maximum Assistance    Transfers:  Sit <> Stand Assistance: Maximum Assistance  Sit <> Stand Assistive Device: Rolling Walker  Bed <> Chair Technique: Stand Pivot  Bed <> Chair Transfer Assistance: Maximum Assistance  Bed <> Chair Assistive Device: Rolling Walker  Toilet Transfer Technique: Stand Pivot  Toilet Transfer Assistance: Maximum Assistance  Toilet Transfer Assistive Device: Rolling Walker    Functional Ambulation: Max A with RW, Max Ax2 with no AD.    Activities of Daily Living:    Grooming Position: Seated  Grooming Level of Assistance: Stand by assistance (setup with hand )  Toileting Where Assessed: Toilet  Toileting Level of Assistance: Maximum assistance (pt able to wipe self with CGA for balance and safety, but requires max A for clothing management and transfer)    Balance:   Static Sit: POOR+: Needs MINIMAL assist to maintain  Dynamic Sit: POOR: N/A  Static Stand: 0: Needs MAXIMAL assist to maintain   Dynamic stand: POOR: N/A    Therapeutic Activities and Exercises:  Pt ed re OT role and POC. Pt requiring Max A for supine to sit, sit to stand, ambulation with or without AD, toilet t/f and  toileting.    AM-PAC 6 CLICK ADL   How much help from another person does this patient currently need?   1 = Unable, Total/Dependent Assistance  2 = A lot, Maximum/Moderate Assistance  3 = A little, Minimum/Contact Guard/Supervision  4 = None, Modified [place]/Independent    Putting on and taking off regular lower body clothing? : 2  Bathing (including washing, rinsing, drying)?: 2  Toileting, which includes using toilet, bedpan, or urinal? : 2  Putting on and taking off regular upper body clothing?: 2  Taking care of personal grooming such as brushing teeth?: 3  Eating meals?: 3  Total Score: 14     AM-PAC Raw Score CMS G-Code Modifier Level of Impairment Assistance   6 % Total / Unable   7 - 9 CM 80 - 100% Maximal Assist   10 - 14 CL 60 - 80% Moderate Assist   15 - 19 CK 40 - 60% Moderate Assist   20 - 22 CJ 20 - 40% Minimal Assist   23 CI 1-20% SBA / CGA   24 CH 0% Independent/ Mod I     Patient left up in chair with all lines intact, call button in reach, RN notified and daughter present    ASSESSMENT:  Danyelle Jacobs is a 88 y.o. female with a medical diagnosis of Gastrointestinal hemorrhage and presents with the impairments listed below. Pt is pleasant and motivated to regain functional independence but is significantly limited by R hip pain. Pt requiring Max A for bed mobility, transfers, ambulation, and self care tasks 2/2 pain. Pt has good activity tolerance with minimal encouragement. Pt would benefit from cont OT to maximize participation in self care tasks and improve mobility.    Rehab identified problem list/impairments: Rehab identified problem list/impairments: weakness, impaired endurance, impaired self care skills, impaired functional mobilty, gait instability, impaired balance, decreased lower extremity function, decreased ROM, pain    Rehab potential is good.    Activity tolerance: Fair    Discharge recommendations: Discharge Facility/Level Of Care Needs: nursing facility, skilled      Barriers to discharge: Barriers to Discharge: Inaccessible home environment, Decreased caregiver support    Equipment recommendations:  (TBD at next level of care)     GOALS:   Occupational Therapy Goals        Problem: Occupational Therapy Goal    Goal Priority Disciplines Outcome Interventions   Occupational Therapy Goal     OT, PT/OT Ongoing (interventions implemented as appropriate)    Description:  Goals to be met by: 4/22/17     Patient will increase functional independence with ADLs by performing:    UE Dressing with Moderate Assistance.  LE Dressing with Moderate Assistance.  Grooming while EOB with Set-up Assistance and Stand-by Assistance.  Toileting from toilet with Moderate Assistance for hygiene and clothing management.   Sitting at edge of bed x15 minutes with Stand-by Assistance.  Rolling to Bilateral with Minimal Assistance.   Supine to sit with Minimal Assistance.  Stand pivot transfers with Moderate Assistance.  Toilet transfer to toilet with Moderate Assistance.                Plan:  Patient to be seen 5 x/week to address the above listed problems via self-care/home management, therapeutic activities, therapeutic exercises  Plan of Care expires: 05/08/17  Plan of Care reviewed with: patient, daughter    Pradeep GrullonMARIA T  4/11/2017  Pager: [phone]

## 2017-04-11 NOTE — PT/OT/SLP PROGRESS
Physical Therapy  Treatment    Danyelle Jacobs   MRN: [id number]   Admitting Diagnosis: Gastrointestinal hemorrhage    PT Received On: 17  PT Start Time: 1010     PT Stop Time: 1055    PT Total Time (min): 45 min   (OT present for treatment)    Billable Minutes:  Gait Training 15 and Therapeutic Activity 23    Treatment Type: Treatment  PT/PTA: PT       General Precautions: Standard, fall  Orthopedic Precautions: N/A   Braces: N/A    Do you have any cultural, spiritual, Nondenominational conflicts, given your current situation?: None noted    Subjective:  Communicated with RN prior to session.  Pt and daughter agreeable to PT treatment.    Pain Ratin/10  Location - Side: Right  Location - Orientation: generalized  Location: hip  Pain Addressed: Reposition, Distraction, Cessation of Activity, Nurse notified  Pain Rating Post-Intervention: 7/10    Objective:   Patient found supine, HOB elevated with: peripheral IV    Functional Mobility:  Bed Mobility:   Supine to Sit: Maximum Assistance, With side rail (with HOB flat, verbal/tactile cues for log roll technique)  Sit to Supine:  (not assessed 2/2 pt up in chair at end of session)    Transfers:  Sit <> Stand Assistance: Maximum Assistance (x 2 trials from bed in lowest position)  Sit <> Stand Assistive Device: No Assistive Device, Rolling Walker  Verbal/tactile cues for safety with RW management.  Pt with decreased safety awareness using RW; excessive forward lean and placing forearms on RW for support.  Increased safety without AD and HHA.    Gait:   Gait Distance: 12 ft with RW and max A, 10 ft with no AD and max A, 10 ft with no AD and max A  Assistance 1: Maximum assistance  Gait Assistive Device: No device, Rolling walker  Gait Pattern: reciprocal  Gait Deviation(s): decreased pedro, decreased step length, decreased stride length, decreased toe-to-floor clearance, decreased weight-shifting ability, forward lean, excessive knee flexion  Pt required max tactile/verbal  cues for RW management.  Pt with excessive forward lean and placing forearms on RW for support.  Verbal/tactile cues to promote upright posture and forward gaze; pt resistant to upright posture 2/2 R hip pain. Pt attempting to return to sitting position on bed before completing pivot turn, feeling BLEs touch bed, and reaching hands back for bed.  Verbal/tactile cues for safety.    Pt ambulated for 2 additional trials with max A and HHA x 2.  Noted mild improvement in posture and improved pedro.  Continuous cueing for safety.    Balance:   Static Sit: FAIR: Maintains without assist, but unable to take any challenges   Dynamic Sit: FAIR: Cannot move trunk without losing balance  Static Stand: 0: Needs MAXIMAL assist to maintain     Therapeutic Activities and Exercises:  Pt sat edge of bed x 3 minutes with L lateral and posterior lean in attempt to off-weight R hip 2/2 pain.  Pt performed ambulation with RW and returned to sitting at bedside.  Pt with L lateral lean on to forearm for comfort in sitting.    Pt performed ambulation with max A and HHA x 2 to bathroom.  Stand to sit toilet transfer with max A.  Pt performed seated forward and lateral reach on commode x 5 trials with min A.  Pt with excessive forward lean sitting on commode requiring min A to correct.  Sit to stand transfer from commode x 2 trials with max A.  Pt ambulated bathroom to bedside chair with max A and HHA x 2.  Pt finished PT session seated in bedside chair, BLEs elevated, and pillow propped on L side to promote midline sitting position.     Time spent educating daughter on PT plan of care, patients current functional status, and out of bed activity to tolerance.  Verbalized understanding.    AM-PAC 6 CLICK MOBILITY  How much help from another person does this patient currently need?   1 = Unable, Total/Dependent Assistance  2 = A lot, Maximum/Moderate Assistance  3 = A little, Minimum/Contact Guard/Supervision  4 = None, Modified  [place]/Independent    Turning over in bed (including adjusting bedclothes, sheets and blankets)?: 2  Sitting down on and standing up from a chair with arms (e.g., wheelchair, bedside commode, etc.): 2  Moving from lying on back to sitting on the side of the bed?: 2  Moving to and from a bed to a chair (including a wheelchair)?: 2  Need to walk in hospital room?: 2  Climbing 3-5 steps with a railing?: 1  Total Score: 11    AM-PAC Raw Score CMS G-Code Modifier Level of Impairment Assistance   6 % Total / Unable   7 - 9 CM 80 - 100% Maximal Assist   10 - 14 CL 60 - 80% Moderate Assist   15 - 19 CK 40 - 60% Moderate Assist   20 - 22 CJ 20 - 40% Minimal Assist   23 CI 1-20% SBA / CGA   24 CH 0% Independent/ Mod I     Patient left up in chair with all lines intact, call button in reach, RN notified and daughter present.    Assessment:  Danyelle Jacobs is a 88 y.o. female with a medical diagnosis of Gastrointestinal hemorrhage and presents with below impairments.  Pt cooperative with PT treatment.  She requires increased time and encouragement 2/2 7/10 R hip pain with movement.  She tolerated increased activity this session; sitting, standing, and 3 trials of ambulation.  She requires max A with mobility 2/2 generalized weakness, hip pain, and decreased safety.  She will continue to benefit from acute PT intervention to address below impairments.  Upon discharge, she will benefit from SNF to progress with functional strengthening and mobility to reduce risk of falls, reduce caregiver burden, and progress towards functional (I) for safe transition to home.      Rehab identified problem list/impairments: Rehab identified problem list/impairments: weakness, impaired endurance, gait instability, impaired functional mobilty, decreased lower extremity function, impaired balance, impaired self care skills, pain, decreased safety awareness, impaired skin    Rehab potential is good    Activity tolerance: Good    Discharge  recommendations: Discharge Facility/Level Of Care Needs: nursing facility, skilled     Barriers to discharge: Barriers to Discharge: Decreased caregiver support, Inaccessible home environment    Equipment recommendations: Equipment Needed After Discharge:  (TBD at next level of care)     GOALS:   Physical Therapy Goals        Problem: Physical Therapy Goal    Goal Priority Disciplines Outcome Goal Variances Interventions   Physical Therapy Goal     PT/OT, PT Ongoing (interventions implemented as appropriate)     Description:  Goals to be met by: 4/15/2017     Patient will increase functional independence with mobility by performin. Supine to sit with MInimal Assistance  2. Sit to supine with MInimal Assistance  3. Sit to stand transfer with Moderate Assistance  4. Bed to chair transfer with Moderate Assistance using AD or No AD  5. Gait x15 feet with Moderate Assistance using AD or No AD  6. Sitting at edge of bed x15 minutes with Contact Guard Assistance  7. Stand for x2 minutes with Moderate Assistance using AD or No AD  8. Lower extremity exercise program x15 reps per handout, with assistance as needed                PLAN:    Patient to be seen 4 x/week  to address the above listed problems via gait training, therapeutic activities, therapeutic exercises, neuromuscular re-education  Plan of Care expires: 17  Plan of Care reviewed with: patient, daughter          Gracie Ordoñeznikki, PT, DPT  2017  Pager: [phone]

## 2017-04-11 NOTE — PLAN OF CARE
Problem: Physical Therapy Goal  Goal: Physical Therapy Goal  Goals to be met by: 4/15/2017     Patient will increase functional independence with mobility by performin. Supine to sit with MInimal Assistance  2. Sit to supine with MInimal Assistance  3. Sit to stand transfer with Moderate Assistance  4. Bed to chair transfer with Moderate Assistance using AD or No AD  5. Gait x15 feet with Moderate Assistance using AD or No AD  6. Sitting at edge of bed x15 minutes with Contact Guard Assistance  7. Stand for x2 minutes with Moderate Assistance using AD or No AD  8. Lower extremity exercise program x15 reps per handout, with assistance as needed   Outcome: Ongoing (interventions implemented as appropriate)  Continue to progress with PT intervention.     Gracie Clemens, PT, DPT  2017  Pager: [phone]

## 2017-04-11 NOTE — PLAN OF CARE
CM met with patient's daughter, and with patient's son on the phone, about discharge planning.  Informed the family she would not be able to go to [facility] because they could not meet the patient's needs.  Explained she would need a longer term SNF facility.  Son, Andrés states how do they know they can't meet her needs if they don't try first.  Explained the process of PT/OT evaluations and how they are used as tools to help determine if they can meet a patient's needs.  States he wants to see what PT says today before they choose any other facilities.  Will continue to follow.

## 2017-04-11 NOTE — ASSESSMENT & PLAN NOTE
- PARVEEN on CKD I -resolved  - Hx of loose bloody bowel movements with episodes of vomiting   - Likely prerenal in etiology with a component on hypoxia  - s/p 2L of NS with subsequent resolution of PARVEEN

## 2017-04-11 NOTE — PLAN OF CARE
Problem: Occupational Therapy Goal  Goal: Occupational Therapy Goal  Goals to be met by: 4/22/17     Patient will increase functional independence with ADLs by performing:    UE Dressing with Moderate Assistance.  LE Dressing with Moderate Assistance.  Grooming while EOB with Set-up Assistance and Stand-by Assistance.  Toileting from toilet with Moderate Assistance for hygiene and clothing management.   Sitting at edge of bed x15 minutes with Stand-by Assistance.  Rolling to Bilateral with Minimal Assistance.   Supine to sit with Minimal Assistance.  Stand pivot transfers with Moderate Assistance.  Toilet transfer to toilet with Moderate Assistance.  Outcome: Ongoing (interventions implemented as appropriate)  Goals not established at baseline 2/2 bed level mobility only and requiring Max A. Pt now with improved activity tolerance, though still requiring Max A. Goals above established to improve functional independence in self care and mobility.      MARIA T Loera  4/11/2017  Pager: [phone]

## 2017-04-11 NOTE — PLAN OF CARE
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]  Yes    Diverticulosis large intestine w/o perforation or abscess w/bleeding [K57.31]  Yes    Compression fracture of first lumbar vertebra [S32.010A]  Yes    PARVEEN (acute kidney injury) [N17.9]  Yes    Angiodysplasia of stomach: see EGD 1/17 [K31.819]  Yes    Anemia associated with acute blood loss [D62]  Yes    Current chronic use of systemic steroids [Z79.52]  Not Applicable    Chronic kidney disease (CKD), stage I [N18.1]  Yes    Gastroesophageal reflux disease without esophagitis [K21.9]  Yes    Benign hypertension [I10]  Yes    CAD (coronary artery disease) [I25.10]  Yes    Mixed hyperlipidemia [E78.2]  Yes      Resolved Hospital Problems    Diagnosis Date Resolved POA   No resolved problems to display.       Patient is homebound due to:  Gastrointestinal hemorrhage    Allergies:  Review of patient's allergies indicates:   Allergen Reactions    Norvasc  [amlodipine]      Other reaction(s): Edema       Vitals:      Every shift (Skilled Nursing patients)    Diet: Cardiac   Supplement:  1 can every three times a day with meals                         Type:    House         Acitivities:     - Up in a chair each morning as tolerated   - Ambulate with assistance to bathroom   - May use walker, cane, or self-propelled wheelchair      LABS:  Per facility protocol      Nursing Precautions:     - Aspiration precautions:             - Total assistance with meals            -  Upright 90 degrees befor during and after meals              -  Suction at bedside          - Fall precautions per nursing home protocol   - Decubitus precautions:        -  for positioning   - Pressure reducing foam mattress   - Turn patient every two hours. Use wedge pillows to anchor patient    CONSULTS:      Physical Therapy to evaluate and treat     Occupational Therapy to evaluate and treat           MISCELLANEOUS CARE:          Routine Skin for Bedridden Patients:  Apply moisture barrier cream to all    skin folds and wet areas in perineal area daily and after baths and                           all bowel movements.        WOUND CARE:     Recommendations: Daily  Right wrist/right lower leg skin tears: clean with normal saline, apply cavilon no sting barrier to periwound,apply medihoney and vaseline gauze to wound bed, secure with kerlix  Right upper/Left upper arm: clean with normal saline, apply cavilon no sting barrier to periowound, apply vaseline gauze to wound beds, secure with kerlix.  Under breasts: apply miconazole ointment BID and after shower            Medications: Discontinue all previous medication orders, if any. See new list below.     Danyelle Jacobs   Home Medication Instructions TYLER:[id number]    Printed on:04/11/17 3661   Medication Information                      alprazolam (XANAX) 0.5 MG tablet  Take 1 tablet (0.5 mg total) by mouth daily as needed.             aspirin 81 mg Tab  Take 81 mg by mouth. 1 Tablet Oral Every day             atorvastatin (LIPITOR) 40 MG tablet  Take 1 tablet (40 mg total) by mouth every evening.             calcium carbonate-vitamin D3 250-125 mg 250-125 mg-unit Tab  Take 1 tablet by mouth once daily.             clopidogrel (PLAVIX) 75 mg tablet  Take 1 tablet (75 mg total) by mouth once daily. Take 75 mg by mouth once daily.    HOLD UNTIL FOLLOW UP WITH PCP              mg capsule  TAKE 1 CAPSULE BY MOUTH TWICE DAILY             donepezil (ARICEPT) 10 MG tablet  Take 1 tablet (10 mg total) by  mouth once daily.             duloxetine (CYMBALTA) 30 MG capsule  Take 1 capsule (30 mg total) by mouth once daily.             ferrous sulfate 325 (65 FE) MG EC tablet  Take 1 tablet (325 mg total) by mouth 2 (two) times daily.             hydrALAZINE (APRESOLINE) 25 MG tablet  Take 1 tablet (25 mg total) by mouth every 8 (eight) hours.             hydrochlorothiazide (MICROZIDE) 12.5 mg capsule  Take 1 capsule (12.5 mg total) by mouth once daily.             hydrocodone-acetaminophen 5-325mg (NORCO) 5-325 mg per tablet  Take 1 tablet by mouth every 6 (six) hours as needed for Pain.             isosorbide mononitrate (IMDUR) 30 MG 24 hr tablet  Take 1 tablet (30 mg total) by mouth every evening.             losartan (COZAAR) 100 MG tablet  Take 1 tablet (100 mg total) by mouth once daily.             miconazole nitrate 2% (MICOTIN) 2 % Oint  Apply topically 2 (two) times daily.             pantoprazole (PROTONIX) 40 MG tablet  Take 1 tablet (40 mg total) by mouth once daily.             predniSONE (DELTASONE) 10 MG tablet  Take 1 tablet (10 mg total) by mouth once daily.             pregabalin (LYRICA) 50 MG capsule  Take 1 capsule (50 mg total) by mouth 2 (two) times daily.             tolterodine (DETROL LA) 2 MG Cp24  Take 1 capsule (2 mg total) by mouth once daily.                       _________________________________  Danielle Dennis, MAURICIO  04/11/2017

## 2017-04-11 NOTE — PLAN OF CARE
This SW spoke to pt's son George regarding discharge planning.  SW counseled on levels of care and SNF criteria.  Pt's son provided SNF choices: [facility].  Referral sent via [facility].    Cindy Worthington LMSW

## 2017-04-11 NOTE — SUBJECTIVE & OBJECTIVE
Interval History: NAEON. Hypertensive earlier this AM, received 1x dose of clonidine. Had pelvic U/S done yesterday revealing 2cm simple adnexal cyst. Has not had a BM since Thursday, glycerin suppository ordered. PTOT to see patient today.    Review of Systems   Constitutional: Negative for chills and fever.   Respiratory: Negative for cough and shortness of breath.    Cardiovascular: Negative for chest pain and palpitations.   Gastrointestinal: Positive for constipation. Negative for diarrhea, nausea and vomiting.   Skin: Positive for rash (maceration under breasts).   Neurological: Negative for headaches.     Objective:     Vital Signs (Most Recent):  Temp: 98.4 °F (36.9 °C) (04/11/17 0409)  Pulse: 62 (04/11/17 0409)  Resp: 16 (04/11/17 0409)  BP: 125/60 (04/11/17 0548)  SpO2: (!) 94 % (04/11/17 0409) Vital Signs (24h Range):  Temp:  [48.9 °F (9.4 °C)-99.4 °F (37.4 °C)] 98.4 °F (36.9 °C)  Pulse:  [62-71] 62  Resp:  [16-18] 16  SpO2:  [92 %-95 %] 94 %  BP: (116-210)/(58-85) 125/60     Weight: 54.7 kg (120 lb 9.5 oz)  Body mass index is 26.1 kg/(m^2).    Intake/Output Summary (Last 24 hours) at 04/11/17 0714  Last data filed at 04/10/17 1400   Gross per 24 hour   Intake              400 ml   Output                2 ml   Net              398 ml      Physical Exam   Constitutional: No distress.   HENT:   Head: Normocephalic and atraumatic.        Eyes: Right eye exhibits no discharge. Left eye exhibits no discharge. No scleral icterus.   Cardiovascular: Normal rate and regular rhythm.    Pulmonary/Chest: Effort normal and breath sounds normal.   Abdominal: Soft. Bowel sounds are normal. There is no tenderness.   Neurological: She is alert.   Skin: Skin is warm and dry.   +paper thin skin with skin tears on elbows, R leg, and UE (bandaged)  +candidal rash under breasts         Significant Labs:   BMP:   Recent Labs  Lab 04/10/17  0419   GLU 83      K 4.6   *   CO2 19*   BUN 15   CREATININE 0.7   CALCIUM  7.8*   MG 1.9     CBC:   Recent Labs  Lab 04/10/17  0419   WBC 9.84   HGB 8.1*   HCT 24.9*          Significant Imaging: I have reviewed and interpreted all pertinent imaging results/findings within the past 24 hours.

## 2017-04-11 NOTE — PROGRESS NOTES
[facility]  Adult Nutrition  Consult Note    SUMMARY     Recommendations    1. Continue current cardiac diet.    - If Phosphorus continues to trend high, add low phos. diet restrictions.   2. RD to monitor & follow-up  .  Goals: Pt to receive nutrition within 48hrs.   Nutrition Goal Status: goal met  Communication of RD Recs: reviewed with RN    Reason for Assessment    Reason for Assessment: RD follow-up  Diagnosis: other (see comments) (GI hemorrhage )  Relevent Medical History: HTN, HLD, CAD, HF, dementia   Interdisciplinary Rounds: did not attend     General Information Comments: Nutrition Discharge Planning: Unclear at this time.     Nutrition Prescription Ordered    Current Diet Order: Cardiac      Nutrition Risk Screen     Nutrition Risk Screen: no indicators present    Nutrition/Diet History    Typical Food/Fluid Intake: Pt with good PO intake this AM, per RN.     Factors Affecting Nutritional Intake: other (see comments) (None)    Labs/Tests/Procedures/Meds    Pertinent Labs Reviewed: reviewed  Pertinent Labs Comments: P 5.0  Pertinent Medications Reviewed: reviewed  Pertinent Medications Comments: prednisone    Physical Findings    Overall Physical Appearance: overweight  Oral/Mouth Cavity: WDL  Skin: intact    Anthropometrics    Height (inches): 57.01 in  Weight Method: Bed Scale  Weight (kg): 54.7 kg     Ideal Body Weight (IBW), Female: 85.05 lb  % Ideal Body Weight, Female (lb): 141.79 lb     BMI (kg/m2): 26.09  BMI Grade: 25 - 29.9 - overweight    Estimated/Assessed Needs    Weight Used For Calorie Calculations: 54.7 kg (120 lb 9.5 oz)   Height (cm): 144.8 cm     Energy Need Method: Cap[place]-St Jeor (1.25 PAL: 1087kcal)     Weight Used For Protein Calculations: 54.7 kg (120 lb 9.5 oz)  Protein Requirements: 56-67 g/d    Fluid Need Method: RDA Method (or per MD)    Monitor and Evaluation    Food and Nutrient Intake: energy intake, food and beverage intake  Food and Nutrient  Adminstration: diet order     Anthropometric Measurements: weight, weight change  Biochemical Data, Medical Tests and Procedures: other (specify) (All labs)  Nutrition-Focused Physical Findings: overall appearance    Nutrition Risk    Level of Risk: other (see comments) (1x/week)    Nutrition Follow-Up    RD Follow-up?: Yes    Assessment and Plan    No nutritional dx at this time.

## 2017-04-11 NOTE — PROGRESS NOTES
[facility] Hospital Medicine  Progress Note    Patient Name: Danyelle Jacobs  MRN: [id number]  Patient Class: IP- Inpatient   Admission Date: 2017  Length of Stay: 5 days  Attending Physician: Yen Ledezma MD  Primary Care Provider: Bianca Dykes MD    Hospital Medicine Team: [facility] MAURICIO Long    Subjective:     Principal Problem:Gastrointestinal hemorrhage    HPI:  Ms. Jacobs is a 88 F with a history of HTN, HLD, CAD, PAD, HFpEF, dementia, and rheumatoid arthritis who presents to the ED with GI bleeding. Her son reports that her symptoms began yesterday evening with bilious vomiting and diarrhea. Since then, she has had 6 episodes of emesis and 5 episodes of diarrhea which started out as black and became bright red by her last BM which was at 6 AM. She currently denies abdominal pain, SOB, or nausea; however, her son reports that she will usually deny symptoms even if they are present.     She had a recent admission from 3/24- for respiratory failure with hypoxia (presumed 2/2 to PNA, resolved with abx) and RLE pain. Also admitted - and transfused at that time. Patient has a significant history of 'bleeding angiodysplastic lesion found on EGD with hemostasis achieved' performed in 2017. Patient also underwent colonoscopy at the same time which showed 'prominent vessels in the cecum but no definite AVM; raised bluish lesion likely a hematoma in the rectum.' Son reports that she has declined both mentally and physically in the last few months and has become progressively weak, less responsive, and has had hallucinations including speaking to  relatives. Also, she has developed urinary and fecal incontinence recently.       Hospital Course:  17: Upon initial evaluation patient's initial oxygen requirements appeared to be increasing. ABG was performed which showed adequate oxygenation. Hct was also obtained with the arterial stick which showed a  Hct of 19. Based on this result and her presenting H/H of 8.3/26.2, 1 U PRBCs was ordered and received 2L of NS. GI and ICU were consulted for further management. Repeat hgb and hemodynamics are stable. Protonix drip started. CT abdomen W/O significant for diverticulosis, adnexal cystic structure, L1 compression fx, and lung opacities (atelectasis v infection/inflammation).    4/7: Hgb stable, no further episodes of vomiting or diarrhea. Pt c/o RLE pain. Hypertensive to 200 systolic, restarting home BP meds stepwise. GI recs supportive care. PARVEEN improved.     4/8: stepped down to hospital medicine. Hemodynamically stable.       4/9: No acute events, PTOT recommending SNF.     4/10: Son requested pelvic ultrasound for patient since she is currently admitted. H/H stable. Awaiting eval from [facility].    4/11: 4/9: no acute events overnight. Started nystatin ointment yesterday for candidal rash under breasts.S/p U/S revealing cystic mass in L adnexa. Awaiting placement.       Interval History: NAEON. Hypertensive earlier this AM, received 1x dose of clonidine. Had pelvic U/S done yesterday revealing 2cm simple adnexal cyst. Has not had a BM since Thursday, glycerin suppository ordered. PTOT to see patient today.    Review of Systems   Constitutional: Negative for chills and fever.   Respiratory: Negative for cough and shortness of breath.    Cardiovascular: Negative for chest pain and palpitations.   Gastrointestinal: Positive for constipation. Negative for diarrhea, nausea and vomiting.   Skin: Positive for rash (maceration under breasts).   Neurological: Negative for headaches.     Objective:     Vital Signs (Most Recent):  Temp: 98.4 °F (36.9 °C) (04/11/17 0409)  Pulse: 62 (04/11/17 0409)  Resp: 16 (04/11/17 0409)  BP: 125/60 (04/11/17 0548)  SpO2: (!) 94 % (04/11/17 0409) Vital Signs (24h Range):  Temp:  [48.9 °F (9.4 °C)-99.4 °F (37.4 °C)] 98.4 °F (36.9 °C)  Pulse:  [62-71] 62  Resp:  [16-18] 16  SpO2:  [92 %-95  %] 94 %  BP: (116-210)/(58-85) 125/60     Weight: 54.7 kg (120 lb 9.5 oz)  Body mass index is 26.1 kg/(m^2).    Intake/Output Summary (Last 24 hours) at 04/11/17 0714  Last data filed at 04/10/17 1400   Gross per 24 hour   Intake              400 ml   Output                2 ml   Net              398 ml      Physical Exam   Constitutional: No distress.   HENT:   Head: Normocephalic and atraumatic.        Eyes: Right eye exhibits no discharge. Left eye exhibits no discharge. No scleral icterus.   Cardiovascular: Normal rate and regular rhythm.    Pulmonary/Chest: Effort normal and breath sounds normal.   Abdominal: Soft. Bowel sounds are normal. There is no tenderness.   Neurological: She is alert.   Skin: Skin is warm and dry.   +paper thin skin with skin tears on elbows, R leg, and UE (bandaged)  +candidal rash under breasts         Significant Labs:   BMP:   Recent Labs  Lab 04/10/17  0419   GLU 83      K 4.6   *   CO2 19*   BUN 15   CREATININE 0.7   CALCIUM 7.8*   MG 1.9     CBC:   Recent Labs  Lab 04/10/17  0419   WBC 9.84   HGB 8.1*   HCT 24.9*          Significant Imaging: I have reviewed and interpreted all pertinent imaging results/findings within the past 24 hours.    Assessment/Plan:      * Gastrointestinal hemorrhage  - Patient presenting with complaint of multiple episodes of blood bowel movements  - Patient's H//H largely stable  -s/p PPI gtt -> transitioned to PO pantoprazole  - GI consulted, recommended OP follow up  -CT abdo revealing diverticulosis, cystic mass in L adnexa   - no further episodes of hematochezia    CAD (coronary artery disease)  - ASA 81 mg PO daily  - Plavix 75 mg PO daily - held  - no signs of further bleeding, Hb stable.  -given hx of multiple GI bleeds, will defer restarting plavix as an OP    Mixed hyperlipidemia  - Lipitor 40 mg PO daily    Benign hypertension  - will continue home meds as patient has not exhibited further bleed/hemodynamic  instability  -given age and hx of GIBs, will hold off on adding another agent, will titrate home meds accordingly.      Current chronic use of systemic steroids  -continue steroids for RA      Anemia associated with acute blood loss  - s/p 1u pRBCs on admission given Hx of CAD and troponinemia (likely demand ischemia)  -H/H largely stable  - See GI hemorrhage for further detail    Angiodysplasia of stomach: see EGD 1/17  - EGD1/2017: 'bleeding angiodysplastic lesion found with hemostasis achieved'  - Colonoscopy: 'prominent vessels in the cecum but no definite AVM; raised bluish lesion likely a hematoma in the rectum.'  - Multiple episodes of yellow/green emesis w/ five episodes of diarrhea, dark red --> bright red  - Patient currently HDS, H/H stable at this point  - GI consulted, recommend OP follow up    PARVEEN (acute kidney injury)  - PARVEEN on CKD I -resolved  - Hx of loose bloody bowel movements with episodes of vomiting   - Likely prerenal in etiology with a component on hypoxia  - s/p 2L of NS with subsequent resolution of PARVEEN    Compression fracture of first lumbar vertebra  -likely 2/2 to osteoporosis, seen on CT abdo  -possible nerve impingement contributing to R hip pain  -continue lyrica and PTOT  -Volteran gel      Sciatica associated with disorder of lumbar spine  PTOT  -continue lyrica      Diverticulosis large intestine w/o perforation or abscess w/bleeding  -See GI hemorrhage      Adnexal mass  L cystic adnexal mass seen on CTabdo in setting of reported complete hysterectomy  -Pelvic U/S revealing 2cm cystic structure, can be monitored with yearly U/S      Candidal intertrigo  -under breasts  -nystatin ointment      VTE Risk Mitigation         Ordered     Medium Risk of VTE  Once      04/06/17 1535     Place ISHMAEL hose  Until discontinued      04/06/17 1535     Reason for No Pharmacological VTE Prophylaxis  Once      04/06/17 1535     Place sequential compression device  Until discontinued      04/06/17  1504          MAURICIO Long  Department of Hospital Medicine   [facility]

## 2017-04-11 NOTE — PLAN OF CARE
Problem: Patient Care Overview  Goal: Plan of Care Review  Outcome: Ongoing (interventions implemented as appropriate)  POC reviewed with pt and family at 1400. Wound Care.  Blood sugar management. Family verbalized understanding. Questions and concerns addressed. No acute events today. Pt progressing toward goals. Will continue to monitor. See flowsheets for full assessment and VS info.

## 2017-04-11 NOTE — ASSESSMENT & PLAN NOTE
- ASA 81 mg PO daily  - Plavix 75 mg PO daily - held  - no signs of further bleeding, Hb stable.  -given hx of multiple GI bleeds, will defer restarting plavix as an OP

## 2017-04-11 NOTE — ASSESSMENT & PLAN NOTE
- Patient presenting with complaint of multiple episodes of blood bowel movements  - Patient's H//H largely stable  -s/p PPI gtt -> transitioned to PO pantoprazole  - GI consulted, recommended OP follow up  -CT abdo revealing diverticulosis, cystic mass in L adnexa   - no further episodes of hematochezia

## 2017-04-12 ENCOUNTER — OUTPATIENT CASE MANAGEMENT (OUTPATIENT)
Dept: ADMINISTRATIVE | Facility: OTHER | Age: 82
End: 2017-04-12

## 2017-04-12 PROBLEM — N17.9 AKI (ACUTE KIDNEY INJURY): Status: RESOLVED | Noted: 2017-03-24 | Resolved: 2017-04-12

## 2017-04-12 LAB
ALBUMIN SERPL BCP-MCNC: 2.1 G/DL
ALP SERPL-CCNC: 92 U/L
ALT SERPL W/O P-5'-P-CCNC: 14 U/L
ANION GAP SERPL CALC-SCNC: 10 MMOL/L
ANISOCYTOSIS BLD QL SMEAR: SLIGHT
AST SERPL-CCNC: 15 U/L
BASOPHILS # BLD AUTO: ABNORMAL K/UL
BASOPHILS NFR BLD: 0 %
BILIRUB SERPL-MCNC: 0.2 MG/DL
BUN SERPL-MCNC: 14 MG/DL
BURR CELLS BLD QL SMEAR: ABNORMAL
CALCIUM SERPL-MCNC: 8.6 MG/DL
CHLORIDE SERPL-SCNC: 108 MMOL/L
CO2 SERPL-SCNC: 21 MMOL/L
CREAT SERPL-MCNC: 0.8 MG/DL
DIFFERENTIAL METHOD: ABNORMAL
EOSINOPHIL # BLD AUTO: ABNORMAL K/UL
EOSINOPHIL NFR BLD: 3 %
ERYTHROCYTE [DISTWIDTH] IN BLOOD BY AUTOMATED COUNT: 20.7 %
EST. GFR  (AFRICAN AMERICAN): >60 ML/MIN/1.73 M^2
EST. GFR  (NON AFRICAN AMERICAN): >60 ML/MIN/1.73 M^2
GLUCOSE SERPL-MCNC: 83 MG/DL
HCT VFR BLD AUTO: 28.6 %
HGB BLD-MCNC: 8.8 G/DL
HYPOCHROMIA BLD QL SMEAR: ABNORMAL
LYMPHOCYTES # BLD AUTO: ABNORMAL K/UL
LYMPHOCYTES NFR BLD: 12 %
MAGNESIUM SERPL-MCNC: 1.6 MG/DL
MCH RBC QN AUTO: 26.9 PG
MCHC RBC AUTO-ENTMCNC: 30.8 %
MCV RBC AUTO: 88 FL
METAMYELOCYTES NFR BLD MANUAL: 1 %
MONOCYTES # BLD AUTO: ABNORMAL K/UL
MONOCYTES NFR BLD: 8 %
MYELOCYTES NFR BLD MANUAL: 1 %
NEUTROPHILS NFR BLD: 73 %
NEUTS BAND NFR BLD MANUAL: 2 %
OVALOCYTES BLD QL SMEAR: ABNORMAL
PHOSPHATE SERPL-MCNC: 3.2 MG/DL
PLATELET # BLD AUTO: 267 K/UL
PMV BLD AUTO: 11.3 FL
POIKILOCYTOSIS BLD QL SMEAR: SLIGHT
POLYCHROMASIA BLD QL SMEAR: ABNORMAL
POTASSIUM SERPL-SCNC: 4 MMOL/L
PROT SERPL-MCNC: 5 G/DL
RBC # BLD AUTO: 3.27 M/UL
SODIUM SERPL-SCNC: 139 MMOL/L
WBC # BLD AUTO: 11.27 K/UL

## 2017-04-12 PROCEDURE — 11000001 HC ACUTE MED/SURG PRIVATE ROOM

## 2017-04-12 PROCEDURE — 36415 COLL VENOUS BLD VENIPUNCTURE: CPT

## 2017-04-12 PROCEDURE — 83735 ASSAY OF MAGNESIUM: CPT

## 2017-04-12 PROCEDURE — 85007 BL SMEAR W/DIFF WBC COUNT: CPT

## 2017-04-12 PROCEDURE — 99231 SBSQ HOSP IP/OBS SF/LOW 25: CPT | Mod: GC,,, | Performed by: HOSPITALIST

## 2017-04-12 PROCEDURE — 85027 COMPLETE CBC AUTOMATED: CPT

## 2017-04-12 PROCEDURE — 84100 ASSAY OF PHOSPHORUS: CPT

## 2017-04-12 PROCEDURE — 25000003 PHARM REV CODE 250: Performed by: STUDENT IN AN ORGANIZED HEALTH CARE EDUCATION/TRAINING PROGRAM

## 2017-04-12 PROCEDURE — 25000003 PHARM REV CODE 250: Performed by: HOSPITALIST

## 2017-04-12 PROCEDURE — 63600175 PHARM REV CODE 636 W HCPCS: Performed by: STUDENT IN AN ORGANIZED HEALTH CARE EDUCATION/TRAINING PROGRAM

## 2017-04-12 PROCEDURE — 80053 COMPREHEN METABOLIC PANEL: CPT

## 2017-04-12 RX ADMIN — CALCIUM CARBONATE-CHOLECALCIFEROL TAB 250 MG-125 UNIT 1 TABLET: 250-125 TAB at 08:04

## 2017-04-12 RX ADMIN — PREDNISONE 10 MG: 10 TABLET ORAL at 08:04

## 2017-04-12 RX ADMIN — HYDRALAZINE HYDROCHLORIDE 25 MG: 25 TABLET, FILM COATED ORAL at 05:04

## 2017-04-12 RX ADMIN — PREGABALIN 50 MG: 50 CAPSULE ORAL at 09:04

## 2017-04-12 RX ADMIN — LOSARTAN POTASSIUM 100 MG: 50 TABLET, FILM COATED ORAL at 08:04

## 2017-04-12 RX ADMIN — DICLOFENAC: 10 GEL TOPICAL at 08:04

## 2017-04-12 RX ADMIN — PREGABALIN 50 MG: 50 CAPSULE ORAL at 08:04

## 2017-04-12 RX ADMIN — MICONAZOLE NITRATE: 20 OINTMENT TOPICAL at 09:04

## 2017-04-12 RX ADMIN — DULOXETINE 30 MG: 30 CAPSULE, DELAYED RELEASE ORAL at 08:04

## 2017-04-12 RX ADMIN — HYDROCHLOROTHIAZIDE 12.5 MG: 12.5 TABLET ORAL at 08:04

## 2017-04-12 RX ADMIN — DONEPEZIL HYDROCHLORIDE 10 MG: 10 TABLET, FILM COATED ORAL at 08:04

## 2017-04-12 RX ADMIN — PANTOPRAZOLE SODIUM 40 MG: 40 TABLET, DELAYED RELEASE ORAL at 08:04

## 2017-04-12 RX ADMIN — MICONAZOLE NITRATE: 20 OINTMENT TOPICAL at 08:04

## 2017-04-12 RX ADMIN — ASPIRIN 81 MG CHEWABLE TABLET 81 MG: 81 TABLET CHEWABLE at 08:04

## 2017-04-12 NOTE — PLAN OF CARE
Radha at [facility] advised that she want have available until next week.  [facility] declined pt.    This SW spoke with Annie at [facility] who states that she will have her team look at this case again and reconsider the denial.     Cindy Worthington, LAUREN  [phone]

## 2017-04-12 NOTE — PROGRESS NOTES
[facility] Hospital Medicine  Progress Note    Patient Name: Danyelle Jacobs  MRN: [id number]  Patient Class: IP- Inpatient   Admission Date: 2017  Length of Stay: 6 days  Attending Physician: Yen Ledezma MD  Primary Care Provider: Bianca Dykes MD    Hospital Medicine [place] [facility] Shawna Porter MD    Subjective:     Principal Problem:Gastrointestinal hemorrhage    HPI:  Ms. Jacobs is a 88 F with a history of HTN, HLD, CAD, PAD, HFpEF, dementia, and rheumatoid arthritis who presents to the ED with GI bleeding. Her son reports that her symptoms began yesterday evening with bilious vomiting and diarrhea. Since then, she has had 6 episodes of emesis and 5 episodes of diarrhea which started out as black and became bright red by her last BM which was at 6 AM. She currently denies abdominal pain, SOB, or nausea; however, her son reports that she will usually deny symptoms even if they are present.     She had a recent admission from 3/24- for respiratory failure with hypoxia (presumed 2/2 to PNA, resolved with abx) and RLE pain. Also admitted - and transfused at that time. Patient has a significant history of 'bleeding angiodysplastic lesion found on EGD with hemostasis achieved' performed in 2017. Patient also underwent colonoscopy at the same time which showed 'prominent vessels in the cecum but no definite AVM; raised bluish lesion likely a hematoma in the rectum.' Son reports that she has declined both mentally and physically in the last few months and has become progressively weak, less responsive, and has had hallucinations including speaking to  relatives. Also, she has developed urinary and fecal incontinence recently.       Hospital Course:  17: Upon initial evaluation patient's initial oxygen requirements appeared to be increasing. ABG was performed which showed adequate oxygenation. Hct was also obtained with the arterial stick which showed a Hct of  19. Based on this result and her presenting H/H of 8.3/26.2, 1 U PRBCs was ordered and received 2L of NS. GI and ICU were consulted for further management. Repeat hgb and hemodynamics are stable. Protonix drip started. CT abdomen W/O significant for diverticulosis, adnexal cystic structure, L1 compression fx, and lung opacities (atelectasis v infection/inflammation).    4/7: Hgb stable, no further episodes of vomiting or diarrhea. Pt c/o RLE pain. Hypertensive to 200 systolic, restarting home BP meds stepwise. GI recs supportive care. PARVEEN improved.     4/8: stepped down to hospital medicine. Hemodynamically stable.       4/9: No acute events, PTOT recommending SNF.     4/10: Son requested pelvic ultrasound for patient since she is currently admitted. H/H stable. Awaiting eval from [facility].    4/11: 4/9: no acute events overnight. Started nystatin ointment yesterday for candidal rash under breasts.S/p U/S revealing cystic mass in L adnexa. Awaiting placement.     4/12 - NAEON. Awaiting placement.           Review of Systems   Constitutional: Negative for chills and fever.   Respiratory: Negative for cough and shortness of breath.    Cardiovascular: Negative for chest pain and palpitations.   Gastrointestinal: Positive for constipation. Negative for diarrhea, nausea and vomiting.   Skin: Positive for rash (maceration under breasts).   Neurological: Negative for headaches.     Objective:     Vital Signs (Most Recent):  Temp: 98.6 °F (37 °C) (04/12/17 0800)  Pulse: 71 (04/12/17 0800)  Resp: 18 (04/12/17 0800)  BP: 139/63 (04/12/17 0800)  SpO2: 98 % (04/12/17 0400) Vital Signs (24h Range):  Temp:  [97.6 °F (36.4 °C)-99.3 °F (37.4 °C)] 98.6 °F (37 °C)  Pulse:  [65-83] 71  Resp:  [18-20] 18  SpO2:  [92 %-98 %] 98 %  BP: (112-141)/(51-67) 139/63     Weight: 54.7 kg (120 lb 9.5 oz)  Body mass index is 26.1 kg/(m^2).    Intake/Output Summary (Last 24 hours) at 04/12/17 0802  Last data filed at 04/12/17 0500   Gross per 24  hour   Intake              760 ml   Output                0 ml   Net              760 ml      Physical Exam   Constitutional: No distress.   HENT:   Head: Normocephalic and atraumatic.        Eyes: Right eye exhibits no discharge. Left eye exhibits no discharge. No scleral icterus.   Cardiovascular: Normal rate and regular rhythm.    Pulmonary/Chest: Effort normal and breath sounds normal.   Abdominal: Soft. Bowel sounds are normal. There is no tenderness.   Neurological: She is alert.   Skin: Skin is warm and dry.   +paper thin skin with skin tears on elbows, R leg, and UE (bandaged)  +candidal rash under breasts         Significant Labs:   BMP:     Recent Labs  Lab 04/12/17  0633   GLU 83      K 4.0      CO2 21*   BUN 14   CREATININE 0.8   CALCIUM 8.6*   MG 1.6     CBC:     Recent Labs  Lab 04/11/17  0525 04/12/17  0633   WBC 10.87 11.27   HGB 8.6* 8.8*   HCT 28.3* 28.6*    267          Assessment/Plan:      * Gastrointestinal hemorrhage  - Patient presenting with complaint of multiple episodes of blood bowel movements  - Patient's H//H largely stable  -s/p PPI gtt -> transitioned to PO pantoprazole  - GI consulted, recommended OP follow up  -CT abdo revealing diverticulosis, cystic mass in L adnexa   - no further episodes of hematochezia    CAD (coronary artery disease)  - ASA 81 mg PO daily  - Plavix 75 mg PO daily - held  - no signs of further bleeding, Hb stable.  -given hx of multiple GI bleeds, will defer restarting plavix as an OP    Benign hypertension  - will continue home meds as patient has not exhibited further bleed/hemodynamic instability       Current chronic use of systemic steroids  -continue steroids for RA      Anemia associated with acute blood loss  - s/p 1u pRBCs on admission given Hx of CAD and troponinemia (likely demand ischemia)  -H/H largely stable  - See GI hemorrhage for further detail    Angiodysplasia of stomach: see EGD 1/17  - EGD1/2017: 'bleeding angiodysplastic  lesion found with hemostasis achieved'  - Colonoscopy: 'prominent vessels in the cecum but no definite AVM; raised bluish lesion likely a hematoma in the rectum.'  - Multiple episodes of yellow/green emesis w/ five episodes of diarrhea, dark red --> bright red  - Patient currently HDS, H/H stable at this point  - GI consulted, recommend OP follow up    Compression fracture of first lumbar vertebra  -likely 2/2 to osteoporosis, seen on CT abdo  -possible nerve impingement contributing to R hip pain  -continue lyrica and PTOT  -Volteran gel      Sciatica associated with disorder of lumbar spine  PTOT  -continue lyrica      Adnexal mass  L cystic adnexal mass seen on CTabdo in setting of reported complete hysterectomy  -Pelvic U/S revealing 2cm cystic structure, can be monitored with yearly U/S      Candidal intertrigo  -under breasts  -nystatin ointment      VTE Risk Mitigation         Ordered     Medium Risk of VTE  Once      04/06/17 1535     Place ISHMAEL hose  Until discontinued      04/06/17 1535     Reason for No Pharmacological VTE Prophylaxis  Once      04/06/17 1535     Place sequential compression device  Until discontinued      04/06/17 1504          Shawna Porter MD  Department of Hospital Medicine   [facility]

## 2017-04-12 NOTE — SUBJECTIVE & OBJECTIVE
Review of Systems   Constitutional: Negative for chills and fever.   Respiratory: Negative for cough and shortness of breath.    Cardiovascular: Negative for chest pain and palpitations.   Gastrointestinal: Positive for constipation. Negative for diarrhea, nausea and vomiting.   Skin: Positive for rash (maceration under breasts).   Neurological: Negative for headaches.     Objective:     Vital Signs (Most Recent):  Temp: 98.6 °F (37 °C) (04/12/17 0800)  Pulse: 71 (04/12/17 0800)  Resp: 18 (04/12/17 0800)  BP: 139/63 (04/12/17 0800)  SpO2: 98 % (04/12/17 0400) Vital Signs (24h Range):  Temp:  [97.6 °F (36.4 °C)-99.3 °F (37.4 °C)] 98.6 °F (37 °C)  Pulse:  [65-83] 71  Resp:  [18-20] 18  SpO2:  [92 %-98 %] 98 %  BP: (112-141)/(51-67) 139/63     Weight: 54.7 kg (120 lb 9.5 oz)  Body mass index is 26.1 kg/(m^2).    Intake/Output Summary (Last 24 hours) at 04/12/17 0839  Last data filed at 04/12/17 0500   Gross per 24 hour   Intake              760 ml   Output                0 ml   Net              760 ml      Physical Exam   Constitutional: No distress.   HENT:   Head: Normocephalic and atraumatic.        Eyes: Right eye exhibits no discharge. Left eye exhibits no discharge. No scleral icterus.   Cardiovascular: Normal rate and regular rhythm.    Pulmonary/Chest: Effort normal and breath sounds normal.   Abdominal: Soft. Bowel sounds are normal. There is no tenderness.   Neurological: She is alert.   Skin: Skin is warm and dry.   +paper thin skin with skin tears on elbows, R leg, and UE (bandaged)  +candidal rash under breasts         Significant Labs:   BMP:     Recent Labs  Lab 04/12/17  0633   GLU 83      K 4.0      CO2 21*   BUN 14   CREATININE 0.8   CALCIUM 8.6*   MG 1.6     CBC:     Recent Labs  Lab 04/11/17  0525 04/12/17  0633   WBC 10.87 11.27   HGB 8.6* 8.8*   HCT 28.3* 28.6*    182

## 2017-04-12 NOTE — PLAN OF CARE
Problem: Patient Care Overview  Goal: Plan of Care Review  Outcome: Ongoing (interventions implemented as appropriate)  PPD placed on LFA.  Plan to d/c to SNF.

## 2017-04-12 NOTE — PROGRESS NOTES
Patient referred for high risk. Patient currently inpatient. Discharge plans SNF at this time. Will continue to follow.

## 2017-04-13 LAB
ALBUMIN SERPL BCP-MCNC: 2 G/DL
ALP SERPL-CCNC: 100 U/L
ALT SERPL W/O P-5'-P-CCNC: 15 U/L
ANION GAP SERPL CALC-SCNC: 9 MMOL/L
ANISOCYTOSIS BLD QL SMEAR: SLIGHT
AST SERPL-CCNC: 16 U/L
BASOPHILS # BLD AUTO: ABNORMAL K/UL
BASOPHILS NFR BLD: 0 %
BILIRUB SERPL-MCNC: 0.3 MG/DL
BUN SERPL-MCNC: 15 MG/DL
CALCIUM SERPL-MCNC: 8.3 MG/DL
CHLORIDE SERPL-SCNC: 109 MMOL/L
CO2 SERPL-SCNC: 24 MMOL/L
CREAT SERPL-MCNC: 0.8 MG/DL
DIFFERENTIAL METHOD: ABNORMAL
EOSINOPHIL # BLD AUTO: ABNORMAL K/UL
EOSINOPHIL NFR BLD: 1 %
ERYTHROCYTE [DISTWIDTH] IN BLOOD BY AUTOMATED COUNT: 20.5 %
EST. GFR  (AFRICAN AMERICAN): >60 ML/MIN/1.73 M^2
EST. GFR  (NON AFRICAN AMERICAN): >60 ML/MIN/1.73 M^2
GLUCOSE SERPL-MCNC: 75 MG/DL
HCT VFR BLD AUTO: 26.2 %
HGB BLD-MCNC: 8.5 G/DL
LACTATE SERPL-SCNC: 3.8 MMOL/L
LYMPHOCYTES # BLD AUTO: ABNORMAL K/UL
LYMPHOCYTES NFR BLD: 7 %
MAGNESIUM SERPL-MCNC: 1.7 MG/DL
MCH RBC QN AUTO: 27.2 PG
MCHC RBC AUTO-ENTMCNC: 32.4 %
MCV RBC AUTO: 84 FL
METAMYELOCYTES NFR BLD MANUAL: 1 %
MONOCYTES # BLD AUTO: ABNORMAL K/UL
MONOCYTES NFR BLD: 10 %
MYELOCYTES NFR BLD MANUAL: 1 %
NEUTROPHILS NFR BLD: 80 %
PHOSPHATE SERPL-MCNC: 4.4 MG/DL
PLATELET # BLD AUTO: 253 K/UL
PLATELET BLD QL SMEAR: ABNORMAL
PMV BLD AUTO: 11.1 FL
POLYCHROMASIA BLD QL SMEAR: ABNORMAL
POTASSIUM SERPL-SCNC: 3.5 MMOL/L
PROT SERPL-MCNC: 4.9 G/DL
RBC # BLD AUTO: 3.12 M/UL
SODIUM SERPL-SCNC: 142 MMOL/L
TB INDURATION 48 - 72 HR READ: 0 MM
WBC # BLD AUTO: 10.52 K/UL

## 2017-04-13 PROCEDURE — 83735 ASSAY OF MAGNESIUM: CPT | Mod: 91

## 2017-04-13 PROCEDURE — 27000221 HC OXYGEN, UP TO 24 HOURS

## 2017-04-13 PROCEDURE — 97530 THERAPEUTIC ACTIVITIES: CPT

## 2017-04-13 PROCEDURE — 83605 ASSAY OF LACTIC ACID: CPT

## 2017-04-13 PROCEDURE — 25000003 PHARM REV CODE 250: Performed by: STUDENT IN AN ORGANIZED HEALTH CARE EDUCATION/TRAINING PROGRAM

## 2017-04-13 PROCEDURE — 85007 BL SMEAR W/DIFF WBC COUNT: CPT

## 2017-04-13 PROCEDURE — 25000003 PHARM REV CODE 250: Performed by: INTERNAL MEDICINE

## 2017-04-13 PROCEDURE — 93005 ELECTROCARDIOGRAM TRACING: CPT

## 2017-04-13 PROCEDURE — 99900035 HC TECH TIME PER 15 MIN (STAT)

## 2017-04-13 PROCEDURE — 63600175 PHARM REV CODE 636 W HCPCS: Performed by: STUDENT IN AN ORGANIZED HEALTH CARE EDUCATION/TRAINING PROGRAM

## 2017-04-13 PROCEDURE — 97535 SELF CARE MNGMENT TRAINING: CPT

## 2017-04-13 PROCEDURE — 97110 THERAPEUTIC EXERCISES: CPT

## 2017-04-13 PROCEDURE — 36415 COLL VENOUS BLD VENIPUNCTURE: CPT

## 2017-04-13 PROCEDURE — 87186 SC STD MICRODIL/AGAR DIL: CPT

## 2017-04-13 PROCEDURE — 99232 SBSQ HOSP IP/OBS MODERATE 35: CPT | Mod: GC,,, | Performed by: HOSPITALIST

## 2017-04-13 PROCEDURE — 93010 ELECTROCARDIOGRAM REPORT: CPT | Mod: ,,, | Performed by: INTERNAL MEDICINE

## 2017-04-13 PROCEDURE — 87077 CULTURE AEROBIC IDENTIFY: CPT

## 2017-04-13 PROCEDURE — 25000003 PHARM REV CODE 250: Performed by: HOSPITALIST

## 2017-04-13 PROCEDURE — 84100 ASSAY OF PHOSPHORUS: CPT

## 2017-04-13 PROCEDURE — 85027 COMPLETE CBC AUTOMATED: CPT

## 2017-04-13 PROCEDURE — 83735 ASSAY OF MAGNESIUM: CPT

## 2017-04-13 PROCEDURE — 80053 COMPREHEN METABOLIC PANEL: CPT

## 2017-04-13 PROCEDURE — 87040 BLOOD CULTURE FOR BACTERIA: CPT | Mod: 59

## 2017-04-13 PROCEDURE — 11000001 HC ACUTE MED/SURG PRIVATE ROOM

## 2017-04-13 PROCEDURE — 80053 COMPREHEN METABOLIC PANEL: CPT | Mod: 91

## 2017-04-13 PROCEDURE — 84100 ASSAY OF PHOSPHORUS: CPT | Mod: 91

## 2017-04-13 PROCEDURE — 63600175 PHARM REV CODE 636 W HCPCS: Performed by: INTERNAL MEDICINE

## 2017-04-13 RX ORDER — PREGABALIN 25 MG/1
75 CAPSULE ORAL 2 TIMES DAILY
Status: DISCONTINUED | OUTPATIENT
Start: 2017-04-13 | End: 2017-04-22

## 2017-04-13 RX ORDER — ACETAMINOPHEN 325 MG/1
650 TABLET ORAL EVERY 6 HOURS PRN
Status: DISCONTINUED | OUTPATIENT
Start: 2017-04-13 | End: 2017-04-24 | Stop reason: HOSPADM

## 2017-04-13 RX ORDER — SODIUM CHLORIDE 0.9 % (FLUSH) 0.9 %
3 SYRINGE (ML) INJECTION EVERY 8 HOURS
Status: DISCONTINUED | OUTPATIENT
Start: 2017-04-14 | End: 2017-04-24 | Stop reason: HOSPADM

## 2017-04-13 RX ORDER — ACETAMINOPHEN 325 MG/1
TABLET ORAL
Status: DISPENSED
Start: 2017-04-13 | End: 2017-04-14

## 2017-04-13 RX ORDER — LIDOCAINE 50 MG/G
1 PATCH TOPICAL
Status: DISCONTINUED | OUTPATIENT
Start: 2017-04-13 | End: 2017-04-24 | Stop reason: HOSPADM

## 2017-04-13 RX ORDER — CIPROFLOXACIN 2 MG/ML
400 INJECTION, SOLUTION INTRAVENOUS ONCE
Status: COMPLETED | OUTPATIENT
Start: 2017-04-13 | End: 2017-04-13

## 2017-04-13 RX ADMIN — SODIUM CHLORIDE 1000 ML: 0.9 INJECTION, SOLUTION INTRAVENOUS at 10:04

## 2017-04-13 RX ADMIN — MICONAZOLE NITRATE: 20 OINTMENT TOPICAL at 09:04

## 2017-04-13 RX ADMIN — PANTOPRAZOLE SODIUM 40 MG: 40 TABLET, DELAYED RELEASE ORAL at 09:04

## 2017-04-13 RX ADMIN — ASPIRIN 81 MG CHEWABLE TABLET 81 MG: 81 TABLET CHEWABLE at 09:04

## 2017-04-13 RX ADMIN — CIPROFLOXACIN 400 MG: 2 INJECTION, SOLUTION INTRAVENOUS at 10:04

## 2017-04-13 RX ADMIN — ACETAMINOPHEN 650 MG: 325 TABLET ORAL at 10:04

## 2017-04-13 RX ADMIN — HYDROCHLOROTHIAZIDE 12.5 MG: 12.5 TABLET ORAL at 09:04

## 2017-04-13 RX ADMIN — HYDRALAZINE HYDROCHLORIDE 25 MG: 25 TABLET, FILM COATED ORAL at 05:04

## 2017-04-13 RX ADMIN — ACETAMINOPHEN 650 MG: 325 TABLET ORAL at 08:04

## 2017-04-13 RX ADMIN — PREGABALIN 50 MG: 50 CAPSULE ORAL at 09:04

## 2017-04-13 RX ADMIN — LOSARTAN POTASSIUM 100 MG: 50 TABLET, FILM COATED ORAL at 09:04

## 2017-04-13 RX ADMIN — PREGABALIN 75 MG: 75 CAPSULE ORAL at 08:04

## 2017-04-13 RX ADMIN — CALCIUM CARBONATE-CHOLECALCIFEROL TAB 250 MG-125 UNIT 1 TABLET: 250-125 TAB at 09:04

## 2017-04-13 RX ADMIN — PREDNISONE 10 MG: 10 TABLET ORAL at 09:04

## 2017-04-13 RX ADMIN — SODIUM CHLORIDE 1000 ML: 0.9 INJECTION, SOLUTION INTRAVENOUS at 05:04

## 2017-04-13 RX ADMIN — DULOXETINE 30 MG: 30 CAPSULE, DELAYED RELEASE ORAL at 09:04

## 2017-04-13 RX ADMIN — ISOSORBIDE MONONITRATE 30 MG: 30 TABLET, EXTENDED RELEASE ORAL at 08:04

## 2017-04-13 RX ADMIN — PIPERACILLIN AND TAZOBACTAM 4.5 G: 4; .5 INJECTION, POWDER, LYOPHILIZED, FOR SOLUTION INTRAVENOUS; PARENTERAL at 11:04

## 2017-04-13 RX ADMIN — LIDOCAINE 1 PATCH: 50 PATCH TOPICAL at 05:04

## 2017-04-13 RX ADMIN — DICLOFENAC: 10 GEL TOPICAL at 09:04

## 2017-04-13 RX ADMIN — POTASSIUM BICARBONATE 50 MEQ: 25 TABLET, EFFERVESCENT ORAL at 05:04

## 2017-04-13 RX ADMIN — DONEPEZIL HYDROCHLORIDE 10 MG: 10 TABLET, FILM COATED ORAL at 09:04

## 2017-04-13 RX ADMIN — TRAMADOL HYDROCHLORIDE 50 MG: 50 TABLET, COATED ORAL at 07:04

## 2017-04-13 NOTE — PROGRESS NOTES
Called to room by physical therapy, pt unresponsive to son. Therapist stated that pt was trembling and eyes rolled to back of head. Vital signs assessed b/p= 97/64, hr= 84, t=100.7, O2 sat = 96% on room air. Pt c/o dizziness and blurred vision but denies headaches at this time. Call placed to IM O to inform of incident. MD will come to further assess. Son remains at the bedside. Will continue to monitor.

## 2017-04-13 NOTE — PLAN OF CARE
[facility] declined again stating pt is too medically complexed.    SW spoke to pt's son George, [phone] to get additional choices.  SW explained that referral would go out to multiple facilities and that we could see who would accept pt.  George ask that referrals go out to [facility] and [facility] locations.  Referral sent via [facility] to:  [facility], [facility], [facility], [facility], [facility], [facility].    Cindy Worthington, LAUREN  [phone]

## 2017-04-13 NOTE — PLAN OF CARE
JAMARCUS met with patient's son, Andrés, concerning placement.  Informed [facility] is full and still waiting to hear from [facility].  States he talked to MARTINA White, and she placed more referrals in computer.  Will continue to follow.

## 2017-04-13 NOTE — PT/OT/SLP PROGRESS
Occupational Therapy  Treatment    Danyelle Jacobs   MRN: [id number]   Admitting Diagnosis: Gastrointestinal hemorrhage    OT Date of Treatment: 04/13/17   OT Start Time: 1245  OT Stop Time: 1333  OT Total Time (min): 48 min    Billable Minutes:  Self Care/Home Management 25 min, Therapeutic Activity 8 min and Therapeutic Exercise 15 min    General Precautions: Standard, fall  Orthopedic Precautions: N/A  Braces: N/A         Subjective:  Communicated with nurse prior to session.  Pt speaking in Nauruan and c/o pain throughout session - OT is fluent in Nauruan and able to communicate with pt throughout session    Pain Rating:  (pt indicated pain in R hip with movement and while seated - pt did not indicate pain level - pt performed pressure relief throughout session by leaning to L - pt given rest breaks and assisted with repositioning)     Objective:  Patient found with: peripheral IV     Functional Mobility:  Bed Mobility:  Rolling/Turning to Left: Moderate assistance, With side rail (assistance needed with iniitation of roll)  Scooting/Bridging:  (scooting to edge of bed using draw sheet)  Supine to Sit: Maximum Assistance (pt able to assist with moving LEs to edge of bed - needed max assist to lift trunk)    Transfers:   Sit <> Stand Assistance: Maximum Assistance  Sit <> Stand Assistive Device: No Assistive Device  Bed <> Chair Technique: Stand Pivot  Bed <> Chair Transfer Assistance: Moderate Assistance (pt needed cues to advance legs to move to sit in bedside chair - pt needed cues to hold head up as pt leaned forward and dropped head forward during transfer)  Bed <> Chair Assistive Device: No Assistive Device    Functional Ambulation: Pt able to take a few steps with OT to get to bedside chair with mod assist - hands on OT's arms for support - pt needed verbal cues to advance legs and keep uright posture as pt progressively leaned forward with head down    Activities of Daily Living:  Feeding Level of Assistance:  Set-up Assistance, Supervision (pt needed set up assist and increased cues to eat her meal - pt able to use R hand to scoop - pt required increased time to chew and swallow food as she ate at a slower rate) - pt ate in bedside chair with pillow to her L side - pt progressively leaned to her L while seated, bearing weight on L UE and pushing towards her L using her R hand on chair's arm rest - pt needed frequent cues and assist to correct posture to midline for safe oral intake    LE Dressing Level of Assistance: Total assistance (socks donned while seated on EOB)    Grooming Position: bedside chair  Grooming Level of Assistance: Supervision (washed hands with wash cloth)     Toileting Level of Assistance: Activity did not occur     Bathing Level of Assistance: Activity did not occur      Balance:   Static Sit: POOR+: Needs MINIMAL assist to maintain  Dynamic Sit: FAIR: Cannot move trunk without losing balance  Static Stand: 0: Needs MAXIMAL assist to maintain   Dynamic stand: POOR: N/A    Therapeutic Activities and Exercises:  · Pt completed ADLs and func mobility during tx session as noted above  · Pt completed 1 set of 10 reps of B UE AROM exercises in order to work towards increasing her UB strength/endurance to assist with mobility and self care skills.  Pt completed the following exercises: shoulder flex/ext, elbow flex/ext, forearm sup/pro, and hand pumps.   Exercises performed while seated in bedside chair.  Pt able to perform exercises with demonstration and verbal cues with occasional assist with R shoulder flexion when fatigued.   · OT educated pt on importance of getting out of bed daily and participating in therapy/exercises    AM-PAC 6 CLICK ADL   How much help from another person does this patient currently need?   1 = Unable, Total/Dependent Assistance  2 = A lot, Maximum/Moderate Assistance  3 = A little, Minimum/Contact Guard/Supervision  4 = None, Modified Fowler/Independent    Putting on and  taking off regular lower body clothing? : 2  Bathing (including washing, rinsing, drying)?: 2  Toileting, which includes using toilet, bedpan, or urinal? : 2  Putting on and taking off regular upper body clothing?: 2  Taking care of personal grooming such as brushing teeth?: 3  Eating meals?: 3  Total Score: 14     AM-PAC Raw Score CMS G-Code Modifier Level of Impairment Assistance   6 % Total / Unable   7 - 9 CM 80 - 100% Maximal Assist   10 - 14 CL 60 - 80% Moderate Assist   15 - 19 CK 40 - 60% Moderate Assist   20 - 22 CJ 20 - 40% Minimal Assist   23 CI 1-20% SBA / CGA   24 CH 0% Independent/ Mod I     Patient left up in chair with call button in reach and sons present    ASSESSMENT:  Danyelle Jacobs is a 88 y.o. female with a medical diagnosis of Gastrointestinal hemorrhage and presents with pain, decreased func mobility , decreased balance, decreased strength/endurnace and decreased self care skills. Pt was agreeable to OT with encouragement and continues to make slow progress towards her goals in therapy.  Pt's goals remain appropriate at this time.   Pt's primary barrier towards progress continue to be increased pain with movement.  She will continue to benefit from skilled OT services in order to assist her with increasing her safety and level of independence with self care and mobility tasks.       Rehab identified problem list/impairments: Rehab identified problem list/impairments: weakness, impaired endurance, impaired self care skills, impaired functional mobilty, gait instability, impaired balance, impaired cognition, decreased coordination, decreased upper extremity function, decreased lower extremity function, impaired fine motor, decreased safety awareness, pain, edema, impaired skin, decreased ROM    Rehab potential is fair.    Activity tolerance: Fair    Discharge recommendations: Discharge Facility/Level Of Care Needs: nursing facility, skilled     Barriers to discharge:      Equipment  recommendations:  (TBD at next level of care)     GOALS:   Occupational Therapy Goals        Problem: Occupational Therapy Goal    Goal Priority Disciplines Outcome Interventions   Occupational Therapy Goal     OT, PT/OT Ongoing (interventions implemented as appropriate)    Description:  Goals to be met by: 4/22/17     Patient will increase functional independence with ADLs by performing:    UE Dressing with Moderate Assistance.  LE Dressing with Moderate Assistance.  Grooming while EOB with Set-up Assistance and Stand-by Assistance.  Toileting from toilet with Moderate Assistance for hygiene and clothing management.   Sitting at edge of bed x15 minutes with Stand-by Assistance.  Rolling to Bilateral with Minimal Assistance.   Supine to sit with Minimal Assistance.  Stand pivot transfers with Moderate Assistance.  Toilet transfer to toilet with Moderate Assistance.             Problem: Occupational Therapy Goal    Goal Priority Disciplines Outcome Interventions   Occupational Therapy Goal     OT, PT/OT               Plan:  Patient to be seen 5 x/week to address the above listed problems via self-care/home management, therapeutic activities, therapeutic exercises  Plan of Care expires: 05/08/17  Plan of Care reviewed with: patient, son         Twyla Vasquez, OT  04/13/2017

## 2017-04-13 NOTE — SUBJECTIVE & OBJECTIVE
Review of Systems   Constitutional: Negative for chills and fever.   Respiratory: Negative for cough and shortness of breath.    Cardiovascular: Negative for chest pain.   Gastrointestinal: Negative for abdominal pain and constipation.     Objective:     Vital Signs (Most Recent):  Temp: 98.7 °F (37.1 °C) (04/13/17 0422)  Pulse: (!) 58 (04/13/17 0422)  Resp: 18 (04/13/17 0422)  BP: (!) 160/70 (04/13/17 0422)  SpO2: 96 % (04/13/17 0422) Vital Signs (24h Range):  Temp:  [97.6 °F (36.4 °C)-99.5 °F (37.5 °C)] 98.7 °F (37.1 °C)  Pulse:  [] 58  Resp:  [18] 18  SpO2:  [94 %-96 %] 96 %  BP: ()/(49-73) 160/70     Weight: 54.7 kg (120 lb 9.5 oz)  Body mass index is 26.1 kg/(m^2).    Intake/Output Summary (Last 24 hours) at 04/13/17 0820  Last data filed at 04/13/17 0600   Gross per 24 hour   Intake              460 ml   Output                0 ml   Net              460 ml      Physical Exam   Constitutional: She appears well-developed and well-nourished. No distress.   Eyes: EOM are normal. No scleral icterus.   Cardiovascular: Regular rhythm and normal heart sounds.    Pulmonary/Chest: Effort normal and breath sounds normal. No respiratory distress.   Abdominal: Soft. Bowel sounds are normal.   Skin: She is not diaphoretic.   Vitals reviewed.

## 2017-04-13 NOTE — PLAN OF CARE
Problem: Physical Therapy Goal  Goal: Physical Therapy Goal  Goals to be met by: 4/15/2017     Patient will increase functional independence with mobility by performin. Supine to sit with MInimal Assistance  2. Sit to supine with MInimal Assistance  3. Sit to stand transfer with Moderate Assistance  4. Bed to chair transfer with Moderate Assistance using AD or No AD  5. Gait x15 feet with Moderate Assistance using AD or No AD  6. Sitting at edge of bed x15 minutes with Contact Guard Assistance  7. Stand for x2 minutes with Moderate Assistance using AD or No AD  8. Lower extremity exercise program x15 reps per handout, with assistance as needed   Outcome: Ongoing (interventions implemented as appropriate)  Continue to progress with PT intervention.     Gracie Clemens, PT, DPT  2017  Pager: [phone]

## 2017-04-13 NOTE — NURSING
Called to room, while amb back to bed with family member , pt states her legs were weak, family member assists pt to ground and calls for help.  PCT & RN list pt back to bed.  Pt and family deny hitting head, denies any new pain, VSS, see flow sheets.     Dr. Nielsen with [facility] made aware. NNO    Of note, both PCT & RN rounded while pt was in the BR & family member stated he would use call light for assistance getting pt back to bed, but decided to assist assist by himself.

## 2017-04-13 NOTE — PROGRESS NOTES
[facility] Hospital Medicine  Progress Note    Patient Name: Danyelle Jacobs  MRN: [id number]  Patient Class: IP- Inpatient   Admission Date: 2017  Length of Stay: 7 days  Attending Physician: Yen Ledezma MD  Primary Care Provider: Bianca Dykes MD    Hospital Medicine Team: [facility] HOSP MED O Shawna Porter MD    Subjective:     Principal Problem:Gastrointestinal hemorrhage    HPI:  Ms. Jacobs is a 88 F with a history of HTN, HLD, CAD, PAD, HFpEF, dementia, and rheumatoid arthritis who presents to the ED with GI bleeding. Her son reports that her symptoms began yesterday evening with bilious vomiting and diarrhea. Since then, she has had 6 episodes of emesis and 5 episodes of diarrhea which started out as black and became bright red by her last BM which was at 6 AM. She currently denies abdominal pain, SOB, or nausea; however, her son reports that she will usually deny symptoms even if they are present.     She had a recent admission from 3/24- for respiratory failure with hypoxia (presumed 2/2 to PNA, resolved with abx) and RLE pain. Also admitted - and transfused at that time. Patient has a significant history of 'bleeding angiodysplastic lesion found on EGD with hemostasis achieved' performed in 2017. Patient also underwent colonoscopy at the same time which showed 'prominent vessels in the cecum but no definite AVM; raised bluish lesion likely a hematoma in the rectum.' Son reports that she has declined both mentally and physically in the last few months and has become progressively weak, less responsive, and has had hallucinations including speaking to  relatives. Also, she has developed urinary and fecal incontinence recently.       Hospital Course:  17: Upon initial evaluation patient's initial oxygen requirements appeared to be increasing. ABG was performed which showed adequate oxygenation. Hct was also obtained with the arterial stick which showed a Hct of  19. Based on this result and her presenting H/H of 8.3/26.2, 1 U PRBCs was ordered and received 2L of NS. GI and ICU were consulted for further management. Repeat hgb and hemodynamics are stable. Protonix drip started. CT abdomen W/O significant for diverticulosis, adnexal cystic structure, L1 compression fx, and lung opacities (atelectasis v infection/inflammation).    4/7: Hgb stable, no further episodes of vomiting or diarrhea. Pt c/o RLE pain. Hypertensive to 200 systolic, restarting home BP meds stepwise. GI recs supportive care. PARVEEN improved.     4/8: stepped down to hospital medicine. Hemodynamically stable.       4/9: No acute events, PTOT recommending SNF.     4/10: Son requested pelvic ultrasound for patient since she is currently admitted. H/H stable. Awaiting eval from [facility].    4/11: 4/9: no acute events overnight. Started nystatin ointment yesterday for candidal rash under breasts.S/p U/S revealing cystic mass in L adnexa. Awaiting placement.     4/12 - NAEON. Awaiting placement.           Review of Systems   Constitutional: Negative for chills and fever.   Respiratory: Negative for cough and shortness of breath.    Cardiovascular: Negative for chest pain.   Gastrointestinal: Negative for abdominal pain and constipation.     Objective:     Vital Signs (Most Recent):  Temp: 98.7 °F (37.1 °C) (04/13/17 0422)  Pulse: (!) 58 (04/13/17 0422)  Resp: 18 (04/13/17 0422)  BP: (!) 160/70 (04/13/17 0422)  SpO2: 96 % (04/13/17 0422) Vital Signs (24h Range):  Temp:  [97.6 °F (36.4 °C)-99.5 °F (37.5 °C)] 98.7 °F (37.1 °C)  Pulse:  [] 58  Resp:  [18] 18  SpO2:  [94 %-96 %] 96 %  BP: ()/(49-73) 160/70     Weight: 54.7 kg (120 lb 9.5 oz)  Body mass index is 26.1 kg/(m^2).    Intake/Output Summary (Last 24 hours) at 04/13/17 0820  Last data filed at 04/13/17 0600   Gross per 24 hour   Intake              460 ml   Output                0 ml   Net              460 ml      Physical Exam   Constitutional:  She appears well-developed and well-nourished. No distress.   Eyes: EOM are normal. No scleral icterus.   Cardiovascular: Regular rhythm and normal heart sounds.    Pulmonary/Chest: Effort normal and breath sounds normal. No respiratory distress.   Abdominal: Soft. Bowel sounds are normal.   Skin: She is not diaphoretic.   Vitals reviewed.         Assessment/Plan:      * Gastrointestinal hemorrhage  - Patient presenting with complaint of multiple episodes of blood bowel movements  - Patient's H//H largely stable  -s/p PPI gtt -> transitioned to PO pantoprazole  - GI consulted, recommended OP follow up  -CT abdo revealing diverticulosis, cystic mass in L adnexa   - no further episodes of hematochezia    CAD (coronary artery disease)  - ASA 81 mg PO daily  - Plavix 75 mg PO daily - held  - no signs of further bleeding, Hb stable.  -given hx of multiple GI bleeds, will defer restarting plavix as an OP    Benign hypertension  - will continue home meds as patient has not exhibited further bleed/hemodynamic instability       Current chronic use of systemic steroids  -continue steroids for RA      Anemia associated with acute blood loss  - s/p 1u pRBCs on admission given Hx of CAD and troponinemia (likely demand ischemia)  -H/H largely stable  - See GI hemorrhage for further detail    Angiodysplasia of stomach: see EGD 1/17  - EGD1/2017: 'bleeding angiodysplastic lesion found with hemostasis achieved'  - Colonoscopy: 'prominent vessels in the cecum but no definite AVM; raised bluish lesion likely a hematoma in the rectum.'  - Multiple episodes of yellow/green emesis w/ five episodes of diarrhea, dark red --> bright red  - Patient currently HDS, H/H stable at this point  - GI consulted, recommend OP follow up    Compression fracture of first lumbar vertebra  -likely 2/2 to osteoporosis, seen on CT abdo  -possible nerve impingement contributing to R hip pain  -continue lyrica and PTOT  -Volteran gel      Sciatica associated  with disorder of lumbar spine  PTOT  -continue lyrica      Adnexal mass  L cystic adnexal mass seen on CTabdo in setting of reported complete hysterectomy  -Pelvic U/S revealing 2cm cystic structure, can be monitored with yearly U/S      Candidal intertrigo  -under breasts  -nystatin ointment      VTE Risk Mitigation         Ordered     Medium Risk of VTE  Once      04/06/17 1535     Place ISHMAEL hose  Until discontinued      04/06/17 1535     Reason for No Pharmacological VTE Prophylaxis  Once      04/06/17 1535     Place sequential compression device  Until discontinued      04/06/17 1504          Shawna Porter MD  Department of Hospital Medicine   [facility]

## 2017-04-13 NOTE — PT/OT/SLP PROGRESS
Physical Therapy  Treatment/ Discharge Summary    Danyelle Jacobs   MRN: [id number]   Admitting Diagnosis: Gastrointestinal hemorrhage    PT Received On: 04/13/17  PT Start Time: 1510     PT Stop Time: 1528    PT Total Time (min): 18 min       Billable Minutes:  Therapeutic Activity 15    Treatment Type: Treatment  PT/PTA: PT       General Precautions: Standard, fall  Orthopedic Precautions: N/A   Braces: N/A    Do you have any cultural, spiritual, Lutheran conflicts, given your current situation?: None noted    Subjective:  Communicated with RN prior to session.  Pt and son agreeable to PT treatment.    Pain Rating:  (R hip pain with movement, did not rate numerically)  Location - Side: Right  Location - Orientation: generalized  Location: hip  Pain Addressed: Reposition, Distraction  Pain Rating Post-Intervention: 0/10    Objective:   Patient found supine, HOB elevated, son present with: peripheral IV    Functional Mobility:  Bed Mobility:   Supine to Sit: Maximum Assistance, With side rail. Verbal/tactile cues for technique.  Sit to Supine: Total Assistance 2/2 pt with BUE and BLE jerking and blank stare.    Balance:   Static Sit: FAIR-: Maintains without assist but inconsistent   Dynamic Sit: POOR: N/A    Therapeutic Activities and Exercises:  Pt sat edge of bed ~5 minutes.  Pt performed 5 reps LAQ AAROM with RLE.  PT positioned behind patient to promote thoracic extension, scapular retraction for improved posture.  Pt with BUE and BLE jerking movements, blank stare/ eyes rolling back in head, and non responsive to PT and son x ~ 30 seconds.  PT assisted pt in return to supine position with total A; pt continued with blank stare ~15 seconds and then began responding to PT and son.    Pt bRN notified and present to take patient vital signs.    · Vital signs assessed b/p= 97/64, hr= 84, t=100.7, O2 sat = 96% on room air. Pt c/o dizziness and blurred vision but denied headaches.  · Pt responsive and resting comfortably  in bed when PT exited room. Son educated on use of call button and RN spectra link if needing assist. Verbalized understanding.    AM-PAC 6 CLICK MOBILITY  How much help from another person does this patient currently need?   1 = Unable, Total/Dependent Assistance  2 = A lot, Maximum/Moderate Assistance  3 = A little, Minimum/Contact Guard/Supervision  4 = None, Modified [place]/Independent    Turning over in bed (including adjusting bedclothes, sheets and blankets)?: 2  Sitting down on and standing up from a chair with arms (e.g., wheelchair, bedside commode, etc.): 2  Moving from lying on back to sitting on the side of the bed?: 2  Moving to and from a bed to a chair (including a wheelchair)?: 2  Need to walk in hospital room?: 2  Climbing 3-5 steps with a railing?: 1  Total Score: 11    AM-PAC Raw Score CMS G-Code Modifier Level of Impairment Assistance   6 % Total / Unable   7 - 9 CM 80 - 100% Maximal Assist   10 - 14 CL 60 - 80% Moderate Assist   15 - 19 CK 40 - 60% Moderate Assist   20 - 22 CJ 20 - 40% Minimal Assist   23 CI 1-20% SBA / CGA   24 CH 0% Independent/ Mod I     Patient left HOB elevated with all lines intact, call button in reach, RN notified and son present.    Assessment:  Danyelle Jacobs is a 88 y.o. female with a medical diagnosis of Gastrointestinal hemorrhage and presents with below impairments.  PT session terminated 2/2 pt with BUE/BLE jerking and unresponsive x ~1 minute.  RN notified and to room to assess patients vital signs as noted in therapeutic activity.  Pt transferred to ICU 4/14/17.  PT will discontinue orders at this time 2/2 transfer to ICU.  Please re-consult PT services when medically appropriate.     Rehab identified problem list/impairments: Rehab identified problem list/impairments: weakness, impaired endurance, impaired functional mobilty, gait instability, impaired balance, impaired self care skills, decreased lower extremity function, decreased upper extremity  function, impaired skin, decreased ROM, pain, decreased safety awareness, impaired cardiopulmonary response to activity    Rehab potential is fair.    Activity tolerance: Fair    Discharge recommendations: Discharge Facility/Level Of Care Needs: nursing facility, skilled     Barriers to discharge: Barriers to Discharge: Decreased caregiver support, Inaccessible home environment    Equipment recommendations: Equipment Needed After Discharge:  (TBD)     GOALS:   Physical Therapy Goals        Problem: Physical Therapy Goal    Goal Priority Disciplines Outcome Goal Variances Interventions   Physical Therapy Goal     PT/OT, PT Ongoing (interventions implemented as appropriate)     Description:  Goals to be met by: 4/15/2017     Patient will increase functional independence with mobility by performin. Supine to sit with MInimal Assistance  2. Sit to supine with MInimal Assistance  3. Sit to stand transfer with Moderate Assistance  4. Bed to chair transfer with Moderate Assistance using AD or No AD  5. Gait x15 feet with Moderate Assistance using AD or No AD  6. Sitting at edge of bed x15 minutes with Contact Guard Assistance  7. Stand for x2 minutes with Moderate Assistance using AD or No AD  8. Lower extremity exercise program x15 reps per handout, with assistance as needed                PLAN:    Pt remains in acute care setting. Transferred to ICU 17.  Please re-consult PT services when medically appropriate.        Gracie Clemens, PT, DPT  2017  Pager: [phone]

## 2017-04-13 NOTE — PLAN OF CARE
[facility] reveiwed case again and denied stating pt is too medically complex.    Cindy Worthington LMSW

## 2017-04-13 NOTE — PLAN OF CARE
Problem: Occupational Therapy Goal  Goal: Occupational Therapy Goal  Goals to be met by: 4/22/17     Patient will increase functional independence with ADLs by performing:    UE Dressing with Moderate Assistance.  LE Dressing with Moderate Assistance.  Grooming while EOB with Set-up Assistance and Stand-by Assistance.  Toileting from toilet with Moderate Assistance for hygiene and clothing management.   Sitting at edge of bed x15 minutes with Stand-by Assistance.  Rolling to Bilateral with Minimal Assistance.   Supine to sit with Minimal Assistance.  Stand pivot transfers with Moderate Assistance.  Toilet transfer to toilet with Moderate Assistance.   Outcome: Ongoing (interventions implemented as appropriate)  Pt was agreeable to OT with encouragement and continues to make slow progress towards her goals in therapy.  Pt's goals remain appropriate at this time.   Pt's primary barrier towards progress continue to be increased pain with movement.  She will continue to benefit from skilled OT services in order to assist her with increasing her safety and level of independence with self care and mobility tasks.         Twyla Vasquez, OT  4/13/2017

## 2017-04-14 PROBLEM — R41.82 ALTERED MENTAL STATUS: Status: ACTIVE | Noted: 2017-04-14

## 2017-04-14 PROBLEM — R65.20 SEVERE SEPSIS: Status: ACTIVE | Noted: 2017-04-14

## 2017-04-14 PROBLEM — A41.9 SEVERE SEPSIS: Status: ACTIVE | Noted: 2017-04-14

## 2017-04-14 PROBLEM — A41.50 SEVERE SEPSIS WITH ACUTE ORGAN DYSFUNCTION DUE TO GRAM NEGATIVE BACTERIA: Status: ACTIVE | Noted: 2017-04-14

## 2017-04-14 PROBLEM — K92.2 GASTROINTESTINAL HEMORRHAGE: Status: RESOLVED | Noted: 2017-04-06 | Resolved: 2017-04-14

## 2017-04-14 LAB
ALBUMIN SERPL BCP-MCNC: 1.8 G/DL
ALP SERPL-CCNC: 102 U/L
ALT SERPL W/O P-5'-P-CCNC: 23 U/L
ANION GAP SERPL CALC-SCNC: 8 MMOL/L
ANION GAP SERPL CALC-SCNC: 9 MMOL/L
ANISOCYTOSIS BLD QL SMEAR: SLIGHT
ANISOCYTOSIS BLD QL SMEAR: SLIGHT
APTT BLDCRRT: 24.8 SEC
AST SERPL-CCNC: 32 U/L
BACTERIA #/AREA URNS AUTO: ABNORMAL /HPF
BASOPHILS # BLD AUTO: 0.04 K/UL
BASOPHILS NFR BLD: 0.1 %
BASOPHILS NFR BLD: 1 %
BILIRUB SERPL-MCNC: 0.3 MG/DL
BILIRUB UR QL STRIP: NEGATIVE
BUN SERPL-MCNC: 21 MG/DL
BUN SERPL-MCNC: 21 MG/DL
BURR CELLS BLD QL SMEAR: ABNORMAL
CALCIUM SERPL-MCNC: 7.4 MG/DL
CALCIUM SERPL-MCNC: 7.8 MG/DL
CHLORIDE SERPL-SCNC: 109 MMOL/L
CHLORIDE SERPL-SCNC: 109 MMOL/L
CLARITY UR REFRACT.AUTO: CLEAR
CO2 SERPL-SCNC: 17 MMOL/L
CO2 SERPL-SCNC: 22 MMOL/L
COLOR UR AUTO: COLORLESS
CREAT SERPL-MCNC: 1.1 MG/DL
CREAT SERPL-MCNC: 1.2 MG/DL
DIFFERENTIAL METHOD: ABNORMAL
DIFFERENTIAL METHOD: ABNORMAL
DOHLE BOD BLD QL SMEAR: PRESENT
EOSINOPHIL # BLD AUTO: 0 K/UL
EOSINOPHIL NFR BLD: 0 %
EOSINOPHIL NFR BLD: 0 %
ERYTHROCYTE [DISTWIDTH] IN BLOOD BY AUTOMATED COUNT: 20.2 %
ERYTHROCYTE [DISTWIDTH] IN BLOOD BY AUTOMATED COUNT: 20.5 %
EST. GFR  (AFRICAN AMERICAN): 46.6 ML/MIN/1.73 M^2
EST. GFR  (AFRICAN AMERICAN): 51.8 ML/MIN/1.73 M^2
EST. GFR  (NON AFRICAN AMERICAN): 40.4 ML/MIN/1.73 M^2
EST. GFR  (NON AFRICAN AMERICAN): 44.9 ML/MIN/1.73 M^2
GLUCOSE SERPL-MCNC: 101 MG/DL
GLUCOSE SERPL-MCNC: 98 MG/DL
GLUCOSE UR QL STRIP: NEGATIVE
GRAM STN SPEC: NORMAL
GRAM STN SPEC: NORMAL
HCT VFR BLD AUTO: 22.8 %
HCT VFR BLD AUTO: 25 %
HGB BLD-MCNC: 7.5 G/DL
HGB BLD-MCNC: 8 G/DL
HGB UR QL STRIP: NEGATIVE
HYALINE CASTS UR QL AUTO: 2 /LPF
HYPOCHROMIA BLD QL SMEAR: ABNORMAL
INR PPP: 1.2
KETONES UR QL STRIP: NEGATIVE
LACTATE SERPL-SCNC: 1.9 MMOL/L
LEUKOCYTE ESTERASE UR QL STRIP: ABNORMAL
LYMPHOCYTES # BLD AUTO: 0.6 K/UL
LYMPHOCYTES NFR BLD: 0 %
LYMPHOCYTES NFR BLD: 1.4 %
MAGNESIUM SERPL-MCNC: 1.2 MG/DL
MAGNESIUM SERPL-MCNC: 1.3 MG/DL
MCH RBC QN AUTO: 27.7 PG
MCH RBC QN AUTO: 28 PG
MCHC RBC AUTO-ENTMCNC: 32 %
MCHC RBC AUTO-ENTMCNC: 32.9 %
MCV RBC AUTO: 85 FL
MCV RBC AUTO: 87 FL
MICROSCOPIC COMMENT: ABNORMAL
MONOCYTES # BLD AUTO: 1.7 K/UL
MONOCYTES NFR BLD: 3.7 %
MONOCYTES NFR BLD: 6 %
NEUTROPHILS # BLD AUTO: 42.8 K/UL
NEUTROPHILS NFR BLD: 93 %
NEUTROPHILS NFR BLD: 94.8 %
NITRITE UR QL STRIP: NEGATIVE
OVALOCYTES BLD QL SMEAR: ABNORMAL
PH UR STRIP: 5 [PH] (ref 5–8)
PHOSPHATE SERPL-MCNC: 1.6 MG/DL
PHOSPHATE SERPL-MCNC: 3.3 MG/DL
PLATELET # BLD AUTO: 191 K/UL
PLATELET # BLD AUTO: 202 K/UL
PLATELET BLD QL SMEAR: ABNORMAL
PLATELET BLD QL SMEAR: ABNORMAL
PMV BLD AUTO: 10.4 FL
PMV BLD AUTO: 11.7 FL
POCT GLUCOSE: 102 MG/DL (ref 70–110)
POCT GLUCOSE: 111 MG/DL (ref 70–110)
POIKILOCYTOSIS BLD QL SMEAR: SLIGHT
POIKILOCYTOSIS BLD QL SMEAR: SLIGHT
POLYCHROMASIA BLD QL SMEAR: ABNORMAL
POTASSIUM SERPL-SCNC: 4.2 MMOL/L
POTASSIUM SERPL-SCNC: 4.3 MMOL/L
PROCALCITONIN SERPL IA-MCNC: 19.63 NG/ML
PROT SERPL-MCNC: 4.4 G/DL
PROT UR QL STRIP: NEGATIVE
PROTHROMBIN TIME: 12.5 SEC
RBC # BLD AUTO: 2.68 M/UL
RBC # BLD AUTO: 2.89 M/UL
RBC #/AREA URNS AUTO: 2 /HPF (ref 0–4)
SCHISTOCYTES BLD QL SMEAR: ABNORMAL
SODIUM SERPL-SCNC: 135 MMOL/L
SODIUM SERPL-SCNC: 139 MMOL/L
SP GR UR STRIP: 1 (ref 1–1.03)
TOXIC GRANULES BLD QL SMEAR: PRESENT
URN SPEC COLLECT METH UR: ABNORMAL
UROBILINOGEN UR STRIP-ACNC: NEGATIVE EU/DL
WBC # BLD AUTO: 35.93 K/UL
WBC # BLD AUTO: 45.79 K/UL
WBC #/AREA URNS AUTO: 10 /HPF (ref 0–5)

## 2017-04-14 PROCEDURE — 25000003 PHARM REV CODE 250: Performed by: INTERNAL MEDICINE

## 2017-04-14 PROCEDURE — 80048 BASIC METABOLIC PNL TOTAL CA: CPT

## 2017-04-14 PROCEDURE — 84145 PROCALCITONIN (PCT): CPT

## 2017-04-14 PROCEDURE — 87086 URINE CULTURE/COLONY COUNT: CPT

## 2017-04-14 PROCEDURE — 83605 ASSAY OF LACTIC ACID: CPT

## 2017-04-14 PROCEDURE — 25000003 PHARM REV CODE 250: Performed by: STUDENT IN AN ORGANIZED HEALTH CARE EDUCATION/TRAINING PROGRAM

## 2017-04-14 PROCEDURE — 63600175 PHARM REV CODE 636 W HCPCS: Performed by: INTERNAL MEDICINE

## 2017-04-14 PROCEDURE — 25000003 PHARM REV CODE 250: Performed by: PHYSICIAN ASSISTANT

## 2017-04-14 PROCEDURE — 84100 ASSAY OF PHOSPHORUS: CPT

## 2017-04-14 PROCEDURE — 25000003 PHARM REV CODE 250: Performed by: HOSPITALIST

## 2017-04-14 PROCEDURE — 85610 PROTHROMBIN TIME: CPT

## 2017-04-14 PROCEDURE — 94761 N-INVAS EAR/PLS OXIMETRY MLT: CPT

## 2017-04-14 PROCEDURE — 63600175 PHARM REV CODE 636 W HCPCS: Performed by: STUDENT IN AN ORGANIZED HEALTH CARE EDUCATION/TRAINING PROGRAM

## 2017-04-14 PROCEDURE — 85025 COMPLETE CBC W/AUTO DIFF WBC: CPT

## 2017-04-14 PROCEDURE — 99233 SBSQ HOSP IP/OBS HIGH 50: CPT | Mod: ,,, | Performed by: INTERNAL MEDICINE

## 2017-04-14 PROCEDURE — 83735 ASSAY OF MAGNESIUM: CPT

## 2017-04-14 PROCEDURE — 11000001 HC ACUTE MED/SURG PRIVATE ROOM

## 2017-04-14 PROCEDURE — 87205 SMEAR GRAM STAIN: CPT

## 2017-04-14 PROCEDURE — 81001 URINALYSIS AUTO W/SCOPE: CPT

## 2017-04-14 PROCEDURE — 85730 THROMBOPLASTIN TIME PARTIAL: CPT

## 2017-04-14 RX ORDER — FUROSEMIDE 10 MG/ML
20 INJECTION INTRAMUSCULAR; INTRAVENOUS ONCE
Status: COMPLETED | OUTPATIENT
Start: 2017-04-14 | End: 2017-04-14

## 2017-04-14 RX ORDER — POTASSIUM CHLORIDE 20 MEQ/15ML
60 SOLUTION ORAL
Status: DISCONTINUED | OUTPATIENT
Start: 2017-04-14 | End: 2017-04-14

## 2017-04-14 RX ORDER — MAGNESIUM SULFATE HEPTAHYDRATE 40 MG/ML
4 INJECTION, SOLUTION INTRAVENOUS
Status: DISCONTINUED | OUTPATIENT
Start: 2017-04-14 | End: 2017-04-14

## 2017-04-14 RX ORDER — SODIUM CHLORIDE 9 MG/ML
INJECTION, SOLUTION INTRAVENOUS CONTINUOUS
Status: DISCONTINUED | OUTPATIENT
Start: 2017-04-14 | End: 2017-04-14

## 2017-04-14 RX ORDER — POTASSIUM CHLORIDE 20 MEQ/15ML
40 SOLUTION ORAL
Status: DISCONTINUED | OUTPATIENT
Start: 2017-04-14 | End: 2017-04-14

## 2017-04-14 RX ORDER — RAMELTEON 8 MG/1
8 TABLET ORAL NIGHTLY PRN
Status: DISCONTINUED | OUTPATIENT
Start: 2017-04-14 | End: 2017-04-24 | Stop reason: HOSPADM

## 2017-04-14 RX ORDER — MAGNESIUM SULFATE HEPTAHYDRATE 40 MG/ML
2 INJECTION, SOLUTION INTRAVENOUS
Status: DISCONTINUED | OUTPATIENT
Start: 2017-04-14 | End: 2017-04-14

## 2017-04-14 RX ORDER — ALPRAZOLAM 0.25 MG/1
0.5 TABLET ORAL ONCE AS NEEDED
Status: DISCONTINUED | OUTPATIENT
Start: 2017-04-14 | End: 2017-04-14

## 2017-04-14 RX ADMIN — Medication 3 ML: at 10:04

## 2017-04-14 RX ADMIN — ASPIRIN 81 MG CHEWABLE TABLET 81 MG: 81 TABLET CHEWABLE at 08:04

## 2017-04-14 RX ADMIN — MAGNESIUM SULFATE IN WATER 4 G: 40 INJECTION, SOLUTION INTRAVENOUS at 10:04

## 2017-04-14 RX ADMIN — MICONAZOLE NITRATE: 20 OINTMENT TOPICAL at 09:04

## 2017-04-14 RX ADMIN — DULOXETINE 30 MG: 30 CAPSULE, DELAYED RELEASE ORAL at 08:04

## 2017-04-14 RX ADMIN — PIPERACILLIN AND TAZOBACTAM 4.5 G: 4; .5 INJECTION, POWDER, LYOPHILIZED, FOR SOLUTION INTRAVENOUS; PARENTERAL at 08:04

## 2017-04-14 RX ADMIN — PIPERACILLIN AND TAZOBACTAM 4.5 G: 4; .5 INJECTION, POWDER, LYOPHILIZED, FOR SOLUTION INTRAVENOUS; PARENTERAL at 09:04

## 2017-04-14 RX ADMIN — TRAMADOL HYDROCHLORIDE 50 MG: 50 TABLET, COATED ORAL at 03:04

## 2017-04-14 RX ADMIN — VANCOMYCIN HYDROCHLORIDE 1000 MG: 1 INJECTION, POWDER, LYOPHILIZED, FOR SOLUTION INTRAVENOUS at 12:04

## 2017-04-14 RX ADMIN — PREGABALIN 75 MG: 75 CAPSULE ORAL at 09:04

## 2017-04-14 RX ADMIN — LIDOCAINE 1 PATCH: 50 PATCH TOPICAL at 04:04

## 2017-04-14 RX ADMIN — MICONAZOLE NITRATE: 20 OINTMENT TOPICAL at 08:04

## 2017-04-14 RX ADMIN — PREGABALIN 75 MG: 75 CAPSULE ORAL at 08:04

## 2017-04-14 RX ADMIN — DICLOFENAC: 10 GEL TOPICAL at 08:04

## 2017-04-14 RX ADMIN — DONEPEZIL HYDROCHLORIDE 10 MG: 10 TABLET, FILM COATED ORAL at 08:04

## 2017-04-14 RX ADMIN — Medication 3 ML: at 07:04

## 2017-04-14 RX ADMIN — SODIUM CHLORIDE 1680 ML: 0.9 INJECTION, SOLUTION INTRAVENOUS at 12:04

## 2017-04-14 RX ADMIN — PANTOPRAZOLE SODIUM 40 MG: 40 TABLET, DELAYED RELEASE ORAL at 08:04

## 2017-04-14 RX ADMIN — RAMELTEON 8 MG: 8 TABLET, FILM COATED ORAL at 10:04

## 2017-04-14 RX ADMIN — SODIUM CHLORIDE: 0.9 INJECTION, SOLUTION INTRAVENOUS at 03:04

## 2017-04-14 RX ADMIN — FUROSEMIDE 20 MG: 10 INJECTION, SOLUTION INTRAMUSCULAR; INTRAVENOUS at 10:04

## 2017-04-14 RX ADMIN — Medication 3 ML: at 02:04

## 2017-04-14 RX ADMIN — CALCIUM CARBONATE-CHOLECALCIFEROL TAB 250 MG-125 UNIT 1 TABLET: 250-125 TAB at 08:04

## 2017-04-14 RX ADMIN — SODIUM CHLORIDE 1000 ML: 0.9 INJECTION, SOLUTION INTRAVENOUS at 03:04

## 2017-04-14 RX ADMIN — PREDNISONE 10 MG: 10 TABLET ORAL at 08:04

## 2017-04-14 RX ADMIN — VANCOMYCIN HYDROCHLORIDE 750 MG: 1 INJECTION, POWDER, LYOPHILIZED, FOR SOLUTION INTRAVENOUS at 10:04

## 2017-04-14 NOTE — PT/OT/SLP DISCHARGE
Occupational Therapy Discharge Summary    Danyelle Jacobs  MRN: [id number]   Severe sepsis with acute organ dysfunction due to Gram negative bacteria   Patient Discharged from acute Occupational Therapy on 04/14/17.  Please refer to prior OT note dated on 04/13/17 for functional status.     Assessment:   Patient was discharge unexpectedly.  Information required to complete and accurate discharge summary is unknown.  Refer to therapy initial evaluation and last progress note for initial and most recent functional status and goal achievement.  Recommendations made may be found in medical record.  GOALS:   Occupational Therapy Goals        Problem: Occupational Therapy Goal    Goal Priority Disciplines Outcome Interventions   Occupational Therapy Goal     OT, PT/OT Ongoing (interventions implemented as appropriate)    Description:  Goals to be met by: 4/22/17     Patient will increase functional independence with ADLs by performing:    UE Dressing with Moderate Assistance.  LE Dressing with Moderate Assistance.  Grooming while EOB with Set-up Assistance and Stand-by Assistance.  Toileting from toilet with Moderate Assistance for hygiene and clothing management.   Sitting at edge of bed x15 minutes with Stand-by Assistance.  Rolling to Bilateral with Minimal Assistance.   Supine to sit with Minimal Assistance.  Stand pivot transfers with Moderate Assistance.  Toilet transfer to toilet with Moderate Assistance.             Problem: Occupational Therapy Goal    Goal Priority Disciplines Outcome Interventions   Occupational Therapy Goal     OT, PT/OT             Reasons for Discontinuation of Therapy Services  Patient is unable to continue work toward goals because of medical or psychosocial complications.      Plan:  Patient Discharged to: ICU - new order for OT requested when pt is medically stable.

## 2017-04-14 NOTE — PROGRESS NOTES
Report received from BREANA Antonio. Pt transported to SICU 6072 per RN with portable telemetry and O2 in use. Pt transferred to ICU bed and wall monitor. Pt assessed, immediate needs met. CC resident called and made aware of patient arrival, new orders received and implemented.     SKIN NOTE: Skin tears to BUE & BLE. Sacrum is red, but blanches to touch.

## 2017-04-14 NOTE — ASSESSMENT & PLAN NOTE
- likely a metabolic encephalopathy given her rapid decline and rapid improvement with IV abx and fluids  - currently remains more somnolent than baseline but is oriented at her baseline  - no concern for stroke given improvement in function and lack of asymmetric weakness or facial drooping

## 2017-04-14 NOTE — ASSESSMENT & PLAN NOTE
- unclear source at this time but potentially urine given her urinary and fecal incontinence  - pending urinalysis, urine culture, blood cultures x 2, and CXR  - LA elevated at 3.8, will continue to trend with volume resuscitation  - currently receiving her 30 cc/kg and will reassess needs for pressors later  - originally started on vanc and cipro but will switch to vanc and zosyn given her acute decline and tripling of her WBC 35.93 from 10

## 2017-04-14 NOTE — NURSING
Pt arrived to 1007. Bed in lowest position, call bell within reach, bed alarm set. Pt oriented to room. Pt goals of care discussed. Await Zosyn arrival on unit from pharmacy/SICU.

## 2017-04-14 NOTE — PROGRESS NOTES
UA and UCx collected by straight cath overnight during admission to ICU was never received by lab.  Will send another specimen now, however, the pt has received broad spectrum antibiotics.  Will also discontinue the low that was placed overnight.    Shawna Porter, PGY3  [phone]

## 2017-04-14 NOTE — NURSING TRANSFER
Nursing Transfer Note      4/14/2017     Transfer To: 1007     Transfer via bed    Transfer with cardiac monitoring    Transported by SICU RN    Medicines sent: creams     Chart send with patient: Yes    Notified: daughter in law         Upon arrival to floor: patient oriented to room, call bell in reach and bed in lowest position

## 2017-04-14 NOTE — PROGRESS NOTES
I talked to Carrie with Hospital Medicine, the patient is now on their transfer list.        Brigette Fraser MD  4/14/2017

## 2017-04-14 NOTE — CONSULTS
[facility]  Critical Care Medicine  Consult Note    Patient Name: Danyelle Jacobs  MRN: [id number]  Admission Date: 2017  Hospital Length of Stay: 8 days  Code Status: Full Code  Attending Physician: Patience Cárdenas MD   Primary Care Provider: Bianca Dykes MD   Principal Problem: Severe sepsis with acute organ dysfunction due to Gram negative bacteria    Consults  Subjective:     HPI:  Ms. Jacobs is a 88 F with a history of HTN, HLD, CAD, PAD, HFpEF, dementia, and rheumatoid arthritis who presented to the ED   with GI bleeding. Her son reported that her symptoms began the evening before with bilious vomiting and diarrhea. Since then, she had had 6 episodes of emesis and 5 episodes of diarrhea which started out as black and became bright red by her last BM which was at 6 AM. She currently denies abdominal pain, SOB, or nausea; however, her son reports that she will usually deny symptoms even if they are present.     She had a recent admission from 3/24- for respiratory failure with hypoxia (presumed 2/2 to PNA, resolved with abx) and RLE pain. Also admitted - and transfused at that time. Patient has a significant history of 'bleeding angiodysplastic lesion found on EGD with hemostasis achieved' performed in 2017. Patient also underwent colonoscopy at the same time which showed 'prominent vessels in the cecum but no definite AVM; raised bluish lesion likely a hematoma in the rectum.' Son reports that she has declined both mentally and physically in the last few months and has become progressively weak, less responsive, and has had hallucinations including speaking to  relatives. Also, she has developed urinary and fecal incontinence recently.     While in the ICU she was started on protonix and was conservatively managed from a GI perspective. She was able to be stepped down to hospital medicine on  and has had a relatively benign course since that time. A rapid was  called tonight for Ms. Jacobs because she was found to be more altered, diaphoretic, pale and hypotensive in the 70s-80s with baseline in the 110s-120s. Patient was bolused IV fluids with improvement in both blood pressure and mental status but still remained somewhat lethargic. Patient had also spiking fevers up to 103.5. Given patient's rapid deterioration and borderline blood pressures despite fluids, she will be accepted in to the ICU for closer monitoring.       Hospital/ICU Course:  04/06/17: Upon initial evaluation patient's initial oxygen requirements appeared to be increasing. ABG was performed which showed adequate oxygenation. Hct was also obtained with the arterial stick which showed a Hct of 19. Based on this result and her presenting H/H of 8.3/26.2, 1 U PRBCs was ordered and received 2L of NS. GI and ICU were consulted for further management. Repeat hgb and hemodynamics are stable. Protonix drip started. CT abdomen W/O significant for diverticulosis, adnexal cystic structure, L1 compression fx, and lung opacities (atelectasis v infection/inflammation).    4/7: Hgb stable, no further episodes of vomiting or diarrhea. Pt c/o RLE pain. Hypertensive to 200 systolic, restarting home BP meds stepwise. GI recs supportive care. PARVEEN improved.     4/8: stepped down to hospital medicine. Hemodynamically stable.       4/9: No acute events, PTOT recommending SNF.     4/10: Son requested pelvic ultrasound for patient since she is currently admitted. H/H stable. Awaiting eval from [facility].    4/11: 4/9: no acute events overnight. Started nystatin ointment yesterday for candidal rash under breasts.S/p U/S revealing cystic mass in L adnexa. Awaiting placement.     4/12 - NAEON. Awaiting placement.      Past Medical History:   Diagnosis Date    Adverse effect of oral bisphosphonates     Anemia     Angiodysplasia of stomach: see EGD 1/17 2/13/2017    Anxiety     Aortic atherosclerosis     Benign hypertension  10/5/2015    Carcinoma, renal cell     Carotid arterial disease 1/20/2014    Cerebellar stroke, acute     Chronic kidney disease (CKD), stage I 2/18/2016    Coronary artery disease     Current chronic use of systemic steroids 10/4/2016    Degenerative disc disease     Depression     Diverticulosis     Former smoker 6/13/2016    Gastroesophageal reflux disease without esophagitis 10/5/2015    GERD (gastroesophageal reflux disease)     Goiter, nontoxic, multinodular     IBS (irritable bowel syndrome)     Left ventricular diastolic dysfunction with preserved systolic function 10/5/2015    Lung nodule     right    MGUS (monoclonal gammopathy of unknown significance) 6/13/2016    Myasthenia gravis, adult form     Osteoporosis 8/10/2015    Osteoporosis, unspecified     PAOD (peripheral arterial occlusive disease)     RA (rheumatoid arthritis)     Smoker 1/20/2014    Stroke     Ulcer     Vitamin D deficiency disease 1/20/2014       Past Surgical History:   Procedure Laterality Date    CATARACT EXTRACTION W/  INTRAOCULAR LENS IMPLANT Bilateral     CHOLECYSTECTOMY      COLONOSCOPY N/A 1/24/2017    Procedure: COLONOSCOPY;  Surgeon: Kristofer Worthington MD;  Location: [address]);  Service: Endoscopy;  Laterality: N/A;  for chronic anemia ; needs to be off plavix x 5 days.     Off Plavix since recent hospitalization 1/19/17.    Speaks Slovenian/needs .    HYSTERECTOMY      KIDNEY SURGERY  2003    NEPHRECTOMY         Review of patient's allergies indicates:   Allergen Reactions    Norvasc  [amlodipine]      Other reaction(s): Edema       Family History     Problem Relation (Age of Onset)    Alzheimer's disease Sister    Heart disease Father    Stroke Mother        Social History Main Topics    Smoking status: Former Smoker     Quit date: 1/19/2015    Smokeless tobacco: Never Used    Alcohol use No    Drug use: No    Sexual activity: No      Review of Systems   Constitutional:  Positive for activity change, diaphoresis, fatigue and fever.   HENT: Negative for congestion, mouth sores and rhinorrhea.    Respiratory: Negative for cough and shortness of breath.    Cardiovascular: Negative for chest pain.   Gastrointestinal: Negative for abdominal pain, blood in stool, diarrhea and nausea.   Genitourinary:        Patient unable to say whether she has dysuria or not   Skin: Positive for pallor.   Neurological: Positive for speech difficulty. Negative for facial asymmetry and weakness.     Objective:     Vital Signs (Most Recent):  Temp: 99.3 °F (37.4 °C) (04/14/17 0005)  Pulse: 82 (04/14/17 0020)  Resp: (!) 27 (04/14/17 0020)  BP: (!) 97/51 (04/14/17 0020)  SpO2: 97 % (04/14/17 0020) Vital Signs (24h Range):  Temp:  [98.7 °F (37.1 °C)-103.5 °F (39.7 °C)] 99.3 °F (37.4 °C)  Pulse:  [] 82  Resp:  [16-27] 27  SpO2:  [92 %-97 %] 97 %  BP: ()/(34-70) 97/51   Weight: 54.7 kg (120 lb 9.5 oz)  Body mass index is 26.1 kg/(m^2).      Intake/Output Summary (Last 24 hours) at 04/14/17 0028  Last data filed at 04/13/17 0600   Gross per 24 hour   Intake              100 ml   Output                0 ml   Net              100 ml       Physical Exam   Constitutional: She appears well-developed and well-nourished.   HENT:   Head: Normocephalic and atraumatic.   Eyes: EOM are normal. Pupils are equal, round, and reactive to light. No scleral icterus.   Cardiovascular: Regular rhythm and normal heart sounds.    Pulmonary/Chest: Effort normal and breath sounds normal. No stridor. No respiratory distress. She has no rales.   Abdominal: Soft. Bowel sounds are normal. She exhibits no distension. There is no tenderness.   Musculoskeletal: She exhibits no edema.   Neurological:   Lethargic but able A&Ox2 after volume resuscitation. Speech pattern lacks clarity but is appropriate.    Skin: Skin is warm. She is diaphoretic.   Skin tear noted to right forearm and right shin    Vitals reviewed.      Vents:      Lines/Drains/Airways     Peripheral Intravenous Line                 Peripheral IV - Single Lumen 04/12/17 Left Forearm 2 days              Significant Labs:    CBC/Anemia Profile:    Recent Labs  Lab 04/12/17  0633 04/13/17  0442 04/13/17  2258   WBC 11.27 10.52 35.93*   HGB 8.8* 8.5* 7.5*   HCT 28.6* 26.2* 22.8*    253 202   MCV 88 84 85   RDW 20.7* 20.5* 20.2*        Chemistries:    Recent Labs  Lab 04/12/17 0633 04/13/17  0442 04/13/17  2258    142 139   K 4.0 3.5 4.3    109 109   CO2 21* 24 22*   BUN 14 15 21   CREATININE 0.8 0.8 1.2   CALCIUM 8.6* 8.3* 7.8*   ALBUMIN 2.1* 2.0* 1.8*   PROT 5.0* 4.9* 4.4*   BILITOT 0.2 0.3 0.3   ALKPHOS 92 100 102   ALT 14 15 23   AST 15 16 32   MG 1.6 1.7 1.3*   PHOS 3.2 4.4 1.6*       Significant Imaging: I have reviewed all pertinent imaging results/findings within the past 24 hours.    Assessment/Plan:     Neuro  MG (myasthenia gravis)  - continue home prednisone 10 mg    Altered mental status  - likely a metabolic encephalopathy given her rapid decline and rapid improvement with IV abx and fluids  - currently remains more somnolent than baseline but is oriented at her baseline  - no concern for stroke given improvement in function and lack of asymmetric weakness or facial drooping    Cardiac  CAD (coronary artery disease)  - continue home aspirin, home plavix on hold given recent GI bleed    Benign hypertension  - Home BP meds held given hypotension  - previously on losartan 100 mg, HCTZ 12. 5 mg and hydralazine 25 mg TID    Renal  PARVEEN (acute kidney injury)  - likely pre-renal secondary to her sepsis  - expect to improve with volume resuscitation     Fluids/Electrolytes/Nutrition/GI  Gastroesophageal reflux disease without esophagitis  - continue protonix    Musculoskeletal and Integument  Rheumatoid arthritis involving multiple sites with positive rheumatoid factor  - continue home prednisone     Other  * Severe sepsis with acute organ dysfunction due  to Gram negative bacteria  - unclear source at this time but potentially urine given her urinary and fecal incontinence  - pending urinalysis, urine culture, blood cultures x 2, and CXR  - LA elevated at 3.8, will continue to trend with volume resuscitation  - currently receiving her 30 cc/kg and will reassess needs for pressors later  - originally started on vanc and cipro but will switch to vanc and zosyn given her acute decline and tripling of her WBC 35.93 from 10          Critical Care Medicine Daily Checklist:    A: Awake: RASS Goal/Actual Goal: RASS Goal: 0-->alert and calm  Actual: Moyer Agitation Sedation Scale (RASS): Alert and calm   B: Spontaneous Breathing Trial Performed?     C: SAT & SBT Coordinated?  N/a   D: Delirium: CAM-ICU Overall CAM-ICU: Positive   E: Early Mobility Performed? Yes   F: Feeding Goal: Goals: Pt to receive nutrition within 48hrs.   Status: Nutrition Goal Status: goal met   Current Diet Order   Procedures    Diet NPO Except for: Medication     Order Specific Question:   Except for     Answer:   Medication      AS: Analgesia/Sedation NA   T: Thromboembolic Prophylaxis ISHMAEL/SCD   H: HOB > 300 Yes   U: Stress Ulcer Prophylaxis (if needed) protonix   G: Glucose Control SSI   B: Bowel Function Stool Occurrence: 0   I: Indwelling Catheter (Lines & Brown) Necessity Brown since 4/13   D: De-escalation of Antimicrobials/Pharmacotherapies vanc and zosyn    Plan for the day/ETD F/u cultures    Code Status:  Family/Goals of Care: Full Code       Critical Care Time: 45 minutes  Critical secondary to Patient has a condition that poses threat to life and bodily function: Severe Sepsis      Critical care was time spent personally by me on the following activities: development of treatment plan with patient or surrogate and bedside caregivers, discussions with consultants, evaluation of patient's response to treatment, examination of patient, ordering and performing treatments and interventions,  ordering and review of laboratory studies, ordering and review of radiographic studies, pulse oximetry, re-evaluation of patient's condition. This critical care time did not overlap with that of any other provider or involve time for any procedures.    Thank you for your consult. Patient will be accepted to the ICU.    Brigette Fraser MD  Critical Care Medicine  [facility]

## 2017-04-14 NOTE — ASSESSMENT & PLAN NOTE
- Home BP meds held given hypotension  - previously on losartan 100 mg, HCTZ 12. 5 mg and hydralazine 25 mg TID

## 2017-04-14 NOTE — PROGRESS NOTES
Charge nurse notified of decrease in pt's BP at 2217, IM O covering physician also paged.Charge nurse called again at 2224 due to another decrease in pt's BP. Covering IM O physician paged STAT several times.  Rapid response called at 2240. Family at bedside.

## 2017-04-14 NOTE — PROGRESS NOTES
Handoff     Primary Team: [facility] Room Number: 6072/6072 A     Patient Name: Danyelle Jacobs MRN: [id number]     Date of Birth: [id number] Allergies: Norvasc  [amlodipine]     Age: 88 y.o. Admit Date: 2017     Sex: female  BMI: Body mass index is 28.15 kg/(m^2).     Code Status: Full Code        Illness Level (current clinical status): Watcher - Yes     Reason for Admission: Severe sepsis with acute organ dysfunction due to Gram negative bacteria    Brief HPI (pertinent PMH and diagnosis or differential diagnosis):   Ms. Jacobs is a 88 F with a history of HTN, HLD, CAD, PAD, HFpEF, dementia, and rheumatoid arthritis who presented to the ED  with GI bleeding. Her son reported that her symptoms began the evening before with bilious vomiting and diarrhea. Since then, she had had 6 episodes of emesis and 5 episodes of diarrhea which started out as black and became bright red by her last BM which was at 6 AM. She currently denies abdominal pain, SOB, or nausea; however, her son reports that she will usually deny symptoms even if they are present.      She had a recent admission from 3/24- for respiratory failure with hypoxia (presumed 2/2 to PNA, resolved with abx) and RLE pain. Also admitted - and transfused at that time. Patient has a significant history of 'bleeding angiodysplastic lesion found on EGD with hemostasis achieved' performed in 2017. Patient also underwent colonoscopy at the same time which showed 'prominent vessels in the cecum but no definite AVM; raised bluish lesion likely a hematoma in the rectum.' Son reports that she has declined both mentally and physically in the last few months and has become progressively weak, less responsive, and has had hallucinations including speaking to  relatives. Also, she has developed urinary and fecal incontinence recently.      While in the ICU she was started on protonix and was conservatively managed from a GI perspective. She was able to be  stepped down to hospital medicine on 4/8 and has had a relatively benign course since that time. A rapid was called tonight for Ms. Jacobs because she was found to be more altered, diaphoretic, pale and hypotensive in the 70s-80s with baseline in the 110s-120s. Patient was bolused IV fluids with improvement in both blood pressure and mental status but still remained somewhat lethargic. Patient had also spiking fevers up to 103.5. Given patient's rapid deterioration and borderline blood pressures despite fluids, she will be accepted in to the ICU for closer monitoring.      Hospital Course (updated, brief assessment by system or problem, significant events):   Patient was fluid resuscitated with over 30 cc/kg and had full response in blood pressure and tachycardia. IV abx of vanc and zosyn were started. Patient's mental status improved to baseline as per son-in-law by the morning. Discussed plan with patient's family.     Tasks (specific, using if-then statements):   - F/u cultures  - F/u urinalysis  - F/u abx needs    Estimated Discharge Date: 4/17    Discharge Disposition: Nursing Facility    Mentored By: Dr. Patience Cárdenas

## 2017-04-15 PROBLEM — J96.01 ACUTE RESPIRATORY FAILURE WITH HYPOXIA: Status: RESOLVED | Noted: 2017-03-24 | Resolved: 2017-04-15

## 2017-04-15 PROBLEM — R78.81 BACTEREMIA DUE TO STAPHYLOCOCCUS AUREUS: Status: ACTIVE | Noted: 2017-04-15

## 2017-04-15 PROBLEM — J18.9 PNEUMONIA DUE TO INFECTIOUS ORGANISM: Status: RESOLVED | Noted: 2017-03-24 | Resolved: 2017-04-15

## 2017-04-15 PROBLEM — N17.9 AKI (ACUTE KIDNEY INJURY): Status: RESOLVED | Noted: 2017-03-24 | Resolved: 2017-04-15

## 2017-04-15 PROBLEM — B95.61 BACTEREMIA DUE TO STAPHYLOCOCCUS AUREUS: Status: ACTIVE | Noted: 2017-04-15

## 2017-04-15 PROBLEM — R41.0 DELIRIUM: Status: RESOLVED | Noted: 2017-03-24 | Resolved: 2017-04-15

## 2017-04-15 PROBLEM — R41.82 ALTERED MENTAL STATUS: Status: RESOLVED | Noted: 2017-04-14 | Resolved: 2017-04-15

## 2017-04-15 LAB
ALBUMIN SERPL BCP-MCNC: 1.6 G/DL
ALP SERPL-CCNC: 104 U/L
ALT SERPL W/O P-5'-P-CCNC: 19 U/L
ANION GAP SERPL CALC-SCNC: 10 MMOL/L
AST SERPL-CCNC: 17 U/L
BACTERIA UR CULT: NO GROWTH
BASOPHILS # BLD AUTO: 0.01 K/UL
BASOPHILS NFR BLD: 0 %
BILIRUB SERPL-MCNC: 0.5 MG/DL
BUN SERPL-MCNC: 21 MG/DL
CALCIUM SERPL-MCNC: 7 MG/DL
CHLORIDE SERPL-SCNC: 107 MMOL/L
CO2 SERPL-SCNC: 19 MMOL/L
CREAT SERPL-MCNC: 0.8 MG/DL
DIFFERENTIAL METHOD: ABNORMAL
EOSINOPHIL # BLD AUTO: 0.1 K/UL
EOSINOPHIL NFR BLD: 0.3 %
ERYTHROCYTE [DISTWIDTH] IN BLOOD BY AUTOMATED COUNT: 20 %
EST. GFR  (AFRICAN AMERICAN): >60 ML/MIN/1.73 M^2
EST. GFR  (NON AFRICAN AMERICAN): >60 ML/MIN/1.73 M^2
GLUCOSE SERPL-MCNC: 137 MG/DL
HCT VFR BLD AUTO: 22.4 %
HGB BLD-MCNC: 7.1 G/DL
LYMPHOCYTES # BLD AUTO: 0.3 K/UL
LYMPHOCYTES NFR BLD: 0.9 %
MAGNESIUM SERPL-MCNC: 2.1 MG/DL
MCH RBC QN AUTO: 27.4 PG
MCHC RBC AUTO-ENTMCNC: 31.7 %
MCV RBC AUTO: 87 FL
MONOCYTES # BLD AUTO: 0.2 K/UL
MONOCYTES NFR BLD: 0.8 %
NEUTROPHILS # BLD AUTO: 30.4 K/UL
NEUTROPHILS NFR BLD: 98 %
PHOSPHATE SERPL-MCNC: 2.5 MG/DL
PLATELET # BLD AUTO: 189 K/UL
PMV BLD AUTO: 12.3 FL
POTASSIUM SERPL-SCNC: 3.3 MMOL/L
PROT SERPL-MCNC: 4.6 G/DL
RBC # BLD AUTO: 2.59 M/UL
SODIUM SERPL-SCNC: 136 MMOL/L
WBC # BLD AUTO: 31.2 K/UL

## 2017-04-15 PROCEDURE — 87186 SC STD MICRODIL/AGAR DIL: CPT

## 2017-04-15 PROCEDURE — 25000003 PHARM REV CODE 250: Performed by: INTERNAL MEDICINE

## 2017-04-15 PROCEDURE — 25000003 PHARM REV CODE 250: Performed by: STUDENT IN AN ORGANIZED HEALTH CARE EDUCATION/TRAINING PROGRAM

## 2017-04-15 PROCEDURE — 99233 SBSQ HOSP IP/OBS HIGH 50: CPT | Mod: GC,,, | Performed by: HOSPITALIST

## 2017-04-15 PROCEDURE — 87040 BLOOD CULTURE FOR BACTERIA: CPT | Mod: 59

## 2017-04-15 PROCEDURE — 99499 UNLISTED E&M SERVICE: CPT | Mod: ,,, | Performed by: PHYSICIAN ASSISTANT

## 2017-04-15 PROCEDURE — 85025 COMPLETE CBC W/AUTO DIFF WBC: CPT

## 2017-04-15 PROCEDURE — 25000003 PHARM REV CODE 250: Performed by: HOSPITALIST

## 2017-04-15 PROCEDURE — 36569 INSJ PICC 5 YR+ W/O IMAGING: CPT

## 2017-04-15 PROCEDURE — 11000001 HC ACUTE MED/SURG PRIVATE ROOM

## 2017-04-15 PROCEDURE — 99223 1ST HOSP IP/OBS HIGH 75: CPT | Mod: ,,, | Performed by: INTERNAL MEDICINE

## 2017-04-15 PROCEDURE — 84100 ASSAY OF PHOSPHORUS: CPT

## 2017-04-15 PROCEDURE — 83735 ASSAY OF MAGNESIUM: CPT

## 2017-04-15 PROCEDURE — 76937 US GUIDE VASCULAR ACCESS: CPT

## 2017-04-15 PROCEDURE — 36415 COLL VENOUS BLD VENIPUNCTURE: CPT

## 2017-04-15 PROCEDURE — 25000003 PHARM REV CODE 250: Performed by: PHYSICIAN ASSISTANT

## 2017-04-15 PROCEDURE — 80053 COMPREHEN METABOLIC PANEL: CPT

## 2017-04-15 PROCEDURE — 63600175 PHARM REV CODE 636 W HCPCS: Performed by: INTERNAL MEDICINE

## 2017-04-15 PROCEDURE — 87077 CULTURE AEROBIC IDENTIFY: CPT

## 2017-04-15 PROCEDURE — C1751 CATH, INF, PER/CENT/MIDLINE: HCPCS

## 2017-04-15 RX ORDER — POTASSIUM CHLORIDE 750 MG/1
20 CAPSULE, EXTENDED RELEASE ORAL ONCE
Status: COMPLETED | OUTPATIENT
Start: 2017-04-15 | End: 2017-04-15

## 2017-04-15 RX ADMIN — Medication 3 ML: at 02:04

## 2017-04-15 RX ADMIN — DONEPEZIL HYDROCHLORIDE 10 MG: 10 TABLET, FILM COATED ORAL at 09:04

## 2017-04-15 RX ADMIN — RAMELTEON 8 MG: 8 TABLET, FILM COATED ORAL at 08:04

## 2017-04-15 RX ADMIN — PIPERACILLIN AND TAZOBACTAM 4.5 G: 4; .5 INJECTION, POWDER, LYOPHILIZED, FOR SOLUTION INTRAVENOUS; PARENTERAL at 02:04

## 2017-04-15 RX ADMIN — LIDOCAINE 1 PATCH: 50 PATCH TOPICAL at 05:04

## 2017-04-15 RX ADMIN — MICONAZOLE NITRATE: 20 OINTMENT TOPICAL at 08:04

## 2017-04-15 RX ADMIN — PANTOPRAZOLE SODIUM 40 MG: 40 TABLET, DELAYED RELEASE ORAL at 09:04

## 2017-04-15 RX ADMIN — Medication 3 ML: at 06:04

## 2017-04-15 RX ADMIN — DULOXETINE 30 MG: 30 CAPSULE, DELAYED RELEASE ORAL at 09:04

## 2017-04-15 RX ADMIN — MICONAZOLE NITRATE: 20 OINTMENT TOPICAL at 09:04

## 2017-04-15 RX ADMIN — ASPIRIN 81 MG CHEWABLE TABLET 81 MG: 81 TABLET CHEWABLE at 09:04

## 2017-04-15 RX ADMIN — PREGABALIN 75 MG: 75 CAPSULE ORAL at 09:04

## 2017-04-15 RX ADMIN — TRAMADOL HYDROCHLORIDE 50 MG: 50 TABLET, COATED ORAL at 08:04

## 2017-04-15 RX ADMIN — DICLOFENAC: 10 GEL TOPICAL at 09:04

## 2017-04-15 RX ADMIN — SODIUM PHOSPHATE, MONOBASIC, MONOHYDRATE 15 MMOL: 276; 142 INJECTION, SOLUTION INTRAVENOUS at 12:04

## 2017-04-15 RX ADMIN — VANCOMYCIN HYDROCHLORIDE 750 MG: 1 INJECTION, POWDER, LYOPHILIZED, FOR SOLUTION INTRAVENOUS at 12:04

## 2017-04-15 RX ADMIN — POTASSIUM BICARBONATE 50 MEQ: 25 TABLET, EFFERVESCENT ORAL at 12:04

## 2017-04-15 RX ADMIN — CALCIUM CARBONATE-CHOLECALCIFEROL TAB 250 MG-125 UNIT 1 TABLET: 250-125 TAB at 09:04

## 2017-04-15 RX ADMIN — PREGABALIN 75 MG: 75 CAPSULE ORAL at 08:04

## 2017-04-15 RX ADMIN — ACETAMINOPHEN 650 MG: 325 TABLET ORAL at 10:04

## 2017-04-15 RX ADMIN — POTASSIUM CHLORIDE 20 MEQ: 750 CAPSULE, EXTENDED RELEASE ORAL at 12:04

## 2017-04-15 NOTE — PLAN OF CARE
Problem: Patient Care Overview  Goal: Plan of Care Review  Pt arrived from SICU. Attempts to get PIV placed since patient's 3 PIV infiltrated. Night nurse aware.

## 2017-04-15 NOTE — PLAN OF CARE
Problem: Patient Care Overview  Goal: Plan of Care Review  Outcome: Ongoing (interventions implemented as appropriate)  Pt afebrile this shift. Wound care consulted on wounds/tears on upper and lower extremities. Wounds to RLE suspected for infection, draining white fluid. WBC 32.20 but trending down. VSS. Patient turned q2. IV vanc and zosyn continued. Free of falls.

## 2017-04-15 NOTE — ASSESSMENT & PLAN NOTE
- s/p 1u pRBCs on admission given Hx of CAD and troponinemia (likely demand ischemia)  -H/H largely stable

## 2017-04-15 NOTE — NURSING
Upon arrival from SICU, pt's 3 PIVs were infiltrated. Multiple attempts made to place a new IV. Anesthesia called to help place new PIV. They request night nurse call ICU for help with getting IV. Kylah TOUSSAINT notified.

## 2017-04-15 NOTE — NURSING
Removed PIV from L AV due to infiltration. New 24g PIV initiated in L hand. Will continue to monitor closely due to very fragile veins.

## 2017-04-15 NOTE — PROGRESS NOTES
[facility] Hospital Medicine  Progress Note    Patient Name: Danyelle Jacobs  MRN: [id number]  Patient Class: IP- Inpatient   Admission Date: 2017  Length of Stay: 9 days  Attending Physician: Yen Ledezma MD  Primary Care Provider: Bianca Dykes MD    Hospital Medicine Team: [facility] HOSP MED O Shawna Porter MD    Subjective:     Principal Problem:Severe sepsis    HPI:  Ms. Jacobs is a 88 F with a history of HTN, HLD, CAD, PAD, HFpEF, dementia, and rheumatoid arthritis who presented to the ED   with GI bleeding. Her son reported that her symptoms began the evening before with bilious vomiting and diarrhea. Since then, she had had 6 episodes of emesis and 5 episodes of diarrhea which started out as black and became bright red by her last BM which was at 6 AM. She currently denies abdominal pain, SOB, or nausea; however, her son reports that she will usually deny symptoms even if they are present.     She had a recent admission from 3/24- for respiratory failure with hypoxia (presumed 2/2 to PNA, resolved with abx) and RLE pain. Also admitted - and transfused at that time. Patient has a significant history of 'bleeding angiodysplastic lesion found on EGD with hemostasis achieved' performed in 2017. Patient also underwent colonoscopy at the same time which showed 'prominent vessels in the cecum but no definite AVM; raised bluish lesion likely a hematoma in the rectum.' Son reports that she has declined both mentally and physically in the last few months and has become progressively weak, less responsive, and has had hallucinations including speaking to  relatives. Also, she has developed urinary and fecal incontinence recently.     While in the ICU she was started on protonix and was conservatively managed from a GI perspective. She was able to be stepped down to hospital medicine on  and has had a relatively benign course since that time. A rapid was called tonight for  Ms. Jacobs because she was found to be more altered, diaphoretic, pale and hypotensive in the 70s-80s with baseline in the 110s-120s. Patient was bolused IV fluids with improvement in both blood pressure and mental status but still remained somewhat lethargic. Patient had also spiking fevers up to 103.5. Given patient's rapid deterioration and borderline blood pressures despite fluids, she will be accepted in to the ICU for closer monitoring.       Hospital Course:  04/06/17: Upon initial evaluation patient's initial oxygen requirements appeared to be increasing. ABG was performed which showed adequate oxygenation. Hct was also obtained with the arterial stick which showed a Hct of 19. Based on this result and her presenting H/H of 8.3/26.2, 1 U PRBCs was ordered and received 2L of NS. GI and ICU were consulted for further management. Repeat hgb and hemodynamics are stable. Protonix drip started. CT abdomen W/O significant for diverticulosis, adnexal cystic structure, L1 compression fx, and lung opacities (atelectasis v infection/inflammation).    4/7: Hgb stable, no further episodes of vomiting or diarrhea. Pt c/o RLE pain. Hypertensive to 200 systolic, restarting home BP meds stepwise. GI recs supportive care. PARVEEN improved.     4/8: stepped down to hospital medicine. Hemodynamically stable.     4/9: No acute events, PTOT recommending SNF.     4/10: Son requested pelvic ultrasound for patient since she is currently admitted. H/H stable. Awaiting eval from [facility].    4/11: 4/9: no acute events overnight. Started nystatin ointment yesterday for candidal rash under breasts.S/p U/S revealing cystic mass in L adnexa. Awaiting placement.     4/12 - NAEON. Awaiting placement.    4/13 - A rapid was called because she was found to be more altered, diaphoretic, pale and hypotensive in the 70s-80s with baseline in the 110s-120s. Patient was bolused IV fluids with improvement in both blood pressure and mental status but  still remained somewhat lethargic. Patient had also spiking fevers up to 103.5. Given patient's rapid deterioration and borderline blood pressures despite fluids, she was sent to the ICU for closer monitoring.   Patient was fluid resuscitated with over 30 cc/kg and had full response in blood pressure and tachycardia. IV abx of vanc and zosyn were started. Patient's mental status improved to baseline by the morning. Discussed plan with patient's family.     4/15 - Pt transferred back to [facility] medicine. SHAWNA.  She remained afebrile with all other VSS.  BCx positive for GPC, awaiting identification of organism.  Continue vanc/zosyn.           Review of Systems   Constitutional: Negative for chills, diaphoresis and fever.   Eyes: Negative for visual disturbance.   Respiratory: Negative for cough and shortness of breath.    Cardiovascular: Negative for chest pain and leg swelling.   Gastrointestinal: Negative for abdominal pain, nausea and vomiting.   Musculoskeletal: Positive for arthralgias and back pain.   Skin: Positive for wound. Negative for color change.   Neurological: Positive for weakness.     Objective:     Vital Signs (Most Recent):  Temp: 98.2 °F (36.8 °C) (04/15/17 0626)  Pulse: 75 (04/15/17 0527)  Resp: 20 (04/15/17 0527)  BP: (!) 126/56 (04/15/17 0527)  SpO2: (!) 93 % (04/15/17 0527) Vital Signs (24h Range):  Temp:  [97.5 °F (36.4 °C)-100 °F (37.8 °C)] 98.2 °F (36.8 °C)  Pulse:  [65-93] 75  Resp:  [13-24] 20  SpO2:  [87 %-96 %] 93 %  BP: (124-167)/(56-79) 126/56     Weight: 57.3 kg (126 lb 4.8 oz)  Body mass index is 27.33 kg/(m^2).    Intake/Output Summary (Last 24 hours) at 04/15/17 0823  Last data filed at 04/15/17 0200   Gross per 24 hour   Intake              240 ml   Output             1025 ml   Net             -785 ml      Physical Exam   Constitutional: She appears well-developed. No distress.   Cardiovascular: Normal rate, regular rhythm and normal heart sounds.    Pulmonary/Chest: Effort  normal and breath sounds normal. No respiratory distress.   Abdominal: Soft. Bowel sounds are normal. She exhibits no distension. There is no tenderness.   Neurological: She is alert.   Skin: Skin is warm and dry. She is not diaphoretic. There is erythema.   Vitals reviewed.         Assessment/Plan:      * Severe sepsis  - GPC growing in blood cultures, repeats pending  - source likely skin given multiple areas of tear and breakdown  - urine collected after broad spectrum antibiotics so not helpful  - CXR does not show any new consolidation although aspiration still possible  - continue vanc/zosyn until speciation of organism  - may require ID consult but will wait for more information  - echo ordered      Benign hypertension  - will hold home meds given new sepsis  - pt currently normotensive       Current chronic use of systemic steroids  - chronic immunosuppression likely contributed to pt's rapid decline  - will hold steroids until blood cultures clear      Anemia associated with acute blood loss  - s/p 1u pRBCs on admission given Hx of CAD and troponinemia (likely demand ischemia)  -H/H largely stable      Angiodysplasia of stomach: see EGD 1/17  - EGD1/2017: 'bleeding angiodysplastic lesion found with hemostasis achieved'  - Colonoscopy: 'prominent vessels in the cecum but no definite AVM; raised bluish lesion likely a hematoma in the rectum.'  - Multiple episodes of yellow/green emesis w/ five episodes of diarrhea, dark red --> bright red  - Patient currently HDS, H/H stable at this point  - GI consulted, recommend OP follow up    Compression fracture of first lumbar vertebra  -likely 2/2 to osteoporosis, seen on CT abdo  -possible nerve impingement contributing to R hip pain  -continue lyrica and PTOT  -Volteran gel      Sciatica associated with disorder of lumbar spine  - PTOT  - increased dose of lyrica from 50 to 75mg        Adnexal mass  L cystic adnexal mass seen on CTabdo in setting of reported  complete hysterectomy  -Pelvic U/S revealing 2cm cystic structure, can be monitored with yearly U/S      Candidal intertrigo  -under breasts  -nystatin ointment      VTE Risk Mitigation         Ordered     Medium Risk of VTE  Once      04/06/17 1535     Place ISHMAEL hose  Until discontinued      04/06/17 1535     Reason for No Pharmacological VTE Prophylaxis  Once      04/06/17 1535     Place sequential compression device  Until discontinued      04/06/17 1504          Shawna Porter MD  Department of Hospital Medicine   [facility]

## 2017-04-15 NOTE — ASSESSMENT & PLAN NOTE
- GPC growing in blood cultures, repeats pending  - source likely skin given multiple areas of tear and breakdown  - urine collected after broad spectrum antibiotics so not helpful  - CXR does not show any new consolidation although aspiration still possible  - continue vanc/zosyn until speciation of organism  - may require ID consult but will wait for more information  - echo ordered

## 2017-04-15 NOTE — ASSESSMENT & PLAN NOTE
- chronic immunosuppression likely contributed to pt's rapid decline  - will hold steroids until blood cultures clear

## 2017-04-15 NOTE — ASSESSMENT & PLAN NOTE
89 y/o female with hx of HTN, HLD, CAD, and RA presented to ED 4/6 for GI bleeding, now presents with Staph aureus bacteremia 4/13. Patient has a significant history of 'bleeding angiodysplastic lesion found on EGD with hemostasis achieved' performed in 01/2017. Patient also underwent colonoscopy at the same time which showed 'prominent vessels in the cecum but no definite AVM; raised bluish lesion likely a hematoma in the rectum.'  Pt spiked fever of 103.5 and leukocytosis trended upward to 45.79. Pt was placed on vancomycin and zosyn. Blood cultures from 4/13 positive for Staph aureus.     -Afebrile today, leukocytosis trending downward to 31.20  -Continue IV vancomycin, vanc trough goal 15-20  -Recommend 2D-echo  -Recommend discontinuing zosyn at this time given cultures growing Staph aureus  -Recommend wound care consult to evaluate wound on right leg.   -blood cultures repeated. Will follow.    Seen and discussed with ID staff. Will follow with you.

## 2017-04-15 NOTE — SUBJECTIVE & OBJECTIVE
Past Medical History:   Diagnosis Date    Adverse effect of oral bisphosphonates     Anemia     Angiodysplasia of stomach: see EGD 1/17 2/13/2017    Anxiety     Aortic atherosclerosis     Benign hypertension 10/5/2015    Carcinoma, renal cell     Carotid arterial disease 1/20/2014    Cerebellar stroke, acute     Chronic kidney disease (CKD), stage I 2/18/2016    Coronary artery disease     Current chronic use of systemic steroids 10/4/2016    Degenerative disc disease     Depression     Diverticulosis     Former smoker 6/13/2016    Gastroesophageal reflux disease without esophagitis 10/5/2015    GERD (gastroesophageal reflux disease)     Goiter, nontoxic, multinodular     IBS (irritable bowel syndrome)     Left ventricular diastolic dysfunction with preserved systolic function 10/5/2015    Lung nodule     right    MGUS (monoclonal gammopathy of unknown significance) 6/13/2016    Myasthenia gravis, adult form     Osteoporosis 8/10/2015    Osteoporosis, unspecified     PAOD (peripheral arterial occlusive disease)     RA (rheumatoid arthritis)     Smoker 1/20/2014    Stroke     Ulcer     Vitamin D deficiency disease 1/20/2014       Past Surgical History:   Procedure Laterality Date    CATARACT EXTRACTION W/  INTRAOCULAR LENS IMPLANT Bilateral     CHOLECYSTECTOMY      COLONOSCOPY N/A 1/24/2017    Procedure: COLONOSCOPY;  Surgeon: Kristofer Worthington MD;  Location: [address];  Service: Endoscopy;  Laterality: N/A;  for chronic anemia ; needs to be off plavix x 5 days.     Off Plavix since recent hospitalization 1/19/17.    Speaks [place]/needs .    HYSTERECTOMY      KIDNEY SURGERY  2003    NEPHRECTOMY         Review of patient's allergies indicates:   Allergen Reactions    Norvasc  [amlodipine]      Other reaction(s): Edema       Medications:  Prescriptions Prior to Admission   Medication Sig    alprazolam (XANAX) 0.5 MG tablet Take 1 tablet (0.5 mg total) by mouth  daily as needed.    aspirin 81 mg Tab Take 81 mg by mouth. 1 Tablet Oral Every day    atorvastatin (LIPITOR) 40 MG tablet Take 1 tablet (40 mg total) by mouth every evening.     mg capsule TAKE 1 CAPSULE BY MOUTH TWICE DAILY    donepezil (ARICEPT) 10 MG tablet Take 1 tablet (10 mg total) by mouth once daily.    duloxetine (CYMBALTA) 30 MG capsule Take 1 capsule (30 mg total) by mouth once daily.    ferrous sulfate 325 (65 FE) MG EC tablet Take 1 tablet (325 mg total) by mouth 2 (two) times daily.    hydrALAZINE (APRESOLINE) 25 MG tablet Take 1 tablet (25 mg total) by mouth every 8 (eight) hours.    hydrochlorothiazide (MICROZIDE) 12.5 mg capsule Take 1 capsule (12.5 mg total) by mouth once daily.    hydrocodone-acetaminophen 5-325mg (NORCO) 5-325 mg per tablet Take 1 tablet by mouth every 6 (six) hours as needed for Pain.    isosorbide mononitrate (IMDUR) 30 MG 24 hr tablet Take 1 tablet (30 mg total) by mouth every evening.    losartan (COZAAR) 100 MG tablet Take 1 tablet (100 mg total) by mouth once daily.    pantoprazole (PROTONIX) 40 MG tablet Take 1 tablet (40 mg total) by mouth once daily.    predniSONE (DELTASONE) 10 MG tablet Take 1 tablet (10 mg total) by mouth once daily.    pregabalin (LYRICA) 50 MG capsule Take 1 capsule (50 mg total) by mouth 2 (two) times daily.    tolterodine (DETROL LA) 2 MG Cp24 Take 1 capsule (2 mg total) by mouth once daily.    [DISCONTINUED] clopidogrel (PLAVIX) 75 mg tablet Take 75 mg by mouth once daily.    [DISCONTINUED] fluconazole (DIFLUCAN) 100 MG tablet TAKE 1 TABLET(100 MG) BY MOUTH EVERY DAY     Antibiotics     Start     Stop Route Frequency Ordered    04/13/17 2322  piperacillin-tazobactam 4.5 g in dextrose 5 % 100 mL IVPB (ready to mix system)      -- IV Every 8 hours (non-standard times) 04/13/17 2323    04/14/17 1115  vancomycin (VANCOCIN) 750 mg in dextrose 5 % 250 mL IVPB      -- IV Every 24 hours (non-standard times) 04/14/17 1010         Antifungals     Start     Stop Route Frequency Ordered    04/10/17 2100  miconazole nitrate 2% ointment      -- Top 2 times daily 04/10/17 1330        Antivirals     None           Immunization History   Administered Date(s) Administered    Influenza 10/16/2006, 10/29/2007, 12/01/2008, 12/07/2009, 11/22/2010, 11/21/2011    Influenza - High Dose 02/25/2013, 01/20/2014, 02/09/2015, 10/05/2015, 10/04/2016    PPD Test 04/11/2017    Pneumococcal Conjugate - 13 Valent 02/09/2015    Pneumococcal Polysaccharide - 23 Valent 10/16/2006    Tdap 06/12/2015       Family History     Problem Relation (Age of Onset)    Alzheimer's disease Sister    Heart disease Father    Stroke Mother        Social History     Social History    Marital status:      Spouse name: N/A    Number of children: N/A    Years of education: N/A     Social History Main Topics    Smoking status: Former Smoker     Quit date: 1/19/2015    Smokeless tobacco: Never Used    Alcohol use No    Drug use: No    Sexual activity: No     Other Topics Concern    None     Social History Narrative     Review of Systems   Constitutional: Negative for chills and fever.   HENT: Negative for congestion.    Eyes: Negative for pain.   Respiratory: Negative for cough and shortness of breath.    Cardiovascular: Negative for chest pain and leg swelling.   Gastrointestinal: Negative for abdominal pain, diarrhea, nausea and vomiting.   Genitourinary: Negative for dysuria.   Musculoskeletal: Positive for arthralgias.   Skin: Positive for wound.   Neurological: Negative for headaches.     Objective:     Vital Signs (Most Recent):  Temp: 99.4 °F (37.4 °C) (04/15/17 1100)  Pulse: 98 (04/15/17 1100)  Resp: 18 (04/15/17 1100)  BP: 127/60 (04/15/17 1100)  SpO2: (!) 94 % (04/15/17 0932) Vital Signs (24h Range):  Temp:  [97.5 °F (36.4 °C)-100 °F (37.8 °C)] 99.4 °F (37.4 °C)  Pulse:  [65-98] 98  Resp:  [18-24] 18  SpO2:  [92 %-96 %] 94 %  BP: (124-155)/(56-79) 127/60      Weight: 57.3 kg (126 lb 4.8 oz)  Body mass index is 27.33 kg/(m^2).    Estimated Creatinine Clearance: 44 mL/min (based on Cr of 0.8).    Physical Exam   Constitutional: She appears well-developed and well-nourished. No distress.   HENT:   Head: Normocephalic.   Eyes: Pupils are equal, round, and reactive to light.   Cardiovascular: Normal rate.  Exam reveals no gallop and no friction rub.    No murmur heard.  Distant heart sounds   Pulmonary/Chest: Effort normal. No respiratory distress. She has no wheezes. She has no rales.   Abdominal: Soft. She exhibits no distension.   Musculoskeletal: Normal range of motion. She exhibits no edema or deformity.   Neurological: She is alert.   Skin: Skin is warm and dry. She is not diaphoretic.   Skin tear of right lower leg. Minimal drainage seen. Skin tears of BUE, no thrombophlebitis seen.    Psychiatric: She has a normal mood and affect.   Nursing note and vitals reviewed.      Significant Labs: All pertinent labs within the past 24 hours have been reviewed.    Significant Imaging: I have reviewed all pertinent imaging results/findings within the past 24 hours.

## 2017-04-15 NOTE — PLAN OF CARE
"Problem: Patient Care Overview  Goal: Plan of Care Review  Outcome: Ongoing (interventions implemented as appropriate)  Alert & oriented to self. VSS; afebrile. Administered all meds as prescribed. New 22g PIV initiated in L AC. Daughter-in-law remained at bedside overnight & requested a new order for Xanax stating, "That's what she takes at home to sleep." The team did not feel comfortable ordering Xanax due to recent altered mental status; instead, Ramelteon was ordered & administered. Pt mumbled incoherently throughout the night; did not get much sleep. Pt remained free from fall/injury throughout the shift.      "

## 2017-04-15 NOTE — CONSULTS
[facility]  Infectious Disease  Consult Note    Patient Name: Danyelle Jacobs  MRN: [id number]  Admission Date: 4/6/2017  Hospital Length of Stay: 9 days  Attending Physician: Yen Ledezma MD  Primary Care Provider: Bianca Dykes MD     Isolation Status: No active isolations    Patient information was obtained from relative(s) and past medical records.      Inpatient consult to Infectious Diseases  Consult performed by: NIDA JUDD  Consult ordered by: WALLACE JIN        Assessment/Plan:     Bacteremia due to Staphylococcus aureus  89 y/o female with hx of HTN, HLD, CAD, and RA presented to ED 4/6 for GI bleeding, now presents with Staph aureus bacteremia 4/13. Patient has a significant history of 'bleeding angiodysplastic lesion found on EGD with hemostasis achieved' performed in 01/2017. Patient also underwent colonoscopy at the same time which showed 'prominent vessels in the cecum but no definite AVM; raised bluish lesion likely a hematoma in the rectum.'  Pt spiked fever of 103.5 and leukocytosis trended upward to 45.79. Pt was placed on vancomycin and zosyn. Blood cultures from 4/13 positive for Staph aureus.     -Afebrile today, leukocytosis trending downward to 31.20  -Continue IV vancomycin, vanc trough goal 15-20  -Recommend 2D-echo  -Recommend discontinuing zosyn at this time given cultures growing Staph aureus  -Recommend wound care consult to evaluate wound on right leg.   -blood cultures repeated. Will follow.    Seen and discussed with ID staff. Will follow with you.       Thank you for your consult. I will follow-up with patient. Please contact us if you have any additional questions.    Niad Judd PA-C  Infectious Disease  [facility]  [phone]    Subjective:     Principal Problem: Severe sepsis    HPI: 89 y/o female with hx of HTN, HLD, CAD, and RA presented to ED 4/6 for GI bleeding. Patient has a significant history of 'bleeding angiodysplastic  lesion found on EGD with hemostasis achieved' performed in 01/2017. Patient also underwent colonoscopy at the same time which showed 'prominent vessels in the cecum but no definite AVM; raised bluish lesion likely a hematoma in the rectum.' While in the ICU she was started on protonix and was conservatively managed from a GI perspective. She had recent admission 3/24 -4/1 for respiratory failure (presumed to be 2/2 PNA, treated with abx). On 4/13 pt was found to be diaphoretic, hypotensive and tachycardic. Pt also spiked fever of 103.5 and leukocytosis trended upward to 45.79. Pt was placed on vancomycin and zosyn. Today, pt remained afebrile, and WBC down to 31.20. Blood cultures were drawn 4/13 which are positive for Staph aureus. Pt denies having any CP, abdominal pain, n/v/d. Per family, pt does have skin tears and wound of extremities. CXR and UA negative. 2D-echo ordered today. Otherwise no complaints other than myalgia 2/2 from rheumatoid arthritis.     Past Medical History:   Diagnosis Date    Adverse effect of oral bisphosphonates     Anemia     Angiodysplasia of stomach: see EGD 1/17 2/13/2017    Anxiety     Aortic atherosclerosis     Benign hypertension 10/5/2015    Carcinoma, renal cell     Carotid arterial disease 1/20/2014    Cerebellar stroke, acute     Chronic kidney disease (CKD), stage I 2/18/2016    Coronary artery disease     Current chronic use of systemic steroids 10/4/2016    Degenerative disc disease     Depression     Diverticulosis     Former smoker 6/13/2016    Gastroesophageal reflux disease without esophagitis 10/5/2015    GERD (gastroesophageal reflux disease)     Goiter, nontoxic, multinodular     IBS (irritable bowel syndrome)     Left ventricular diastolic dysfunction with preserved systolic function 10/5/2015    Lung nodule     right    MGUS (monoclonal gammopathy of unknown significance) 6/13/2016    Myasthenia gravis, adult form     Osteoporosis 8/10/2015     Osteoporosis, unspecified     PAOD (peripheral arterial occlusive disease)     RA (rheumatoid arthritis)     Smoker 1/20/2014    Stroke     Ulcer     Vitamin D deficiency disease 1/20/2014       Past Surgical History:   Procedure Laterality Date    CATARACT EXTRACTION W/  INTRAOCULAR LENS IMPLANT Bilateral     CHOLECYSTECTOMY      COLONOSCOPY N/A 1/24/2017    Procedure: COLONOSCOPY;  Surgeon: Kristofer Worthington MD;  Location: [address];  Service: Endoscopy;  Laterality: N/A;  for chronic anemia ; needs to be off plavix x 5 days.     Off Plavix since recent hospitalization 1/19/17.    Speaks [place]/needs .    HYSTERECTOMY      KIDNEY SURGERY  2003    NEPHRECTOMY         Review of patient's allergies indicates:   Allergen Reactions    Norvasc  [amlodipine]      Other reaction(s): Edema       Medications:  Prescriptions Prior to Admission   Medication Sig    alprazolam (XANAX) 0.5 MG tablet Take 1 tablet (0.5 mg total) by mouth daily as needed.    aspirin 81 mg Tab Take 81 mg by mouth. 1 Tablet Oral Every day    atorvastatin (LIPITOR) 40 MG tablet Take 1 tablet (40 mg total) by mouth every evening.     mg capsule TAKE 1 CAPSULE BY MOUTH TWICE DAILY    donepezil (ARICEPT) 10 MG tablet Take 1 tablet (10 mg total) by mouth once daily.    duloxetine (CYMBALTA) 30 MG capsule Take 1 capsule (30 mg total) by mouth once daily.    ferrous sulfate 325 (65 FE) MG EC tablet Take 1 tablet (325 mg total) by mouth 2 (two) times daily.    hydrALAZINE (APRESOLINE) 25 MG tablet Take 1 tablet (25 mg total) by mouth every 8 (eight) hours.    hydrochlorothiazide (MICROZIDE) 12.5 mg capsule Take 1 capsule (12.5 mg total) by mouth once daily.    hydrocodone-acetaminophen 5-325mg (NORCO) 5-325 mg per tablet Take 1 tablet by mouth every 6 (six) hours as needed for Pain.    isosorbide mononitrate (IMDUR) 30 MG 24 hr tablet Take 1 tablet (30 mg total) by mouth every evening.    losartan  (COZAAR) 100 MG tablet Take 1 tablet (100 mg total) by mouth once daily.    pantoprazole (PROTONIX) 40 MG tablet Take 1 tablet (40 mg total) by mouth once daily.    predniSONE (DELTASONE) 10 MG tablet Take 1 tablet (10 mg total) by mouth once daily.    pregabalin (LYRICA) 50 MG capsule Take 1 capsule (50 mg total) by mouth 2 (two) times daily.    tolterodine (DETROL LA) 2 MG Cp24 Take 1 capsule (2 mg total) by mouth once daily.    [DISCONTINUED] clopidogrel (PLAVIX) 75 mg tablet Take 75 mg by mouth once daily.    [DISCONTINUED] fluconazole (DIFLUCAN) 100 MG tablet TAKE 1 TABLET(100 MG) BY MOUTH EVERY DAY     Antibiotics     Start     Stop Route Frequency Ordered    04/13/17 2322  piperacillin-tazobactam 4.5 g in dextrose 5 % 100 mL IVPB (ready to mix system)      -- IV Every 8 hours (non-standard times) 04/13/17 2323    04/14/17 1115  vancomycin (VANCOCIN) 750 mg in dextrose 5 % 250 mL IVPB      -- IV Every 24 hours (non-standard times) 04/14/17 1010        Antifungals     Start     Stop Route Frequency Ordered    04/10/17 2100  miconazole nitrate 2% ointment      -- Top 2 times daily 04/10/17 1330        Antivirals     None           Immunization History   Administered Date(s) Administered    Influenza 10/16/2006, 10/29/2007, 12/01/2008, 12/07/2009, 11/22/2010, 11/21/2011    Influenza - High Dose 02/25/2013, 01/20/2014, 02/09/2015, 10/05/2015, 10/04/2016    PPD Test 04/11/2017    Pneumococcal Conjugate - 13 Valent 02/09/2015    Pneumococcal Polysaccharide - 23 Valent 10/16/2006    Tdap 06/12/2015       Family History     Problem Relation (Age of Onset)    Alzheimer's disease Sister    Heart disease Father    Stroke Mother        Social History     Social History    Marital status:      Spouse name: N/A    Number of children: N/A    Years of education: N/A     Social History Main Topics    Smoking status: Former Smoker     Quit date: 1/19/2015    Smokeless tobacco: Never Used    Alcohol use  No    Drug use: No    Sexual activity: No     Other Topics Concern    None     Social History Narrative     Review of Systems   Constitutional: Negative for chills and fever.   HENT: Negative for congestion.    Eyes: Negative for pain.   Respiratory: Negative for cough and shortness of breath.    Cardiovascular: Negative for chest pain and leg swelling.   Gastrointestinal: Negative for abdominal pain, diarrhea, nausea and vomiting.   Genitourinary: Negative for dysuria.   Musculoskeletal: Positive for arthralgias.   Skin: Positive for wound.   Neurological: Negative for headaches.     Objective:     Vital Signs (Most Recent):  Temp: 99.4 °F (37.4 °C) (04/15/17 1100)  Pulse: 98 (04/15/17 1100)  Resp: 18 (04/15/17 1100)  BP: 127/60 (04/15/17 1100)  SpO2: (!) 94 % (04/15/17 0932) Vital Signs (24h Range):  Temp:  [97.5 °F (36.4 °C)-100 °F (37.8 °C)] 99.4 °F (37.4 °C)  Pulse:  [65-98] 98  Resp:  [18-24] 18  SpO2:  [92 %-96 %] 94 %  BP: (124-155)/(56-79) 127/60     Weight: 57.3 kg (126 lb 4.8 oz)  Body mass index is 27.33 kg/(m^2).    Estimated Creatinine Clearance: 44 mL/min (based on Cr of 0.8).    Physical Exam   Constitutional: She appears well-developed and well-nourished. No distress.   HENT:   Head: Normocephalic.   Eyes: Pupils are equal, round, and reactive to light.   Cardiovascular: Normal rate.  Exam reveals no gallop and no friction rub.    No murmur heard.  Distant heart sounds   Pulmonary/Chest: Effort normal. No respiratory distress. She has no wheezes. She has no rales.   Abdominal: Soft. She exhibits no distension.   Musculoskeletal: Normal range of motion. She exhibits no edema or deformity.   Neurological: She is alert.   Skin: Skin is warm and dry. She is not diaphoretic.   Skin tear of right lower leg. Minimal drainage seen. Skin tears of BUE, no thrombophlebitis seen.    Psychiatric: She has a normal mood and affect.   Nursing note and vitals reviewed.      Significant Labs: All pertinent labs  within the past 24 hours have been reviewed.    Significant Imaging: I have reviewed all pertinent imaging results/findings within the past 24 hours.

## 2017-04-15 NOTE — CONSULTS
Single lumen 18 x 8 midline placed to Left bRACHIAL vein.  Max dwell date 5/14/17. Lot# [id number]. Needle advanced using realtime ultrasound guidance. Image recorded and saved.

## 2017-04-15 NOTE — SUBJECTIVE & OBJECTIVE
Review of Systems   Constitutional: Negative for chills, diaphoresis and fever.   Eyes: Negative for visual disturbance.   Respiratory: Negative for cough and shortness of breath.    Cardiovascular: Negative for chest pain and leg swelling.   Gastrointestinal: Negative for abdominal pain, nausea and vomiting.   Musculoskeletal: Positive for arthralgias and back pain.   Skin: Positive for wound. Negative for color change.   Neurological: Positive for weakness.     Objective:     Vital Signs (Most Recent):  Temp: 98.2 °F (36.8 °C) (04/15/17 0626)  Pulse: 75 (04/15/17 0527)  Resp: 20 (04/15/17 0527)  BP: (!) 126/56 (04/15/17 0527)  SpO2: (!) 93 % (04/15/17 0527) Vital Signs (24h Range):  Temp:  [97.5 °F (36.4 °C)-100 °F (37.8 °C)] 98.2 °F (36.8 °C)  Pulse:  [65-93] 75  Resp:  [13-24] 20  SpO2:  [87 %-96 %] 93 %  BP: (124-167)/(56-79) 126/56     Weight: 57.3 kg (126 lb 4.8 oz)  Body mass index is 27.33 kg/(m^2).    Intake/Output Summary (Last 24 hours) at 04/15/17 0823  Last data filed at 04/15/17 0200   Gross per 24 hour   Intake              240 ml   Output             1025 ml   Net             -785 ml      Physical Exam   Constitutional: She appears well-developed. No distress.   Cardiovascular: Normal rate, regular rhythm and normal heart sounds.    Pulmonary/Chest: Effort normal and breath sounds normal. No respiratory distress.   Abdominal: Soft. Bowel sounds are normal. She exhibits no distension. There is no tenderness.   Neurological: She is alert.   Skin: Skin is warm and dry. She is not diaphoretic. There is erythema.   Vitals reviewed.

## 2017-04-16 LAB
ABO + RH BLD: NORMAL
ALBUMIN SERPL BCP-MCNC: 1.5 G/DL
ALP SERPL-CCNC: 111 U/L
ALT SERPL W/O P-5'-P-CCNC: 19 U/L
ANION GAP SERPL CALC-SCNC: 7 MMOL/L
AST SERPL-CCNC: 23 U/L
BACTERIA BLD CULT: NORMAL
BASOPHILS # BLD AUTO: 0.01 K/UL
BASOPHILS NFR BLD: 0.1 %
BILIRUB SERPL-MCNC: 0.4 MG/DL
BLD GP AB SCN CELLS X3 SERPL QL: NORMAL
BLD PROD TYP BPU: NORMAL
BLOOD UNIT EXPIRATION DATE: NORMAL
BLOOD UNIT TYPE CODE: 5100
BLOOD UNIT TYPE: NORMAL
BUN SERPL-MCNC: 17 MG/DL
CALCIUM SERPL-MCNC: 7.3 MG/DL
CHLORIDE SERPL-SCNC: 106 MMOL/L
CO2 SERPL-SCNC: 22 MMOL/L
CODING SYSTEM: NORMAL
CREAT SERPL-MCNC: 0.8 MG/DL
DIFFERENTIAL METHOD: ABNORMAL
DISPENSE STATUS: NORMAL
EOSINOPHIL # BLD AUTO: 0.2 K/UL
EOSINOPHIL NFR BLD: 1 %
ERYTHROCYTE [DISTWIDTH] IN BLOOD BY AUTOMATED COUNT: 19.5 %
EST. GFR  (AFRICAN AMERICAN): >60 ML/MIN/1.73 M^2
EST. GFR  (NON AFRICAN AMERICAN): >60 ML/MIN/1.73 M^2
GLUCOSE SERPL-MCNC: 93 MG/DL
HCT VFR BLD AUTO: 21.6 %
HGB BLD-MCNC: 6.8 G/DL
LYMPHOCYTES # BLD AUTO: 0.3 K/UL
LYMPHOCYTES NFR BLD: 2 %
MAGNESIUM SERPL-MCNC: 2 MG/DL
MCH RBC QN AUTO: 26.9 PG
MCHC RBC AUTO-ENTMCNC: 31.5 %
MCV RBC AUTO: 85 FL
MONOCYTES # BLD AUTO: 0.4 K/UL
MONOCYTES NFR BLD: 2.6 %
NEUTROPHILS # BLD AUTO: 15.3 K/UL
NEUTROPHILS NFR BLD: 94.3 %
PHOSPHATE SERPL-MCNC: 2.7 MG/DL
PLATELET # BLD AUTO: 162 K/UL
PMV BLD AUTO: 11.3 FL
POTASSIUM SERPL-SCNC: 4.4 MMOL/L
PROT SERPL-MCNC: 4.6 G/DL
RBC # BLD AUTO: 2.53 M/UL
SODIUM SERPL-SCNC: 135 MMOL/L
TRANS ERYTHROCYTES VOL PATIENT: NORMAL ML
WBC # BLD AUTO: 16.27 K/UL

## 2017-04-16 PROCEDURE — 25000003 PHARM REV CODE 250: Performed by: STUDENT IN AN ORGANIZED HEALTH CARE EDUCATION/TRAINING PROGRAM

## 2017-04-16 PROCEDURE — 86920 COMPATIBILITY TEST SPIN: CPT

## 2017-04-16 PROCEDURE — 25000003 PHARM REV CODE 250: Performed by: INTERNAL MEDICINE

## 2017-04-16 PROCEDURE — 87040 BLOOD CULTURE FOR BACTERIA: CPT | Mod: 59

## 2017-04-16 PROCEDURE — 36430 TRANSFUSION BLD/BLD COMPNT: CPT

## 2017-04-16 PROCEDURE — 83735 ASSAY OF MAGNESIUM: CPT

## 2017-04-16 PROCEDURE — 25500020 PHARM REV CODE 255: Performed by: HOSPITALIST

## 2017-04-16 PROCEDURE — 25000003 PHARM REV CODE 250: Performed by: HOSPITALIST

## 2017-04-16 PROCEDURE — 86900 BLOOD TYPING SEROLOGIC ABO: CPT

## 2017-04-16 PROCEDURE — 63600175 PHARM REV CODE 636 W HCPCS: Performed by: INTERNAL MEDICINE

## 2017-04-16 PROCEDURE — 36415 COLL VENOUS BLD VENIPUNCTURE: CPT

## 2017-04-16 PROCEDURE — 25000003 PHARM REV CODE 250: Performed by: PHYSICIAN ASSISTANT

## 2017-04-16 PROCEDURE — P9021 RED BLOOD CELLS UNIT: HCPCS

## 2017-04-16 PROCEDURE — 99232 SBSQ HOSP IP/OBS MODERATE 35: CPT | Mod: ,,, | Performed by: PHYSICIAN ASSISTANT

## 2017-04-16 PROCEDURE — 80053 COMPREHEN METABOLIC PANEL: CPT

## 2017-04-16 PROCEDURE — 11000001 HC ACUTE MED/SURG PRIVATE ROOM

## 2017-04-16 PROCEDURE — 99233 SBSQ HOSP IP/OBS HIGH 50: CPT | Mod: 25,GC,, | Performed by: HOSPITALIST

## 2017-04-16 PROCEDURE — 85025 COMPLETE CBC W/AUTO DIFF WBC: CPT

## 2017-04-16 PROCEDURE — 63600175 PHARM REV CODE 636 W HCPCS: Performed by: PHYSICIAN ASSISTANT

## 2017-04-16 PROCEDURE — A9585 GADOBUTROL INJECTION: HCPCS | Performed by: HOSPITALIST

## 2017-04-16 PROCEDURE — 84100 ASSAY OF PHOSPHORUS: CPT

## 2017-04-16 PROCEDURE — 86850 RBC ANTIBODY SCREEN: CPT

## 2017-04-16 RX ORDER — FUROSEMIDE 10 MG/ML
40 INJECTION INTRAMUSCULAR; INTRAVENOUS ONCE
Status: COMPLETED | OUTPATIENT
Start: 2017-04-16 | End: 2017-04-16

## 2017-04-16 RX ORDER — ISOSORBIDE MONONITRATE 30 MG/1
30 TABLET, EXTENDED RELEASE ORAL NIGHTLY
Status: DISCONTINUED | OUTPATIENT
Start: 2017-04-16 | End: 2017-04-22

## 2017-04-16 RX ORDER — CLONIDINE HYDROCHLORIDE 0.1 MG/1
0.1 TABLET ORAL EVERY 6 HOURS PRN
Status: DISCONTINUED | OUTPATIENT
Start: 2017-04-16 | End: 2017-04-21

## 2017-04-16 RX ORDER — HYDRALAZINE HYDROCHLORIDE 25 MG/1
25 TABLET, FILM COATED ORAL EVERY 8 HOURS
Status: DISCONTINUED | OUTPATIENT
Start: 2017-04-16 | End: 2017-04-17

## 2017-04-16 RX ORDER — HYDRALAZINE HYDROCHLORIDE 25 MG/1
25 TABLET, FILM COATED ORAL EVERY 8 HOURS PRN
Status: DISCONTINUED | OUTPATIENT
Start: 2017-04-16 | End: 2017-04-16

## 2017-04-16 RX ORDER — HYDROCHLOROTHIAZIDE 12.5 MG/1
12.5 TABLET ORAL DAILY
Status: DISCONTINUED | OUTPATIENT
Start: 2017-04-16 | End: 2017-04-24 | Stop reason: HOSPADM

## 2017-04-16 RX ORDER — HYDROCODONE BITARTRATE AND ACETAMINOPHEN 500; 5 MG/1; MG/1
TABLET ORAL
Status: DISCONTINUED | OUTPATIENT
Start: 2017-04-16 | End: 2017-04-22

## 2017-04-16 RX ORDER — LOSARTAN POTASSIUM 50 MG/1
100 TABLET ORAL DAILY
Status: DISCONTINUED | OUTPATIENT
Start: 2017-04-16 | End: 2017-04-21

## 2017-04-16 RX ORDER — GADOBUTROL 604.72 MG/ML
6 INJECTION INTRAVENOUS
Status: COMPLETED | OUTPATIENT
Start: 2017-04-16 | End: 2017-04-16

## 2017-04-16 RX ADMIN — CEFAZOLIN 6 G: 1 INJECTION, POWDER, FOR SOLUTION INTRAMUSCULAR; INTRAVENOUS; PARENTERAL at 10:04

## 2017-04-16 RX ADMIN — Medication 3 ML: at 02:04

## 2017-04-16 RX ADMIN — GADOBUTROL 6 ML: 604.72 INJECTION INTRAVENOUS at 11:04

## 2017-04-16 RX ADMIN — DONEPEZIL HYDROCHLORIDE 10 MG: 10 TABLET, FILM COATED ORAL at 09:04

## 2017-04-16 RX ADMIN — DULOXETINE 30 MG: 30 CAPSULE, DELAYED RELEASE ORAL at 09:04

## 2017-04-16 RX ADMIN — TRAMADOL HYDROCHLORIDE 50 MG: 50 TABLET, COATED ORAL at 04:04

## 2017-04-16 RX ADMIN — HYDROCHLOROTHIAZIDE 12.5 MG: 12.5 TABLET ORAL at 01:04

## 2017-04-16 RX ADMIN — CALCIUM CARBONATE-CHOLECALCIFEROL TAB 250 MG-125 UNIT 1 TABLET: 250-125 TAB at 09:04

## 2017-04-16 RX ADMIN — DICLOFENAC: 10 GEL TOPICAL at 09:04

## 2017-04-16 RX ADMIN — LOSARTAN POTASSIUM 100 MG: 50 TABLET, FILM COATED ORAL at 01:04

## 2017-04-16 RX ADMIN — FUROSEMIDE 40 MG: 10 INJECTION, SOLUTION INTRAMUSCULAR; INTRAVENOUS at 04:04

## 2017-04-16 RX ADMIN — CLONIDINE HYDROCHLORIDE 0.1 MG: 0.1 TABLET ORAL at 06:04

## 2017-04-16 RX ADMIN — MICONAZOLE NITRATE: 20 OINTMENT TOPICAL at 09:04

## 2017-04-16 RX ADMIN — Medication 3 ML: at 06:04

## 2017-04-16 RX ADMIN — PREGABALIN 75 MG: 75 CAPSULE ORAL at 09:04

## 2017-04-16 RX ADMIN — ISOSORBIDE MONONITRATE 30 MG: 30 TABLET, EXTENDED RELEASE ORAL at 04:04

## 2017-04-16 RX ADMIN — ACETAMINOPHEN 650 MG: 325 TABLET ORAL at 04:04

## 2017-04-16 RX ADMIN — LIDOCAINE 1 PATCH: 50 PATCH TOPICAL at 06:04

## 2017-04-16 RX ADMIN — PANTOPRAZOLE SODIUM 40 MG: 40 TABLET, DELAYED RELEASE ORAL at 09:04

## 2017-04-16 RX ADMIN — HYDRALAZINE HYDROCHLORIDE 25 MG: 25 TABLET, FILM COATED ORAL at 09:04

## 2017-04-16 RX ADMIN — ASPIRIN 81 MG CHEWABLE TABLET 81 MG: 81 TABLET CHEWABLE at 10:04

## 2017-04-16 RX ADMIN — HYDRALAZINE HYDROCHLORIDE 25 MG: 25 TABLET, FILM COATED ORAL at 01:04

## 2017-04-16 RX ADMIN — TRAMADOL HYDROCHLORIDE 50 MG: 50 TABLET, COATED ORAL at 01:04

## 2017-04-16 NOTE — ASSESSMENT & PLAN NOTE
- chronic immunosuppression likely contributed to pt's rapid decline  - will hold steroids until blood cultures clear, restart 10mg prednisone when appropriate

## 2017-04-16 NOTE — SUBJECTIVE & OBJECTIVE
Interval History:   NAEON. Remained afebrile. Leukocytosis trending downward. Repeat blood cultures positive GPC resembling staph. Blood cultures from 4/13 MSSA. Will discontinue vancomycin and started cefazolin. Blood cultures repeated today. No acute complaints at this time.    Review of Systems   Constitutional: Negative for chills and fever.   HENT: Negative for congestion.    Eyes: Negative for pain.   Respiratory: Negative for cough and shortness of breath.    Cardiovascular: Negative for chest pain and leg swelling.   Gastrointestinal: Negative for abdominal pain, diarrhea, nausea and vomiting.   Genitourinary: Negative for dysuria.   Musculoskeletal: Positive for arthralgias.   Skin: Positive for wound.   Neurological: Negative for headaches.     Objective:     Vital Signs (Most Recent):  Temp: 98.4 °F (36.9 °C) (04/16/17 1442)  Pulse: 71 (04/16/17 1442)  Resp: 20 (04/16/17 1442)  BP: (!) 190/80 (04/16/17 1446)  SpO2: 96 % (04/16/17 1442) Vital Signs (24h Range):  Temp:  [97.5 °F (36.4 °C)-99.1 °F (37.3 °C)] 98.4 °F (36.9 °C)  Pulse:  [64-82] 71  Resp:  [18-20] 20  SpO2:  [91 %-97 %] 96 %  BP: (121-211)/(60-82) 190/80     Weight: 57.3 kg (126 lb 4.8 oz)  Body mass index is 27.33 kg/(m^2).    Estimated Creatinine Clearance: 44 mL/min (based on Cr of 0.8).    Physical Exam   Constitutional: She appears well-developed and well-nourished. No distress.   HENT:   Head: Normocephalic.   Eyes: Pupils are equal, round, and reactive to light.   Cardiovascular: Normal rate.  Exam reveals no gallop and no friction rub.    No murmur heard.  Distant heart sounds   Pulmonary/Chest: Effort normal. No respiratory distress. She has no wheezes. She has no rales.   Abdominal: Soft. She exhibits no distension.   Musculoskeletal: Normal range of motion. She exhibits no edema or deformity.   Neurological: She is alert.   Skin: Skin is warm and dry. She is not diaphoretic.   Skin tear of right lower leg. Minimal drainage seen. Skin  tears of BUE, no thrombophlebitis seen.    Psychiatric: She has a normal mood and affect.   Nursing note and vitals reviewed.      Significant Labs: All pertinent labs within the past 24 hours have been reviewed.    Significant Imaging: I have reviewed all pertinent imaging results/findings within the past 24 hours.

## 2017-04-16 NOTE — PROGRESS NOTES
Pt hypertensive >200, restarted home BP regimen.  Received losartan 100mg, hydralazine 25mg, and HCTZ 12.5 around 2pm with no effect.  Will restart home isosorbide mononitrate 30mg now, pt takes nightly at home.  Will also give 40mg IV lasix x1 as pt has significant peripheral edema and is currently receiving 1 unit pRBC.  If no response will consider prn clonidine which she has had in the past with good response.    Shawna Porter, PGY3  [phone]

## 2017-04-16 NOTE — PLAN OF CARE
Problem: Infection, Risk/Actual (Adult)  Goal: Identify Related Risk Factors and Signs and Symptoms  Related risk factors and signs and symptoms are identified upon initiation of Human Response Clinical Practice Guideline (CPG)   Outcome: Ongoing (interventions implemented as appropriate)  Plan of care reviewed with patient and son. AAO to self only. VSS, afebrile. No acute events overnight. Medicated prn for leg pain and headache. Multiple skin tears on upper and lower extremities. Wound care is following the patient. Blood cultures positive for staph. Incontinent x 2. Yet to have a BM. Skin care provided. Turned q 2. Son at the bedside. Call light in reach. Will continue to monitor.

## 2017-04-16 NOTE — ASSESSMENT & PLAN NOTE
- s/p 1u pRBCs on admission given Hx of CAD and troponinemia (likely demand ischemia)  - Hb has trended down since going to ICU, received 6L IVF over 24 hour period, likely dilutional   - no signs of repeat bleeding  - will transfuse 1 unit pRBC

## 2017-04-16 NOTE — PROGRESS NOTES
[facility]  Infectious Disease  Progress Note    Patient Name: Danyelle Jacobs  MRN: [id number]  Admission Date: 4/6/2017  Length of Stay: 10 days  Attending Physician: Yen Ledezma MD  Primary Care Provider: Bianca Dykes MD    Isolation Status: No active isolations  Assessment/Plan:      Bacteremia due to Staphylococcus aureus  87 y/o female with hx of HTN, HLD, CAD, and RA presented to ED 4/6 for GI bleeding, now presents with Staph aureus bacteremia 4/13. Patient has a significant history of 'bleeding angiodysplastic lesion found on EGD with hemostasis achieved' performed in 01/2017. Patient also underwent colonoscopy at the same time which showed 'prominent vessels in the cecum but no definite AVM; raised bluish lesion likely a hematoma in the rectum.'  Pt spiked fever of 103.5 and leukocytosis trended upward to 45.79 on 4/13. Blood cultures were drawn, positive for MSSA. Pt on vancomycin.      -Afebrile today, leukocytosis trending downward to 16.27 from 31.20  -Discontinued vancomycin, started cefazolin continuous infusion as blood cultures 4/13 are MSSA. Blood cultures 4/15 positive GPC resembling staph.   -2D-echo pending  -Recommend wound care consult to evaluate wound on right lat shin skin tear   -Pt complaining of right hip pain. MRI lower extremity of right ordered by primary team-will follow  -Blood cultures repeated. Will follow.    Discussed with ID staff. Will follow with you.       Thank you for your consult. I will follow-up with patient. Please contact us if you have any additional questions.    Bailey Sierra PA-C  Infectious Disease  [facility]  [phone]    Subjective:     Principal Problem:Severe sepsis    HPI: 87 y/o female with hx of HTN, HLD, CAD, and RA presented to ED 4/6 for GI bleeding. Patient has a significant history of 'bleeding angiodysplastic lesion found on EGD with hemostasis achieved' performed in 01/2017. Patient also underwent  colonoscopy at the same time which showed 'prominent vessels in the cecum but no definite AVM; raised bluish lesion likely a hematoma in the rectum.' While in the ICU she was started on protonix and was conservatively managed from a GI perspective. She had recent admission 3/24 -4/1 for respiratory failure (presumed to be 2/2 PNA, treated with abx). On 4/13 pt was found to be diaphoretic, hypotensive and tachycardic. Pt also spiked fever of 103.5 and leukocytosis trended upward to 45.79. Pt was placed on vancomycin and zosyn. Today, pt remained afebrile, and WBC down to 31.20. Blood cultures were drawn 4/13 which are positive for Staph aureus. Pt denies having any CP, abdominal pain, n/v/d. Per family, pt does have skin tears and wound of extremities. CXR and UA negative. 2D-echo ordered today. Otherwise no complaints other than myalgia 2/2 from rheumatoid arthritis.   Interval History:   NAEON. Remained afebrile. Leukocytosis trending downward. Repeat blood cultures positive GPC resembling staph. Blood cultures from 4/13 MSSA. Will discontinue vancomycin and started cefazolin. Blood cultures repeated today. No acute complaints at this time.    Review of Systems   Constitutional: Negative for chills and fever.   HENT: Negative for congestion.    Eyes: Negative for pain.   Respiratory: Negative for cough and shortness of breath.    Cardiovascular: Negative for chest pain and leg swelling.   Gastrointestinal: Negative for abdominal pain, diarrhea, nausea and vomiting.   Genitourinary: Negative for dysuria.   Musculoskeletal: Positive for arthralgias.   Skin: Positive for wound.   Neurological: Negative for headaches.     Objective:     Vital Signs (Most Recent):  Temp: 98.4 °F (36.9 °C) (04/16/17 1442)  Pulse: 71 (04/16/17 1442)  Resp: 20 (04/16/17 1442)  BP: (!) 190/80 (04/16/17 1446)  SpO2: 96 % (04/16/17 1442) Vital Signs (24h Range):  Temp:  [97.5 °F (36.4 °C)-99.1 °F (37.3 °C)] 98.4 °F (36.9 °C)  Pulse:  [64-82]  71  Resp:  [18-20] 20  SpO2:  [91 %-97 %] 96 %  BP: (121-211)/(60-82) 190/80     Weight: 57.3 kg (126 lb 4.8 oz)  Body mass index is 27.33 kg/(m^2).    Estimated Creatinine Clearance: 44 mL/min (based on Cr of 0.8).    Physical Exam   Constitutional: She appears well-developed and well-nourished. No distress.   HENT:   Head: Normocephalic.   Eyes: Pupils are equal, round, and reactive to light.   Cardiovascular: Normal rate.  Exam reveals no gallop and no friction rub.    No murmur heard.  Distant heart sounds   Pulmonary/Chest: Effort normal. No respiratory distress. She has no wheezes. She has no rales.   Abdominal: Soft. She exhibits no distension.   Musculoskeletal: Normal range of motion. She exhibits no edema or deformity.   Neurological: She is alert.   Skin: Skin is warm and dry. She is not diaphoretic.   Skin tear of right lower leg. Minimal drainage seen. Skin tears of BUE, no thrombophlebitis seen.    Psychiatric: She has a normal mood and affect.   Nursing note and vitals reviewed.      Significant Labs: All pertinent labs within the past 24 hours have been reviewed.    Significant Imaging: I have reviewed all pertinent imaging results/findings within the past 24 hours.

## 2017-04-16 NOTE — PROGRESS NOTES
BP still elevated despite home losartan, hctz, isosorbide, and added lasix 40mg IV x1.  Will add clonidine 0.1 prn, which can be titrated up as needed.      Shawna Porter, PGY3  [phone]

## 2017-04-16 NOTE — NURSING
Results received from microbiology. Aerobic cultures positive with gram + cocci and clusters resembling staph.

## 2017-04-16 NOTE — NURSING
Results received from microbiology. Anerobic cultures positive with gram + cocci and clusters resembling staph.

## 2017-04-16 NOTE — ASSESSMENT & PLAN NOTE
- staph aureus growing in blood cultures, sensitivities pending, repeats also positive  - source likely skin given multiple areas of tear and breakdown  - UCx negative but urine collected after broad spectrum antibiotics so not helpful  - CXR does not show any new consolidation although aspiration still possible  - continue vanc  - ID following, recommend imaging of right hip given persistent pain  - echo ordered

## 2017-04-16 NOTE — ASSESSMENT & PLAN NOTE
89 y/o female with hx of HTN, HLD, CAD, and RA presented to ED 4/6 for GI bleeding, now presents with Staph aureus bacteremia 4/13. Patient has a significant history of 'bleeding angiodysplastic lesion found on EGD with hemostasis achieved' performed in 01/2017. Patient also underwent colonoscopy at the same time which showed 'prominent vessels in the cecum but no definite AVM; raised bluish lesion likely a hematoma in the rectum.'  Pt spiked fever of 103.5 and leukocytosis trended upward to 45.79 on 4/13. Blood cultures were drawn, positive for MSSA. Pt on vancomycin.      -Afebrile today, leukocytosis trending downward to 16.27 from 31.20  -Discontinued vancomycin, started cefazolin continuous infusion as blood cultures 4/13 are MSSA. Blood cultures 4/15 positive GPC resembling staph.   -2D-echo pending  -Recommend wound care consult to evaluate wound on right lat shin skin tear   -Pt complaining of right hip pain. MRI lower extremity of right ordered by primary team-will follow  -Blood cultures repeated. Will follow.    Discussed with ID staff. Will follow with you.

## 2017-04-16 NOTE — PROGRESS NOTES
[facility] Hospital Medicine  Progress Note    Patient Name: Danyelle Jacobs  MRN: [id number]  Patient Class: IP- Inpatient   Admission Date: 2017  Length of Stay: 10 days  Attending Physician: Yen Ledezma MD  Primary Care Provider: Bianca Dykes MD    Hospital Medicine Team: [facility] O Shawna Porter MD    Subjective:     Principal Problem:Severe sepsis    HPI:  Ms. Jacobs is a 88 F with a history of HTN, HLD, CAD, PAD, HFpEF, dementia, and rheumatoid arthritis who presented to the ED   with GI bleeding. Her son reported that her symptoms began the evening before with bilious vomiting and diarrhea. Since then, she had had 6 episodes of emesis and 5 episodes of diarrhea which started out as black and became bright red by her last BM which was at 6 AM. She currently denies abdominal pain, SOB, or nausea; however, her son reports that she will usually deny symptoms even if they are present.     She had a recent admission from 3/24- for respiratory failure with hypoxia (presumed 2/2 to PNA, resolved with abx) and RLE pain. Also admitted - and transfused at that time. Patient has a significant history of 'bleeding angiodysplastic lesion found on EGD with hemostasis achieved' performed in 2017. Patient also underwent colonoscopy at the same time which showed 'prominent vessels in the cecum but no definite AVM; raised bluish lesion likely a hematoma in the rectum.' Son reports that she has declined both mentally and physically in the last few months and has become progressively weak, less responsive, and has had hallucinations including speaking to  relatives. Also, she has developed urinary and fecal incontinence recently.     While in the ICU she was started on protonix and was conservatively managed from a GI perspective. She was able to be stepped down to hospital medicine on  and has had a relatively benign course since that time. A rapid was called tonight for  Ms. Jacobs because she was found to be more altered, diaphoretic, pale and hypotensive in the 70s-80s with baseline in the 110s-120s. Patient was bolused IV fluids with improvement in both blood pressure and mental status but still remained somewhat lethargic. Patient had also spiking fevers up to 103.5. Given patient's rapid deterioration and borderline blood pressures despite fluids, she will be accepted in to the ICU for closer monitoring.       Hospital Course:  04/06/17: Upon initial evaluation patient's initial oxygen requirements appeared to be increasing. ABG was performed which showed adequate oxygenation. Hct was also obtained with the arterial stick which showed a Hct of 19. Based on this result and her presenting H/H of 8.3/26.2, 1 U PRBCs was ordered and received 2L of NS. GI and ICU were consulted for further management. Repeat hgb and hemodynamics are stable. Protonix drip started. CT abdomen W/O significant for diverticulosis, adnexal cystic structure, L1 compression fx, and lung opacities (atelectasis v infection/inflammation).    4/7: Hgb stable, no further episodes of vomiting or diarrhea. Pt c/o RLE pain. Hypertensive to 200 systolic, restarting home BP meds stepwise. GI recs supportive care. PARVEEN improved.     4/8: stepped down to hospital medicine. Hemodynamically stable.     4/9: No acute events, PTOT recommending SNF.     4/10: Son requested pelvic ultrasound for patient since she is currently admitted. H/H stable. Awaiting eval from [facility].    4/11: 4/9: no acute events overnight. Started nystatin ointment yesterday for candidal rash under breasts.S/p U/S revealing cystic mass in L adnexa. Awaiting placement.     4/12 - NAEON. Awaiting placement.    4/13 - A rapid was called because she was found to be more altered, diaphoretic, pale and hypotensive in the 70s-80s with baseline in the 110s-120s. Patient was bolused IV fluids with improvement in both blood pressure and mental status but  still remained somewhat lethargic. Patient had also spiking fevers up to 103.5. Given patient's rapid deterioration and borderline blood pressures despite fluids, she was sent to the ICU for closer monitoring.   Patient was fluid resuscitated with over 30 cc/kg and had full response in blood pressure and tachycardia. IV abx of vanc and zosyn were started. Patient's mental status improved to baseline by the morning. Discussed plan with patient's family.     4/15 - Pt transferred back to hospital medicine. SHAWNA.  She remained afebrile with all other VSS.  BCx positive for GPC, awaiting identification of organism.  Continue vanc/zosyn.    4/16 - NAEON.  Pt remained AFVSS.  BCx growing staph aureus, sensitivities pending, repeat BCx positive.  ID following, recommend right hip imaging.           Review of Systems   Constitutional: Negative for chills, diaphoresis and fever.   Eyes: Negative for visual disturbance.   Respiratory: Negative for cough and shortness of breath.    Cardiovascular: Negative for chest pain and leg swelling.   Gastrointestinal: Negative for abdominal pain, nausea and vomiting.   Musculoskeletal: Positive for arthralgias and back pain.   Skin: Positive for wound. Negative for color change.     Objective:     Vital Signs (Most Recent):  Temp: 98.6 °F (37 °C) (04/16/17 0400)  Pulse: 67 (04/16/17 0400)  Resp: 18 (04/16/17 0400)  BP: (!) 143/60 (04/16/17 0400)  SpO2: (!) 92 % (04/16/17 0400) Vital Signs (24h Range):  Temp:  [98.3 °F (36.8 °C)-99.5 °F (37.5 °C)] 98.6 °F (37 °C)  Pulse:  [67-98] 67  Resp:  [18-20] 18  SpO2:  [91 %-97 %] 92 %  BP: (121-143)/(56-76) 143/60     Weight: 57.3 kg (126 lb 4.8 oz)  Body mass index is 27.33 kg/(m^2).    Intake/Output Summary (Last 24 hours) at 04/16/17 0751  Last data filed at 04/16/17 0300   Gross per 24 hour   Intake              650 ml   Output                0 ml   Net              650 ml      Physical Exam   Constitutional: She appears well-developed. No  distress.   Cardiovascular: Normal rate, regular rhythm and normal heart sounds.    Pulmonary/Chest: Effort normal and breath sounds normal. No respiratory distress.   Abdominal: Soft. Bowel sounds are normal. She exhibits no distension. There is no tenderness.   Neurological: She is alert.   Skin: Skin is warm and dry. She is not diaphoretic. There is erythema.   Vitals reviewed.         Assessment/Plan:      * Severe sepsis  - staph aureus growing in blood cultures, sensitivities pending, repeats also positive  - source likely skin given multiple areas of tear and breakdown  - UCx negative but urine collected after broad spectrum antibiotics so not helpful  - CXR does not show any new consolidation although aspiration still possible  - continue vanc  - ID following, recommend imaging of right hip given persistent pain  - echo ordered      CAD (coronary artery disease)  - ASA 81 mg PO daily  - Plavix 75 mg PO daily - held  - no signs of further bleeding, Hb stable.  -given hx of multiple GI bleeds, will defer restarting plavix as an OP    Benign hypertension  - will hold home meds given new sepsis  - pt currently normotensive       Current chronic use of systemic steroids  - chronic immunosuppression likely contributed to pt's rapid decline  - will hold steroids until blood cultures clear, restart 10mg prednisone when appropriate      Anemia associated with acute blood loss  - s/p 1u pRBCs on admission given Hx of CAD and troponinemia (likely demand ischemia)  - Hb has trended down since going to ICU, received 6L IVF over 24 hour period, likely dilutional   - no signs of repeat bleeding  - will transfuse 1 unit pRBC      Angiodysplasia of stomach: see EGD 1/17  - EGD1/2017: 'bleeding angiodysplastic lesion found with hemostasis achieved'  - Colonoscopy: 'prominent vessels in the cecum but no definite AVM; raised bluish lesion likely a hematoma in the rectum.'  - Multiple episodes of yellow/green emesis w/ five  episodes of diarrhea, dark red --> bright red  - Patient currently HDS, H/H stable at this point  - GI consulted, recommend OP follow up    Compression fracture of first lumbar vertebra  -likely 2/2 to osteoporosis, seen on CT abdo  -possible nerve impingement contributing to R hip pain  -continue lyrica and PTOT  -Volteran gel      Sciatica associated with disorder of lumbar spine  - PTOT  - increased dose of lyrica from 50 to 75mg        Adnexal mass  L cystic adnexal mass seen on CTabdo in setting of reported complete hysterectomy  -Pelvic U/S revealing 2cm cystic structure, can be monitored with yearly U/S      Candidal intertrigo  -under breasts  -nystatin ointment      Bacteremia due to Staphylococcus aureus  - see sepsis      VTE Risk Mitigation         Ordered     Medium Risk of VTE  Once      04/06/17 1535     Place ISHMAEL hose  Until discontinued      04/06/17 1535     Reason for No Pharmacological VTE Prophylaxis  Once      04/06/17 1535     Place sequential compression device  Until discontinued      04/06/17 1504          Shawna Porter MD  Department of Hospital Medicine   [facility]

## 2017-04-16 NOTE — NURSING
Medicine O akil, pt hypertensive - /80 manual. HR, temp, resp, and O2 on room air all WNL. Pt does not report headache or blurred vision. Pt does complain of pain 8 in right hip with radiation to right thigh. Will administer pain medicine and monitor pt closely. Awaiting call back from team.

## 2017-04-16 NOTE — SUBJECTIVE & OBJECTIVE
Review of Systems   Constitutional: Negative for chills, diaphoresis and fever.   Eyes: Negative for visual disturbance.   Respiratory: Negative for cough and shortness of breath.    Cardiovascular: Negative for chest pain and leg swelling.   Gastrointestinal: Negative for abdominal pain, nausea and vomiting.   Musculoskeletal: Positive for arthralgias and back pain.   Skin: Positive for wound. Negative for color change.     Objective:     Vital Signs (Most Recent):  Temp: 98.6 °F (37 °C) (04/16/17 0400)  Pulse: 67 (04/16/17 0400)  Resp: 18 (04/16/17 0400)  BP: (!) 143/60 (04/16/17 0400)  SpO2: (!) 92 % (04/16/17 0400) Vital Signs (24h Range):  Temp:  [98.3 °F (36.8 °C)-99.5 °F (37.5 °C)] 98.6 °F (37 °C)  Pulse:  [67-98] 67  Resp:  [18-20] 18  SpO2:  [91 %-97 %] 92 %  BP: (121-143)/(56-76) 143/60     Weight: 57.3 kg (126 lb 4.8 oz)  Body mass index is 27.33 kg/(m^2).    Intake/Output Summary (Last 24 hours) at 04/16/17 0751  Last data filed at 04/16/17 0300   Gross per 24 hour   Intake              650 ml   Output                0 ml   Net              650 ml      Physical Exam   Constitutional: She appears well-developed. No distress.   Cardiovascular: Normal rate, regular rhythm and normal heart sounds.    Pulmonary/Chest: Effort normal and breath sounds normal. No respiratory distress.   Abdominal: Soft. Bowel sounds are normal. She exhibits no distension. There is no tenderness.   Neurological: She is alert.   Skin: Skin is warm and dry. She is not diaphoretic. There is erythema.   Vitals reviewed.

## 2017-04-16 NOTE — NURSING
Dr. Porter notified of pt's hypertension and her asymptomatic status. Home meds restarted and PRN hydralazine ordered. Hydroclorothiazide, hydralazine, and lorsartan administered. Will recheck pt's BP in one hour.

## 2017-04-16 NOTE — NURSING
Dr Proter notified of Pt's hypertension - 220/90 manual. HR, Temp, Resp, O2 sat WNL. Pt w/o symptom. No complaints of headache, pain, blurred vision or distress. Lungs auscultated - all fields clear. Edema to upper extremities, especially hands noted. RUE weeping fluid. MD notified of swelling. Awaiting orders to lower BP. Pt alert and pleasant. No change in baseline orientation. Family at bedside.

## 2017-04-17 ENCOUNTER — OUTPATIENT CASE MANAGEMENT (OUTPATIENT)
Dept: ADMINISTRATIVE | Facility: OTHER | Age: 82
End: 2017-04-17

## 2017-04-17 PROBLEM — K68.12 PSOAS ABSCESS: Status: ACTIVE | Noted: 2017-04-17

## 2017-04-17 PROBLEM — A41.01 MSSA (METHICILLIN SUSCEPTIBLE STAPHYLOCOCCUS AUREUS) SEPTICEMIA: Status: ACTIVE | Noted: 2017-04-15

## 2017-04-17 LAB
ALBUMIN SERPL BCP-MCNC: 1.5 G/DL
ALP SERPL-CCNC: 149 U/L
ALT SERPL W/O P-5'-P-CCNC: 27 U/L
ANION GAP SERPL CALC-SCNC: 7 MMOL/L
ANISOCYTOSIS BLD QL SMEAR: SLIGHT
AORTIC VALVE REGURGITATION: ABNORMAL
AST SERPL-CCNC: 33 U/L
BASOPHILS # BLD AUTO: 0.03 K/UL
BASOPHILS NFR BLD: 0.3 %
BILIRUB SERPL-MCNC: 0.4 MG/DL
BUN SERPL-MCNC: 12 MG/DL
CALCIUM SERPL-MCNC: 7.7 MG/DL
CHLORIDE SERPL-SCNC: 103 MMOL/L
CO2 SERPL-SCNC: 24 MMOL/L
CREAT SERPL-MCNC: 0.7 MG/DL
DIFFERENTIAL METHOD: ABNORMAL
EOSINOPHIL # BLD AUTO: 0.2 K/UL
EOSINOPHIL NFR BLD: 1.6 %
ERYTHROCYTE [DISTWIDTH] IN BLOOD BY AUTOMATED COUNT: 18.3 %
EST. GFR  (AFRICAN AMERICAN): >60 ML/MIN/1.73 M^2
EST. GFR  (NON AFRICAN AMERICAN): >60 ML/MIN/1.73 M^2
ESTIMATED PA SYSTOLIC PRESSURE: 70.09
GLUCOSE SERPL-MCNC: 90 MG/DL
HCT VFR BLD AUTO: 26.2 %
HGB BLD-MCNC: 8.6 G/DL
LYMPHOCYTES # BLD AUTO: 0.4 K/UL
LYMPHOCYTES NFR BLD: 3.7 %
MAGNESIUM SERPL-MCNC: 1.5 MG/DL
MCH RBC QN AUTO: 27.3 PG
MCHC RBC AUTO-ENTMCNC: 32.8 %
MCV RBC AUTO: 83 FL
MITRAL VALVE REGURGITATION: ABNORMAL
MONOCYTES # BLD AUTO: 0.7 K/UL
MONOCYTES NFR BLD: 7.4 %
NEUTROPHILS # BLD AUTO: 8.2 K/UL
NEUTROPHILS NFR BLD: 87 %
PHOSPHATE SERPL-MCNC: 3.1 MG/DL
PLATELET # BLD AUTO: 167 K/UL
PLATELET BLD QL SMEAR: ABNORMAL
PMV BLD AUTO: 11.6 FL
POTASSIUM SERPL-SCNC: 3.6 MMOL/L
PROT SERPL-MCNC: 4.6 G/DL
RBC # BLD AUTO: 3.15 M/UL
RETIRED EF AND QEF - SEE NOTES: 65 (ref 55–65)
SODIUM SERPL-SCNC: 134 MMOL/L
TRICUSPID VALVE REGURGITATION: ABNORMAL
WBC # BLD AUTO: 9.54 K/UL

## 2017-04-17 PROCEDURE — 36415 COLL VENOUS BLD VENIPUNCTURE: CPT

## 2017-04-17 PROCEDURE — 85025 COMPLETE CBC W/AUTO DIFF WBC: CPT

## 2017-04-17 PROCEDURE — 80053 COMPREHEN METABOLIC PANEL: CPT

## 2017-04-17 PROCEDURE — 25000003 PHARM REV CODE 250: Performed by: INTERNAL MEDICINE

## 2017-04-17 PROCEDURE — 99233 SBSQ HOSP IP/OBS HIGH 50: CPT | Mod: ,,, | Performed by: PHYSICIAN ASSISTANT

## 2017-04-17 PROCEDURE — 99223 1ST HOSP IP/OBS HIGH 75: CPT | Mod: GC,,, | Performed by: SURGERY

## 2017-04-17 PROCEDURE — 97164 PT RE-EVAL EST PLAN CARE: CPT

## 2017-04-17 PROCEDURE — 93306 TTE W/DOPPLER COMPLETE: CPT | Mod: 26,,, | Performed by: INTERNAL MEDICINE

## 2017-04-17 PROCEDURE — 97168 OT RE-EVAL EST PLAN CARE: CPT

## 2017-04-17 PROCEDURE — 99233 SBSQ HOSP IP/OBS HIGH 50: CPT | Mod: 25,GC,, | Performed by: HOSPITALIST

## 2017-04-17 PROCEDURE — 25000003 PHARM REV CODE 250: Performed by: STUDENT IN AN ORGANIZED HEALTH CARE EDUCATION/TRAINING PROGRAM

## 2017-04-17 PROCEDURE — 11000001 HC ACUTE MED/SURG PRIVATE ROOM

## 2017-04-17 PROCEDURE — 25000003 PHARM REV CODE 250: Performed by: HOSPITALIST

## 2017-04-17 PROCEDURE — 63600175 PHARM REV CODE 636 W HCPCS: Performed by: PHYSICIAN ASSISTANT

## 2017-04-17 PROCEDURE — 63600175 PHARM REV CODE 636 W HCPCS: Performed by: STUDENT IN AN ORGANIZED HEALTH CARE EDUCATION/TRAINING PROGRAM

## 2017-04-17 PROCEDURE — 93306 TTE W/DOPPLER COMPLETE: CPT

## 2017-04-17 PROCEDURE — 87040 BLOOD CULTURE FOR BACTERIA: CPT | Mod: 59

## 2017-04-17 PROCEDURE — 25000003 PHARM REV CODE 250: Performed by: PHYSICIAN ASSISTANT

## 2017-04-17 PROCEDURE — 84100 ASSAY OF PHOSPHORUS: CPT

## 2017-04-17 PROCEDURE — 83735 ASSAY OF MAGNESIUM: CPT

## 2017-04-17 RX ORDER — HYDRALAZINE HYDROCHLORIDE 25 MG/1
50 TABLET, FILM COATED ORAL EVERY 8 HOURS
Status: DISCONTINUED | OUTPATIENT
Start: 2017-04-17 | End: 2017-04-18

## 2017-04-17 RX ORDER — MAGNESIUM SULFATE HEPTAHYDRATE 40 MG/ML
2 INJECTION, SOLUTION INTRAVENOUS ONCE
Status: COMPLETED | OUTPATIENT
Start: 2017-04-17 | End: 2017-04-17

## 2017-04-17 RX ADMIN — CEFAZOLIN 6 G: 1 INJECTION, POWDER, FOR SOLUTION INTRAMUSCULAR; INTRAVENOUS; PARENTERAL at 09:04

## 2017-04-17 RX ADMIN — PREGABALIN 75 MG: 75 CAPSULE ORAL at 09:04

## 2017-04-17 RX ADMIN — CLONIDINE HYDROCHLORIDE 0.1 MG: 0.1 TABLET ORAL at 06:04

## 2017-04-17 RX ADMIN — Medication 3 ML: at 09:04

## 2017-04-17 RX ADMIN — MAGNESIUM SULFATE IN WATER 2 G: 40 INJECTION, SOLUTION INTRAVENOUS at 09:04

## 2017-04-17 RX ADMIN — TRAMADOL HYDROCHLORIDE 50 MG: 50 TABLET, COATED ORAL at 05:04

## 2017-04-17 RX ADMIN — LOSARTAN POTASSIUM 100 MG: 50 TABLET, FILM COATED ORAL at 09:04

## 2017-04-17 RX ADMIN — Medication 3 ML: at 01:04

## 2017-04-17 RX ADMIN — PANTOPRAZOLE SODIUM 40 MG: 40 TABLET, DELAYED RELEASE ORAL at 09:04

## 2017-04-17 RX ADMIN — HYDRALAZINE HYDROCHLORIDE 25 MG: 25 TABLET, FILM COATED ORAL at 05:04

## 2017-04-17 RX ADMIN — HYDRALAZINE HYDROCHLORIDE 50 MG: 25 TABLET ORAL at 01:04

## 2017-04-17 RX ADMIN — ASPIRIN 81 MG CHEWABLE TABLET 81 MG: 81 TABLET CHEWABLE at 09:04

## 2017-04-17 RX ADMIN — HYDROCHLOROTHIAZIDE 12.5 MG: 12.5 TABLET ORAL at 09:04

## 2017-04-17 RX ADMIN — DULOXETINE 30 MG: 30 CAPSULE, DELAYED RELEASE ORAL at 09:04

## 2017-04-17 RX ADMIN — CALCIUM CARBONATE-CHOLECALCIFEROL TAB 250 MG-125 UNIT 1 TABLET: 250-125 TAB at 09:04

## 2017-04-17 RX ADMIN — TRAMADOL HYDROCHLORIDE 50 MG: 50 TABLET, COATED ORAL at 11:04

## 2017-04-17 RX ADMIN — DONEPEZIL HYDROCHLORIDE 10 MG: 10 TABLET, FILM COATED ORAL at 09:04

## 2017-04-17 RX ADMIN — DICLOFENAC: 10 GEL TOPICAL at 09:04

## 2017-04-17 RX ADMIN — HYDRALAZINE HYDROCHLORIDE 50 MG: 25 TABLET ORAL at 09:04

## 2017-04-17 RX ADMIN — ISOSORBIDE MONONITRATE 30 MG: 30 TABLET, EXTENDED RELEASE ORAL at 09:04

## 2017-04-17 RX ADMIN — MICONAZOLE NITRATE: 20 OINTMENT TOPICAL at 09:04

## 2017-04-17 RX ADMIN — ACETAMINOPHEN 650 MG: 325 TABLET ORAL at 12:04

## 2017-04-17 RX ADMIN — LIDOCAINE 1 PATCH: 50 PATCH TOPICAL at 04:04

## 2017-04-17 RX ADMIN — CLONIDINE HYDROCHLORIDE 0.1 MG: 0.1 TABLET ORAL at 04:04

## 2017-04-17 NOTE — CONSULTS
Wound care resconsulted for skin breakdown to extremities.  Hx of multiple medical problems.    Partial thickness skin tears noted to both arms and legs, skin frail, tears easily. The skin on the legs is slightly swollen, hairless and shiny.   Areas cleaned with normal saline, petroleum dressing placed over the wound beds and wrapped in kerlix- secured with tape or stockinette.  The RLL shin wound has loose slough noted to wound bed- wound edges karel.  Cleaned with normal saline, medi-honey applied, covered with petroleum dressing and secured with kerlix/tape.  Nursing to continue care daily.  Son at bedside- verbalized understanding of wound care. Consent was received from the patient/son for the wound photograph.   Left forearm  1cm L x 2 cm W    Right wrist  2 cm L x 7 cm W    Right lower leg shin and lateral leg  5.5 cm L x 1cm W x 0.2 cm D and 3.5 x 1 x 0.1    Left shin  1cm Lx 0.5 cm W

## 2017-04-17 NOTE — ASSESSMENT & PLAN NOTE
- Source of right hip/RL abdominal pain  - Patient without septic hip at this point  - Consulted General Surgery who indicated that it would be likely IR who needs to drain this.   - WCC down and VSS  - Will place formal consult to IR once General Surgery has evaluated.   - Continue ancef

## 2017-04-17 NOTE — PROGRESS NOTES
[facility]  Infectious Disease  Progress Note    Patient Name: Danyelle Jacobs  MRN: [id number]  Admission Date: 4/6/2017  Length of Stay: 11 days  Attending Physician: Yen Ledezma MD  Primary Care Provider: Bianca Dykes MD    Isolation Status: No active isolations  Assessment/Plan:      MSSA (methicillin susceptible Staphylococcus aureus) septicemia  88 year old female with hx of HTN, HLD, CAD, and RA admitted 4/6 for GI bleed. Patient's course complicated by fevers up to 103.5, leukocytosis of 45.79 on 4/13 and patient was found to have MSSA bacteremia and right psoas abscess. She is now on IV Cefazolin. Her leukocytosis has resolved. Spiked fevers up to 101.1 today.  Persistently positive blood cultures likely secondary to need for adequate source control ( + 4/13, 4/15, 4/16).    - Will repeat blood cultures today x 2  - Continue IV Cefazolin  - Recommend percutaneous drainage of psoas abscess. IR consult placed.  - 2D-echo ordered. Will follow.   - Continue wound care per wound care recommendations.  - Discussed with staff. ID will follow with you.       Please call for any questions. Thank you.  Shanika Lugo PA-C  Phone: [phone]  Pager: [phone]    Subjective:     Principal Problem:Severe sepsis    HPI: 87 y/o female with hx of HTN, HLD, CAD, and RA presented to ED 4/6 for GI bleeding. Patient has a significant history of 'bleeding angiodysplastic lesion found on EGD with hemostasis achieved' performed in 01/2017. Patient also underwent colonoscopy at the same time which showed 'prominent vessels in the cecum but no definite AVM; raised bluish lesion likely a hematoma in the rectum.' While in the ICU she was started on protonix and was conservatively managed from a GI perspective. She had recent admission 3/24 -4/1 for respiratory failure (presumed to be 2/2 PNA, treated with abx). On 4/13 pt was found to be diaphoretic, hypotensive and tachycardic. Pt also spiked fever of 103.5 and  leukocytosis trended upward to 45.79. Pt was placed on vancomycin and zosyn. Today, pt remained afebrile, and WBC down to 31.20. Blood cultures were drawn 4/13 which are positive for Staph aureus. Pt denies having any CP, abdominal pain, n/v/d. Per family, pt does have skin tears and wound of extremities. CXR and UA negative. 2D-echo ordered today. Otherwise no complaints other than myalgia 2/2 from rheumatoid arthritis.   Interval History:   NAEON. Remains afebrile after being stepped down to the floor. Leukocytosis now resolved. Repeat blood cultures positive. Will repeat daily until clear. Tolerating IV Ancef without adverse reactions. No acute complaints at this time. Scheduled for IR drainage of psoas abscess found on MRI.     Review of Systems   Constitutional: Negative for chills, diaphoresis and fever.   HENT: Negative for congestion.    Respiratory: Negative for cough and shortness of breath.    Cardiovascular: Negative for chest pain and leg swelling.   Gastrointestinal: Negative for abdominal pain, diarrhea, nausea and vomiting.   Genitourinary: Negative for difficulty urinating and dysuria.   Musculoskeletal: Positive for arthralgias.        + right hip pain   Skin: Positive for wound (to upper and lower extremities).   Neurological: Negative for numbness and headaches.   Hematological: Bruises/bleeds easily.     Objective:     Vital Signs (Most Recent):  Temp: 97.7 °F (36.5 °C) (04/17/17 0741)  Pulse: 67 (04/17/17 0741)  Resp: 18 (04/17/17 0741)  BP: (!) 160/72 (04/17/17 0741)  SpO2: 99 % (04/17/17 0741) Vital Signs (24h Range):  Temp:  [97.4 °F (36.3 °C)-99.9 °F (37.7 °C)] 97.7 °F (36.5 °C)  Pulse:  [64-88] 67  Resp:  [16-20] 18  SpO2:  [94 %-99 %] 99 %  BP: (129-228)/(61-95) 160/72     Weight: 57.3 kg (126 lb 4.8 oz)  Body mass index is 27.33 kg/(m^2).    Estimated Creatinine Clearance: 50.3 mL/min (based on Cr of 0.7).    Physical Exam   Constitutional: She appears well-developed and well-nourished.  No distress.   HENT:   Head: Normocephalic.   Eyes: Pupils are equal, round, and reactive to light.   Cardiovascular: Normal rate and intact distal pulses.  Exam reveals no gallop and no friction rub.    No murmur heard.  Distant heart sounds   Pulmonary/Chest: Effort normal. No respiratory distress. She has no wheezes. She has no rales.   Abdominal: Soft. She exhibits no distension. There is no tenderness.   Musculoskeletal: She exhibits edema (RLE). She exhibits no deformity.   Neurological: She is alert. No cranial nerve deficit.   Skin: Skin is warm and dry. She is not diaphoretic.   right lower extremity wound with minimal drainage. No malodor. + surrounding warmth, edema and erythema.  Ecchymosis to BUEs.    Psychiatric: She has a normal mood and affect.   Nursing note and vitals reviewed.      Significant Labs: All pertinent labs within the past 24 hours have been reviewed.    Significant Imaging: I have reviewed all pertinent imaging results/findings within the past 24 hours.

## 2017-04-17 NOTE — PLAN OF CARE
SW received a phone call from Rajeev in admissions from [facility]. They can accept pt when medically ready.

## 2017-04-17 NOTE — ASSESSMENT & PLAN NOTE
- restarted patients' home regiment yesterday. Increased patient's hydralazine today from 25 mg to 50 mg TID.   - Will monitor progress.

## 2017-04-17 NOTE — PLAN OF CARE
CM met with patient sitting up in bed eating, and son concerning discharge planning.  Son states MRI showed abscess in right hip and she can't go anywhere until it's cleared up per MD.  Left number for son to call if needs anything, will continue to follow.

## 2017-04-17 NOTE — NURSING
Notified Dr Porter of BP maintained >200. Administered catapres 0.1 as ordered. Pt asymptomatic. Night nurse up to date on today's events.

## 2017-04-17 NOTE — CONSULTS
[facility]  General Surgery  Consult Note    Inpatient consult to General Surgery  Consult performed by: SEEMA BARROS  Consult ordered by: MARCIO HOUSE        Subjective:     Chief Complaint/Reason for Admission: admitted for GI bleed, consult for psoas muscle abscess    History of Present Illness: 88 year old female with multiple medical problems who was initially admitted with a GI bleed, who has a prolonged hospital course with an icu admission, but eventually was found to gram positive bacteremia, and complained of right hip pain, an MRI was done and revealed a 2 cm psoas muscle abscess, General surgery is consulted for further recommendations.     Patient is Kazakh speaking, history if obtained from son present at [facility] and review of chart    No current facility-administered medications on file prior to encounter.      Current Outpatient Prescriptions on File Prior to Encounter   Medication Sig    alprazolam (XANAX) 0.5 MG tablet Take 1 tablet (0.5 mg total) by mouth daily as needed.    aspirin 81 mg Tab Take 81 mg by mouth. 1 Tablet Oral Every day    atorvastatin (LIPITOR) 40 MG tablet Take 1 tablet (40 mg total) by mouth every evening.     mg capsule TAKE 1 CAPSULE BY MOUTH TWICE DAILY    donepezil (ARICEPT) 10 MG tablet Take 1 tablet (10 mg total) by mouth once daily.    duloxetine (CYMBALTA) 30 MG capsule Take 1 capsule (30 mg total) by mouth once daily.    ferrous sulfate 325 (65 FE) MG EC tablet Take 1 tablet (325 mg total) by mouth 2 (two) times daily.    hydrALAZINE (APRESOLINE) 25 MG tablet Take 1 tablet (25 mg total) by mouth every 8 (eight) hours.    hydrochlorothiazide (MICROZIDE) 12.5 mg capsule Take 1 capsule (12.5 mg total) by mouth once daily.    hydrocodone-acetaminophen 5-325mg (NORCO) 5-325 mg per tablet Take 1 tablet by mouth every 6 (six) hours as needed for Pain.    isosorbide mononitrate (IMDUR) 30 MG 24 hr tablet Take 1 tablet (30 mg  total) by mouth every evening.    losartan (COZAAR) 100 MG tablet Take 1 tablet (100 mg total) by mouth once daily.    pantoprazole (PROTONIX) 40 MG tablet Take 1 tablet (40 mg total) by mouth once daily.    predniSONE (DELTASONE) 10 MG tablet Take 1 tablet (10 mg total) by mouth once daily.    pregabalin (LYRICA) 50 MG capsule Take 1 capsule (50 mg total) by mouth 2 (two) times daily.    tolterodine (DETROL LA) 2 MG Cp24 Take 1 capsule (2 mg total) by mouth once daily.       Review of patient's allergies indicates:   Allergen Reactions    Norvasc  [amlodipine]      Other reaction(s): Edema       Past Medical History:   Diagnosis Date    Adverse effect of oral bisphosphonates     Anemia     Angiodysplasia of stomach: see EGD 1/17 2/13/2017    Anxiety     Aortic atherosclerosis     Benign hypertension 10/5/2015    Carcinoma, renal cell     Carotid arterial disease 1/20/2014    Cerebellar stroke, acute     Chronic kidney disease (CKD), stage I 2/18/2016    Coronary artery disease     Current chronic use of systemic steroids 10/4/2016    Degenerative disc disease     Depression     Diverticulosis     Former smoker 6/13/2016    Gastroesophageal reflux disease without esophagitis 10/5/2015    GERD (gastroesophageal reflux disease)     Goiter, nontoxic, multinodular     IBS (irritable bowel syndrome)     Left ventricular diastolic dysfunction with preserved systolic function 10/5/2015    Lung nodule     right    MGUS (monoclonal gammopathy of unknown significance) 6/13/2016    Myasthenia gravis, adult form     Osteoporosis 8/10/2015    Osteoporosis, unspecified     PAOD (peripheral arterial occlusive disease)     RA (rheumatoid arthritis)     Smoker 1/20/2014    Stroke     Ulcer     Vitamin D deficiency disease 1/20/2014     Past Surgical History:   Procedure Laterality Date    CATARACT EXTRACTION W/  INTRAOCULAR LENS IMPLANT Bilateral     CHOLECYSTECTOMY      COLONOSCOPY N/A  1/24/2017    Procedure: COLONOSCOPY;  Surgeon: Kristofer Worthington MD;  Location: [facility] ([address] [place] [address]);  Service: Endoscopy;  Laterality: N/A;  for chronic anemia ; needs to be off plavix x 5 days.     Off Plavix since recent hospitalization 1/19/17.    Speaks Faroese/needs .    HYSTERECTOMY      KIDNEY SURGERY  2003    NEPHRECTOMY       Family History     Problem Relation (Age of Onset)    Alzheimer's disease Sister    Heart disease Father    Stroke Mother        Social History Main Topics    Smoking status: Former Smoker     Quit date: 1/19/2015    Smokeless tobacco: Never Used    Alcohol use No    Drug use: No    Sexual activity: No     Review of Systems   Unable to perform ROS: Other     Objective:     Vital Signs (Most Recent):  Temp: 97.7 °F (36.5 °C) (04/17/17 0741)  Pulse: 67 (04/17/17 0741)  Resp: 18 (04/17/17 0741)  BP: (!) 160/72 (04/17/17 0741)  SpO2: 99 % (04/17/17 0741) Vital Signs (24h Range):  Temp:  [97.4 °F (36.3 °C)-99.9 °F (37.7 °C)] 97.7 °F (36.5 °C)  Pulse:  [64-88] 67  Resp:  [16-20] 18  SpO2:  [94 %-99 %] 99 %  BP: (129-228)/(61-95) 160/72     Weight: 57.3 kg (126 lb 4.8 oz)  Body mass index is 27.33 kg/(m^2).      Intake/Output Summary (Last 24 hours) at 04/17/17 1132  Last data filed at 04/17/17 0400   Gross per 24 hour   Intake              840 ml   Output                0 ml   Net              840 ml       Physical Exam   Constitutional: She is oriented to person, place, and time. She appears well-developed and well-nourished. No distress.   HENT:   Head: Normocephalic and atraumatic.   Eyes: Conjunctivae are normal. Right eye exhibits no discharge. Left eye exhibits no discharge. No scleral icterus.   Neck: Normal range of motion. Neck supple. No JVD present. No tracheal deviation present.   Cardiovascular: Normal rate.    No murmur heard.  Pulmonary/Chest: Effort normal. No respiratory distress.   Abdominal: Soft. She exhibits no distension. There is no tenderness.    Neurological: She is alert and oriented to person, place, and time.   Skin: Skin is dry. No rash noted. She is not diaphoretic.   Multiple wounds on bilateral lower extremity       Significant Labs:  CBC:   Recent Labs  Lab 04/17/17  0419   WBC 9.54   RBC 3.15*   HGB 8.6*   HCT 26.2*      MCV 83   MCH 27.3   MCHC 32.8     CMP:   Recent Labs  Lab 04/17/17 0419   GLU 90   CALCIUM 7.7*   ALBUMIN 1.5*   PROT 4.6*   *   K 3.6   CO2 24      BUN 12   CREATININE 0.7   ALKPHOS 149*   ALT 27   AST 33   BILITOT 0.4       Significant Diagnostics:  I have reviewed all pertinent imaging results/findings within the past 24 hours.    Assessment/Plan:     Active Diagnoses:    Diagnosis Date Noted POA    PRINCIPAL PROBLEM:  Severe sepsis [A41.9, R65.20] 04/14/2017 Yes    Psoas abscess, right [K68.12] 04/17/2017 Yes    MSSA (methicillin susceptible Staphylococcus aureus) septicemia [A41.01] 04/15/2017 No    Candidal intertrigo [B37.2] 04/11/2017 No    Adnexal mass [N94.9] 04/10/2017 Yes    Sciatica associated with disorder of lumbar spine [M53.9] 04/09/2017 Yes    Diverticulosis large intestine w/o perforation or abscess w/bleeding [K57.31] 04/09/2017 Yes    Compression fracture of first lumbar vertebra [S32.010A] 04/07/2017 Yes    Angiodysplasia of stomach: see EGD 1/17 [K31.819] 02/13/2017 Yes    Anemia associated with acute blood loss [D62] 01/24/2017 Yes    Current chronic use of systemic steroids [Z79.52] 10/04/2016 Not Applicable    Chronic kidney disease (CKD), stage I [N18.1] 02/18/2016 Yes    Rheumatoid arthritis involving multiple sites with positive rheumatoid factor [M05.79] 02/18/2016 Yes    Gastroesophageal reflux disease without esophagitis [K21.9] 10/05/2015 Yes    Benign hypertension [I10] 10/05/2015 Yes    CAD (coronary artery disease) [I25.10] 08/13/2012 Yes    Mixed hyperlipidemia [E78.2] 08/13/2012 Yes    MG (myasthenia gravis) [G70.00] 08/13/2012 Yes      Problems Resolved  During this Admission:    Diagnosis Date Noted Date Resolved POA    Altered mental status [R41.82] 04/14/2017 04/15/2017 No    Gastrointestinal hemorrhage [K92.2] 04/06/2017 04/14/2017 Yes    PARVEEN (acute kidney injury) [N17.9] 03/24/2017 04/15/2017 Yes     PSOAS muscle abscess  Would recommend IR drainage of this abscess, surgery to drain the abscess would be a very morbid procedure considering her multiple comorbidity.   Agree with ECHO considering this abscess and gram positive bacteremia  Continue antibiotics per ID    Thank you for your consult. I will follow-up with patient. Please contact us if you have any additional questions.    Yemi Randolph MD  General Surgery  [facility]    Attending attestation:  Agree with above.  Consider IR drainage if possible.  If not, continue broad spectrum antibiotics per ID.    perfecto manuel MD

## 2017-04-17 NOTE — ASSESSMENT & PLAN NOTE
88 year old female with hx of HTN, HLD, CAD, and RA admitted 4/6 for GI bleed. Patient's course complicated by fevers up to 103.5, leukocytosis of 45.79 on 4/13 and patient was found to have MSSA bacteremia and right psoas abscess. She is now on IV Cefazolin. Her leukocytosis has resolved. Spiked fevers up to 101.1 today.  Persistently positive blood cultures likely secondary to need for adequate source control ( + 4/13, 4/15, 4/16).    - Will repeat blood cultures today x 2  - Continue IV Cefazolin  - Recommend percutaneous drainage of psoas abscess. IR consult placed.  - 2D-echo ordered. Will follow.   - Continue wound care per wound care recommendations.  - Discussed with staff. ID will follow with you.

## 2017-04-17 NOTE — ASSESSMENT & PLAN NOTE
- s/p pRBC's on this admission secondary to GIB from angiodysplagia.   - no signs of repeat bleeding  - Will transfuse if patient's hb < 7

## 2017-04-17 NOTE — PLAN OF CARE
Problem: Patient Care Overview  Goal: Plan of Care Review  Outcome: Ongoing (interventions implemented as appropriate)  Abscess to right psoas found by MRI. Pt will be NPO at midnight for drain of abscess tomorrow in IR. Cardiac echo completed. Blood Cultures from 4/16 positive for gram positive coccyx clusters resembling staph. Wound care consulted, dressings changed, orders written. Dressings to be changed every other day. BP improved. Less than 170 systolic this shift. Fever of 101 this afternoon. Tylenol administered. Pt currently afrebrile. AO to self. Family at bedside. Will continue to monitor.

## 2017-04-17 NOTE — PLAN OF CARE
Problem: Patient Care Overview  Goal: Plan of Care Review  Outcome: Ongoing (interventions implemented as appropriate)  Plan of care discussed with patient AAOX4. Vital signs stable b/p better 130 around midnight SBP. On RA  Afebrile t max 99.9 axillary free from fall son at bedside turn q 2hrs using wedge patient very incont of bowel and bladder. All dsg to skin tear dry intact dsg change is daily barrier cream applied to bottom. Will cont to monitor.

## 2017-04-17 NOTE — PROGRESS NOTES
[facility] Hospital Medicine  Progress Note    Patient Name: Danyelle Jacobs  MRN: [id number]  Patient Class: IP- Inpatient   Admission Date: 2017  Length of Stay: 11 days  Attending Physician: Yen Ledezma MD  Primary Care Provider: Bianca Dykes MD    [facility] Medicine Team: [facility]OSP MED O Brittny Schneider MD    Subjective:     Principal Problem:Severe sepsis    HPI:  Ms. Jacobs is a 88 F with a history of HTN, HLD, CAD, PAD, HFpEF, dementia, and rheumatoid arthritis who presented to the ED   with GI bleeding. Her son reported that her symptoms began the evening before with bilious vomiting and diarrhea. Since then, she had had 6 episodes of emesis and 5 episodes of diarrhea which started out as black and became bright red by her last BM which was at 6 AM. She currently denies abdominal pain, SOB, or nausea; however, her son reports that she will usually deny symptoms even if they are present.     She had a recent admission from 3/24- for respiratory failure with hypoxia (presumed 2/2 to PNA, resolved with abx) and RLE pain. Also admitted - and transfused at that time. Patient has a significant history of 'bleeding angiodysplastic lesion found on EGD with hemostasis achieved' performed in 2017. Patient also underwent colonoscopy at the same time which showed 'prominent vessels in the cecum but no definite AVM; raised bluish lesion likely a hematoma in the rectum.' Son reports that she has declined both mentally and physically in the last few months and has become progressively weak, less responsive, and has had hallucinations including speaking to  relatives. Also, she has developed urinary and fecal incontinence recently.     While in the ICU she was started on protonix and was conservatively managed from a GI perspective. She was able to be stepped down to hospital medicine on  and has had a relatively benign course since that time. A rapid was called tonight  for Ms. Jacobs because she was found to be more altered, diaphoretic, pale and hypotensive in the 70s-80s with baseline in the 110s-120s. Patient was bolused IV fluids with improvement in both blood pressure and mental status but still remained somewhat lethargic. Patient had also spiking fevers up to 103.5. Given patient's rapid deterioration and borderline blood pressures despite fluids, she will be accepted in to the ICU for closer monitoring.       Hospital Course:  04/06/17: Upon initial evaluation patient's initial oxygen requirements appeared to be increasing. ABG was performed which showed adequate oxygenation. Hct was also obtained with the arterial stick which showed a Hct of 19. Based on this result and her presenting H/H of 8.3/26.2, 1 U PRBCs was ordered and received 2L of NS. GI and ICU were consulted for further management. Repeat hgb and hemodynamics are stable. Protonix drip started. CT abdomen W/O significant for diverticulosis, adnexal cystic structure, L1 compression fx, and lung opacities (atelectasis v infection/inflammation).    4/7: Hgb stable, no further episodes of vomiting or diarrhea. Pt c/o RLE pain. Hypertensive to 200 systolic, restarting home BP meds stepwise. GI recs supportive care. PARVEEN improved.     4/8: stepped down to hospital medicine. Hemodynamically stable.     4/9: No acute events, PTOT recommending SNF.      4/10: Son requested pelvic ultrasound for patient since she is currently admitted. H/H stable. Awaiting eval from [facility].    4/11: 4/9: no acute events overnight. Started nystatin ointment yesterday for candidal rash under breasts.S/p U/S revealing cystic mass in L adnexa. Awaiting placement.     4/12 - NAEON. Awaiting placement.    4/13 - A rapid was called because she was found to be more altered, diaphoretic, pale and hypotensive in the 70s-80s with baseline in the 110s-120s. Patient was bolused IV fluids with improvement in both blood pressure and mental status  but still remained somewhat lethargic. Patient had also spiking fevers up to 103.5. Given patient's rapid deterioration and borderline blood pressures despite fluids, she was sent to the ICU for closer monitoring.   Patient was fluid resuscitated with over 30 cc/kg and had full response in blood pressure and tachycardia. IV abx of vanc and zosyn were started. Patient's mental status improved to baseline by the morning. Discussed plan with patient's family.     4/15 - Pt transferred back to hospital medicine. SHAWNA.  She remained afebrile with all other VSS.  BCx positive for GPC, awaiting identification of organism.  Continue vanc/zosyn.    4/16 - NAEON.  Pt remained AFVSS.  BCx growing staph aureus, sensitivities pending, repeat BCx positive.  ID following, recommend right hip imaging.    04/17 - Right hip abscess elucidated on MRI. Talked to General Surgery and indicated that IR was the most likely route. Her WCC has decreased and there is no concern for a septic hip at this point.  Increased the patient's hydralazine dose as well.            Review of Systems   Constitutional: Negative for chills, diaphoresis and fever.   Eyes: Negative for visual disturbance.   Respiratory: Negative for cough and shortness of breath.    Cardiovascular: Negative for chest pain and leg swelling.   Gastrointestinal: Negative for abdominal pain, nausea and vomiting.   Musculoskeletal: Positive for arthralgias and back pain.   Skin: Positive for wound. Negative for color change.     Objective:     Vital Signs (Most Recent):  Temp: 97.7 °F (36.5 °C) (04/17/17 0741)  Pulse: 67 (04/17/17 0741)  Resp: 18 (04/17/17 0741)  BP: (!) 160/72 (04/17/17 0741)  SpO2: 99 % (04/17/17 0741) Vital Signs (24h Range):  Temp:  [97.4 °F (36.3 °C)-99.9 °F (37.7 °C)] 97.7 °F (36.5 °C)  Pulse:  [64-88] 67  Resp:  [16-20] 18  SpO2:  [94 %-99 %] 99 %  BP: (129-228)/(61-95) 160/72     Weight: 57.3 kg (126 lb 4.8 oz)  Body mass index is 27.33  kg/(m^2).    Intake/Output Summary (Last 24 hours) at 04/17/17 1020  Last data filed at 04/17/17 0400   Gross per 24 hour   Intake              840 ml   Output                0 ml   Net              840 ml      Physical Exam   Constitutional: She appears well-developed. No distress.   Cardiovascular: Normal rate, regular rhythm and normal heart sounds.    Pulmonary/Chest: Effort normal and breath sounds normal. No respiratory distress.   Abdominal: Soft. Bowel sounds are normal. She exhibits no distension. There is no tenderness.   Neurological: She is alert.   Skin: Skin is warm and dry. She is not diaphoretic. There is erythema.   Vitals reviewed.         Assessment/Plan:      * Severe sepsis  - MSSA bacteremia. Now with Rt. Psoas abscess.  - source likely skin given multiple areas of tear and breakdown  - UCx negative but urine collected after broad spectrum antibiotics so not helpful  - CXR does not show any new consolidation although aspiration still possible  - continue ancef.   - 2D echo pending.       MG (myasthenia gravis)        CAD (coronary artery disease)  - ASA 81 mg PO daily  - Plavix 75 mg PO daily - held  - no signs of further bleeding, Hb stable.  -given hx of multiple GI bleeds, will defer restarting plavix as an OP    Benign hypertension  - restarted patients' home regiment yesterday. Increased patient's hydralazine today from 25 mg to 50 mg TID.   - Will monitor progress.        Rheumatoid arthritis involving multiple sites with positive rheumatoid factor        Current chronic use of systemic steroids  - chronic immunosuppression likely contributed to pt's rapid decline  - will hold steroids until blood cultures clear, restart 10mg prednisone when appropriate      Anemia associated with acute blood loss  - s/p pRBC's on this admission secondary to GIB from angiodysplagia.   - no signs of repeat bleeding  - Will transfuse if patient's hb < 7      Angiodysplasia of stomach: see EGD 1/17  -  EGD1/2017: 'bleeding angiodysplastic lesion found with hemostasis achieved'  - Colonoscopy: 'prominent vessels in the cecum but no definite AVM; raised bluish lesion likely a hematoma in the rectum.'  - Multiple episodes of yellow/green emesis w/ five episodes of diarrhea, dark red --> bright red  - Patient currently HDS, H/H stable at this point  - GI consulted, recommend OP follow up    Compression fracture of first lumbar vertebra  -likely 2/2 to osteoporosis, seen on CT abdo  -possible nerve impingement contributing to R hip pain  -continue lyrica and PTOT  -Volteran gel      Candidal intertrigo  -under breasts  -nystatin ointment      MSSA (methicillin susceptible Staphylococcus aureus) septicemia  - see sepsis      Psoas abscess, right  - Source of right hip/RL abdominal pain  - Patient without septic hip at this point  - Consulted General Surgery who indicated that it would be likely IR who needs to drain this.   - WCC down and VSS  - Will place formal consult to IR once General Surgery has evaluated.   - Continue ancef      VTE Risk Mitigation         Ordered     Medium Risk of VTE  Once      04/06/17 1535     Place ISHMAEL hose  Until discontinued      04/06/17 1535     Reason for No Pharmacological VTE Prophylaxis  Once      04/06/17 1535     Place sequential compression device  Until discontinued      04/06/17 1504          Brittny Schneider MD  Department of Hospital Medicine   [facility]

## 2017-04-17 NOTE — PROGRESS NOTES
An O[facility] welcome Packet and consent form was created and mailed to the patient on 4/17/2017        Thank you,  Deya Wisdom, SSC

## 2017-04-17 NOTE — PT/OT/SLP RE-EVAL
Occupational Therapy  Re-evaluation    Danyelle Jacobs   MRN: [id number]   Admitting Diagnosis: Severe sepsis    OT Date of Treatment: 04/17/17   OT Start Time: 0911  OT Stop Time: 0933  OT Total Time (min): 22 min    Billable Minutes:  Re-eval 22    Diagnosis: Severe sepsis       Past Medical History:   Diagnosis Date    Adverse effect of oral bisphosphonates     Anemia     Angiodysplasia of stomach: see EGD 1/17 2/13/2017    Anxiety     Aortic atherosclerosis     Benign hypertension 10/5/2015    Carcinoma, renal cell     Carotid arterial disease 1/20/2014    Cerebellar stroke, acute     Chronic kidney disease (CKD), stage I 2/18/2016    Coronary artery disease     Current chronic use of systemic steroids 10/4/2016    Degenerative disc disease     Depression     Diverticulosis     Former smoker 6/13/2016    Gastroesophageal reflux disease without esophagitis 10/5/2015    GERD (gastroesophageal reflux disease)     Goiter, nontoxic, multinodular     IBS (irritable bowel syndrome)     Left ventricular diastolic dysfunction with preserved systolic function 10/5/2015    Lung nodule     right    MGUS (monoclonal gammopathy of unknown significance) 6/13/2016    Myasthenia gravis, adult form     Osteoporosis 8/10/2015    Osteoporosis, unspecified     PAOD (peripheral arterial occlusive disease)     RA (rheumatoid arthritis)     Smoker 1/20/2014    Stroke     Ulcer     Vitamin D deficiency disease 1/20/2014      Past Surgical History:   Procedure Laterality Date    CATARACT EXTRACTION W/  INTRAOCULAR LENS IMPLANT Bilateral     CHOLECYSTECTOMY      COLONOSCOPY N/A 1/24/2017    Procedure: COLONOSCOPY;  Surgeon: Kristofer Worthington MD;  Location: [address]);  Service: Endoscopy;  Laterality: N/A;  for chronic anemia ; needs to be off plavix x 5 days.     Off Plavix since recent hospitalization 1/19/17.    Speaks Martiniquais/needs .    HYSTERECTOMY      KIDNEY SURGERY  2003     NEPHRECTOMY         Referring physician: Charlotte  Date referred to OT: 4/14/17    General Precautions: Standard, fall  Orthopedic Precautions:    Braces:            Patient History:  Living Environment  Lives With: child(sherry), adult  Living Arrangements: house  Home Layout: Able to live on 1st floor  Living Environment Comment: Pt lives with son and his wife who give assistance with ADLs as she has been declining since recent D/C from [facility] on 4/01.  Equipment Currently Used at Home: wheelchair, walker, rolling, shower chair    Prior level of function:   Bed Mobility/Transfers: needs assist  Grooming: needs assist  Bathing: needs assist  Upper Body Dressing: needs assist  Lower Body Dressing: needs assist  Toileting: needs assist  Home Management Skills: unable to perform  Homemaking Responsibilities: No     Dominant hand: right    Subjective:  Communicated with nurse prior to session.  Family present to provide translation.  Chief Complaint: weakness, pain  Patient/Family stated goals: To get stronger.    Pain Rating:  (Did not provide a number but moaning throughout session)  Location - Side: Right  Location - Orientation: generalized  Location: hip  Pain Addressed: Distraction, Cessation of Activity, Reposition, Nurse notified       Objective:  Patient found with: peripheral IV    Cognitive Exam:  Oriented to: Person, Place and Situation  Follows Commands/attention: Follows two-step commands  Communication: Khmer-speaking  Memory:  No Deficits noted  Safety awareness/insight to disability: impaired  Coping skills/emotional control: Moans continuously from R hip pain    Visual/perceptual:  Intact    Physical Exam:  Postural examination/scapula alignment: Rounded shoulder and Head forward  Skin integrity: Bruising of BLEs  Edema: None noted     Sensation:   Intact    Upper Extremity Range of Motion:  Right Upper Extremity: WFL  Left Upper Extremity: WFL    Upper Extremity Strength:  Right Upper Extremity: WFL  Left  "Upper Extremity: WFL   Strength: WFL    Fine motor coordination:   Intact    Gross motor coordination: WFL    Functional Mobility:  Bed Mobility:  Rolling/Turning to Left: Total assistance  Supine to Sit: Total Assistance  Sit to Supine: Total Assistance    Transfers:  Sit <> Stand Assistance: Activity did not occur    Functional Ambulation: Did not occur    Activities of Daily Living:     Grooming Position: other (Supine)  Grooming Level of Assistance: Supervision         Balance:   Static Sit: 0: Needs MAXIMAL assist to maintain sitting without back support  Dynamic Sit: POOR: N/A    Therapeutic Activities and Exercises:  Bed mobility; UE ROM/MMT    AM-PAC 6 CLICK ADL  How much help from another person does this patient currently need?  1 = Unable, Total/Dependent Assistance  2 = A lot, Maximum/Moderate Assistance  3 = A little, Minimum/Contact Guard/Supervision  4 = None, Modified [place]/Independent    Putting on and taking off regular lower body clothing? : 1  Bathing (including washing, rinsing, drying)?: 2  Toileting, which includes using toilet, bedpan, or urinal? : 1  Putting on and taking off regular upper body clothing?: 2  Taking care of personal grooming such as brushing teeth?: 3  Eating meals?: 3  Total Score: 12    AM-PAC Raw Score CMS "G-Code Modifier Level of Impairment Assistance   6 % Total / Unable   7 - 9 CM 80 - 100% Maximal Assist   10 - 14 CL 60 - 80% Moderate Assist   15 - 19 CK 40 - 60% Moderate Assist   20 - 22 CJ 20 - 40% Minimal Assist   23 CI 1-20% SBA / CGA   24 CH 0% Independent/ Mod I       Patient left supine with all lines intact and family present    Assessment:  Danyelle Jacobs is a 88 y.o. female with a medical diagnosis of Severe sepsis and presents for a re-eval after being transferred to  on 4/14. Pt was sat EOB with Total A x 3 minutes but unable to sit upright with (L) lateral lean due to pain in (R) hip. Family is present bedside and state that they would " like her to go to a SNF rehab post-acute D/C. Pt continues to exhibit decreased (I) in multiple ADL areas, functional mobility & t/fs as well as decreased overall strength, ROM endurance and balance and would benefit from IP OT to address these deficits and to facilitate improving (I) with daily tasks.    Rehab identified problem list/impairments: Rehab identified problem list/impairments: weakness, gait instability, decreased upper extremity function, decreased ROM, impaired endurance, impaired balance, decreased lower extremity function, impaired coordination, decreased safety awareness, impaired self care skills, impaired cognition, pain, impaired functional mobilty, decreased coordination    Rehab potential is fair.    Activity tolerance: Fair    Discharge recommendations: Discharge Facility/Level Of Care Needs: nursing facility, skilled     Barriers to discharge: Barriers to Discharge: Inaccessible home environment, Decreased caregiver support, Other (Comment) (pain)    Equipment recommendations: other (see comments) (TBD at next level of care)     GOALS:   Occupational Therapy Goals        Problem: Occupational Therapy Goal    Goal Priority Disciplines Outcome Interventions   Occupational Therapy Goal     OT, PT/OT Ongoing (interventions implemented as appropriate)    Description:  Goals to be met by: 4/22/17     Patient will increase functional independence with ADLs by performing:    UE Dressing with Moderate Assistance.  LE Dressing with Moderate Assistance.  Grooming while EOB with Set-up Assistance and Stand-by Assistance.  Toileting from toilet with Moderate Assistance for hygiene and clothing management.   Sitting at edge of bed x15 minutes with Stand-by Assistance.  Rolling to Bilateral with Minimal Assistance.   Supine to sit with Minimal Assistance.  Stand pivot transfers with Moderate Assistance.  Toilet transfer to toilet with Moderate Assistance.             Problem: Occupational Therapy Goal     Goal Priority Disciplines Outcome Interventions   Occupational Therapy Goal     OT, PT/OT               PLAN:  Patient to be seen 4 x/week to address the above listed problems via self-care/home management, therapeutic activities, therapeutic exercises, neuromuscular re-education  Plan of Care expires: 05/17/17  Plan of Care reviewed with: patient, nicolas STARKS MARIA T Villegas  04/17/2017

## 2017-04-17 NOTE — LETTER
April 17, 2017    Danyelle Jacobs  [address]  [place] [address]             Outpatient Case Management  [address] [facility] [address] Dear Danyelle Jacobs:    We understand that receiving many services from different doctors and healthcare providers is overwhelming. There are appointments to make, transportation to arrange, dietary instructions to understand, and new medications to obtain.    This is where [facility] can help.     You are eligible to receive Outpatient Case Management services when you have healthcare needs that require the coordination of many providers, treatments, and services. You also qualify if you need assistance with a new treatment plan.     There is no charge for this support. You may have been referred to this program from your doctor(s), hospital staff member(s), or insurance company but you always have a choice to participate or not participate. To participate, you must give us your permission to be enrolled.     When you are enrolled in the [facility] program, the  who is assigned to you is    Jillian Cortez RN  [phone]    Depending on your needs and wishes, your  may speak with you by phone, visit you at your place of living (for example your home, skilled nursing facility, or rehabilitation facility), or meet you at your doctors office.     Your  will tell you why you have been selected to participate in the program and will complete an assessment of your needs. Then a personalized plan of care will be developed with you and or your caregiver.             Here are examples of the services your [facility]  provides.     Coordinate communication among multiple providers.   Arrange for transportation, doctors visits, durable medical equipment, home care services, and special clinics.    Provide coaching on how to manage your health condition.    Answer questions  about your health condition.   Help you understand your doctors treatment plan.    Provide additional instruction about your health condition, treatments, and medications.    Help you obtain information about your insurance coverage.    Advocate for your individual needs.    Request a Licensed Clinical  (LCSW) to visit you if you need their services. LCSWs help with long term planning (discussing placement options, advanced planning directives), financial planning, and assistance (for example rent, utilities, medication funding).     Your  will coordinate their activities with other outpatient services you are receiving. All services provided by [facility]  are coordinated with and communicated to your primary care physician.    Our goal is to help you manage your health condition(s) safely within your living environment, whether that is your home or a medical facility. We want to help you function at the healthiest and highest level possible.     Sincerely,      Taqueria Blank MD  Medical Director    Enclosures:    Frequently Asked Questions  Patient Rights and Responsibilities   Reporting a Grievance or Complaint  Consent/Release of Information  Stamped Addressed Envelope                  Frequently Asked Questions about [facility]    What is [facility]?  Outpatient Case management is not Home healthcare services. [facility]  do not provide hands-on care. [facility]  will work with your doctor to arrange for home health services, if needed. Home health services have a limited duration and there are some restrictions as to who can get these services. There is no prescribed limit to the amount of time you receive [facility] services. [facility]  are not agents of your insurance company. However, [facility]  can  help you obtain information from your insurance company.     Who are the [facility] ?  [facility]  are Registered Nurses and Social Workers. It is important to remember that you and your  are a team that works together with your primary care physician to create your individualized plan of care. The ultimate goal of your care plan is to help you implement your doctors treatment plan and to help you function at the highest level of health possible.     What are my rights as a patient?  It is important for you to know and understand your rights and responsibilities while receiving services from the [facility] program. We have enclosed a complete description of your rights and responsibilities. You can help to make your care more effective when you understand your right and responsibilities.     What is needed to be enrolled in the program?  You are only enrolled in the [facility] Program when you give us your consent to participate. You will find enclosed a consent form. You are receiving this letter because you or your caregivers have given us a verbal consent to enroll you in [facility] Program. We ask that you sign and return the enclosed written consent in the stamped self-addressed envelope.                           Patient Rights and Responsibilities    We consider you a partner in your care. When you are well informed, participate in treatment decisions and communicate openly with your doctor and other healthcare professionals, you help make your care more effective.     While you are in the Outpatient Case Management Program, your rights include the following:     You have a right to be provided services in a non-discriminatory manner in accordance with the provisions of Title VI of the Civil Rights Act of 1964, Section 504 of the Rehabilitation Act of 1973, the Age Discrimination  Act of 1975, the Americans with Disabilities Act as well as any other applicable Federal and State laws and regulations.     You have the right to a reasonable, timely response to your request or need for care, as well as the right to considerate and respectful care including an environment that preserves dignity and contributes to a positive self-image. You are responsible for being considerate and respectful of our staff.     You have a right to information regarding patient rights, advocacy services and complaint mechanisms, and the right to prompt resolution of any complaint. You or a designee has the right to participate in the resolution of ethical issues surrounding your care. You have a right to file a complaint if you feel that your rights have been infringed, without fear or penalty from [facility] or the federal government. You may file a complaint with the Director of Outpatient Case Management by calling [phone]. At any time, you may lodge a grievance with the [facility] and [facility] by calling [phone], or the Joint Commission on Accreditation of Healthcare Organizations at [phone].     You, or someone acting on your behalf, have the right to understandable information on your health status, treatment and progress in order to make decisions. You have the right to know the nature, risks and alternatives to treatment. You have the right to be informed, when appropriate, regarding the outcome of the care that has been provided. You have the right to refuse treatment to the extent permitted by law, and the right to be informed of the alternatives and consequences of refusing treatment.     You, in collaboration with your physician, have the right to make decisions regarding care and the right to participate in the development and implementation of the plan of care and effective pain management. You have the right to know the name and professional status of those  responsible for the delivery of your care and treatment.       You have a right, within legal guidelines, to have a guardian, next-of-kin or legal designee exercises your patient rights when you are unable to do so. You have the right for your wishes regarding end-of-life decisions to be addressed by the healthcare team through advance directives. You have the right to personal privacy and confidentiality and to expect confidentiality of all records and communications pertaining to your care.      You have the right to receive communications about your health information confidentially. You have the right to request restrictions on the uses and disclosures of your health information. You have the right to inspect, copy, request amendments and receive an accounting of to whom we have disclosed your health information.     You have the right to be provided with interpretation services if you do not speak English; to alternative communication techniques if you are hearing or vision impaired; and to have any other resources needed on your behalf to ensure effective communication. These services are provided free of charge.     You have a right to personal safety (free from mental, physical, sexual and verbal abuse, neglect and exploitation). You have the right to access protective and advocacy services.     Advance Directives  A Patient Advocate is available to meet with patients to answer questions regarding advance directives.    Living Will  A document that outlines what medical treatment the patient does or does not want in the event the patient becomes unable to make those decisions at the appropriate time.    Durable Medical Power of   A document by which the patient designates an individual to be responsible for making medical decisions in the event the patient becomes unable to do so.    HIPAA Notice of Privacy Practices  Your medical information is governed by federal privacy laws. HIPAA protects  private medical information and how that information is disclosed. If you have a question regarding the HIPAA Notice of Privacy Practices, or if you believe your privacy rights have been violated, you may call our designated hotline at ([phone].            Quality Improvement  Because we consistently strive to improve the care and service provided to our patients, we welcome your feedback. Your comments are an important part of our quality improvement process, as we like to know what we are doing right and which areas are in need of improvement. Our policy is to listen, be responsive and provide you with an appropriate and timely follow-up to your questions or concerns. Our goal is active patient and family involvement in all aspects of the care process.                                                                                  Reporting a Grievance or Complaint    During your time with the [facility] team you may have a grievance or complaint with our services. Your Patients Bill of Rights gives patients, families, and caregivers the right to express concerns and grievances and the right to expect a reasonable and timely response.     Your presentation of your concerns is not viewed negatively. It is an opportunity for us to improve the quality of our care and services we provide to you.     You may report your concerns directly to your , or you can phone in a complaint to:     Director of Outpatient Case Management  [phone]    You may also send a complaint letter to:    Director of Outpatient Case Management Services  [address] [place] [id number]    Tell us the details of your complaint and provide us with a contact phone number so we can contact you to obtain additional information. We will return a call to you within two business days of our receipt of your complaint, and to request additional information as needed. If you choose to mail a  letter, your complaint may take a few days longer to reach us.     All grievances will be addressed as quickly as possible. A grievance or complaint that involves situations or practices which place patients in immediate danger will be addressed as an urgent matter. We will work to resolve all other complaints within seven days of receipt. By that time, you will receive a phone call with either the resolution of your complaint, or a plan for corrective action. A formal written response will be sent to you within 30 days of receipt of your grievance.     If a resolution cannot be completed within 30 days, a letter will be sent to you or your family member with an estimated time for the final response.    Additionally, all patients have the right to file complaints with external agencies, without exception. Complaints/grievances can be addressed to the following agencies:            Patient Safety or Quality of Care Concerns  Office of Quality Monitoring   The [facility] [place] [address]  [phone] Toll Free    HIPPA Privacy/Security Concerns  Office for Civil Rights Region IV  [place] Department of Health & Human Services  [address] [place] [address], [address]  [place] [address] [phone] Phone  [phone] TDD  [phone] Toll Free    Medicare/Medicaid Billing Concerns  [place] for Medicare & Medicaid Services  Region [address]  [address] [place] [address], [address]  [place] [address] [phone] Phone  [phone] Toll Free    General Concerns  [place] [facility] ([facility])  ([phone]) [phone] Toll Free Complaint Hotline                                                              Consent Form/Release of Information    By signing--     (1) I agree I have read the Outpatient Case Management information provided to me;     (2) I agree to voluntarily participate in the Outpatient Case Management program;     (3) I understand I must consent to participation in  the Outpatient Case Management program during my first interview with my ;    (4) I consent to the discussion and release of my personal health information to my healthcare team (including my personal physician, my medical home care team, any specialty physician(s), and my [facility] team);     (5) I agree my consent is valid for the length of time I am receiving Outpatient Case Management;    (6) I agree to referrals to community resources which my Case Management team recommends for me. I agree to the release of my personal information and personal health information as necessary to referral sources.    ___________________________________________________________________  Patients Printed Name     ___________________________________________________________________  Patients Signature       Date    If patient is in being cared for, please complete this section:     ___________________________________________________________________  Printed Name of Person Caring For Patient   Relationship To Patient    ___________________________________________________________________   Signature of Person Caring For Patient     Date    PLEASE SIGN AND RETURN IN THE ENCLOSED PRE-ADDRESSED ENVELOPE.

## 2017-04-17 NOTE — CONSULTS
D/w Dr. Ledezma, will plan for CT guided abscess drain tomorrow.  Please make NPO after midnight and hold anticoagulation.    Froilan Urbano MD  PGY-V  Interventional Radiology

## 2017-04-17 NOTE — ASSESSMENT & PLAN NOTE
- MSSA bacteremia. Now with Rt. Psoas abscess.  - source likely skin given multiple areas of tear and breakdown  - UCx negative but urine collected after broad spectrum antibiotics so not helpful  - CXR does not show any new consolidation although aspiration still possible  - continue ancef.   - 2D echo pending.

## 2017-04-17 NOTE — SUBJECTIVE & OBJECTIVE
Interval History:   NAEON. Remains afebrile after being stepped down to the floor. Leukocytosis now resolved. Repeat blood cultures positive. Will repeat daily until clear. Tolerating IV Ancef without adverse reactions. No acute complaints at this time. Scheduled for IR drainage of psoas abscess found on MRI.     Review of Systems   Constitutional: Negative for chills, diaphoresis and fever.   HENT: Negative for congestion.    Respiratory: Negative for cough and shortness of breath.    Cardiovascular: Negative for chest pain and leg swelling.   Gastrointestinal: Negative for abdominal pain, diarrhea, nausea and vomiting.   Genitourinary: Negative for difficulty urinating and dysuria.   Musculoskeletal: Positive for arthralgias.        + right hip pain   Skin: Positive for wound (to upper and lower extremities).   Neurological: Negative for numbness and headaches.   Hematological: Bruises/bleeds easily.     Objective:     Vital Signs (Most Recent):  Temp: 97.7 °F (36.5 °C) (04/17/17 0741)  Pulse: 67 (04/17/17 0741)  Resp: 18 (04/17/17 0741)  BP: (!) 160/72 (04/17/17 0741)  SpO2: 99 % (04/17/17 0741) Vital Signs (24h Range):  Temp:  [97.4 °F (36.3 °C)-99.9 °F (37.7 °C)] 97.7 °F (36.5 °C)  Pulse:  [64-88] 67  Resp:  [16-20] 18  SpO2:  [94 %-99 %] 99 %  BP: (129-228)/(61-95) 160/72     Weight: 57.3 kg (126 lb 4.8 oz)  Body mass index is 27.33 kg/(m^2).    Estimated Creatinine Clearance: 50.3 mL/min (based on Cr of 0.7).    Physical Exam   Constitutional: She appears well-developed and well-nourished. No distress.   HENT:   Head: Normocephalic.   Eyes: Pupils are equal, round, and reactive to light.   Cardiovascular: Normal rate and intact distal pulses.  Exam reveals no gallop and no friction rub.    No murmur heard.  Distant heart sounds   Pulmonary/Chest: Effort normal. No respiratory distress. She has no wheezes. She has no rales.   Abdominal: Soft. She exhibits no distension. There is no tenderness.    Musculoskeletal: She exhibits edema (RLE). She exhibits no deformity.   Neurological: She is alert. No cranial nerve deficit.   Skin: Skin is warm and dry. She is not diaphoretic.   right lower extremity wound with minimal drainage. No malodor. + surrounding warmth, edema and erythema.  Ecchymosis to BUEs.    Psychiatric: She has a normal mood and affect.   Nursing note and vitals reviewed.      Significant Labs: All pertinent labs within the past 24 hours have been reviewed.    Significant Imaging: I have reviewed all pertinent imaging results/findings within the past 24 hours.

## 2017-04-17 NOTE — PT/OT/SLP RE-EVAL
Physical Therapy  Re-evaluation    Danyelle Jacobs   MRN: [id number]   Admitting Diagnosis: Severe sepsis    PT Received On: 04/17/17  PT Start Time: 0911     PT Stop Time: 0932    PT Total Time (min): 21 min   Co re-eval with OT    Billable Minutes:  Re- Evaluation 21 minutes    Diagnosis: Severe sepsis    Past Medical History:   Diagnosis Date    Adverse effect of oral bisphosphonates     Anemia     Angiodysplasia of stomach: see EGD 1/17 2/13/2017    Anxiety     Aortic atherosclerosis     Benign hypertension 10/5/2015    Carcinoma, renal cell     Carotid arterial disease 1/20/2014    Cerebellar stroke, acute     Chronic kidney disease (CKD), stage I 2/18/2016    Coronary artery disease     Current chronic use of systemic steroids 10/4/2016    Degenerative disc disease     Depression     Diverticulosis     Former smoker 6/13/2016    Gastroesophageal reflux disease without esophagitis 10/5/2015    GERD (gastroesophageal reflux disease)     Goiter, nontoxic, multinodular     IBS (irritable bowel syndrome)     Left ventricular diastolic dysfunction with preserved systolic function 10/5/2015    Lung nodule     right    MGUS (monoclonal gammopathy of unknown significance) 6/13/2016    Myasthenia gravis, adult form     Osteoporosis 8/10/2015    Osteoporosis, unspecified     PAOD (peripheral arterial occlusive disease)     RA (rheumatoid arthritis)     Smoker 1/20/2014    Stroke     Ulcer     Vitamin D deficiency disease 1/20/2014      Past Surgical History:   Procedure Laterality Date    CATARACT EXTRACTION W/  INTRAOCULAR LENS IMPLANT Bilateral     CHOLECYSTECTOMY      COLONOSCOPY N/A 1/24/2017    Procedure: COLONOSCOPY;  Surgeon: Kristofer Worthington MD;  Location: [address];  Service: Endoscopy;  Laterality: N/A;  for chronic anemia ; needs to be off plavix x 5 days.     Off Plavix since recent hospitalization 1/19/17.    Speaks [place]/needs .    HYSTERECTOMY      KIDNEY  SURGERY  2003    NEPHRECTOMY         Referring physician: Shawna Porter MD  Date referred to PT: 4/16/17    General Precautions: Standard, fall (cardiac)  Orthopedic Precautions: N/A   Braces: N/A       Do you have any cultural, spiritual, Jain conflicts, given your current situation?: None noted    Patient History:  Lives With: child(sherry), adult  Living Arrangements: house  Living Environment Comment: Pt lives in [facility] with 0STE with adult son and daughter-in- law. PTA, pt was accepting assist for all mobilitiy and ADLs 2/2 decline in status since recent admission. Prior to admission patient was mod (I) with RW or furniture walking with supervision. Pt has had several falls upon D/C home; of note pt's son is also caring for sick brother and has become overwhelmed with caring for both his mother and brother. Pt enjoys watching television.  Equipment Currently Used at Home: wheelchair, walker, standard, shower chair, bedside commode    Previous Level of Function:  Ambulation Skills: needs device and assist  Transfer Skills: needs device and assist  ADL Skills: needs device and assist  Work/Leisure Activity: needs device and assist    Subjective:  Communicated with RN prior to session.  Pt and family agreeable to PT/OT re-evaluation. Pt found supine in bed with HOB elevated coughing after eating/drinking breakfast. RN notified and MD sticky note updated to consult speech for risk of aspiration  Chief Complaint: R hip pain   Patient goals: per family- improve mobility     Pain Rating:  (did not provide numerical value )   Location - Side: Right  Location - Orientation: generalized  Location: hip  Pain Addressed: Distraction, Cessation of Activity, Nurse notified, Reposition  Pain Rating Post-Intervention:  (See comment above)    Objective:   Patient found with: peripheral IV     Cognitive Exam:  Oriented to: Person, Place and Time    Follows Commands/attention: Follows two-step commands  Communication: minimal  English. Pt's son present for translation from English <-> Nigerien. Clear in Nigerien   Safety awareness/insight to disability: impaired    Physical Exam:  Postural examination/scapula alignment: Rounded shoulder, Head forward and Posterior pelvic tilt    Skin integrity: Thin and Dry multiple wounds on BLE  Edema: Mild in BLE    Sensation:   Intact    Lower Extremity Range of Motion: performed via PROM secondary to patient did not tolerate sitting EOB- increased R hip pain   Right Lower Extremity: WFL  Left Lower Extremity: WFL    Lower Extremity Strength: LISA against gravity secondary to patient did not tolerate sitting EOB- increased R hip pain   - RLE and LLE performed supine in bed: grossly 2+/5     Functional Mobility:  Bed Mobility:  Rolling/Turning to Left: Total assistance  Rolling/Turning Right: Total assistance  Scooting/Bridging: Total Assistance, With assist of 2 (via drawsheet towards HOB)  Supine to Sit: Total Assistance  Sit to Supine: Total Assistance    Transfers:  Sit <> Stand Assistance: Activity did not occur    Gait:   Gait Distance: Did not occur today.     Balance:   Static Sit: total A secondary to posterior and L lateral lean.     Therapeutic Activities and Exercises:  Pt educated on role of PT and POC/goals for therapy as well as safety with mobility. Pt and family verbalized understanding. Pt expressed no further concerns/questions.     AM-PAC 6 CLICK MOBILITY  How much help from another person does this patient currently need?   1 = Unable, Total/Dependent Assistance  2 = A lot, Maximum/Moderate Assistance  3 = A little, Minimum/Contact Guard/Supervision  4 = None, Modified [place]/Independent    Turning over in bed (including adjusting bedclothes, sheets and blankets)?: 2  Sitting down on and standing up from a chair with arms (e.g., wheelchair, bedside commode, etc.): 1  Moving from lying on back to sitting on the side of the bed?: 1  Moving to and from a bed to a chair (including a  wheelchair)?: 1  Need to walk in hospital room?: 1  Climbing 3-5 steps with a railing?: 1  Total Score: 7     AM-PAC Raw Score CMS G-Code Modifier Level of Impairment Assistance   6 % Total / Unable   7 - 9 CM 80 - 100% Maximal Assist   10 - 14 CL 60 - 80% Moderate Assist   15 - 19 CK 40 - 60% Moderate Assist   20 - 22 CJ 20 - 40% Minimal Assist   23 CI 1-20% SBA / CGA   24 CH 0% Independent/ Mod I     Patient left supine with all lines intact, call button in reach and RN and family present.    Assessment:   Danyelle Jacobs is a 88 y.o. female with a medical diagnosis of Severe sepsis. Upon PT re-evaluation, pt presents with decreased strength, R hip pain, impaired functional mobility, imbalance, and decreased endurance. Pt completed bed mobility with total A and only tolerated sitting EOB x ~15 seconds with total A. Pt displays posterior and L lateral lean for sitting balance in order to off load weight on R hip while seated EOB. Pt would benefit from skilled PT services to address these deficits and improve return to PLOF. Anticipate d/c to SNF.     Rehab identified problem list/impairments: Rehab identified problem list/impairments: weakness, impaired endurance, impaired self care skills, impaired functional mobilty, impaired balance, decreased coordination, decreased upper extremity function, decreased lower extremity function, decreased safety awareness, pain, decreased ROM    Rehab potential is fair.    Activity tolerance: Fair    Discharge recommendations: Discharge Facility/Level Of Care Needs: nursing facility, skilled     Barriers to discharge: Barriers to Discharge: Decreased caregiver support, Inaccessible home environment (increased assistance at this time)    Equipment recommendations: Equipment Needed After Discharge:  (TBD at next level of care)     GOALS:   Physical Therapy Goals        Problem: Physical Therapy Goal    Goal Priority Disciplines Outcome Goal Variances Interventions   Physical  Therapy Goal     PT/OT, PT Ongoing (interventions implemented as appropriate)     Description:  Goals to be met by: 2017     Patient will increase functional independence with mobility by performin. Supine to sit with Mod Assistance  2. Sit to supine with Mod Assistance  3. Sit to stand transfer with Max Assistance  4. Bed to chair transfer with Max Assistance using LRAD  5. Gait x 10 feet with Max Assistance using LRAD  6. Sitting at edge of bed x 5 minutes with Minimal Assistance  7. Stand for x 1 minute with Moderate Assistance using LRAD  8. Lower extremity exercise program x15 reps per handout, with assistance as needed                 PLAN:    Patient to be seen 4 x/week to address the above listed problems via gait training, therapeutic activities, therapeutic exercises, wheelchair management/training, neuromuscular re-education  Plan of Care expires: 17  Plan of Care reviewed with: patient, family    Teresa Marie, PT  2017

## 2017-04-17 NOTE — PLAN OF CARE
Problem: Physical Therapy Goal  Goal: Physical Therapy Goal  Goals to be met by: 2017     Patient will increase functional independence with mobility by performin. Supine to sit with Mod Assistance  2. Sit to supine with Mod Assistance  3. Sit to stand transfer with Max Assistance  4. Bed to chair transfer with Max Assistance using LRAD  5. Gait x 10 feet with Max Assistance using LRAD  6. Sitting at edge of bed x 5 minutes with Minimal Assistance  7. Stand for x 1 minute with Moderate Assistance using LRAD  8. Lower extremity exercise program x15 reps per handout, with assistance as needed   Outcome: Ongoing (interventions implemented as appropriate)  Upon PT re-evaluation, pt presents with decreased strength, R hip pain, impaired functional mobility, imbalance, and decreased endurance. Pt completed bed mobility with total A and only tolerated sitting EOB x ~15 seconds with total A. Pt displays posterior and L lateral lean for sitting balance in order to off load weight on R hip while seated EOB. Pt would benefit from skilled PT services to address these deficits and improve return to PLOF. Anticipate d/c to SNF.      Teresa Marie DPT, PT  2017

## 2017-04-17 NOTE — PLAN OF CARE
Problem: Patient Care Overview  Goal: Plan of Care Review  Outcome: Ongoing (interventions implemented as appropriate)  Pt hypertensive throughout afternoon, BP >200 systolic. MD aware. Administered cardiac meds as ordered. Pt w/o symptoms of distress. MRI of right hip scheduled for tonight. Continuous IV antibiotics infusing. 1 unit PRBC's administered. Incontinence care provided. Pt AOx self. Pleasant. Family at bedside.

## 2017-04-17 NOTE — SUBJECTIVE & OBJECTIVE
Review of Systems   Constitutional: Negative for chills, diaphoresis and fever.   Eyes: Negative for visual disturbance.   Respiratory: Negative for cough and shortness of breath.    Cardiovascular: Negative for chest pain and leg swelling.   Gastrointestinal: Negative for abdominal pain, nausea and vomiting.   Musculoskeletal: Positive for arthralgias and back pain.   Skin: Positive for wound. Negative for color change.     Objective:     Vital Signs (Most Recent):  Temp: 97.7 °F (36.5 °C) (04/17/17 0741)  Pulse: 67 (04/17/17 0741)  Resp: 18 (04/17/17 0741)  BP: (!) 160/72 (04/17/17 0741)  SpO2: 99 % (04/17/17 0741) Vital Signs (24h Range):  Temp:  [97.4 °F (36.3 °C)-99.9 °F (37.7 °C)] 97.7 °F (36.5 °C)  Pulse:  [64-88] 67  Resp:  [16-20] 18  SpO2:  [94 %-99 %] 99 %  BP: (129-228)/(61-95) 160/72     Weight: 57.3 kg (126 lb 4.8 oz)  Body mass index is 27.33 kg/(m^2).    Intake/Output Summary (Last 24 hours) at 04/17/17 1020  Last data filed at 04/17/17 0400   Gross per 24 hour   Intake              840 ml   Output                0 ml   Net              840 ml      Physical Exam   Constitutional: She appears well-developed. No distress.   Cardiovascular: Normal rate, regular rhythm and normal heart sounds.    Pulmonary/Chest: Effort normal and breath sounds normal. No respiratory distress.   Abdominal: Soft. Bowel sounds are normal. She exhibits no distension. There is no tenderness.   Neurological: She is alert.   Skin: Skin is warm and dry. She is not diaphoretic. There is erythema.   Vitals reviewed.

## 2017-04-18 DIAGNOSIS — I25.10 CORONARY ARTERY DISEASE DUE TO CALCIFIED CORONARY LESION: ICD-10-CM

## 2017-04-18 DIAGNOSIS — I25.84 CORONARY ARTERY DISEASE DUE TO CALCIFIED CORONARY LESION: ICD-10-CM

## 2017-04-18 LAB
ALBUMIN SERPL BCP-MCNC: 1.5 G/DL
ALP SERPL-CCNC: 164 U/L
ALT SERPL W/O P-5'-P-CCNC: 18 U/L
ANION GAP SERPL CALC-SCNC: 9 MMOL/L
ANISOCYTOSIS BLD QL SMEAR: SLIGHT
AST SERPL-CCNC: 29 U/L
BACTERIA BLD CULT: NORMAL
BASOPHILS # BLD AUTO: ABNORMAL K/UL
BASOPHILS NFR BLD: 0.5 %
BILIRUB SERPL-MCNC: 0.3 MG/DL
BUN SERPL-MCNC: 13 MG/DL
BURR CELLS BLD QL SMEAR: ABNORMAL
CALCIUM SERPL-MCNC: 8.2 MG/DL
CHLORIDE SERPL-SCNC: 100 MMOL/L
CO2 SERPL-SCNC: 23 MMOL/L
CREAT SERPL-MCNC: 0.7 MG/DL
CRP SERPL-MCNC: 180.2 MG/L
DIFFERENTIAL METHOD: ABNORMAL
EOSINOPHIL # BLD AUTO: ABNORMAL K/UL
EOSINOPHIL NFR BLD: 1.5 %
ERYTHROCYTE [DISTWIDTH] IN BLOOD BY AUTOMATED COUNT: 18.1 %
ERYTHROCYTE [SEDIMENTATION RATE] IN BLOOD BY WESTERGREN METHOD: 80 MM/HR
EST. GFR  (AFRICAN AMERICAN): >60 ML/MIN/1.73 M^2
EST. GFR  (NON AFRICAN AMERICAN): >60 ML/MIN/1.73 M^2
GIANT PLATELETS BLD QL SMEAR: PRESENT
GLUCOSE SERPL-MCNC: 108 MG/DL
GRAM STN SPEC: NORMAL
GRAM STN SPEC: NORMAL
HCT VFR BLD AUTO: 27.3 %
HGB BLD-MCNC: 8.6 G/DL
LYMPHOCYTES # BLD AUTO: ABNORMAL K/UL
LYMPHOCYTES NFR BLD: 4 %
MAGNESIUM SERPL-MCNC: 1.8 MG/DL
MCH RBC QN AUTO: 27.2 PG
MCHC RBC AUTO-ENTMCNC: 31.5 %
MCV RBC AUTO: 86 FL
METAMYELOCYTES NFR BLD MANUAL: 1 %
MONOCYTES # BLD AUTO: ABNORMAL K/UL
MONOCYTES NFR BLD: 6.5 %
MYELOCYTES NFR BLD MANUAL: 0.5 %
NEUTROPHILS NFR BLD: 85.5 %
NEUTS BAND NFR BLD MANUAL: 0.5 %
OVALOCYTES BLD QL SMEAR: ABNORMAL
PHOSPHATE SERPL-MCNC: 3.4 MG/DL
PLATELET # BLD AUTO: 171 K/UL
PLATELET BLD QL SMEAR: ABNORMAL
PMV BLD AUTO: 12.6 FL
POIKILOCYTOSIS BLD QL SMEAR: ABNORMAL
POLYCHROMASIA BLD QL SMEAR: ABNORMAL
POTASSIUM SERPL-SCNC: 4.6 MMOL/L
PROCALCITONIN SERPL IA-MCNC: 2.89 NG/ML
PROT SERPL-MCNC: 4.9 G/DL
RBC # BLD AUTO: 3.16 M/UL
SCHISTOCYTES BLD QL SMEAR: ABNORMAL
SODIUM SERPL-SCNC: 132 MMOL/L
WBC # BLD AUTO: 8.77 K/UL

## 2017-04-18 PROCEDURE — 25000003 PHARM REV CODE 250: Performed by: STUDENT IN AN ORGANIZED HEALTH CARE EDUCATION/TRAINING PROGRAM

## 2017-04-18 PROCEDURE — 97803 MED NUTRITION INDIV SUBSEQ: CPT

## 2017-04-18 PROCEDURE — 0K9N3ZZ DRAINAGE OF RIGHT HIP MUSCLE, PERCUTANEOUS APPROACH: ICD-10-PCS | Performed by: RADIOLOGY

## 2017-04-18 PROCEDURE — 99232 SBSQ HOSP IP/OBS MODERATE 35: CPT | Mod: ,,, | Performed by: PHYSICIAN ASSISTANT

## 2017-04-18 PROCEDURE — 85651 RBC SED RATE NONAUTOMATED: CPT

## 2017-04-18 PROCEDURE — 97110 THERAPEUTIC EXERCISES: CPT

## 2017-04-18 PROCEDURE — 87075 CULTR BACTERIA EXCEPT BLOOD: CPT

## 2017-04-18 PROCEDURE — 25000003 PHARM REV CODE 250: Performed by: HOSPITALIST

## 2017-04-18 PROCEDURE — 25000003 PHARM REV CODE 250: Performed by: PHYSICIAN ASSISTANT

## 2017-04-18 PROCEDURE — 85027 COMPLETE CBC AUTOMATED: CPT

## 2017-04-18 PROCEDURE — 25000003 PHARM REV CODE 250: Performed by: INTERNAL MEDICINE

## 2017-04-18 PROCEDURE — 86140 C-REACTIVE PROTEIN: CPT

## 2017-04-18 PROCEDURE — 84145 PROCALCITONIN (PCT): CPT

## 2017-04-18 PROCEDURE — 63600175 PHARM REV CODE 636 W HCPCS: Performed by: PHYSICIAN ASSISTANT

## 2017-04-18 PROCEDURE — 63600175 PHARM REV CODE 636 W HCPCS: Performed by: RADIOLOGY

## 2017-04-18 PROCEDURE — 11000001 HC ACUTE MED/SURG PRIVATE ROOM

## 2017-04-18 PROCEDURE — 97530 THERAPEUTIC ACTIVITIES: CPT

## 2017-04-18 PROCEDURE — 85007 BL SMEAR W/DIFF WBC COUNT: CPT

## 2017-04-18 PROCEDURE — 87205 SMEAR GRAM STAIN: CPT

## 2017-04-18 PROCEDURE — 83735 ASSAY OF MAGNESIUM: CPT

## 2017-04-18 PROCEDURE — 87077 CULTURE AEROBIC IDENTIFY: CPT

## 2017-04-18 PROCEDURE — 87070 CULTURE OTHR SPECIMN AEROBIC: CPT

## 2017-04-18 PROCEDURE — 87186 SC STD MICRODIL/AGAR DIL: CPT

## 2017-04-18 PROCEDURE — 99232 SBSQ HOSP IP/OBS MODERATE 35: CPT | Mod: GC,,, | Performed by: HOSPITALIST

## 2017-04-18 PROCEDURE — 84100 ASSAY OF PHOSPHORUS: CPT

## 2017-04-18 PROCEDURE — 36415 COLL VENOUS BLD VENIPUNCTURE: CPT

## 2017-04-18 PROCEDURE — 80053 COMPREHEN METABOLIC PANEL: CPT

## 2017-04-18 RX ORDER — ISOSORBIDE MONONITRATE 30 MG/1
TABLET, EXTENDED RELEASE ORAL
Qty: 90 TABLET | Refills: 0 | Status: SHIPPED | OUTPATIENT
Start: 2017-04-18 | End: 2017-04-23 | Stop reason: HOSPADM

## 2017-04-18 RX ORDER — MIDAZOLAM HYDROCHLORIDE 1 MG/ML
INJECTION, SOLUTION INTRAMUSCULAR; INTRAVENOUS CODE/TRAUMA/SEDATION MEDICATION
Status: COMPLETED | OUTPATIENT
Start: 2017-04-18 | End: 2017-04-18

## 2017-04-18 RX ORDER — HYDRALAZINE HYDROCHLORIDE 25 MG/1
75 TABLET, FILM COATED ORAL EVERY 8 HOURS
Status: DISCONTINUED | OUTPATIENT
Start: 2017-04-18 | End: 2017-04-20

## 2017-04-18 RX ADMIN — CALCIUM CARBONATE-CHOLECALCIFEROL TAB 250 MG-125 UNIT 1 TABLET: 250-125 TAB at 08:04

## 2017-04-18 RX ADMIN — MIDAZOLAM HYDROCHLORIDE 1 MG: 1 INJECTION INTRAMUSCULAR; INTRAVENOUS at 11:04

## 2017-04-18 RX ADMIN — Medication 3 ML: at 05:04

## 2017-04-18 RX ADMIN — DICLOFENAC: 10 GEL TOPICAL at 08:04

## 2017-04-18 RX ADMIN — Medication 3 ML: at 10:04

## 2017-04-18 RX ADMIN — PANTOPRAZOLE SODIUM 40 MG: 40 TABLET, DELAYED RELEASE ORAL at 08:04

## 2017-04-18 RX ADMIN — ISOSORBIDE MONONITRATE 30 MG: 30 TABLET, EXTENDED RELEASE ORAL at 10:04

## 2017-04-18 RX ADMIN — MICONAZOLE NITRATE: 20 OINTMENT TOPICAL at 10:04

## 2017-04-18 RX ADMIN — HYDROCHLOROTHIAZIDE 12.5 MG: 12.5 TABLET ORAL at 08:04

## 2017-04-18 RX ADMIN — PREGABALIN 75 MG: 75 CAPSULE ORAL at 08:04

## 2017-04-18 RX ADMIN — MICONAZOLE NITRATE: 20 OINTMENT TOPICAL at 08:04

## 2017-04-18 RX ADMIN — HYDRALAZINE HYDROCHLORIDE 50 MG: 25 TABLET ORAL at 05:04

## 2017-04-18 RX ADMIN — HYDRALAZINE HYDROCHLORIDE 75 MG: 25 TABLET, FILM COATED ORAL at 01:04

## 2017-04-18 RX ADMIN — PREGABALIN 75 MG: 75 CAPSULE ORAL at 10:04

## 2017-04-18 RX ADMIN — HYDRALAZINE HYDROCHLORIDE 75 MG: 25 TABLET, FILM COATED ORAL at 10:04

## 2017-04-18 RX ADMIN — LIDOCAINE 1 PATCH: 50 PATCH TOPICAL at 06:04

## 2017-04-18 RX ADMIN — CEFAZOLIN 6 G: 1 INJECTION, POWDER, FOR SOLUTION INTRAMUSCULAR; INTRAVENOUS; PARENTERAL at 08:04

## 2017-04-18 RX ADMIN — CLONIDINE HYDROCHLORIDE 0.1 MG: 0.1 TABLET ORAL at 12:04

## 2017-04-18 RX ADMIN — TRAMADOL HYDROCHLORIDE 50 MG: 50 TABLET, COATED ORAL at 01:04

## 2017-04-18 RX ADMIN — LOSARTAN POTASSIUM 100 MG: 50 TABLET, FILM COATED ORAL at 08:04

## 2017-04-18 RX ADMIN — DONEPEZIL HYDROCHLORIDE 10 MG: 10 TABLET, FILM COATED ORAL at 08:04

## 2017-04-18 RX ADMIN — DULOXETINE 30 MG: 30 CAPSULE, DELAYED RELEASE ORAL at 08:04

## 2017-04-18 NOTE — SUBJECTIVE & OBJECTIVE
Interval History: no complaints this am, npo for procedure    Medications:  Continuous Infusions:   Scheduled Meds:   calcium carbonate-vitamin D3 250-125 mg  1 tablet Oral Daily    ceFAZolin(ANCEF)in D5W 500 mL CONTINUOUS INFUSION  6 g Intravenous Q24H    diclofenac sodium   Topical Daily    donepezil  10 mg Oral Daily    duloxetine  30 mg Oral Daily    hydrALAZINE  75 mg Oral Q8H    hydrochlorothiazide  12.5 mg Oral Daily    isosorbide mononitrate  30 mg Oral QHS    lidocaine  1 patch Transdermal Q24H    losartan  100 mg Oral Daily    miconazole nitrate 2%   Topical (Top) BID    pantoprazole  40 mg Oral Daily    pregabalin  75 mg Oral BID    sodium chloride 0.9%  3 mL Intravenous Q8H     PRN Meds:sodium chloride, acetaminophen, cloNIDine, ondansetron, ondansetron, ramelteon, tramadol     Review of patient's allergies indicates:   Allergen Reactions    Norvasc  [amlodipine]      Other reaction(s): Edema     Objective:     Vital Signs (Most Recent):  Temp: 99.9 °F (37.7 °C) (04/18/17 0826)  Pulse: 65 (04/18/17 0826)  Resp: 18 (04/18/17 0826)  BP: 132/64 (04/18/17 0826)  SpO2: 97 % (04/18/17 0826) Vital Signs (24h Range):  Temp:  [97.3 °F (36.3 °C)-101.1 °F (38.4 °C)] 99.9 °F (37.7 °C)  Pulse:  [57-73] 65  Resp:  [17-20] 18  SpO2:  [93 %-97 %] 97 %  BP: (132-224)/(60-94) 132/64     Weight: 57.3 kg (126 lb 4.8 oz)  Body mass index is 27.33 kg/(m^2).    Intake/Output - Last 3 Shifts       04/16 0700 - 04/17 0659 04/17 0700 - 04/18 0659 04/18 0700 - 04/19 0659    P.O. 1080 1500     I.V. (mL/kg)  150 (2.6)     Blood 0      IV Piggyback  1000     Total Intake(mL/kg) 1080 (18.9) 2650 (46.3)     Net +1080 +2650             Urine Occurrence 5 x 4 x 1 x    Stool Occurrence 4 x 0 x     Emesis Occurrence 0 x 0 x           Physical Exam   Constitutional: She is oriented to person, place, and time. She appears well-developed and well-nourished. No distress.   HENT:   Head: Normocephalic and atraumatic.   Eyes:  Conjunctivae are normal. Right eye exhibits no discharge. Left eye exhibits no discharge. No scleral icterus.   Neck: Normal range of motion. Neck supple. No JVD present. No tracheal deviation present.   Cardiovascular: Normal rate and regular rhythm.    Pulmonary/Chest: Effort normal. No respiratory distress.   Abdominal: Soft. She exhibits no distension. There is no tenderness.   Neurological: She is alert and oriented to person, place, and time.   Skin: Skin is warm and dry. No rash noted. She is not diaphoretic. No erythema.       Significant Labs:  CBC:   Recent Labs  Lab 04/18/17 0415   WBC 8.77   RBC 3.16*   HGB 8.6*   HCT 27.3*      MCV 86   MCH 27.2   MCHC 31.5*     CMP:   Recent Labs  Lab 04/18/17 0415      CALCIUM 8.2*   ALBUMIN 1.5*   PROT 4.9*   *   K 4.6   CO2 23      BUN 13   CREATININE 0.7   ALKPHOS 164*   ALT 18   AST 29   BILITOT 0.3       Significant Diagnostics:  I have reviewed and interpreted all pertinent imaging results/findings within the past 24 hours.

## 2017-04-18 NOTE — ASSESSMENT & PLAN NOTE
88 year old female with hx of HTN, HLD, CAD, and RA admitted 4/6 for GI bleed. Patient's course complicated by fevers up to 103.5, leukocytosis of 45.79 on 4/13 and patient was found to have MSSA bacteremia and right psoas abscess. She is now on IV Cefazolin. Her leukocytosis has resolved. Low grade temperature 99.9 today.  Persistently positive blood cultures likely secondary to need for adequate source control ( + 4/13, 4/15, 4/16). Repeat blood cultures 4/17 NGTD    - Continue IV Cefazolin  - IR plan for drainage today of psoas abscess. Will follow   - 2D-echo negative for vegetations.   - procalcitonin 2.89 from 19.63  - Continue wound care per wound care recommendations.  - Discussed with staff. ID will follow with you.

## 2017-04-18 NOTE — PLAN OF CARE
Problem: Patient Care Overview  Goal: Plan of Care Review  Outcome: Ongoing (interventions implemented as appropriate)    04/17/17 2002   Coping/Psychosocial   Plan Of Care Reviewed With patient;son      Patient remains oriented to self and situation, with some moments of disorientation. Son is at the bedside. /94 @ 0000, prn clonidine given and team paged with no response. BP @ 0400 132/60. With the exception of BP medications given, pt has remained NPO. Continuous abx infusing. No complaints of pain throughout the shift. All dressings changed. Right Psoas muscle abscess scheduled to be drained today. Will continue to monitor.

## 2017-04-18 NOTE — SUBJECTIVE & OBJECTIVE
Interval History:   Afebrile and leukocytosis now resolved. Blood cultures from yesterday NGTD.   No acute complaints at this time. Scheduled for IR drainage of psoas abscess today.     Review of Systems   Constitutional: Negative for chills, diaphoresis and fever.   HENT: Negative for congestion.    Respiratory: Negative for cough and shortness of breath.    Cardiovascular: Negative for chest pain and leg swelling.   Gastrointestinal: Negative for abdominal pain, diarrhea, nausea and vomiting.   Genitourinary: Negative for difficulty urinating and dysuria.   Musculoskeletal: Positive for arthralgias.        + right hip pain   Skin: Positive for wound (to upper and lower extremities).   Neurological: Negative for numbness and headaches.   Hematological: Bruises/bleeds easily.     Objective:     Vital Signs (Most Recent):  Temp: 99.9 °F (37.7 °C) (04/18/17 0826)  Pulse: 72 (04/18/17 1141)  Resp: 18 (04/18/17 1141)  BP: (!) 176/74 (04/18/17 1141)  SpO2: 96 % (04/18/17 1141) Vital Signs (24h Range):  Temp:  [97.3 °F (36.3 °C)-101.1 °F (38.4 °C)] 99.9 °F (37.7 °C)  Pulse:  [57-73] 72  Resp:  [17-20] 18  SpO2:  [93 %-97 %] 96 %  BP: (132-224)/(60-94) 176/74     Weight: 57.3 kg (126 lb 4.8 oz)  Body mass index is 27.33 kg/(m^2).    Estimated Creatinine Clearance: 50.3 mL/min (based on Cr of 0.7).    Physical Exam   Constitutional: She appears well-developed and well-nourished. No distress.   HENT:   Head: Normocephalic.   Eyes: Pupils are equal, round, and reactive to light.   Cardiovascular: Normal rate and intact distal pulses.  Exam reveals no gallop and no friction rub.    No murmur heard.  Distant heart sounds   Pulmonary/Chest: Effort normal. No respiratory distress. She has no wheezes. She has no rales.   Abdominal: Soft. She exhibits no distension. There is no tenderness.   Musculoskeletal: She exhibits edema (RLE). She exhibits no deformity.   Neurological: She is alert. No cranial nerve deficit.   Skin: Skin is  warm and dry. She is not diaphoretic.   right lower extremity wound with minimal drainage. No malodor. + surrounding warmth, edema and erythema.  Ecchymosis to BUEs.    Psychiatric: She has a normal mood and affect.   Nursing note and vitals reviewed.      Significant Labs: All pertinent labs within the past 24 hours have been reviewed.    Significant Imaging: I have reviewed all pertinent imaging results/findings within the past 24 hours.

## 2017-04-18 NOTE — PROCEDURES
Radiology Post-Procedure Note    Pre Op Diagnosis: right psoas abscess    Post Op Diagnosis: right psoas abscess    Procedure:right psoas abscess drainage    Procedure performed by: Tamika JAMISON, Francisco May    Written Informed Consent Obtained: Yes    Specimen Removed: YES 7 cc brown fluid    Estimated Blood Loss: Minimal    Findings: CT was used for localization of abnormal fluid collection. A needle was inserted into the fluid collection and slightly thick brown fluid was aspirated.  Approximately  7 mL fluid was removed.     A specimen was sent to the lab for further analysis and culture.    The patient tolerated procedure well and there were no complications. Please see procedure report under Imaging for further details.    Lalo Lundberg MD  Staff Radiologist  Department of Radiology  Pager: [phone]

## 2017-04-18 NOTE — PT/OT/SLP PROGRESS
Physical Therapy  Treatment    Danyelle Jacobs   MRN: [id number]   Admitting Diagnosis: Severe sepsis    PT Received On: 04/18/17  PT Start Time: 1530     PT Stop Time: 1557    PT Total Time (min): 27 min       Billable Minutes:  Therapeutic Activity 17 and Therapeutic Exercise 10    Treatment Type: Treatment  PT/PTA: PT     PTA Visit Number: 0       General Precautions: Standard, fall  Orthopedic Precautions: N/A   Braces: N/A    Do you have any cultural, spiritual, Scientology conflicts, given your current situation?: None noted    Subjective:  Communicated with nsg prior to session.  -Pt agreeable to PT session. Pt's family present at bedside to interpret for pt.    Pain Rating: other (see comments) (did not quantify)  Location - Side: Right  Location - Orientation: posterior  Location: hip  Pain Addressed: Cessation of Activity, Reposition  Pain Rating Post-Intervention: other (see comments)    Objective:   Patient found with: NG tube, peripheral IV    Functional Mobility:  Bed Mobility:   Scooting/Bridging: Total Assistance, With assist of 2  Supine to Sit: Total Assistance, With assist of 2  Sit to Supine: Total Assistance, With assist of  2    Transfers:  Sit <> Stand Assistance: Activity did not occur    Gait:   Gait Distance: did not occur    Balance:   Static Sit: POOR: Needs MODERATE assist to maintain  Dynamic Sit: POOR: N/A    Therapeutic Activities and Exercises:  -Pt sat EOB for ~5 minutes today with max(A)-total (A) to maintain balance. Pt reported pain in (R) posterior hip region while sitting and demonstrated a (L) lateral lean to off-load painful abscess drainage site. While sitting EOB, pt performed AAROM exercises with PT of LAQ and marching x15 reps. Pt able to use (B) UE support with cueing from PT to help maintain balance.    -PT educated pt on performing therex of AP, GS, QS. PT provided visual demonstration for pt as well as tactile cueing to facilitate muscle contraction. Pt's family present at  bedside and verbalized understanding of how to perform. Pt able to perform 20 reps of AP and QS and 10 reps of GS.    -Pt/family educated on:  A. PROM of (B) LE 2x/day to maintain joint flexibility  B. Performing HEP 3-5x/day to improve muscular endurance and blood flow  C. Importance of OOB/EOB activity to improve functional outcomes        AM-PAC 6 CLICK MOBILITY  How much help from another person does this patient currently need?   1 = Unable, Total/Dependent Assistance  2 = A lot, Maximum/Moderate Assistance  3 = A little, Minimum/Contact Guard/Supervision  4 = None, Modified [place]/Independent    Turning over in bed (including adjusting bedclothes, sheets and blankets)?: 2  Sitting down on and standing up from a chair with arms (e.g., wheelchair, bedside commode, etc.): 1  Moving from lying on back to sitting on the side of the bed?: 2  Moving to and from a bed to a chair (including a wheelchair)?: 1  Need to walk in hospital room?: 1  Climbing 3-5 steps with a railing?: 1  Total Score: 8    AM-PAC Raw Score CMS G-Code Modifier Level of Impairment Assistance   6 % Total / Unable   7 - 9 CM 80 - 100% Maximal Assist   10 - 14 CL 60 - 80% Moderate Assist   15 - 19 CK 40 - 60% Moderate Assist   20 - 22 CJ 20 - 40% Minimal Assist   23 CI 1-20% SBA / CGA   24 CH 0% Independent/ Mod I     Patient left supine with all lines intact, call button in reach and nsg notified.    Assessment:  Danyelle Jacobs is a 88 y.o. female with a medical diagnosis of Severe sepsis and presents with impaired functional mobility. Pt tolerated session fairly today with decreased tolerance to EOB sitting 2/2 pain in (R) posterior hip. Pt required assistance of 2 people for all functional tasks including sitting balance at EOB. Pt able to perform (B) LE therex with cueing after returning to supine Pt will cont to benefit from skilled therapy services and remains appropriate for SNF placement upon d/c.     Rehab identified problem  list/impairments: Rehab identified problem list/impairments: weakness, impaired endurance, impaired self care skills, impaired functional mobilty, impaired balance, gait instability, decreased lower extremity function, pain, impaired skin, impaired joint extensibility    Rehab potential is fair.    Activity tolerance: Fair    Discharge recommendations: Discharge Facility/Level Of Care Needs: nursing facility, skilled     Barriers to discharge: Barriers to Discharge: Inaccessible home environment, Decreased caregiver support    Equipment recommendations: Equipment Needed After Discharge:  (TBD)     GOALS:   Physical Therapy Goals        Problem: Physical Therapy Goal    Goal Priority Disciplines Outcome Goal Variances Interventions   Physical Therapy Goal     PT/OT, PT Ongoing (interventions implemented as appropriate)     Description:  Goals to be met by: 2017     Patient will increase functional independence with mobility by performin. Supine to sit with Mod Assistance-not met  2. Sit to supine with Mod Assistance-not met  3. Sit to stand transfer with Max Assistance-not met  4. Bed to chair transfer with Max Assistance using LRAD-not met  5. Gait x 10 feet with Max Assistance using LRAD-not met  6. Sitting at edge of bed x 5 minutes with Minimal Assistance-not met  7. Stand for x 1 minute with Moderate Assistance using LRAD-not met  8. Lower extremity exercise program x15 reps per handout, with assistance as needed-not met                  PLAN:    Patient to be seen 4 x/week  to address the above listed problems via gait training, therapeutic activities, therapeutic exercises, wheelchair management/training, neuromuscular re-education  Plan of Care expires: 17  Plan of Care reviewed with: patient, family         Robson Díaz, PT  2017

## 2017-04-18 NOTE — ASSESSMENT & PLAN NOTE
- MSSA bacteremia. Now with Rt. Psoas abscess.  - source likely skin given multiple areas of tear and breakdown  - UCx negative but urine collected after broad spectrum antibiotics so not helpful  - CXR does not show any new consolidation although aspiration still possible  - continue ancef.   - 2D echo with no vegetations.

## 2017-04-18 NOTE — PROGRESS NOTES
[facility]  Infectious Disease  Progress Note    Patient Name: Danyelle Jacobs  MRN: [id number]  Admission Date: 4/6/2017  Length of Stay: 12 days  Attending Physician: Yen Ledezma MD  Primary Care Provider: Bianca Dykes MD    Isolation Status: No active isolations  Assessment/Plan:      MSSA (methicillin susceptible Staphylococcus aureus) septicemia  88 year old female with hx of HTN, HLD, CAD, and RA admitted 4/6 for GI bleed. Patient's course complicated by fevers up to 103.5, leukocytosis of 45.79 on 4/13 and patient was found to have MSSA bacteremia and right psoas abscess. She is now on IV Cefazolin. Her leukocytosis has resolved. Low grade temperature 99.9 today.  Persistently positive blood cultures likely secondary to need for adequate source control ( + 4/13, 4/15, 4/16). Repeat blood cultures 4/17 NGTD    - Continue IV Cefazolin  - IR plan for drainage today of psoas abscess. Will follow   - 2D-echo negative for vegetations.   - procalcitonin 2.89 from 19.63  - Continue wound care per wound care recommendations.  - Discussed with staff. ID will follow with you.       Thank you for your consult. I will follow-up with patient. Please contact us if you have any additional questions.    Bailey Sierra PA-C  Infectious Disease  [facility]  [phone]    Subjective:     Principal Problem:Severe sepsis    HPI: 89 y/o female with hx of HTN, HLD, CAD, and RA presented to ED 4/6 for GI bleeding. Patient has a significant history of 'bleeding angiodysplastic lesion found on EGD with hemostasis achieved' performed in 01/2017. Patient also underwent colonoscopy at the same time which showed 'prominent vessels in the cecum but no definite AVM; raised bluish lesion likely a hematoma in the rectum.' While in the ICU she was started on protonix and was conservatively managed from a GI perspective. She had recent admission 3/24 -4/1 for respiratory failure (presumed to be 2/2 PNA,  treated with abx). On 4/13 pt was found to be diaphoretic, hypotensive and tachycardic. Pt also spiked fever of 103.5 and leukocytosis trended upward to 45.79. Pt was placed on vancomycin and zosyn. Today, pt remained afebrile, and WBC down to 31.20. Blood cultures were drawn 4/13 which are positive for Staph aureus. Pt denies having any CP, abdominal pain, n/v/d. Per family, pt does have skin tears and wound of extremities. CXR and UA negative. 2D-echo ordered today. Otherwise no complaints other than myalgia 2/2 from rheumatoid arthritis.   Interval History:   Afebrile and leukocytosis now resolved. Blood cultures from yesterday NGTD.   No acute complaints at this time. Scheduled for IR drainage of psoas abscess today.     Review of Systems   Constitutional: Negative for chills, diaphoresis and fever.   HENT: Negative for congestion.    Respiratory: Negative for cough and shortness of breath.    Cardiovascular: Negative for chest pain and leg swelling.   Gastrointestinal: Negative for abdominal pain, diarrhea, nausea and vomiting.   Genitourinary: Negative for difficulty urinating and dysuria.   Musculoskeletal: Positive for arthralgias.        + right hip pain   Skin: Positive for wound (to upper and lower extremities).   Neurological: Negative for numbness and headaches.   Hematological: Bruises/bleeds easily.     Objective:     Vital Signs (Most Recent):  Temp: 99.9 °F (37.7 °C) (04/18/17 0826)  Pulse: 72 (04/18/17 1141)  Resp: 18 (04/18/17 1141)  BP: (!) 176/74 (04/18/17 1141)  SpO2: 96 % (04/18/17 1141) Vital Signs (24h Range):  Temp:  [97.3 °F (36.3 °C)-101.1 °F (38.4 °C)] 99.9 °F (37.7 °C)  Pulse:  [57-73] 72  Resp:  [17-20] 18  SpO2:  [93 %-97 %] 96 %  BP: (132-224)/(60-94) 176/74     Weight: 57.3 kg (126 lb 4.8 oz)  Body mass index is 27.33 kg/(m^2).    Estimated Creatinine Clearance: 50.3 mL/min (based on Cr of 0.7).    Physical Exam   Constitutional: She appears well-developed and well-nourished. No  distress.   HENT:   Head: Normocephalic.   Eyes: Pupils are equal, round, and reactive to light.   Cardiovascular: Normal rate and intact distal pulses.  Exam reveals no gallop and no friction rub.    No murmur heard.  Distant heart sounds   Pulmonary/Chest: Effort normal. No respiratory distress. She has no wheezes. She has no rales.   Abdominal: Soft. She exhibits no distension. There is no tenderness.   Musculoskeletal: She exhibits edema (RLE). She exhibits no deformity.   Neurological: She is alert. No cranial nerve deficit.   Skin: Skin is warm and dry. She is not diaphoretic.   right lower extremity wound with minimal drainage. No malodor. + surrounding warmth, edema and erythema.  Ecchymosis to BUEs.    Psychiatric: She has a normal mood and affect.   Nursing note and vitals reviewed.      Significant Labs: All pertinent labs within the past 24 hours have been reviewed.    Significant Imaging: I have reviewed all pertinent imaging results/findings within the past 24 hours.

## 2017-04-18 NOTE — PLAN OF CARE
Problem: Patient Care Overview  Goal: Plan of Care Review  Outcome: Ongoing (interventions implemented as appropriate)  Pt to IR for I&D of abscess to lower back. Pt free from falls and injury throughout shift. Pt turned q2h using pillows and wedge. Pt with hx dementia which causes her to speak in [place] - her native language. Family at bedside. Dressings changed on wounds.

## 2017-04-18 NOTE — PROGRESS NOTES
[facility] Hospital Medicine  Progress Note    Patient Name: Danyelle Jacobs  MRN: [id number]  Patient Class: IP- Inpatient   Admission Date: 2017  Length of Stay: 12 days  Attending Physician: Yen Ledezma MD  Primary Care Provider: Bianca Dykes MD    Hospital Medicine [place] [facility] [place] Rashaad Ricci MD    Subjective:     Principal Problem:Severe sepsis    HPI:  Ms. Jacobs is a 88 F with a history of HTN, HLD, CAD, PAD, HFpEF, dementia, and rheumatoid arthritis who presented to the ED   with GI bleeding. Her son reported that her symptoms began the evening before with bilious vomiting and diarrhea. Since then, she had had 6 episodes of emesis and 5 episodes of diarrhea which started out as black and became bright red by her last BM which was at 6 AM. She currently denies abdominal pain, SOB, or nausea; however, her son reports that she will usually deny symptoms even if they are present.     She had a recent admission from 3/24- for respiratory failure with hypoxia (presumed 2/2 to PNA, resolved with abx) and RLE pain. Also admitted - and transfused at that time. Patient has a significant history of 'bleeding angiodysplastic lesion found on EGD with hemostasis achieved' performed in 2017. Patient also underwent colonoscopy at the same time which showed 'prominent vessels in the cecum but no definite AVM; raised bluish lesion likely a hematoma in the rectum.' Son reports that she has declined both mentally and physically in the last few months and has become progressively weak, less responsive, and has had hallucinations including speaking to  relatives. Also, she has developed urinary and fecal incontinence recently.     While in the ICU she was started on protonix and was conservatively managed from a GI perspective. She was able to be stepped down to hospital medicine on  and has had a relatively benign course since that time. A rapid was called tonight for  Ms. Jacobs because she was found to be more altered, diaphoretic, pale and hypotensive in the 70s-80s with baseline in the 110s-120s. Patient was bolused IV fluids with improvement in both blood pressure and mental status but still remained somewhat lethargic. Patient had also spiking fevers up to 103.5. Given patient's rapid deterioration and borderline blood pressures despite fluids, she will be accepted in to the ICU for closer monitoring.       Hospital Course:  04/06/17: Upon initial evaluation patient's initial oxygen requirements appeared to be increasing. ABG was performed which showed adequate oxygenation. Hct was also obtained with the arterial stick which showed a Hct of 19. Based on this result and her presenting H/H of 8.3/26.2, 1 U PRBCs was ordered and received 2L of NS. GI and ICU were consulted for further management. Repeat hgb and hemodynamics are stable. Protonix drip started. CT abdomen W/O significant for diverticulosis, adnexal cystic structure, L1 compression fx, and lung opacities (atelectasis v infection/inflammation).    4/7: Hgb stable, no further episodes of vomiting or diarrhea. Pt c/o RLE pain. Hypertensive to 200 systolic, restarting home BP meds stepwise. GI recs supportive care. PARVEEN improved.     4/8: stepped down to hospital medicine. Hemodynamically stable.     4/9: No acute events, PTOT recommending SNF.      4/10: Son requested pelvic ultrasound for patient since she is currently admitted. H/H stable. Awaiting eval from [facility].    4/11: 4/9: no acute events overnight. Started nystatin ointment yesterday for candidal rash under breasts.S/p U/S revealing cystic mass in L adnexa. Awaiting placement.     4/12 - NAEON. Awaiting placement.    4/13 - A rapid was called because she was found to be more altered, diaphoretic, pale and hypotensive in the 70s-80s with baseline in the 110s-120s. Patient was bolused IV fluids with improvement in both blood pressure and mental status but  still remained somewhat lethargic. Patient had also spiking fevers up to 103.5. Given patient's rapid deterioration and borderline blood pressures despite fluids, she was sent to the ICU for closer monitoring.   Patient was fluid resuscitated with over 30 cc/kg and had full response in blood pressure and tachycardia. IV abx of vanc and zosyn were started. Patient's mental status improved to baseline by the morning. Discussed plan with patient's family.     4/15 - Pt transferred back to hospital medicine. SHAWNA.  She remained afebrile with all other VSS.  BCx positive for GPC, awaiting identification of organism.  Continue vanc/zosyn.    4/16 - NAEON.  Pt remained AFVSS.  BCx growing staph aureus, sensitivities pending, repeat BCx positive.  ID following, recommend right hip imaging.    04/17 - Right hip abscess elucidated on MRI. Talked to General Surgery and indicated that IR was the most likely route. Her WCC has decreased and there is no concern for a septic hip at this point.  Increased the patient's hydralazine dose as well.        Interval Hx: overnight, patient was hypertensive she received clonidine which decreased her BP. Currently she reports right hip pain. She is NPO for CT guided aspiration.     Review of Systems   Constitutional: Negative for chills, diaphoresis and fever.   Eyes: Negative for visual disturbance.   Respiratory: Negative for cough and shortness of breath.    Cardiovascular: Negative for chest pain and leg swelling.   Gastrointestinal: Negative for abdominal pain, nausea and vomiting.   Musculoskeletal: Positive for arthralgias and back pain.   Skin: Positive for wound. Negative for color change.     Objective:     Vital Signs (Most Recent):  Temp: 98.7 °F (37.1 °C) (04/18/17 1215)  Pulse: 70 (04/18/17 1245)  Resp: 16 (04/18/17 1245)  BP: (!) 120/52 (04/18/17 1245)  SpO2: (!) 94 % (04/18/17 1245) Vital Signs (24h Range):  Temp:  [97.3 °F (36.3 °C)-99.9 °F (37.7 °C)] 98.7 °F (37.1  °C)  Pulse:  [57-73] 70  Resp:  [16-18] 16  SpO2:  [93 %-97 %] 94 %  BP: (120-224)/(52-94) 120/52     Weight: 57.3 kg (126 lb 4.8 oz)  Body mass index is 27.33 kg/(m^2).    Intake/Output Summary (Last 24 hours) at 04/18/17 1256  Last data filed at 04/18/17 0000   Gross per 24 hour   Intake              500 ml   Output                0 ml   Net              500 ml      Physical Exam   Constitutional: She appears well-developed. No distress.   Cardiovascular: Normal rate, regular rhythm and normal heart sounds.    Pulmonary/Chest: Effort normal and breath sounds normal. No respiratory distress.   Abdominal: Soft. Bowel sounds are normal. She exhibits no distension. There is no tenderness.   Neurological: She is alert.   Skin: Skin is warm and dry. She is not diaphoretic. There is erythema.   Vitals reviewed.         Assessment/Plan:      * Severe sepsis  - MSSA bacteremia. Now with Rt. Psoas abscess.  - source likely skin given multiple areas of tear and breakdown  - UCx negative but urine collected after broad spectrum antibiotics so not helpful  - CXR does not show any new consolidation although aspiration still possible  - continue ancef.   - 2D echo with no vegetations.       MG (myasthenia gravis)        CAD (coronary artery disease)  - ASA 81 mg PO was held , will restart after procedure.   - Plavix 75 mg PO daily - held  - no signs of further bleeding, Hb stable.  -given hx of multiple GI bleeds, will defer restarting plavix as an OP    Mixed hyperlipidemia  - Lipitor 40 mg PO daily    Benign hypertension  - restarted patients' home regiment yesterday. Increased patient's hydralazine today from 50 mg to 75  mg TID.   - Will monitor progress.        Current chronic use of systemic steroids  - chronic immunosuppression likely contributed to pt's rapid decline  - will hold steroids until blood cultures clear, restart 10mg prednisone when appropriate      Anemia associated with acute blood loss  - s/p pRBC's on  this admission secondary to GIB from angiodysplagia.   - no signs of repeat bleeding  - Will transfuse if patient's hb < 7      Angiodysplasia of stomach: see EGD 1/17  - EGD1/2017: 'bleeding angiodysplastic lesion found with hemostasis achieved'  - Colonoscopy: 'prominent vessels in the cecum but no definite AVM; raised bluish lesion likely a hematoma in the rectum.'  - Multiple episodes of yellow/green emesis w/ five episodes of diarrhea, dark red --> bright red  - Patient currently HDS, H/H stable at this point  - GI consulted, recommend OP follow up    Compression fracture of first lumbar vertebra  -likely 2/2 to osteoporosis, seen on CT abdo  -possible nerve impingement contributing to R hip pain  -continue lyrica and PTOT  -Volteran gel      Adnexal mass  L cystic adnexal mass seen on CTabdo in setting of reported complete hysterectomy  -Pelvic U/S revealing 2cm cystic structure, can be monitored with yearly U/S      Candidal intertrigo  -under breasts  -nystatin ointment      MSSA (methicillin susceptible Staphylococcus aureus) septicemia  - see sepsis      Psoas abscess  - Source of right hip/RL abdominal pain  - Patient without septic hip at this point  - patient is undergoing IR CT guided aspiration today.   - Continue ancef      VTE Risk Mitigation         Ordered     Medium Risk of VTE  Once      04/06/17 1535     Place ISHMAEL hose  Until discontinued      04/06/17 1535     Reason for No Pharmacological VTE Prophylaxis  Once      04/06/17 1535     Place sequential compression device  Until discontinued      04/06/17 1504          Rashaad Ricci MD  Department of Hospital Medicine   [facility]

## 2017-04-18 NOTE — SUBJECTIVE & OBJECTIVE
Interval Hx: overnight, patient was hypertensive she received clonidine which decreased her BP. Currently she reports right hip pain. She is NPO for CT guided aspiration.     Review of Systems   Constitutional: Negative for chills, diaphoresis and fever.   Eyes: Negative for visual disturbance.   Respiratory: Negative for cough and shortness of breath.    Cardiovascular: Negative for chest pain and leg swelling.   Gastrointestinal: Negative for abdominal pain, nausea and vomiting.   Musculoskeletal: Positive for arthralgias and back pain.   Skin: Positive for wound. Negative for color change.     Objective:     Vital Signs (Most Recent):  Temp: 98.7 °F (37.1 °C) (04/18/17 1215)  Pulse: 70 (04/18/17 1245)  Resp: 16 (04/18/17 1245)  BP: (!) 120/52 (04/18/17 1245)  SpO2: (!) 94 % (04/18/17 1245) Vital Signs (24h Range):  Temp:  [97.3 °F (36.3 °C)-99.9 °F (37.7 °C)] 98.7 °F (37.1 °C)  Pulse:  [57-73] 70  Resp:  [16-18] 16  SpO2:  [93 %-97 %] 94 %  BP: (120-224)/(52-94) 120/52     Weight: 57.3 kg (126 lb 4.8 oz)  Body mass index is 27.33 kg/(m^2).    Intake/Output Summary (Last 24 hours) at 04/18/17 1256  Last data filed at 04/18/17 0000   Gross per 24 hour   Intake              500 ml   Output                0 ml   Net              500 ml      Physical Exam   Constitutional: She appears well-developed. No distress.   Cardiovascular: Normal rate, regular rhythm and normal heart sounds.    Pulmonary/Chest: Effort normal and breath sounds normal. No respiratory distress.   Abdominal: Soft. Bowel sounds are normal. She exhibits no distension. There is no tenderness.   Neurological: She is alert.   Skin: Skin is warm and dry. She is not diaphoretic. There is erythema.   Vitals reviewed.

## 2017-04-18 NOTE — PROGRESS NOTES
[facility]  Adult Nutrition  Consult Note    SUMMARY     Recommendations    1. When medically feasible, resume diet. Recommend cardiac, dental soft (texture per pt's family request).   2. Upon diet advancement, add Boost Plus TID (strawberry).   3. RD to monitor & follow-up.    Goals: Pt to receive nutrition within 48hrs.   Nutrition Goal Status: goal met  Communication of RD Recs: reviewed with RN    Reason for Assessment    Reason for Assessment: RD follow-up  Diagnosis: other (see comments) (GI hemorrhage )  Relevent Medical History: HTN, HLD, CAD, HF, dementia   Interdisciplinary Rounds: did not attend     General Information Comments: Pt currently NPO for abscess drain. Prior to NPO status, pt consuming 50-75% of meals. Family requesting dental soft diet & Boost. Pt disoriented during visit.  Nutrition Discharge Planning: Adequate PO intake    Nutrition Prescription Ordered    Current Diet Order: NPO    Nutrition Risk Screen     Nutrition Risk Screen: no indicators present    Nutrition/Diet History    Typical Food/Fluid Intake: Pt with good appetite PTA.     Factors Affecting Nutritional Intake: NPO, chewing difficulties/inability to chew food    Labs/Tests/Procedures/Meds    Pertinent Labs Reviewed: reviewed  Pertinent Labs Comments: Na 132  Pertinent Medications Reviewed: reviewed  Pertinent Medications Comments: prednisone    Physical Findings    Overall Physical Appearance: overweight  Oral/Mouth Cavity: WDL  Skin: intact    Anthropometrics    Height (inches): 57.01 in  Weight Method: Bed Scale  Weight (kg): 57.3 kg     Ideal Body Weight (IBW), Female: 85.05 lb  % Ideal Body Weight, Female (lb): 148.52 lb     BMI (kg/m2): 27.33  BMI Grade: 25 - 29.9 - overweight    Estimated/Assessed Needs    Weight Used For Calorie Calculations: 57.3 kg (126 lb 5.2 oz)   Height (cm): 144.8 cm     Energy Need Method: [place] Jeor, other (see comments) (1103 kcal/d)     Weight Used For Protein  Calculations: 57.3 kg (126 lb 5.2 oz)  Protein Requirements: 57-68 g/d    Fluid Need Method: RDA Method (or per MD)    Assessment and Plan    No nutritional dx at this time.    Monitor and Evaluation    Food and Nutrient Intake: energy intake, food and beverage intake  Food and Nutrient Adminstration: diet order     Anthropometric Measurements: weight, weight change  Biochemical Data, Medical Tests and Procedures: other (specify) (All labs)  Nutrition-Focused Physical Findings: overall appearance    Nutrition Risk    Level of Risk: other (see comments) (1x/week)    Nutrition Follow-Up    RD Follow-up?: Yes

## 2017-04-18 NOTE — ASSESSMENT & PLAN NOTE
- ASA 81 mg PO was held , will restart after procedure.   - Plavix 75 mg PO daily - held  - no signs of further bleeding, Hb stable.  -given hx of multiple GI bleeds, will defer restarting plavix as an OP

## 2017-04-18 NOTE — ASSESSMENT & PLAN NOTE
- restarted patients' home regiment yesterday. Increased patient's hydralazine today from 50 mg to 75  mg TID.   - Will monitor progress.

## 2017-04-18 NOTE — H&P
Inpatient Radiology Pre-procedure Note    History of Present Illness:  Danyelle Jacobs is a 88 y.o. female with a right psoas abscess who presents for CT guided aspiration and possible drainage.  Admission H&P reviewed.  Past Medical History:   Diagnosis Date    Adverse effect of oral bisphosphonates     Anemia     Angiodysplasia of stomach: see EGD 1/17 2/13/2017    Anxiety     Aortic atherosclerosis     Benign hypertension 10/5/2015    Carcinoma, renal cell     Carotid arterial disease 1/20/2014    Cerebellar stroke, acute     Chronic kidney disease (CKD), stage I 2/18/2016    Coronary artery disease     Current chronic use of systemic steroids 10/4/2016    Degenerative disc disease     Depression     Diverticulosis     Former smoker 6/13/2016    Gastroesophageal reflux disease without esophagitis 10/5/2015    GERD (gastroesophageal reflux disease)     Goiter, nontoxic, multinodular     IBS (irritable bowel syndrome)     Left ventricular diastolic dysfunction with preserved systolic function 10/5/2015    Lung nodule     right    MGUS (monoclonal gammopathy of unknown significance) 6/13/2016    Myasthenia gravis, adult form     Osteoporosis 8/10/2015    Osteoporosis, unspecified     PAOD (peripheral arterial occlusive disease)     RA (rheumatoid arthritis)     Smoker 1/20/2014    Stroke     Ulcer     Vitamin D deficiency disease 1/20/2014     Past Surgical History:   Procedure Laterality Date    CATARACT EXTRACTION W/  INTRAOCULAR LENS IMPLANT Bilateral     CHOLECYSTECTOMY      COLONOSCOPY N/A 1/24/2017    Procedure: COLONOSCOPY;  Surgeon: Kristofer Worthington MD;  Location: [address]);  Service: Endoscopy;  Laterality: N/A;  for chronic anemia ; needs to be off plavix x 5 days.     Off Plavix since recent hospitalization 1/19/17.    Speaks Luxembourgish/needs .    HYSTERECTOMY      KIDNEY SURGERY  2003    NEPHRECTOMY         Review of Systems:   As documented in primary  team H&P    Home Meds:   Prior to Admission medications    Medication Sig Start Date End Date Taking? Authorizing Provider   alprazolam (XANAX) 0.5 MG tablet Take 1 tablet (0.5 mg total) by mouth daily as needed. 1/17/17  Yes Bianca Dykes MD   aspirin 81 mg Tab Take 81 mg by mouth. 1 Tablet Oral Every day   Yes Historical Provider, MD   atorvastatin (LIPITOR) 40 MG tablet Take 1 tablet (40 mg total) by mouth every evening. 1/17/17  Yes Bianca Dykes MD    mg capsule TAKE 1 CAPSULE BY MOUTH TWICE DAILY 2/24/17  Yes Bianca Dykes MD   donepezil (ARICEPT) 10 MG tablet Take 1 tablet (10 mg total) by mouth once daily. 1/17/17 1/17/18 Yes Bianca Dykes MD   duloxetine (CYMBALTA) 30 MG capsule Take 1 capsule (30 mg total) by mouth once daily. 1/17/17  Yes Bianca Dykes MD   ferrous sulfate 325 (65 FE) MG EC tablet Take 1 tablet (325 mg total) by mouth 2 (two) times daily. 1/19/17  Yes Bianca Dykes MD   hydrALAZINE (APRESOLINE) 25 MG tablet Take 1 tablet (25 mg total) by mouth every 8 (eight) hours. 1/17/17  Yes Bianca Dykes MD   hydrochlorothiazide (MICROZIDE) 12.5 mg capsule Take 1 capsule (12.5 mg total) by mouth once daily. 10/4/16  Yes Bianca Dykes MD   hydrocodone-acetaminophen 5-325mg (NORCO) 5-325 mg per tablet Take 1 tablet by mouth every 6 (six) hours as needed for Pain. 3/30/17  Yes Jose Thrasher MD   isosorbide mononitrate (IMDUR) 30 MG 24 hr tablet Take 1 tablet (30 mg total) by mouth every evening. 1/17/17  Yes Bianca Dykes MD   losartan (COZAAR) 100 MG tablet Take 1 tablet (100 mg total) by mouth once daily. 1/17/17  Yes Bianca Dykes MD   pantoprazole (PROTONIX) 40 MG tablet Take 1 tablet (40 mg total) by mouth once daily. 1/17/17  Yes Bianca Dykes MD   predniSONE (DELTASONE) 10 MG tablet Take 1 tablet (10 mg total) by mouth once daily. 1/17/17  Yes Bianca Dykes MD   pregabalin (LYRICA) 50 MG capsule Take 1 capsule (50 mg total) by mouth 2 (two) times  daily. 3/30/17 9/28/17 Yes Jose Thrasher MD   tolterodine (DETROL LA) 2 MG Cp24 Take 1 capsule (2 mg total) by mouth once daily. 1/17/17  Yes Bianca Dykes MD   calcium carbonate-vitamin D3 250-125 mg 250-125 mg-unit Tab Take 1 tablet by mouth once daily. 4/10/17 4/10/18  MAURICIO Ibarra   clopidogrel (PLAVIX) 75 mg tablet Take 1 tablet (75 mg total) by mouth once daily. Take 75 mg by mouth once daily.    HOLD UNTIL FOLLOW UP WITH PCP 4/10/17 5/10/17  MAURICIO Ibarra   miconazole nitrate 2% (MICOTIN) 2 % Oint Apply topically 2 (two) times daily. 4/10/17   MAURICIO Ibarra     Scheduled Meds:    calcium carbonate-vitamin D3 250-125 mg  1 tablet Oral Daily    ceFAZolin(ANCEF)in D5W 500 mL CONTINUOUS INFUSION  6 g Intravenous Q24H    diclofenac sodium   Topical Daily    donepezil  10 mg Oral Daily    duloxetine  30 mg Oral Daily    hydrALAZINE  75 mg Oral Q8H    hydrochlorothiazide  12.5 mg Oral Daily    isosorbide mononitrate  30 mg Oral QHS    lidocaine  1 patch Transdermal Q24H    losartan  100 mg Oral Daily    miconazole nitrate 2%   Topical (Top) BID    pantoprazole  40 mg Oral Daily    pregabalin  75 mg Oral BID    sodium chloride 0.9%  3 mL Intravenous Q8H     Continuous Infusions:    PRN Meds:sodium chloride, acetaminophen, cloNIDine, ondansetron, ondansetron, ramelteon, tramadol  Anticoagulants/Antiplatelets: no anticoagulation    Allergies:   Review of patient's allergies indicates:   Allergen Reactions    Norvasc  [amlodipine]      Other reaction(s): Edema     Sedation Hx: have not been any systemic reactions    Labs:    Recent Labs  Lab 04/14/17  0344   INR 1.2       Recent Labs  Lab 04/18/17  0415   WBC 8.77   HGB 8.6*   HCT 27.3*   MCV 86         Recent Labs  Lab 04/18/17  0415      *   K 4.6      CO2 23   BUN 13   CREATININE 0.7   CALCIUM 8.2*   MG 1.8   ALT 18   AST 29   ALBUMIN 1.5*   BILITOT 0.3         Vitals:  Temp: 99.9 °F  (37.7 °C) (04/18/17 0826)  Pulse: 65 (04/18/17 0826)  Resp: 18 (04/18/17 0826)  BP: 132/64 (04/18/17 0826)  SpO2: 97 % (04/18/17 0826)     Physical Exam:  ASA: 3  Mallampati: 2    General: no acute distress  Mental Status: alert and oriented to person, place and time  HEENT: normocephalic, atraumatic  Chest: unlabored breathing  Heart: regular heart rate  Abdomen: nondistended  Extremity: moves all extremities    Plan: Proceed with CT guided aspiration and possible drainage.  Sedation Plan: Moderate.    Froilan Urbano MD  Resident  Department of Radiology  Pager: [phone]

## 2017-04-18 NOTE — NURSING
Upon taking 0000 vitals, BP was noted to be 224/94. Manual BP revealed the same. Administered PRN clonidine and paged team, waiting for a call back. Will continue to monitor and reassess.

## 2017-04-18 NOTE — PLAN OF CARE
Problem: Physical Therapy Goal  Goal: Physical Therapy Goal  Goals to be met by: 2017     Patient will increase functional independence with mobility by performin. Supine to sit with Mod Assistance-not met  2. Sit to supine with Mod Assistance-not met  3. Sit to stand transfer with Max Assistance-not met  4. Bed to chair transfer with Max Assistance using LRAD-not met  5. Gait x 10 feet with Max Assistance using LRAD-not met  6. Sitting at edge of bed x 5 minutes with Minimal Assistance-not met  7. Stand for x 1 minute with Moderate Assistance using LRAD-not met  8. Lower extremity exercise program x15 reps per handout, with assistance as needed-not met   Outcome: Ongoing (interventions implemented as appropriate)  Pt tolerated session fairly today with decreased tolerance to EOB sitting 2/2 pain in (R) posterior hip. Pt required assistance of 2 people for all functional tasks including sitting balance at EOB. Pt able to perform (B) LE therex with cueing after returning to supine Pt will cont to benefit from skilled therapy services and remains appropriate for SNF placement upon d/c.

## 2017-04-18 NOTE — PROGRESS NOTES
[facility]  General Surgery  Progress Note    Subjective:     History of Present Illness:  88 year old female with multiple medical problems who was initially admitted with a GI bleed, who has a prolonged hospital course with an icu admission, but eventually was found to gram positive bacteremia, and complained of right hip pain, an MRI was done and revealed a 2 cm psoas muscle abscess, General surgery is consulted for further recommendations.     Post-Op Info:  * No surgery found *         Interval History: no complaints this am, npo for procedure    Medications:  Continuous Infusions:   Scheduled Meds:   calcium carbonate-vitamin D3 250-125 mg  1 tablet Oral Daily    ceFAZolin(ANCEF)in D5W 500 mL CONTINUOUS INFUSION  6 g Intravenous Q24H    diclofenac sodium   Topical Daily    donepezil  10 mg Oral Daily    duloxetine  30 mg Oral Daily    hydrALAZINE  75 mg Oral Q8H    hydrochlorothiazide  12.5 mg Oral Daily    isosorbide mononitrate  30 mg Oral QHS    lidocaine  1 patch Transdermal Q24H    losartan  100 mg Oral Daily    miconazole nitrate 2%   Topical (Top) BID    pantoprazole  40 mg Oral Daily    pregabalin  75 mg Oral BID    sodium chloride 0.9%  3 mL Intravenous Q8H     PRN Meds:sodium chloride, acetaminophen, cloNIDine, ondansetron, ondansetron, ramelteon, tramadol     Review of patient's allergies indicates:   Allergen Reactions    Norvasc  [amlodipine]      Other reaction(s): Edema     Objective:     Vital Signs (Most Recent):  Temp: 99.9 °F (37.7 °C) (04/18/17 0826)  Pulse: 65 (04/18/17 0826)  Resp: 18 (04/18/17 0826)  BP: 132/64 (04/18/17 0826)  SpO2: 97 % (04/18/17 0826) Vital Signs (24h Range):  Temp:  [97.3 °F (36.3 °C)-101.1 °F (38.4 °C)] 99.9 °F (37.7 °C)  Pulse:  [57-73] 65  Resp:  [17-20] 18  SpO2:  [93 %-97 %] 97 %  BP: (132-224)/(60-94) 132/64     Weight: 57.3 kg (126 lb 4.8 oz)  Body mass index is 27.33 kg/(m^2).    Intake/Output - Last 3 Shifts       04/16  0700 - 04/17 0659 04/17 0700 - 04/18 0659 04/18 0700 - 04/19 0659    P.O. 1080 1500     I.V. (mL/kg)  150 (2.6)     Blood 0      IV Piggyback  1000     Total Intake(mL/kg) 1080 (18.9) 2650 (46.3)     Net +1080 +2650             Urine Occurrence 5 x 4 x 1 x    Stool Occurrence 4 x 0 x     Emesis Occurrence 0 x 0 x           Physical Exam   Constitutional: She is oriented to person, place, and time. She appears well-developed and well-nourished. No distress.   HENT:   Head: Normocephalic and atraumatic.   Eyes: Conjunctivae are normal. Right eye exhibits no discharge. Left eye exhibits no discharge. No scleral icterus.   Neck: Normal range of motion. Neck supple. No JVD present. No tracheal deviation present.   Cardiovascular: Normal rate and regular rhythm.    Pulmonary/Chest: Effort normal. No respiratory distress.   Abdominal: Soft. She exhibits no distension. There is no tenderness.   Neurological: She is alert and oriented to person, place, and time.   Skin: Skin is warm and dry. No rash noted. She is not diaphoretic. No erythema.       Significant Labs:  CBC:   Recent Labs  Lab 04/18/17  0415   WBC 8.77   RBC 3.16*   HGB 8.6*   HCT 27.3*      MCV 86   MCH 27.2   MCHC 31.5*     CMP:   Recent Labs  Lab 04/18/17  0415      CALCIUM 8.2*   ALBUMIN 1.5*   PROT 4.9*   *   K 4.6   CO2 23      BUN 13   CREATININE 0.7   ALKPHOS 164*   ALT 18   AST 29   BILITOT 0.3       Significant Diagnostics:  I have reviewed and interpreted all pertinent imaging results/findings within the past 24 hours.    Assessment/Plan:     Psoas abscess  Continue to agree with IR drainage of this abscess        Yemi Randolph MD  General Surgery  [facility]

## 2017-04-18 NOTE — ASSESSMENT & PLAN NOTE
- Source of right hip/RL abdominal pain  - Patient without septic hip at this point  - patient is undergoing IR CT guided aspiration today.   - Continue ancef

## 2017-04-18 NOTE — NURSING
Pt arrived back to 1007 from IR. Bedside swallow completed with no problems. Per MD okay to give patient a diet. Pt tolerated apple sauce with medications with no problem. Educated family at bedside about s/s to look out for when pt's tray arrives. Pt medicated with Ultram for post procedure pain. VSS.

## 2017-04-19 LAB
ALBUMIN SERPL BCP-MCNC: 1.4 G/DL
ALP SERPL-CCNC: 144 U/L
ALT SERPL W/O P-5'-P-CCNC: 7 U/L
ANION GAP SERPL CALC-SCNC: 11 MMOL/L
ANISOCYTOSIS BLD QL SMEAR: SLIGHT
AST SERPL-CCNC: 19 U/L
BACTERIA BLD CULT: NORMAL
BASOPHILS # BLD AUTO: ABNORMAL K/UL
BASOPHILS NFR BLD: 1 %
BILIRUB SERPL-MCNC: 0.2 MG/DL
BUN SERPL-MCNC: 13 MG/DL
CALCIUM SERPL-MCNC: 8.2 MG/DL
CHLORIDE SERPL-SCNC: 101 MMOL/L
CO2 SERPL-SCNC: 23 MMOL/L
CREAT SERPL-MCNC: 0.7 MG/DL
DIFFERENTIAL METHOD: ABNORMAL
EOSINOPHIL # BLD AUTO: ABNORMAL K/UL
EOSINOPHIL NFR BLD: 4 %
ERYTHROCYTE [DISTWIDTH] IN BLOOD BY AUTOMATED COUNT: 18.2 %
EST. GFR  (AFRICAN AMERICAN): >60 ML/MIN/1.73 M^2
EST. GFR  (NON AFRICAN AMERICAN): >60 ML/MIN/1.73 M^2
GLUCOSE SERPL-MCNC: 88 MG/DL
HCT VFR BLD AUTO: 27.6 %
HGB BLD-MCNC: 8.9 G/DL
LYMPHOCYTES # BLD AUTO: ABNORMAL K/UL
LYMPHOCYTES NFR BLD: 6 %
MAGNESIUM SERPL-MCNC: 1.6 MG/DL
MCH RBC QN AUTO: 27.7 PG
MCHC RBC AUTO-ENTMCNC: 32.2 %
MCV RBC AUTO: 86 FL
METAMYELOCYTES NFR BLD MANUAL: 1 %
MONOCYTES # BLD AUTO: ABNORMAL K/UL
MONOCYTES NFR BLD: 10 %
NEUTROPHILS NFR BLD: 78 %
PHOSPHATE SERPL-MCNC: 3.3 MG/DL
PLATELET # BLD AUTO: 212 K/UL
PLATELET BLD QL SMEAR: ABNORMAL
PMV BLD AUTO: ABNORMAL FL
POLYCHROMASIA BLD QL SMEAR: ABNORMAL
POTASSIUM SERPL-SCNC: 3.2 MMOL/L
PROT SERPL-MCNC: 4.7 G/DL
RBC # BLD AUTO: 3.21 M/UL
SODIUM SERPL-SCNC: 135 MMOL/L
WBC # BLD AUTO: 7.14 K/UL

## 2017-04-19 PROCEDURE — 25000003 PHARM REV CODE 250: Performed by: INTERNAL MEDICINE

## 2017-04-19 PROCEDURE — 25000003 PHARM REV CODE 250: Performed by: PHYSICIAN ASSISTANT

## 2017-04-19 PROCEDURE — 87040 BLOOD CULTURE FOR BACTERIA: CPT

## 2017-04-19 PROCEDURE — 36415 COLL VENOUS BLD VENIPUNCTURE: CPT

## 2017-04-19 PROCEDURE — 25000003 PHARM REV CODE 250: Performed by: HOSPITALIST

## 2017-04-19 PROCEDURE — 25000003 PHARM REV CODE 250: Performed by: STUDENT IN AN ORGANIZED HEALTH CARE EDUCATION/TRAINING PROGRAM

## 2017-04-19 PROCEDURE — 85027 COMPLETE CBC AUTOMATED: CPT

## 2017-04-19 PROCEDURE — 84100 ASSAY OF PHOSPHORUS: CPT

## 2017-04-19 PROCEDURE — 63600175 PHARM REV CODE 636 W HCPCS: Performed by: PHYSICIAN ASSISTANT

## 2017-04-19 PROCEDURE — G8998 SWALLOW D/C STATUS: HCPCS | Mod: CK

## 2017-04-19 PROCEDURE — 80053 COMPREHEN METABOLIC PANEL: CPT

## 2017-04-19 PROCEDURE — 83735 ASSAY OF MAGNESIUM: CPT

## 2017-04-19 PROCEDURE — 85007 BL SMEAR W/DIFF WBC COUNT: CPT

## 2017-04-19 PROCEDURE — 11000001 HC ACUTE MED/SURG PRIVATE ROOM

## 2017-04-19 PROCEDURE — 99232 SBSQ HOSP IP/OBS MODERATE 35: CPT | Mod: GC,,, | Performed by: HOSPITALIST

## 2017-04-19 PROCEDURE — G8997 SWALLOW GOAL STATUS: HCPCS | Mod: CK

## 2017-04-19 PROCEDURE — 92610 EVALUATE SWALLOWING FUNCTION: CPT

## 2017-04-19 PROCEDURE — 97530 THERAPEUTIC ACTIVITIES: CPT

## 2017-04-19 PROCEDURE — G8996 SWALLOW CURRENT STATUS: HCPCS | Mod: CK

## 2017-04-19 PROCEDURE — 63600175 PHARM REV CODE 636 W HCPCS: Performed by: STUDENT IN AN ORGANIZED HEALTH CARE EDUCATION/TRAINING PROGRAM

## 2017-04-19 PROCEDURE — 99232 SBSQ HOSP IP/OBS MODERATE 35: CPT | Mod: ,,, | Performed by: PHYSICIAN ASSISTANT

## 2017-04-19 RX ORDER — POTASSIUM CHLORIDE 20 MEQ/15ML
20 SOLUTION ORAL
Status: COMPLETED | OUTPATIENT
Start: 2017-04-19 | End: 2017-04-19

## 2017-04-19 RX ORDER — ENOXAPARIN SODIUM 100 MG/ML
40 INJECTION SUBCUTANEOUS EVERY 24 HOURS
Status: DISCONTINUED | OUTPATIENT
Start: 2017-04-19 | End: 2017-04-24 | Stop reason: HOSPADM

## 2017-04-19 RX ORDER — POLYETHYLENE GLYCOL 3350 17 G/17G
17 POWDER, FOR SOLUTION ORAL DAILY
Status: DISCONTINUED | OUTPATIENT
Start: 2017-04-19 | End: 2017-04-24 | Stop reason: HOSPADM

## 2017-04-19 RX ORDER — ALPRAZOLAM 0.5 MG/1
0.5 TABLET ORAL DAILY PRN
Qty: 90 TABLET | Refills: 0 | Status: CANCELLED | OUTPATIENT
Start: 2017-04-19

## 2017-04-19 RX ORDER — ASPIRIN 81 MG/1
81 TABLET ORAL DAILY
Status: DISCONTINUED | OUTPATIENT
Start: 2017-04-19 | End: 2017-04-19

## 2017-04-19 RX ORDER — NAPROXEN SODIUM 220 MG/1
81 TABLET, FILM COATED ORAL DAILY
Status: DISCONTINUED | OUTPATIENT
Start: 2017-04-20 | End: 2017-04-24 | Stop reason: HOSPADM

## 2017-04-19 RX ORDER — AMOXICILLIN 250 MG
1 CAPSULE ORAL DAILY
Status: DISCONTINUED | OUTPATIENT
Start: 2017-04-19 | End: 2017-04-24 | Stop reason: HOSPADM

## 2017-04-19 RX ORDER — POTASSIUM CHLORIDE 20 MEQ/1
20 TABLET, EXTENDED RELEASE ORAL
Status: DISCONTINUED | OUTPATIENT
Start: 2017-04-19 | End: 2017-04-19

## 2017-04-19 RX ADMIN — Medication 3 ML: at 02:04

## 2017-04-19 RX ADMIN — CLONIDINE HYDROCHLORIDE 0.1 MG: 0.1 TABLET ORAL at 04:04

## 2017-04-19 RX ADMIN — CEFAZOLIN 6 G: 1 INJECTION, POWDER, FOR SOLUTION INTRAMUSCULAR; INTRAVENOUS; PARENTERAL at 09:04

## 2017-04-19 RX ADMIN — STANDARDIZED SENNA CONCENTRATE AND DOCUSATE SODIUM 1 TABLET: 8.6; 5 TABLET, FILM COATED ORAL at 09:04

## 2017-04-19 RX ADMIN — DONEPEZIL HYDROCHLORIDE 10 MG: 10 TABLET, FILM COATED ORAL at 09:04

## 2017-04-19 RX ADMIN — ISOSORBIDE MONONITRATE 30 MG: 30 TABLET, EXTENDED RELEASE ORAL at 09:04

## 2017-04-19 RX ADMIN — HYDROCHLOROTHIAZIDE 12.5 MG: 12.5 TABLET ORAL at 09:04

## 2017-04-19 RX ADMIN — CALCIUM CARBONATE-CHOLECALCIFEROL TAB 250 MG-125 UNIT 1 TABLET: 250-125 TAB at 09:04

## 2017-04-19 RX ADMIN — DULOXETINE 30 MG: 30 CAPSULE, DELAYED RELEASE ORAL at 09:04

## 2017-04-19 RX ADMIN — DICLOFENAC: 10 GEL TOPICAL at 09:04

## 2017-04-19 RX ADMIN — POTASSIUM CHLORIDE 20 MEQ: 20 SOLUTION ORAL at 09:04

## 2017-04-19 RX ADMIN — HYDRALAZINE HYDROCHLORIDE 75 MG: 25 TABLET, FILM COATED ORAL at 01:04

## 2017-04-19 RX ADMIN — PREGABALIN 75 MG: 75 CAPSULE ORAL at 09:04

## 2017-04-19 RX ADMIN — PANTOPRAZOLE SODIUM 40 MG: 40 TABLET, DELAYED RELEASE ORAL at 09:04

## 2017-04-19 RX ADMIN — LOSARTAN POTASSIUM 100 MG: 50 TABLET, FILM COATED ORAL at 09:04

## 2017-04-19 RX ADMIN — HYDRALAZINE HYDROCHLORIDE 75 MG: 25 TABLET, FILM COATED ORAL at 09:04

## 2017-04-19 RX ADMIN — Medication 3 ML: at 09:04

## 2017-04-19 RX ADMIN — POTASSIUM CHLORIDE 20 MEQ: 20 SOLUTION ORAL at 01:04

## 2017-04-19 RX ADMIN — HYDRALAZINE HYDROCHLORIDE 75 MG: 25 TABLET, FILM COATED ORAL at 06:04

## 2017-04-19 RX ADMIN — POLYETHYLENE GLYCOL 3350 17 G: 17 POWDER, FOR SOLUTION ORAL at 05:04

## 2017-04-19 RX ADMIN — MICONAZOLE NITRATE: 20 OINTMENT TOPICAL at 09:04

## 2017-04-19 RX ADMIN — POTASSIUM CHLORIDE 20 MEQ: 20 SOLUTION ORAL at 12:04

## 2017-04-19 RX ADMIN — Medication 3 ML: at 06:04

## 2017-04-19 RX ADMIN — TRAMADOL HYDROCHLORIDE 50 MG: 50 TABLET, COATED ORAL at 06:04

## 2017-04-19 RX ADMIN — ENOXAPARIN SODIUM 40 MG: 100 INJECTION SUBCUTANEOUS at 05:04

## 2017-04-19 RX ADMIN — LIDOCAINE 1 PATCH: 50 PATCH TOPICAL at 05:04

## 2017-04-19 NOTE — ASSESSMENT & PLAN NOTE
- MSSA bacteremia. Now with Rt. Psoas abscess.  - source likely skin given multiple areas of tear and breakdown  - UCx negative but urine collected after broad spectrum antibiotics so not helpful  - CXR does not show any new consolidation although aspiration still possible  - continue ancef.   - cultures are still +ve , will repeat.   - 2D echo with no vegetations.

## 2017-04-19 NOTE — SUBJECTIVE & OBJECTIVE
Interval Hx: patient is complaining of right arm pain and swelling, stated it started today. Denies SOB, CP , last BM was 3 days ago.      Review of Systems   Constitutional: Negative for chills, diaphoresis and fever.   Eyes: Negative for visual disturbance.   Respiratory: Negative for cough and shortness of breath.    Cardiovascular: Negative for chest pain and leg swelling.   Gastrointestinal: Negative for abdominal pain, nausea and vomiting.   Musculoskeletal: Positive for arthralgias and back pain.   Skin: Positive for wound. Negative for color change.     Objective:     Vital Signs (Most Recent):  Temp: 98.7 °F (37.1 °C) (04/19/17 1557)  Pulse: 75 (04/19/17 1100)  Resp: 16 (04/19/17 1100)  BP: (!) 152/63 (04/19/17 1557)  SpO2: 97 % (04/19/17 0726) Vital Signs (24h Range):  Temp:  [97.4 °F (36.3 °C)-99.5 °F (37.5 °C)] 98.7 °F (37.1 °C)  Pulse:  [67-78] 75  Resp:  [16] 16  SpO2:  [94 %-97 %] 97 %  BP: (110-218)/(53-87) 152/63     Weight: 57.3 kg (126 lb 4.8 oz)  Body mass index is 27.33 kg/(m^2).    Intake/Output Summary (Last 24 hours) at 04/19/17 1631  Last data filed at 04/19/17 1240   Gross per 24 hour   Intake              420 ml   Output                0 ml   Net              420 ml      Physical Exam   Constitutional: She appears well-developed. No distress.   Cardiovascular: Normal rate, regular rhythm and normal heart sounds.    Pulmonary/Chest: Effort normal and breath sounds normal. No respiratory distress.   Abdominal: Soft. Bowel sounds are normal. She exhibits no distension. There is no tenderness.   Musculoskeletal: She exhibits edema (right arm swelling. tender and painful).   Neurological: She is alert.   Skin: Skin is warm and dry. She is not diaphoretic. There is erythema.   Vitals reviewed.

## 2017-04-19 NOTE — PT/OT/SLP EVAL
Speech Language Pathology  Evaluation  Clinical Evaluation of Swallow  Discharge Summary      Danyelle Jacobs   MRN: [id number]   Admitting Diagnosis: Severe sepsis    Diet recommendations: Solid Diet Level: Puree  Liquid Diet Level: Thin Feed only when awake/alert, HOB to 90 degrees, Small bites/sips, 1 bite/sip at a time, Meds crushed in puree and Assistance with meals.  Pt's diet may be advanced to Mechanical soft or Dental Soft as tolerated.    SLP Treatment Date: 04/19/17  Speech Start Time: 1048     Speech Stop Time: 1107     Speech Total (min): 19 min       TREATMENT BILLABLE MINUTES:  Eval Swallow and Oral Function 19    Diagnosis: Severe sepsis      Past Medical History:   Diagnosis Date    Adverse effect of oral bisphosphonates     Anemia     Angiodysplasia of stomach: see EGD 1/17 2/13/2017    Anxiety     Aortic atherosclerosis     Benign hypertension 10/5/2015    Carcinoma, renal cell     Carotid arterial disease 1/20/2014    Cerebellar stroke, acute     Chronic kidney disease (CKD), stage I 2/18/2016    Coronary artery disease     Current chronic use of systemic steroids 10/4/2016    Degenerative disc disease     Depression     Diverticulosis     Former smoker 6/13/2016    Gastroesophageal reflux disease without esophagitis 10/5/2015    GERD (gastroesophageal reflux disease)     Goiter, nontoxic, multinodular     IBS (irritable bowel syndrome)     Left ventricular diastolic dysfunction with preserved systolic function 10/5/2015    Lung nodule     right    MGUS (monoclonal gammopathy of unknown significance) 6/13/2016    Myasthenia gravis, adult form     Osteoporosis 8/10/2015    Osteoporosis, unspecified     PAOD (peripheral arterial occlusive disease)     RA (rheumatoid arthritis)     Smoker 1/20/2014    Stroke     Ulcer     Vitamin D deficiency disease 1/20/2014     Past Surgical History:   Procedure Laterality Date    CATARACT EXTRACTION W/  INTRAOCULAR LENS IMPLANT  "Bilateral     CHOLECYSTECTOMY      COLONOSCOPY N/A 1/24/2017    Procedure: COLONOSCOPY;  Surgeon: Kristofer Worthington MD;  [facility]  Service: Endoscopy;  Laterality: N/A;  for chronic anemia ; needs to be off plavix x 5 days.     Off Plavix since recent hospitalization 1/19/17.    Speaks [place]/needs .    HYSTERECTOMY      KIDNEY SURGERY  2003    NEPHRECTOMY         Has the patient been evaluated by SLP for swallowing? : Yes  Keep patient NPO?: No   General Precautions: Standard, aspiration, fall          Social Hx: Patient lives with her son.    Prior diet: dental soft w/thin liquids.    Occupational/hobbies/homemaking: none stated.    Subjective:  "She has just a few teeth on top so chewing is hard for her.  Some things she just chews and spits out like certain meats."  Pt;s daughter reported  Patient goals: none stated.    Pain Rating: other (see comments) (pt did not rate)        Location: other (see comments) (pt c/o pain all over)  Pain Addressed: Reposition, Distraction, Cessation of Activity  Pain Rating Post-Intervention: other (see comments) (no change post)    Objective:   Patient found with: peripheral IV    Oral Musculature Evaluation  Oral Musculature: WFL  Dentition: scattered dentition  Mucosal Quality: dry  Mandibular Strength and Mobility: WFL  Oral Labial Strength and Mobility: WFL  Lingual Strength and Mobility: WFL  Buccal Strength and Mobility: WFL  Volitional Cough: adequate  Volitional Swallow: timely  Voice Prior to PO Intake: clear     Bedside Swallow Eval:  Consistencies Assessed: Thin liquids tsp, cup edge and straw sips, Puree tsp bites and Soft solids clinician fed bite x1  Oral Phase: excess chewing of soft solids d/t missing teeth.  Pharyngeal Phase: WFL, no overt clinical  signs/symptoms of aspiration and no overt clinical signs/symptoms of pharyngeal dysphagia.  Pt tolerated all liquid and puree trials w/ no overt s/s of airway " compromise.    Additional Treatment:  Education was provided to pt and her daughter re: SLP role, eval results, diet recs, aspiration precautions and s/s of aspiration.  Pt's daughter indicated good understanding.    FIM:                                 Assessment:  Danyelle Jacobs is a 88 y.o. female with a medical diagnosis of Severe sepsis and presents with a functional oropharyngeal swallow.  Further Skilled Speech services are not indicated at this time.    Do you have any cultural, spiritual, Holiness conflicts, given your current situation?: none     Discharge recommendations: Discharge Facility/Level Of Care Needs: nursing facility, skilled     Goals:   SLP Goals     Not on file      Multidisciplinary Problems (Resolved)        Problem: SLP Goal    Goal Priority Disciplines Outcome   SLP Goal   (Resolved)     SLP Outcome(s) achieved              Plan:   Patient to be seen     Plan of Care expires:    Plan of Care reviewed with: patient, daughter  SLP Follow-up?: No         SLP G-Codes  Functional Assessment Tool Used: noms  Score: 4  Functional Limitations: Swallowing  Swallow Current Status (): CK  Swallow Goal Status ():   Swallow Discharge Status (): LUZ MARIA Hammond CCC-SLP  04/19/2017

## 2017-04-19 NOTE — ASSESSMENT & PLAN NOTE
- Source of right hip/RL abdominal pain  - Patient without septic hip at this point  - patient is undergoing IR CT guided aspiration on 4/18.cultures are growing +ve cocci in clusters.    - Continue ancef

## 2017-04-19 NOTE — PROGRESS NOTES
S/p IR drain for psoas abscess.  Patient reports hip pain improved, however does complain of some diffuse pain.      Psoas abscess grew out Staph Aureus.  Continue antibiotics per ID.  No indication for surgery at this time.  Will sign off.  Thank you for the consult.    perfecto manuel md

## 2017-04-19 NOTE — PT/OT/SLP PROGRESS
"Physical Therapy  Treatment    Danyelle Jacobs   MRN: [id number]   Admitting Diagnosis: Severe sepsis    PT Received On: 04/19/17  PT Start Time: 1330     PT Stop Time: 1355    PT Total Time (min): 25 min       Billable Minutes:  Therapeutic Activity 25    Treatment Type: Treatment  PT/PTA: PT       General Precautions: Standard, aspiration, fall    Subjective:  Communicated with RN (Liza) prior to session. SPT (Megan) present for session.    Pt speaking in German to her daughter regarding declining EOB mobility this date; pt agreeable with encouragement from PT and pt's daughter. Pt yelling out in pain throughout session; easily redirectable.     Pain Rating:  (pt unable to rate pain; per daughter pt states "i feel like I have just had a baby.")  Pain Rating Post-Intervention:  (Pt resting comfortably; NAD)    Objective:   Patient found with: peripheral IV (RN to order pressure relief boots)    Functional Mobility:  Bed Mobility:   Rolling/Turning to Left: Total assistance  Rolling/Turning Right: Total assistance  Scooting/Bridging: Total Assistance (to maintain sitting balance and scoot to EOB)  Supine to Sit: Total Assistance (Mod A x3 persons)  Sit to Supine: Total Assistance (Mod A x3 persons)    Transfers:  Sit <> Stand Assistance: Activity did not occur    Balance:   Static Sit: POOR: Needs MODERATE assist to maintain  Dynamic Sit: POOR: N/A  Static Stand: LISA this date    Pt tolerated x6 min sitting EOB this date with mod-max A for sitting balance and facilitation/VCs for upright posture and forward gaze. Pt instructed on x5 short arc quads to B LE to attempt prolonging EOB sitting; pt unable to tolerate and requesting return to supine 2/2 discomfort.    Therapeutic Activities and Exercises:  PT entered pt's room to find pt resting quietly with daughter at bedside. Pt initially declining PT intervention this date 2/2 pain. Agreeable with encouragement. Pt tolerated EOB sitting as above; pt req increased time " to move toward EOB and req placement of B LE near EOB progressively per pain tolerance. Pt's daughter providing encouragement throughout. Pt interactive throughout session, but inattentive. Upon return to supine, pt req total A toward HOB via drawsheet and positioned in L sidelying per request. Pt resting comfortably; daughter at bedside. RN aware of pt's current status and mobility needs. Questions/concerns addressed within PT scope of practice.     AM-PAC 6 CLICK MOBILITY  How much help from another person does this patient currently need?   1 = Unable, Total/Dependent Assistance  2 = A lot, Maximum/Moderate Assistance  3 = A little, Minimum/Contact Guard/Supervision  4 = None, Modified [place]/Independent    Turning over in bed (including adjusting bedclothes, sheets and blankets)?: 2  Sitting down on and standing up from a chair with arms (e.g., wheelchair, bedside commode, etc.): 2  Moving from lying on back to sitting on the side of the bed?: 2  Moving to and from a bed to a chair (including a wheelchair)?: 2  Need to walk in hospital room?: 1  Climbing 3-5 steps with a railing?: 1  Total Score: 10    AM-PAC Raw Score CMS G-Code Modifier Level of Impairment Assistance   6 % Total / Unable   7 - 9 CM 80 - 100% Maximal Assist   10 - 14 CL 60 - 80% Moderate Assist   15 - 19 CK 40 - 60% Moderate Assist   20 - 22 CJ 20 - 40% Minimal Assist   23 CI 1-20% SBA / CGA   24 CH 0% Independent/ Mod I     Patient left left sidelying with HOB elevated with all lines intact, call button in reach, RN notified and daughter present.    Assessment:  Danyelle Jacobs is a 88 y.o. female with a medical diagnosis of Severe sepsis; s/p psoas abscess draining and presents with improved participation with PT this date; tolerated EOB x6 min and attempting to assist with supine to sit task despite significant pain to entire body. Pt limited by pain this date; will progress as tolerated. Pt req total A for mnmgnt of painful  extremities; however, able to mobilize with mod A x1 for sitting balance EOB. Pt will benefit from SNF placement. Patient will benefit from continued PT services to address:    Rehab identified problem list/impairments: Rehab identified problem list/impairments: weakness, impaired endurance, impaired sensation, impaired self care skills, impaired functional mobilty, gait instability, impaired balance, decreased lower extremity function, decreased upper extremity function, decreased coordination, impaired cognition, decreased safety awareness, pain, edema, impaired skin, impaired joint extensibility    Rehab potential is good.    Activity tolerance: Good    Discharge recommendations: Discharge Facility/Level Of Care Needs: nursing facility, skilled     Barriers to discharge: Barriers to Discharge: Inaccessible home environment, Decreased caregiver support    Equipment recommendations: Equipment Needed After Discharge:  (TBD next level of care)     GOALS:   Physical Therapy Goals        Problem: Physical Therapy Goal    Goal Priority Disciplines Outcome Goal Variances Interventions   Physical Therapy Goal     PT/OT, PT Ongoing (interventions implemented as appropriate)     Description:  Goals to be met by: 2017     Patient will increase functional independence with mobility by performin. Supine to sit with Mod Assistance-not met  2. Sit to supine with Mod Assistance-not met  3. Sit to stand transfer with Max Assistance-not met  4. Bed to chair transfer with Max Assistance using LRAD-not met  5. Gait x 10 feet with Max Assistance using LRAD-not met  6. Sitting at edge of bed x 5 minutes with Minimal Assistance-not met  7. Stand for x 1 minute with Moderate Assistance using LRAD-not met  8. Lower extremity exercise program x15 reps per handout, with assistance as needed-not met                PLAN:    Patient to be seen 4 x/week  to address the above listed problems via gait training, therapeutic activities,  therapeutic exercises, neuromuscular re-education  Plan of Care expires: 05/17/17  Plan of Care reviewed with: patient, daughter     Bekah CADETOrlin Prescott, PT, DPT  [phone]  4/19/2017

## 2017-04-19 NOTE — PLAN OF CARE
Problem: Physical Therapy Goal  Goal: Physical Therapy Goal  Goals to be met by: 2017     Patient will increase functional independence with mobility by performin. Supine to sit with Mod Assistance-not met  2. Sit to supine with Mod Assistance-not met  3. Sit to stand transfer with Max Assistance-not met  4. Bed to chair transfer with Max Assistance using LRAD-not met  5. Gait x 10 feet with Max Assistance using LRAD-not met  6. Sitting at edge of bed x 5 minutes with Minimal Assistance-not met  7. Stand for x 1 minute with Moderate Assistance using LRAD-not met  8. Lower extremity exercise program x15 reps per handout, with assistance as needed-not met   Outcome: Ongoing (interventions implemented as appropriate)     Goals updated and appropriate to reflect pt's current mobility needs.     Bekah Prescott, PT, DPT  [phone]  2017

## 2017-04-19 NOTE — PLAN OF CARE
Problem: SLP Goal  Goal: SLP Goal  Outcome: Outcome(s) achieved Date Met:  04/19/17  Clinical Swallow evaluation completed.  REC: pt cont w/ puree diet w/ thin liquids, oral meds crushed in puree, aspiration precautions, feeding assistance for all meals.  Pt diet may be advanced to mechanical/dental soft as tolerated. Further Skilled Speech services are not indicated.  ST to s/o.  Thank you.       Pilar Hammond CCC-SLP  4/19/2017

## 2017-04-19 NOTE — PROGRESS NOTES
[facility]  Infectious Disease  Progress Note    Patient Name: Danyelle Jacobs  MRN: [id number]  Admission Date: 4/6/2017  Length of Stay: 13 days  Attending Physician: Yen Ledezma MD  Primary Care Provider: Bianca Dykes MD    Isolation Status: No active isolations  Assessment/Plan:      MSSA (methicillin susceptible Staphylococcus aureus) septicemia  88 year old female with hx of HTN, HLD, CAD, and RA admitted 4/6 for GI bleed. Patient's course complicated by fevers up to 103.5, leukocytosis of 45.79 on 4/13 and patient was found to have MSSA bacteremia and right psoas abscess. She is now on IV Cefazolin. Her leukocytosis has resolved. Low grade temperature 99.9 today.  Persistently positive blood cultures likely secondary to need for adequate source control ( + 4/13, 4/15, 4/16, 4/17 ). Repeat blood cultures 4/18 today. May likely be source control. Will follow blood cultures from today now that abscess is drained.     - Continue IV Cefazolin  - IR drained abscess yesterday. Cultures growing staph aureus. Will follow   - 2D-echo negative for vegetations.   - procalcitonin 2.89 from 19.63  - Continue wound care per wound care recommendations.  - Follow repeat blood cultures.   - Discussed with staff. ID will follow with you.     Thank you for your consult. I will follow-up with patient. Please contact us if you have any additional questions.    Bailey Sierra PA-C  Infectious Disease  [facility]  [phone]    Subjective:     Principal Problem:Severe sepsis    HPI: 89 y/o female with hx of HTN, HLD, CAD, and RA presented to ED 4/6 for GI bleeding. Patient has a significant history of 'bleeding angiodysplastic lesion found on EGD with hemostasis achieved' performed in 01/2017. Patient also underwent colonoscopy at the same time which showed 'prominent vessels in the cecum but no definite AVM; raised bluish lesion likely a hematoma in the rectum.' While in the ICU she was  started on protonix and was conservatively managed from a GI perspective. She had recent admission 3/24 -4/1 for respiratory failure (presumed to be 2/2 PNA, treated with abx). On 4/13 pt was found to be diaphoretic, hypotensive and tachycardic. Pt also spiked fever of 103.5 and leukocytosis trended upward to 45.79. Pt was placed on vancomycin and zosyn. Today, pt remained afebrile, and WBC down to 31.20. Blood cultures were drawn 4/13 which are positive for Staph aureus. Pt denies having any CP, abdominal pain, n/v/d. Per family, pt does have skin tears and wound of extremities. CXR and UA negative. 2D-echo ordered today. Otherwise no complaints other than myalgia 2/2 from rheumatoid arthritis.   Interval History:  NAEON. Blood cultures persistently positive. Cultures repeated today. Had IR drainage yesterday, cultures growing Staph aureus. Sensitivities pending. Pt continues to have right hip pain and has right arm pain.     Review of Systems   Constitutional: Negative for chills, diaphoresis and fever.   HENT: Negative for congestion.    Respiratory: Negative for cough and shortness of breath.    Cardiovascular: Negative for chest pain and leg swelling.   Gastrointestinal: Negative for abdominal pain, diarrhea, nausea and vomiting.   Genitourinary: Negative for difficulty urinating and dysuria.   Musculoskeletal: Positive for arthralgias.        + right hip pain  + right arm pain   Skin: Positive for wound (to upper and lower extremities).   Neurological: Negative for numbness and headaches.   Hematological: Bruises/bleeds easily.     Objective:     Vital Signs (Most Recent):  Temp: 98.2 °F (36.8 °C) (04/19/17 0726)  Pulse: 67 (04/19/17 0726)  Resp: 16 (04/19/17 0726)  BP: (!) 125/53 (04/19/17 0726)  SpO2: 97 % (04/19/17 0726) Vital Signs (24h Range):  Temp:  [97.4 °F (36.3 °C)-98.8 °F (37.1 °C)] 98.2 °F (36.8 °C)  Pulse:  [60-78] 67  Resp:  [16-18] 16  SpO2:  [94 %-97 %] 97 %  BP: (110-218)/(52-87) 125/53      Weight: 57.3 kg (126 lb 4.8 oz)  Body mass index is 27.33 kg/(m^2).    Estimated Creatinine Clearance: 50.3 mL/min (based on Cr of 0.7).    Physical Exam   Constitutional: She appears well-developed and well-nourished. No distress.   HENT:   Head: Normocephalic.   Eyes: Pupils are equal, round, and reactive to light.   Cardiovascular: Normal rate and intact distal pulses.  Exam reveals no gallop and no friction rub.    No murmur heard.  Distant heart sounds   Pulmonary/Chest: Effort normal. No respiratory distress. She has no wheezes. She has no rales.   Abdominal: Soft. She exhibits no distension. There is no tenderness.   Musculoskeletal: She exhibits edema (RLE) and tenderness (right arm and right leg). She exhibits no deformity.   Neurological: She is alert. No cranial nerve deficit.   Skin: Skin is warm and dry. She is not diaphoretic.   right lower extremity wound with minimal drainage. No malodor. + surrounding warmth, edema and erythema.  Ecchymosis to BUEs.    Psychiatric: She has a normal mood and affect.   Nursing note and vitals reviewed.      Significant Labs: All pertinent labs within the past 24 hours have been reviewed.    Significant Imaging: I have reviewed all pertinent imaging results/findings within the past 24 hours.

## 2017-04-19 NOTE — PLAN OF CARE
Problem: Patient Care Overview  Goal: Plan of Care Review  Outcome: Ongoing (interventions implemented as appropriate)  AAOX2 to person, and place, infection control maintained, pt on 24hr ancef infusion, no falls this shift, bed alarm on, family @ bedside, worked with PT and OT today, pt with c/o pain to right arm, MD notified, US of arm at some point this evening. Wound care provided. Call light in reach..

## 2017-04-19 NOTE — ASSESSMENT & PLAN NOTE
88 year old female with hx of HTN, HLD, CAD, and RA admitted 4/6 for GI bleed. Patient's course complicated by fevers up to 103.5, leukocytosis of 45.79 on 4/13 and patient was found to have MSSA bacteremia and right psoas abscess. She is now on IV Cefazolin. Her leukocytosis has resolved. Low grade temperature 99.9 today.  Persistently positive blood cultures likely secondary to need for adequate source control ( + 4/13, 4/15, 4/16, 4/17 ). Repeat blood cultures 4/18 today. May likely be source control. Will follow blood cultures from today now that abscess is drained.     - Continue IV Cefazolin  - IR drained abscess yesterday. Cultures growing staph aureus. Will follow   - 2D-echo negative for vegetations.   - procalcitonin 2.89 from 19.63  - Continue wound care per wound care recommendations.  - Follow repeat blood cultures.   - Discussed with staff. ID will follow with you.

## 2017-04-19 NOTE — PLAN OF CARE
Problem: Patient Care Overview  Goal: Plan of Care Review  Outcome: Ongoing (interventions implemented as appropriate)  No acute changes overnight. Pt remains oriented to self with brief moments of disorientation. 0400 Bp was elevated @ 218/87, treated w/ prn clonidine and decreased to 178/76. Complaints of pain was treated with tramadol. Son-in-law is at the bedside. Will continue to monitor.

## 2017-04-19 NOTE — PLAN OF CARE
IMM signed by daughter, copy given to daughter and original placed on chart.     04/19/17 1015   Medicare Message   Important Message from Medicare regarding Discharge Appeal Rights Given to patient/caregiver;Explained to patient/caregiver;Signed/date by patient/caregiver   Date IMM was signed 04/19/17   Time IMM was signed 101

## 2017-04-19 NOTE — ASSESSMENT & PLAN NOTE
- ASA 81 mg PO was held for procedure. Will restart it today.   - Plavix 75 mg PO daily - held  - no signs of further bleeding, Hb stable.  -given hx of multiple GI bleeds, will defer restarting plavix as an OP

## 2017-04-19 NOTE — PLAN OF CARE
CM received call from [place], daughter, to call her brother, Andrés [phone] concerning her Mother's placement in a facility.  CM called Andrés and spoke with him about [facility] clinically accepting patient.  Andrés states they have not looked at the facility and that they did go see [facility] [phone] and were very impressed with it.  They will go look at [facility] today and let us know which facility they want.      Andrés also expressed concern about transferring her prior to infection being cleared up.  Informed MD stated he would not transfer patient until medically stable.  Andrés verbalized understanding.    JAMARCUS then called [facility] concerning acceptance, spoke with Thais who states she was clinically accepted and will call back to let CM know the financial status.  Awaiting call back.    12:50  CM received call back from Thais at [facility] stating that patient was financially accepted also.  CM informed they were still the number one choice at this time and would let them know when patient medically ready for discharge to SNF.

## 2017-04-19 NOTE — PROGRESS NOTES
[facility] Hospital Medicine  Progress Note    Patient Name: Danyelle Jacobs  MRN: [id number]  Patient Class: IP- Inpatient   Admission Date: 2017  Length of Stay: 13 days  Attending Physician: Yen Ledezma MD  Primary Care Provider: Bianca Dykes MD    Hospital Medicine Team: [facility] HOSP MED O Rashaad Ricci MD    Subjective:     Principal Problem:Severe sepsis    HPI:  Ms. Jacobs is a 88 F with a history of HTN, HLD, CAD, PAD, HFpEF, dementia, and rheumatoid arthritis who presented to the ED   with GI bleeding. Her son reported that her symptoms began the evening before with bilious vomiting and diarrhea. Since then, she had had 6 episodes of emesis and 5 episodes of diarrhea which started out as black and became bright red by her last BM which was at 6 AM. She currently denies abdominal pain, SOB, or nausea; however, her son reports that she will usually deny symptoms even if they are present.     She had a recent admission from 3/24- for respiratory failure with hypoxia (presumed 2/2 to PNA, resolved with abx) and RLE pain. Also admitted - and transfused at that time. Patient has a significant history of 'bleeding angiodysplastic lesion found on EGD with hemostasis achieved' performed in 2017. Patient also underwent colonoscopy at the same time which showed 'prominent vessels in the cecum but no definite AVM; raised bluish lesion likely a hematoma in the rectum.' Son reports that she has declined both mentally and physically in the last few months and has become progressively weak, less responsive, and has had hallucinations including speaking to  relatives. Also, she has developed urinary and fecal incontinence recently.     While in the ICU she was started on protonix and was conservatively managed from a GI perspective. She was able to be stepped down to hospital medicine on  and has had a relatively benign course since that time. A rapid was called tonight for  Ms. Jacobs because she was found to be more altered, diaphoretic, pale and hypotensive in the 70s-80s with baseline in the 110s-120s. Patient was bolused IV fluids with improvement in both blood pressure and mental status but still remained somewhat lethargic. Patient had also spiking fevers up to 103.5. Given patient's rapid deterioration and borderline blood pressures despite fluids, she will be accepted in to the ICU for closer monitoring.       Hospital Course:  04/06/17: Upon initial evaluation patient's initial oxygen requirements appeared to be increasing. ABG was performed which showed adequate oxygenation. Hct was also obtained with the arterial stick which showed a Hct of 19. Based on this result and her presenting H/H of 8.3/26.2, 1 U PRBCs was ordered and received 2L of NS. GI and ICU were consulted for further management. Repeat hgb and hemodynamics are stable. Protonix drip started. CT abdomen W/O significant for diverticulosis, adnexal cystic structure, L1 compression fx, and lung opacities (atelectasis v infection/inflammation).    4/7: Hgb stable, no further episodes of vomiting or diarrhea. Pt c/o RLE pain. Hypertensive to 200 systolic, restarting home BP meds stepwise. GI recs supportive care. PARVEEN improved.     4/8: stepped down to hospital medicine. Hemodynamically stable.     4/9: No acute events, PTOT recommending SNF.      4/10: Son requested pelvic ultrasound for patient since she is currently admitted. H/H stable. Awaiting eval from [facility].    4/11: 4/9: no acute events overnight. Started nystatin ointment yesterday for candidal rash under breasts.S/p U/S revealing cystic mass in L adnexa. Awaiting placement.     4/12 - NAEON. Awaiting placement.    4/13 - A rapid was called because she was found to be more altered, diaphoretic, pale and hypotensive in the 70s-80s with baseline in the 110s-120s. Patient was bolused IV fluids with improvement in both blood pressure and mental status but  still remained somewhat lethargic. Patient had also spiking fevers up to 103.5. Given patient's rapid deterioration and borderline blood pressures despite fluids, she was sent to the ICU for closer monitoring.   Patient was fluid resuscitated with over 30 cc/kg and had full response in blood pressure and tachycardia. IV abx of vanc and zosyn were started. Patient's mental status improved to baseline by the morning. Discussed plan with patient's family.     4/15 - Pt transferred back to hospital medicine. SHAWNA.  She remained afebrile with all other VSS.  BCx positive for GPC, awaiting identification of organism.  Continue vanc/zosyn.    4/16 - NAEON.  Pt remained AFVSS.  BCx growing staph aureus, sensitivities pending, repeat BCx positive.  ID following, recommend right hip imaging.    04/17 - Right hip abscess elucidated on MRI. Talked to General Surgery and indicated that IR was the most likely route. Her WCC has decreased and there is no concern for a septic hip at this point.  Increased the patient's hydralazine dose as well.        Interval Hx: patient is complaining of right arm pain and swelling, stated it started today. Denies SOB, CP , last BM was 3 days ago.      Review of Systems   Constitutional: Negative for chills, diaphoresis and fever.   Eyes: Negative for visual disturbance.   Respiratory: Negative for cough and shortness of breath.    Cardiovascular: Negative for chest pain and leg swelling.   Gastrointestinal: Negative for abdominal pain, nausea and vomiting.   Musculoskeletal: Positive for arthralgias and back pain.   Skin: Positive for wound. Negative for color change.     Objective:     Vital Signs (Most Recent):  Temp: 98.7 °F (37.1 °C) (04/19/17 1557)  Pulse: 75 (04/19/17 1100)  Resp: 16 (04/19/17 1100)  BP: (!) 152/63 (04/19/17 1557)  SpO2: 97 % (04/19/17 0726) Vital Signs (24h Range):  Temp:  [97.4 °F (36.3 °C)-99.5 °F (37.5 °C)] 98.7 °F (37.1 °C)  Pulse:  [67-78] 75  Resp:  [16] 16  SpO2:   [94 %-97 %] 97 %  BP: (110-218)/(53-87) 152/63     Weight: 57.3 kg (126 lb 4.8 oz)  Body mass index is 27.33 kg/(m^2).    Intake/Output Summary (Last 24 hours) at 04/19/17 1631  Last data filed at 04/19/17 1240   Gross per 24 hour   Intake              420 ml   Output                0 ml   Net              420 ml      Physical Exam   Constitutional: She appears well-developed. No distress.   Cardiovascular: Normal rate, regular rhythm and normal heart sounds.    Pulmonary/Chest: Effort normal and breath sounds normal. No respiratory distress.   Abdominal: Soft. Bowel sounds are normal. She exhibits no distension. There is no tenderness.   Musculoskeletal: She exhibits edema (right arm swelling. tender and painful).   Neurological: She is alert.   Skin: Skin is warm and dry. She is not diaphoretic. There is erythema.   Vitals reviewed.         Assessment/Plan:      CAD (coronary artery disease)  - ASA 81 mg PO was held for procedure. Will restart it today.   - Plavix 75 mg PO daily - held  - no signs of further bleeding, Hb stable.  -given hx of multiple GI bleeds, will defer restarting plavix as an OP    Mixed hyperlipidemia  - Lipitor 40 mg PO daily    Gastroesophageal reflux disease without esophagitis  -s/p protonix iv  - on protonix oral now.     Current chronic use of systemic steroids  - chronic immunosuppression likely contributed to pt's rapid decline  - will hold steroids until blood cultures clear, restart 10mg prednisone when appropriate      Anemia associated with acute blood loss  - s/p pRBC's on this admission secondary to GIB from angiodysplagia.   - no signs of repeat bleeding  - Will transfuse if patient's hb < 7      Angiodysplasia of stomach: see EGD 1/17  - EGD1/2017: 'bleeding angiodysplastic lesion found with hemostasis achieved'  - Colonoscopy: 'prominent vessels in the cecum but no definite AVM; raised bluish lesion likely a hematoma in the rectum.'  - Multiple episodes of yellow/green emesis  w/ five episodes of diarrhea, dark red --> bright red  - Patient currently HDS, H/H stable at this point  - GI consulted, recommend OP follow up    Compression fracture of first lumbar vertebra  -likely 2/2 to osteoporosis, seen on CT abdo  -possible nerve impingement contributing to R hip pain  -continue lyrica and PTOT  -Volteran gel      Adnexal mass  L cystic adnexal mass seen on CTabdo in setting of reported complete hysterectomy  -Pelvic U/S revealing 2cm cystic structure, can be monitored with yearly U/S      Candidal intertrigo  -under breasts  -nystatin ointment      MSSA (methicillin susceptible Staphylococcus aureus) septicemia  - MSSA bacteremia. Now with Rt. Psoas abscess.  - source likely skin given multiple areas of tear and breakdown  - UCx negative but urine collected after broad spectrum antibiotics so not helpful  - CXR does not show any new consolidation although aspiration still possible  - continue ancef.   - cultures are still +ve , will repeat.   - 2D echo with no vegetations.       Psoas abscess  - Source of right hip/RL abdominal pain  - Patient without septic hip at this point  - patient is undergoing IR CT guided aspiration on 4/18.cultures are growing +ve cocci in clusters.    - Continue ancef      VTE Risk Mitigation         Ordered     enoxaparin injection 40 mg  Daily     Route:  Subcutaneous        04/19/17 1632     Medium Risk of VTE  Once      04/06/17 1535     Place ISHMAEL hose  Until discontinued      04/06/17 1535     Reason for No Pharmacological VTE Prophylaxis  Once      04/06/17 1535     Place sequential compression device  Until discontinued      04/06/17 1504          Rashaad Ricci MD  Department of Hospital Medicine   [facility]

## 2017-04-19 NOTE — SUBJECTIVE & OBJECTIVE
Interval History:  NAEON. Blood cultures persistently positive. Cultures repeated today. Had IR drainage yesterday, cultures growing Staph aureus. Sensitivities pending. Pt continues to have right hip pain and has right arm pain.     Review of Systems   Constitutional: Negative for chills, diaphoresis and fever.   HENT: Negative for congestion.    Respiratory: Negative for cough and shortness of breath.    Cardiovascular: Negative for chest pain and leg swelling.   Gastrointestinal: Negative for abdominal pain, diarrhea, nausea and vomiting.   Genitourinary: Negative for difficulty urinating and dysuria.   Musculoskeletal: Positive for arthralgias.        + right hip pain  + right arm pain   Skin: Positive for wound (to upper and lower extremities).   Neurological: Negative for numbness and headaches.   Hematological: Bruises/bleeds easily.     Objective:     Vital Signs (Most Recent):  Temp: 98.2 °F (36.8 °C) (04/19/17 0726)  Pulse: 67 (04/19/17 0726)  Resp: 16 (04/19/17 0726)  BP: (!) 125/53 (04/19/17 0726)  SpO2: 97 % (04/19/17 0726) Vital Signs (24h Range):  Temp:  [97.4 °F (36.3 °C)-98.8 °F (37.1 °C)] 98.2 °F (36.8 °C)  Pulse:  [60-78] 67  Resp:  [16-18] 16  SpO2:  [94 %-97 %] 97 %  BP: (110-218)/(52-87) 125/53     Weight: 57.3 kg (126 lb 4.8 oz)  Body mass index is 27.33 kg/(m^2).    Estimated Creatinine Clearance: 50.3 mL/min (based on Cr of 0.7).    Physical Exam   Constitutional: She appears well-developed and well-nourished. No distress.   HENT:   Head: Normocephalic.   Eyes: Pupils are equal, round, and reactive to light.   Cardiovascular: Normal rate and intact distal pulses.  Exam reveals no gallop and no friction rub.    No murmur heard.  Distant heart sounds   Pulmonary/Chest: Effort normal. No respiratory distress. She has no wheezes. She has no rales.   Abdominal: Soft. She exhibits no distension. There is no tenderness.   Musculoskeletal: She exhibits edema (RLE) and tenderness (right arm and right  leg). She exhibits no deformity.   Neurological: She is alert. No cranial nerve deficit.   Skin: Skin is warm and dry. She is not diaphoretic.   right lower extremity wound with minimal drainage. No malodor. + surrounding warmth, edema and erythema.  Ecchymosis to BUEs.    Psychiatric: She has a normal mood and affect.   Nursing note and vitals reviewed.      Significant Labs: All pertinent labs within the past 24 hours have been reviewed.    Significant Imaging: I have reviewed all pertinent imaging results/findings within the past 24 hours.

## 2017-04-20 ENCOUNTER — APPOINTMENT (OUTPATIENT)
Dept: CARDIOLOGY | Facility: CLINIC | Age: 82
DRG: 811 | End: 2017-04-20
Payer: MEDICARE

## 2017-04-20 PROBLEM — M79.89 PAIN AND SWELLING OF RIGHT FOREARM: Status: ACTIVE | Noted: 2017-04-20

## 2017-04-20 PROBLEM — M79.631 PAIN AND SWELLING OF RIGHT FOREARM: Status: ACTIVE | Noted: 2017-04-20

## 2017-04-20 LAB
ALBUMIN SERPL BCP-MCNC: 1.4 G/DL
ALP SERPL-CCNC: 133 U/L
ALT SERPL W/O P-5'-P-CCNC: 5 U/L
ANION GAP SERPL CALC-SCNC: 7 MMOL/L
ANISOCYTOSIS BLD QL SMEAR: SLIGHT
AST SERPL-CCNC: 16 U/L
BACTERIA BLD CULT: NORMAL
BACTERIA SPEC AEROBE CULT: NORMAL
BASOPHILS # BLD AUTO: ABNORMAL K/UL
BASOPHILS NFR BLD: 1 %
BILIRUB SERPL-MCNC: 0.3 MG/DL
BUN SERPL-MCNC: 13 MG/DL
CALCIUM SERPL-MCNC: 8.3 MG/DL
CHLORIDE SERPL-SCNC: 102 MMOL/L
CO2 SERPL-SCNC: 23 MMOL/L
CREAT SERPL-MCNC: 0.7 MG/DL
DIFFERENTIAL METHOD: ABNORMAL
EOSINOPHIL # BLD AUTO: ABNORMAL K/UL
EOSINOPHIL NFR BLD: 0 %
ERYTHROCYTE [DISTWIDTH] IN BLOOD BY AUTOMATED COUNT: 18 %
EST. GFR  (AFRICAN AMERICAN): >60 ML/MIN/1.73 M^2
EST. GFR  (NON AFRICAN AMERICAN): >60 ML/MIN/1.73 M^2
GLUCOSE SERPL-MCNC: 83 MG/DL
HCT VFR BLD AUTO: 24 %
HGB BLD-MCNC: 8 G/DL
HYPOCHROMIA BLD QL SMEAR: ABNORMAL
LYMPHOCYTES # BLD AUTO: ABNORMAL K/UL
LYMPHOCYTES NFR BLD: 6 %
MAGNESIUM SERPL-MCNC: 1.6 MG/DL
MCH RBC QN AUTO: 26.9 PG
MCHC RBC AUTO-ENTMCNC: 33.3 %
MCV RBC AUTO: 81 FL
METAMYELOCYTES NFR BLD MANUAL: 1 %
MONOCYTES # BLD AUTO: ABNORMAL K/UL
MONOCYTES NFR BLD: 15 %
MYELOCYTES NFR BLD MANUAL: 1 %
NEUTROPHILS NFR BLD: 74 %
NEUTS BAND NFR BLD MANUAL: 2 %
OVALOCYTES BLD QL SMEAR: ABNORMAL
PHOSPHATE SERPL-MCNC: 2.2 MG/DL
PLATELET # BLD AUTO: 240 K/UL
PLATELET BLD QL SMEAR: ABNORMAL
PMV BLD AUTO: ABNORMAL FL
POIKILOCYTOSIS BLD QL SMEAR: SLIGHT
POTASSIUM SERPL-SCNC: 4.4 MMOL/L
PROT SERPL-MCNC: 4.5 G/DL
RBC # BLD AUTO: 2.97 M/UL
SCHISTOCYTES BLD QL SMEAR: PRESENT
SODIUM SERPL-SCNC: 132 MMOL/L
SPHEROCYTES BLD QL SMEAR: ABNORMAL
WBC # BLD AUTO: 8.89 K/UL

## 2017-04-20 PROCEDURE — 36415 COLL VENOUS BLD VENIPUNCTURE: CPT

## 2017-04-20 PROCEDURE — 85027 COMPLETE CBC AUTOMATED: CPT

## 2017-04-20 PROCEDURE — 25000003 PHARM REV CODE 250: Performed by: STUDENT IN AN ORGANIZED HEALTH CARE EDUCATION/TRAINING PROGRAM

## 2017-04-20 PROCEDURE — 97530 THERAPEUTIC ACTIVITIES: CPT

## 2017-04-20 PROCEDURE — 80053 COMPREHEN METABOLIC PANEL: CPT

## 2017-04-20 PROCEDURE — 11000001 HC ACUTE MED/SURG PRIVATE ROOM

## 2017-04-20 PROCEDURE — 63600175 PHARM REV CODE 636 W HCPCS: Performed by: PHYSICIAN ASSISTANT

## 2017-04-20 PROCEDURE — 25000003 PHARM REV CODE 250: Performed by: INTERNAL MEDICINE

## 2017-04-20 PROCEDURE — 99232 SBSQ HOSP IP/OBS MODERATE 35: CPT | Mod: GC,,, | Performed by: HOSPITALIST

## 2017-04-20 PROCEDURE — 93971 EXTREMITY STUDY: CPT | Mod: 26,,, | Performed by: INTERNAL MEDICINE

## 2017-04-20 PROCEDURE — 99233 SBSQ HOSP IP/OBS HIGH 50: CPT | Mod: ,,, | Performed by: PHYSICIAN ASSISTANT

## 2017-04-20 PROCEDURE — 97110 THERAPEUTIC EXERCISES: CPT

## 2017-04-20 PROCEDURE — 25000003 PHARM REV CODE 250: Performed by: HOSPITALIST

## 2017-04-20 PROCEDURE — 97535 SELF CARE MNGMENT TRAINING: CPT

## 2017-04-20 PROCEDURE — 25000003 PHARM REV CODE 250: Performed by: PHYSICIAN ASSISTANT

## 2017-04-20 PROCEDURE — 63600175 PHARM REV CODE 636 W HCPCS: Performed by: STUDENT IN AN ORGANIZED HEALTH CARE EDUCATION/TRAINING PROGRAM

## 2017-04-20 PROCEDURE — 83735 ASSAY OF MAGNESIUM: CPT

## 2017-04-20 PROCEDURE — 93971 EXTREMITY STUDY: CPT

## 2017-04-20 PROCEDURE — 84100 ASSAY OF PHOSPHORUS: CPT

## 2017-04-20 PROCEDURE — 85007 BL SMEAR W/DIFF WBC COUNT: CPT

## 2017-04-20 RX ORDER — OXYCODONE AND ACETAMINOPHEN 5; 325 MG/1; MG/1
1 TABLET ORAL EVERY 6 HOURS PRN
Status: DISCONTINUED | OUTPATIENT
Start: 2017-04-20 | End: 2017-04-24 | Stop reason: HOSPADM

## 2017-04-20 RX ADMIN — SODIUM CHLORIDE 500 ML: 0.9 INJECTION, SOLUTION INTRAVENOUS at 12:04

## 2017-04-20 RX ADMIN — LIDOCAINE 1 PATCH: 50 PATCH TOPICAL at 05:04

## 2017-04-20 RX ADMIN — Medication 3 ML: at 05:04

## 2017-04-20 RX ADMIN — TRAMADOL HYDROCHLORIDE 50 MG: 50 TABLET, COATED ORAL at 04:04

## 2017-04-20 RX ADMIN — DONEPEZIL HYDROCHLORIDE 10 MG: 10 TABLET, FILM COATED ORAL at 08:04

## 2017-04-20 RX ADMIN — LOSARTAN POTASSIUM 100 MG: 50 TABLET, FILM COATED ORAL at 08:04

## 2017-04-20 RX ADMIN — PANTOPRAZOLE SODIUM 40 MG: 40 TABLET, DELAYED RELEASE ORAL at 08:04

## 2017-04-20 RX ADMIN — OXYCODONE HYDROCHLORIDE AND ACETAMINOPHEN 1 TABLET: 5; 325 TABLET ORAL at 05:04

## 2017-04-20 RX ADMIN — ASPIRIN 81 MG CHEWABLE TABLET 81 MG: 81 TABLET CHEWABLE at 08:04

## 2017-04-20 RX ADMIN — CALCIUM CARBONATE-CHOLECALCIFEROL TAB 250 MG-125 UNIT 1 TABLET: 250-125 TAB at 08:04

## 2017-04-20 RX ADMIN — HYDRALAZINE HYDROCHLORIDE 75 MG: 25 TABLET, FILM COATED ORAL at 05:04

## 2017-04-20 RX ADMIN — ENOXAPARIN SODIUM 40 MG: 100 INJECTION SUBCUTANEOUS at 05:04

## 2017-04-20 RX ADMIN — ISOSORBIDE MONONITRATE 30 MG: 30 TABLET, EXTENDED RELEASE ORAL at 10:04

## 2017-04-20 RX ADMIN — CEFAZOLIN 6 G: 1 INJECTION, POWDER, FOR SOLUTION INTRAMUSCULAR; INTRAVENOUS; PARENTERAL at 10:04

## 2017-04-20 RX ADMIN — DICLOFENAC: 10 GEL TOPICAL at 09:04

## 2017-04-20 RX ADMIN — MICONAZOLE NITRATE: 20 OINTMENT TOPICAL at 10:04

## 2017-04-20 RX ADMIN — MICONAZOLE NITRATE: 20 OINTMENT TOPICAL at 09:04

## 2017-04-20 RX ADMIN — PREGABALIN 75 MG: 75 CAPSULE ORAL at 08:04

## 2017-04-20 RX ADMIN — PREGABALIN 75 MG: 75 CAPSULE ORAL at 10:04

## 2017-04-20 RX ADMIN — STANDARDIZED SENNA CONCENTRATE AND DOCUSATE SODIUM 1 TABLET: 8.6; 5 TABLET, FILM COATED ORAL at 08:04

## 2017-04-20 RX ADMIN — OXYCODONE HYDROCHLORIDE AND ACETAMINOPHEN 1 TABLET: 5; 325 TABLET ORAL at 09:04

## 2017-04-20 RX ADMIN — SODIUM PHOSPHATE, MONOBASIC, MONOHYDRATE 30 MMOL: 276; 142 INJECTION, SOLUTION INTRAVENOUS at 09:04

## 2017-04-20 RX ADMIN — HYDROCHLOROTHIAZIDE 12.5 MG: 12.5 TABLET ORAL at 08:04

## 2017-04-20 RX ADMIN — DULOXETINE 30 MG: 30 CAPSULE, DELAYED RELEASE ORAL at 08:04

## 2017-04-20 NOTE — PT/OT/SLP PROGRESS
"Occupational Therapy  Treatment    Danyelle Jacobs   MRN: [id number]   Admitting Diagnosis: Severe sepsis    OT Date of Treatment: 04/20/17   OT Start Time: 1354  OT Stop Time: 1439  OT Total Time (min): 45 min    Billable Minutes:  Self Care/Home Management 25 min, Therapeutic Activity 12 min and Therapeutic Exercise 8 min    General Precautions: Standard, aspiration, fall  Orthopedic Precautions: N/A  Braces: N/A         Subjective:  Communicated with nurse prior to session.  "Liang Shin!"  Pt speaking and making comments in Citizen of [place] throughout session.  OT able to communicate fluently in Citizen [place] with pt.  Pt was noted with improvement regarding following instructions and participating in tx session.     Pain Rating:  (pt grimacing and calling out in pain with movement to roll and sit up on EOB - did not give numerical value but pain significantly decreased when resting)  Location - Side: Right   Location:  (right lower arm/hand and hip)  Pain Addressed: Reposition, Pre-medicate for activity, Cessation of Activity, Distraction       Objective:  Patient found with: peripheral IV     Functional Mobility:  Bed Mobility:  Rolling/Turning to Left: Maximum assistance (pt assisted with reaching for bedrail to L and to roll to L - pt able to hold onto bedrail and hold position during hygiene)  Rolling/Turning Right: Maximum assistance (pt able to complete final 25% of roll towards R while reaching for bedrail - pt held onto bedrail and maintained position during hygiene from toileting)  Scooting/Bridging: Total Assistance (using draw sheet to scoot to EOB and HOB - assist x2 to scoot to HOB)  Supine to Sit: Total Assistance (total assist of 1 person only - needed assist to lower B LEs and lift trunk - increased time due to pain)  Sit to Supine: Maximum Assistance, Moderate Assistance (Mod - Max A: pt was able to lower trunk with increased cues and slowly raised L LE, needed assist with R LE to place on bed )    Transfers:   Sit " <> Stand Assistance: Activity did not occur - pt declined transfer to bed side chair      Activities of Daily Living:  Feeding Level of Assistance: Activity did not occur     UE Dressing Level of Assistance: Maximum assistance (hospital gown)    LE Dressing Level of Assistance: Total assistance (marcial socks)    Grooming Position: EOB  Grooming Level of Assistance: Maximum assistance (pt combed hair while seated on EOB - able to comb small portion on her L but needed assist to complete)     Toileting Where Assessed: Bed level  Toileting Level of Assistance: Maximum assistance (pt was incontinent - pt needed assistance wiping buttocks but able to wipe perineal - able to hold position in sidelying during rear hytiene)     Bathing Level of Assistance: Activity did not occur      Balance:   Static Sit: FAIR-: Maintains without assist but inconsistent   Dynamic Sit: FAIR: Cannot move trunk without losing balance  Static Stand: N/A  Dynamic stand: N/A    Therapeutic Activities and Exercises:  · Pt worked on ADLs and func mobility as noted above  · Pt able to sit on EOB for ~8-10 minutes with assist - pt with increasing kyphotic posture with time due to weak core - pt provided with physical cues to correct posture, but pt unable to carry through due to pain in back  · Pt completed 1 set of 10 reps of B UE AROM exercises in order to work towards increasing her UB strength/endurance to assist with mobility and self care skills.  Pt completed the following exercises: shoulder flex/ext, elbow flex/ext, forearm sup/pro, and hand pumps.   · Pt and daughter educated on edema management - encouraged to perform UE exercises 3x daily and to elevate R UE    AM-PAC 6 CLICK ADL   How much help from another person does this patient currently need?   1 = Unable, Total/Dependent Assistance  2 = A lot, Maximum/Moderate Assistance  3 = A little, Minimum/Contact Guard/Supervision  4 = None, Modified [place]/Independent    Putting on and  taking off regular lower body clothing? : 1  Bathing (including washing, rinsing, drying)?: 2  Toileting, which includes using toilet, bedpan, or urinal? : 2  Putting on and taking off regular upper body clothing?: 2  Taking care of personal grooming such as brushing teeth?: 3  Eating meals?: 3  Total Score: 13     AM-PAC Raw Score CMS G-Code Modifier Level of Impairment Assistance   6 % Total / Unable   7 - 9 CM 80 - 100% Maximal Assist   10 - 14 CL 60 - 80% Moderate Assist   15 - 19 CK 40 - 60% Moderate Assist   20 - 22 CJ 20 - 40% Minimal Assist   23 CI 1-20% SBA / CGA   24 CH 0% Independent/ Mod I     Patient left HOB elevated with all lines intact, call button in reach and daughter present    ASSESSMENT:  Danyelle Jacobs is a 88 y.o. female with a medical diagnosis of Severe sepsis and presents with pain, R UE edema, decreased strength/endurance, decreased self care, and decreased fun mobility. Pt was agreeable to OT and was noted to make progress towards her goals in therapy.  Pt has not reached goals during today's session but is demonstrating increased participation in tx involving func mobility and self care.  Pt's primary barrier during today's session include pain, deconditioning, and edema.  Pt's goals remain appropriate at this time.  She will continue to benefit from skilled OT services in order to assist her with increasing her safety and level of independence with self care and mobility tasks. .    Rehab identified problem list/impairments: Rehab identified problem list/impairments: weakness, impaired endurance, impaired self care skills, impaired functional mobilty, gait instability, decreased lower extremity function, decreased upper extremity function, impaired balance, decreased safety awareness, pain, edema, impaired skin, impaired joint extensibility    Rehab potential is fair.    Activity tolerance: Fair    Discharge recommendations: Discharge Facility/Level Of Care Needs: nursing  facility, skilled     Barriers to discharge: Barriers to Discharge: Decreased caregiver support, Inaccessible home environment    Equipment recommendations:  (TBD at next level of care)     GOALS:   Occupational Therapy Goals        Problem: Occupational Therapy Goal    Goal Priority Disciplines Outcome Interventions   Occupational Therapy Goal     OT, PT/OT Ongoing (interventions implemented as appropriate)    Description:  Goals to be met by: 4/22/17     Patient will increase functional independence with ADLs by performing:    UE Dressing with Moderate Assistance.  LE Dressing with Moderate Assistance.  Grooming while EOB with Set-up Assistance and Stand-by Assistance.  Toileting from toilet with Moderate Assistance for hygiene and clothing management.   Sitting at edge of bed x15 minutes with Stand-by Assistance.  Rolling to Bilateral with Minimal Assistance.   Supine to sit with Minimal Assistance.  Stand pivot transfers with Moderate Assistance.  Toilet transfer to toilet with Moderate Assistance.             Problem: Occupational Therapy Goal    Goal Priority Disciplines Outcome Interventions   Occupational Therapy Goal     OT, PT/OT               Plan:  Patient to be seen 4 x/week to address the above listed problems via self-care/home management, therapeutic activities, therapeutic exercises, neuromuscular re-education  Plan of Care expires: 05/17/17  Plan of Care reviewed with: patient, daughter         Twyla Vasquez, OT  04/20/2017

## 2017-04-20 NOTE — PLAN OF CARE
Problem: Occupational Therapy Goal  Goal: Occupational Therapy Goal  Goals to be met by: 4/22/17     Patient will increase functional independence with ADLs by performing:    UE Dressing with Moderate Assistance.  LE Dressing with Moderate Assistance.  Grooming while EOB with Set-up Assistance and Stand-by Assistance.  Toileting from toilet with Moderate Assistance for hygiene and clothing management.   Sitting at edge of bed x15 minutes with Stand-by Assistance.  Rolling to Bilateral with Minimal Assistance.   Supine to sit with Minimal Assistance.  Stand pivot transfers with Moderate Assistance.  Toilet transfer to toilet with Moderate Assistance.   Outcome: Ongoing (interventions implemented as appropriate)     Pt was agreeable to OT and was noted to make progress towards her goals in therapy.  Pt has not reached goals during today's session but is demonstrating increased participation in tx involving func mobility and self care.  Pt's primary barrier during today's session include pain, deconditioning, and edema.  Pt's goals remain appropriate at this time.  She will continue to benefit from skilled OT services in order to assist her with increasing her safety and level of independence with self care and mobility tasks.      Twyla Vasquez, OT  4/20/2017

## 2017-04-20 NOTE — ASSESSMENT & PLAN NOTE
88 year old female with hx of HTN, HLD, CAD, and RA admitted 4/6 for GI bleed. Patient's course complicated by fevers up to 103.5, leukocytosis of 45.79 on 4/13 and patient was found to have MSSA bacteremia and right psoas abscess. She is now on IV Cefazolin. Her leukocytosis has resolved. Remained afebrile. Persistently positive blood cultures likely secondary to need for adequate source control ( + 4/13, 4/15, 4/16, 4/17 ). Repeat blood cultures 4/19 NGTD. Will follow with you.   - Continue IV Cefazolin  - IR drained abscess 4/18. Cultures growing MSSA  - 2D-echo negative for vegetations.   - Follow blood cultures.   - ultrasound RUE pending. Will follow.   - Discussed with staff. ID will follow with you.

## 2017-04-20 NOTE — PROGRESS NOTES
[facility] Hospital Medicine  Progress Note    Patient Name: Danyelle Jacobs  MRN: [id number]  Patient Class: IP- Inpatient   Admission Date: 2017  Length of Stay: 14 days  Attending Physician: Yen Ledezma MD  Primary Care Provider: Bianca Dykes MD    [place] [facility] [place] [place] Rashaad Ricci MD    Subjective:     Principal Problem:Severe sepsis    HPI:  Ms. Jacobs is a 88 F with a history of HTN, HLD, CAD, PAD, HFpEF, dementia, and rheumatoid arthritis who presented to the ED   with GI bleeding. Her son reported that her symptoms began the evening before with bilious vomiting and diarrhea. Since then, she had had 6 episodes of emesis and 5 episodes of diarrhea which started out as black and became bright red by her last BM which was at 6 AM. She currently denies abdominal pain, SOB, or nausea; however, her son reports that she will usually deny symptoms even if they are present.     She had a recent admission from 3/24- for respiratory failure with hypoxia (presumed 2/2 to PNA, resolved with abx) and RLE pain. Also admitted - and transfused at that time. Patient has a significant history of 'bleeding angiodysplastic lesion found on EGD with hemostasis achieved' performed in 2017. Patient also underwent colonoscopy at the same time which showed 'prominent vessels in the cecum but no definite AVM; raised bluish lesion likely a hematoma in the rectum.' Son reports that she has declined both mentally and physically in the last few months and has become progressively weak, less responsive, and has had hallucinations including speaking to  relatives. Also, she has developed urinary and fecal incontinence recently.     While in the ICU she was started on protonix and was conservatively managed from a GI perspective. She was able to be stepped down to hospital medicine on  and has had a relatively benign course since that time. A rapid was called tonight for  Ms. Jacobs because she was found to be more altered, diaphoretic, pale and hypotensive in the 70s-80s with baseline in the 110s-120s. Patient was bolused IV fluids with improvement in both blood pressure and mental status but still remained somewhat lethargic. Patient had also spiking fevers up to 103.5. Given patient's rapid deterioration and borderline blood pressures despite fluids, she will be accepted in to the ICU for closer monitoring.       Hospital Course:  04/06/17: Upon initial evaluation patient's initial oxygen requirements appeared to be increasing. ABG was performed which showed adequate oxygenation. Hct was also obtained with the arterial stick which showed a Hct of 19. Based on this result and her presenting H/H of 8.3/26.2, 1 U PRBCs was ordered and received 2L of NS. GI and ICU were consulted for further management. Repeat hgb and hemodynamics are stable. Protonix drip started. CT abdomen W/O significant for diverticulosis, adnexal cystic structure, L1 compression fx, and lung opacities (atelectasis v infection/inflammation).    4/7: Hgb stable, no further episodes of vomiting or diarrhea. Pt c/o RLE pain. Hypertensive to 200 systolic, restarting home BP meds stepwise. GI recs supportive care. PARVEEN improved.     4/8: stepped down to hospital medicine. Hemodynamically stable.     4/9: No acute events, PTOT recommending SNF.      4/10: Son requested pelvic ultrasound for patient since she is currently admitted. H/H stable. Awaiting eval from [facility].    4/11: 4/9: no acute events overnight. Started nystatin ointment yesterday for candidal rash under breasts.S/p U/S revealing cystic mass in L adnexa. Awaiting placement.     4/12 - NAEON. Awaiting placement.    4/13 - A rapid was called because she was found to be more altered, diaphoretic, pale and hypotensive in the 70s-80s with baseline in the 110s-120s. Patient was bolused IV fluids with improvement in both blood pressure and mental status but  still remained somewhat lethargic. Patient had also spiking fevers up to 103.5. Given patient's rapid deterioration and borderline blood pressures despite fluids, she was sent to the ICU for closer monitoring.   Patient was fluid resuscitated with over 30 cc/kg and had full response in blood pressure and tachycardia. IV abx of vanc and zosyn were started. Patient's mental status improved to baseline by the morning. Discussed plan with patient's family.     4/15 - Pt transferred back to hospital medicine. SHAWNA.  She remained afebrile with all other VSS.  BCx positive for GPC, awaiting identification of organism.  Continue vanc/zosyn.    4/16 - NAEON.  Pt remained AFVSS.  BCx growing staph aureus, sensitivities pending, repeat BCx positive.  ID following, recommend right hip imaging.    04/17 - Right hip abscess elucidated on MRI. Talked to General Surgery and indicated that IR was the most likely route. Her WCC has decreased and there is no concern for a septic hip at this point.  Increased the patient's hydralazine dose as well.        Interval Hx: swelling of her arm has improved, patient had a BM overnight.   Review of Systems   Constitutional: Negative for chills, diaphoresis and fever.   Eyes: Negative for visual disturbance.   Respiratory: Negative for cough and shortness of breath.    Cardiovascular: Negative for chest pain and leg swelling.   Gastrointestinal: Negative for abdominal pain, nausea and vomiting.   Musculoskeletal: Positive for arthralgias and back pain.   Skin: Positive for wound. Negative for color change.     Objective:     Vital Signs (Most Recent):  Temp: 98.5 °F (36.9 °C) (04/20/17 1136)  Pulse: 69 (04/20/17 1136)  Resp: 16 (04/20/17 1136)  BP: (!) 82/47 (04/20/17 1136)  SpO2: 95 % (04/20/17 1136) Vital Signs (24h Range):  Temp:  [97.7 °F (36.5 °C)-98.7 °F (37.1 °C)] 98.5 °F (36.9 °C)  Pulse:  [69-86] 69  Resp:  [16-19] 16  SpO2:  [94 %-97 %] 95 %  BP: ()/(47-63) 82/47     Weight:  57.9 kg (127 lb 10.3 oz)  Body mass index is 27.62 kg/(m^2).    Intake/Output Summary (Last 24 hours) at 04/20/17 1415  Last data filed at 04/20/17 0920   Gross per 24 hour   Intake           1507.2 ml   Output                0 ml   Net           1507.2 ml      Physical Exam   Constitutional: She appears well-developed. No distress.   Cardiovascular: Normal rate, regular rhythm and normal heart sounds.    Pulmonary/Chest: Effort normal and breath sounds normal. No respiratory distress.   Abdominal: Soft. Bowel sounds are normal. She exhibits no distension. There is no tenderness.   Musculoskeletal: She exhibits edema (right arm swelling. tender and painful).   Neurological: She is alert.   Skin: Skin is warm and dry. She is not diaphoretic. There is erythema.   Vitals reviewed.         Assessment/Plan:      CAD (coronary artery disease)  - continue  ASA 81 mg PO   - Plavix 75 mg PO daily - held  - no signs of further bleeding, Hb stable.  -given hx of multiple GI bleeds, will defer restarting plavix as an OP    Mixed hyperlipidemia  - Lipitor 40 mg PO daily    Gastroesophageal reflux disease without esophagitis  -s/p protonix iv  - on protonix oral now.     Benign hypertension  - on losartan, imdur, hydralazine and hctz  - became hypotensive and was given 500 bolus.  - will dc hydralazine.     Angiodysplasia of stomach: see EGD 1/17  - EGD1/2017: 'bleeding angiodysplastic lesion found with hemostasis achieved'  - Colonoscopy: 'prominent vessels in the cecum but no definite AVM; raised bluish lesion likely a hematoma in the rectum.'  - Multiple episodes of yellow/green emesis w/ five episodes of diarrhea, dark red --> bright red  - Patient currently HDS, H/H stable at this point  - GI consulted, recommend OP follow up    Compression fracture of first lumbar vertebra  -likely 2/2 to osteoporosis, seen on CT abdo  -possible nerve impingement contributing to R hip pain  -continue lyrica and PTOT  -Volteran  gel      Adnexal mass  L cystic adnexal mass seen on CTabdo in setting of reported complete hysterectomy  -Pelvic U/S revealing 2cm cystic structure, can be monitored with yearly U/S      Candidal intertrigo  -under breasts  -nystatin ointment      MSSA (methicillin susceptible Staphylococcus aureus) septicemia  - MSSA bacteremia. Now with Rt. Psoas abscess.  - source likely skin given multiple areas of tear and breakdown  - UCx negative but urine collected after broad spectrum antibiotics so not helpful  - CXR does not show any new consolidation although aspiration still possible  - continue ancef.   - echo -ve for vegetation.   - cultures from the psoas abscess growing growing staph.  - blood cultures from 4/19 NTD.         Psoas abscess  - s/p IR CT guided aspiration on 4/18.  - Patient without septic hip at this point  - cultures are growing +ve cocci in clusters (staph aureus).    - Continue ancef      Pain and swelling of right forearm  US arm is pending      VTE Risk Mitigation         Ordered     enoxaparin injection 40 mg  Daily     Route:  Subcutaneous        04/19/17 1632     Medium Risk of VTE  Once      04/06/17 1535     Place ISHMAEL hose  Until discontinued      04/06/17 1535     Reason for No Pharmacological VTE Prophylaxis  Once      04/06/17 1535     Place sequential compression device  Until discontinued      04/06/17 1504          Rashaad Ricci MD  Department of Hospital Medicine   [facility]

## 2017-04-20 NOTE — PLAN OF CARE
Problem: Pressure Ulcer Risk (Solomon Scale) (Adult,Obstetrics,Pediatric)  Intervention: Prevent/Manage Excess Moisture    04/20/17 1634   Hygiene Care   Perineal Care absorbent pad changed   Skin Interventions   Skin Protection incontinence pads utilized;skin sealant/moisture barrier applied         Goal: Identify Related Risk Factors and Signs and Symptoms  Related risk factors and signs and symptoms are identified upon initiation of Human Response Clinical Practice Guideline (CPG)   Outcome: Ongoing (interventions implemented as appropriate)    04/20/17 1634   Pressure Ulcer Risk (Solomon Scale)   Related Risk Factors (Pressure Ulcer Risk (Solomon Scale)) body weight extremes;cognitive impairment;fluid intake inadequate;hospitalization prolonged;mechanical forces;mobility impaired;nutritional deficiencies       Goal: Skin Integrity  Patient will demonstrate the desired outcomes by discharge/transition of care.   Outcome: Ongoing (interventions implemented as appropriate)    04/20/17 1634   Pressure Ulcer Risk (Solomon Scale) (Adult,Obstetrics,Pediatric)   Skin Integrity making progress toward outcome         Problem: Patient Care Overview  Goal: Plan of Care Review  Outcome: Ongoing (interventions implemented as appropriate)  Patient AAOX1 and cooperative throughout shift. Family at bedside. Patient repositioned every two hours, as tolerated. Float heels initiated this shift. Wound care completed to right shin and right arm, per wound care orders. Ultrasound of right arm completed today. PRN pain medication given, crushed in pudding, as ordered. Patient hypotensive at noon vitals; 500 mL NS bolus given, per MD order. Patient stable, will continue to monitor.

## 2017-04-20 NOTE — ASSESSMENT & PLAN NOTE
- s/p IR CT guided aspiration on 4/18.  - Patient without septic hip at this point  - cultures are growing +ve cocci in clusters (staph aureus).    - Continue ancef

## 2017-04-20 NOTE — ASSESSMENT & PLAN NOTE
- on losartan, imdur, hydralazine and hctz  - became hypotensive and was given 500 bolus.  - will dc hydralazine.

## 2017-04-20 NOTE — ASSESSMENT & PLAN NOTE
- continue  ASA 81 mg PO   - Plavix 75 mg PO daily - held  - no signs of further bleeding, Hb stable.  -given hx of multiple GI bleeds, will defer restarting plavix as an OP

## 2017-04-20 NOTE — PROGRESS NOTES
[facility]  Infectious Disease  Progress Note    Patient Name: Danyelle Jacobs  MRN: [id number]  Admission Date: 4/6/2017  Length of Stay: 14 days  Attending Physician: Yen Ledezma MD  Primary Care Provider: Bianca Dykes MD    Isolation Status: No active isolations  Assessment/Plan:      MSSA (methicillin susceptible Staphylococcus aureus) septicemia  88 year old female with hx of HTN, HLD, CAD, and RA admitted 4/6 for GI bleed. Patient's course complicated by fevers up to 103.5, leukocytosis of 45.79 on 4/13 and patient was found to have MSSA bacteremia and right psoas abscess. She is now on IV Cefazolin. Her leukocytosis has resolved. Remained afebrile. Persistently positive blood cultures likely secondary to need for adequate source control ( + 4/13, 4/15, 4/16, 4/17 ). Repeat blood cultures 4/19 NGTD. Will follow with you.   - Continue IV Cefazolin  - IR drained abscess 4/18. Cultures growing MSSA  - 2D-echo negative for vegetations.   - Follow blood cultures.   - ultrasound RUE pending. Will follow.   - Discussed with staff. ID will follow with you.     Thank you for your consult. I will follow-up with patient. Please contact us if you have any additional questions.    Bailey Sierra PA-C  Infectious Disease  [facility]  [phone]    Subjective:     Principal Problem:Severe sepsis    HPI: 89 y/o female with hx of HTN, HLD, CAD, and RA presented to ED 4/6 for GI bleeding. Patient has a significant history of 'bleeding angiodysplastic lesion found on EGD with hemostasis achieved' performed in 01/2017. Patient also underwent colonoscopy at the same time which showed 'prominent vessels in the cecum but no definite AVM; raised bluish lesion likely a hematoma in the rectum.' While in the ICU she was started on protonix and was conservatively managed from a GI perspective. She had recent admission 3/24 -4/1 for respiratory failure (presumed to be 2/2 PNA, treated with abx).  On 4/13 pt was found to be diaphoretic, hypotensive and tachycardic. Pt also spiked fever of 103.5 and leukocytosis trended upward to 45.79. Pt was placed on vancomycin and zosyn. Today, pt remained afebrile, and WBC down to 31.20. Blood cultures were drawn 4/13 which are positive for Staph aureus. Pt denies having any CP, abdominal pain, n/v/d. Per family, pt does have skin tears and wound of extremities. CXR and UA negative. 2D-echo ordered today. Otherwise no complaints other than myalgia 2/2 from rheumatoid arthritis.   Interval History:   Remained afebrile, no leukocytosis. Blood cultures from yeterday NGTD. Cultures from drainage growing MSSA. Continue cefazolin. Complaining of RUE pain and swelling. Reports having ultrasound done this morning. Will follow.     Review of Systems   Constitutional: Negative for chills, diaphoresis and fever.   HENT: Negative for congestion.    Respiratory: Negative for cough and shortness of breath.    Cardiovascular: Negative for chest pain and leg swelling.   Gastrointestinal: Negative for abdominal pain, diarrhea, nausea and vomiting.   Genitourinary: Negative for difficulty urinating and dysuria.   Musculoskeletal: Positive for arthralgias.        + right hip pain  + right arm pain/swelling   Skin: Positive for wound (to upper and lower extremities).   Neurological: Negative for numbness and headaches.   Hematological: Bruises/bleeds easily.     Objective:     Vital Signs (Most Recent):  Temp: 98.7 °F (37.1 °C) (04/20/17 1608)  Pulse: 70 (04/20/17 1608)  Resp: 16 (04/20/17 1608)  BP: 107/60 (04/20/17 1608)  SpO2: 97 % (04/20/17 1608) Vital Signs (24h Range):  Temp:  [97.7 °F (36.5 °C)-98.7 °F (37.1 °C)] 98.7 °F (37.1 °C)  Pulse:  [69-86] 70  Resp:  [16-19] 16  SpO2:  [94 %-97 %] 97 %  BP: ()/(47-61) 107/60     Weight: 57.9 kg (127 lb 10.3 oz)  Body mass index is 27.62 kg/(m^2).    Estimated Creatinine Clearance: 50.8 mL/min (based on Cr of 0.7).    Physical Exam    Constitutional: She appears well-developed and well-nourished. No distress.   HENT:   Head: Normocephalic.   Eyes: Pupils are equal, round, and reactive to light.   Cardiovascular: Normal rate and intact distal pulses.  Exam reveals no gallop and no friction rub.    No murmur heard.  Distant heart sounds   Pulmonary/Chest: Effort normal. No respiratory distress. She has no wheezes. She has no rales.   Abdominal: Soft. She exhibits no distension. There is no tenderness.   Musculoskeletal: She exhibits edema (RUE) and tenderness (right arm and right leg). She exhibits no deformity.   Neurological: She is alert. No cranial nerve deficit.   Skin: Skin is warm and dry. She is not diaphoretic.   right lower extremity wound with minimal drainage. No malodor. + surrounding warmth, edema and erythema.  Ecchymosis to BUEs.    Psychiatric: She has a normal mood and affect.   Nursing note and vitals reviewed.      Significant Labs: All pertinent labs within the past 24 hours have been reviewed.    Significant Imaging: I have reviewed all pertinent imaging results/findings within the past 24 hours.

## 2017-04-20 NOTE — ASSESSMENT & PLAN NOTE
- MSSA bacteremia. Now with Rt. Psoas abscess.  - source likely skin given multiple areas of tear and breakdown  - UCx negative but urine collected after broad spectrum antibiotics so not helpful  - CXR does not show any new consolidation although aspiration still possible  - continue ancef.   - echo -ve for vegetation.   - cultures from the psoas abscess growing growing staph.  - blood cultures from 4/19 NTD.

## 2017-04-20 NOTE — SUBJECTIVE & OBJECTIVE
Interval History:   Remained afebrile, no leukocytosis. Blood cultures from yeterday NGTD. Cultures from drainage growing MSSA. Continue cefazolin. Complaining of RUE pain and swelling. Reports having ultrasound done this morning. Will follow.     Review of Systems   Constitutional: Negative for chills, diaphoresis and fever.   HENT: Negative for congestion.    Respiratory: Negative for cough and shortness of breath.    Cardiovascular: Negative for chest pain and leg swelling.   Gastrointestinal: Negative for abdominal pain, diarrhea, nausea and vomiting.   Genitourinary: Negative for difficulty urinating and dysuria.   Musculoskeletal: Positive for arthralgias.        + right hip pain  + right arm pain/swelling   Skin: Positive for wound (to upper and lower extremities).   Neurological: Negative for numbness and headaches.   Hematological: Bruises/bleeds easily.     Objective:     Vital Signs (Most Recent):  Temp: 98.7 °F (37.1 °C) (04/20/17 1608)  Pulse: 70 (04/20/17 1608)  Resp: 16 (04/20/17 1608)  BP: 107/60 (04/20/17 1608)  SpO2: 97 % (04/20/17 1608) Vital Signs (24h Range):  Temp:  [97.7 °F (36.5 °C)-98.7 °F (37.1 °C)] 98.7 °F (37.1 °C)  Pulse:  [69-86] 70  Resp:  [16-19] 16  SpO2:  [94 %-97 %] 97 %  BP: ()/(47-61) 107/60     Weight: 57.9 kg (127 lb 10.3 oz)  Body mass index is 27.62 kg/(m^2).    Estimated Creatinine Clearance: 50.8 mL/min (based on Cr of 0.7).    Physical Exam   Constitutional: She appears well-developed and well-nourished. No distress.   HENT:   Head: Normocephalic.   Eyes: Pupils are equal, round, and reactive to light.   Cardiovascular: Normal rate and intact distal pulses.  Exam reveals no gallop and no friction rub.    No murmur heard.  Distant heart sounds   Pulmonary/Chest: Effort normal. No respiratory distress. She has no wheezes. She has no rales.   Abdominal: Soft. She exhibits no distension. There is no tenderness.   Musculoskeletal: She exhibits edema (RUE) and tenderness  (right arm and right leg). She exhibits no deformity.   Neurological: She is alert. No cranial nerve deficit.   Skin: Skin is warm and dry. She is not diaphoretic.   right lower extremity wound with minimal drainage. No malodor. + surrounding warmth, edema and erythema.  Ecchymosis to BUEs.    Psychiatric: She has a normal mood and affect.   Nursing note and vitals reviewed.      Significant Labs: All pertinent labs within the past 24 hours have been reviewed.    Significant Imaging: I have reviewed all pertinent imaging results/findings within the past 24 hours.

## 2017-04-20 NOTE — PLAN OF CARE
Problem: Pressure Ulcer Risk (Solomon Scale) (Adult,Obstetrics,Pediatric)  Goal: Identify Related Risk Factors and Signs and Symptoms  Related risk factors and signs and symptoms are identified upon initiation of Human Response Clinical Practice Guideline (CPG)   Outcome: Ongoing (interventions implemented as appropriate)  Pt turned every 2 hours    Problem: Patient Care Overview  Goal: Plan of Care Review  Outcome: Ongoing (interventions implemented as appropriate)  Pt AAOx2 to self and place, pt on 24 hour Ancef infusion at 20.8ml/hr. Pt complains of right arm pain when touched. Plan of care reviewed with pt. And family, all questions and concerns were addressed. Will continue to monitor.    Problem: Infection, Risk/Actual (Adult)  Goal: Identify Related Risk Factors and Signs and Symptoms  Related risk factors and signs and symptoms are identified upon initiation of Human Response Clinical Practice Guideline (CPG)   Outcome: Ongoing (interventions implemented as appropriate)  Infection control maintained.    Problem: Fall Risk (Adult)  Goal: Identify Related Risk Factors and Signs and Symptoms  Related risk factors and signs and symptoms are identified upon initiation of Human Response Clinical Practice Guideline (CPG)   Outcome: Ongoing (interventions implemented as appropriate)  Bed alarm on, family at bedside, call light within reach

## 2017-04-21 LAB
ALBUMIN SERPL BCP-MCNC: 1.4 G/DL
ALP SERPL-CCNC: 126 U/L
ALT SERPL W/O P-5'-P-CCNC: <5 U/L
ANION GAP SERPL CALC-SCNC: 7 MMOL/L
ANISOCYTOSIS BLD QL SMEAR: SLIGHT
AST SERPL-CCNC: 16 U/L
BASOPHILS # BLD AUTO: ABNORMAL K/UL
BASOPHILS NFR BLD: 1 %
BILIRUB SERPL-MCNC: 0.2 MG/DL
BUN SERPL-MCNC: 15 MG/DL
CALCIUM SERPL-MCNC: 8.3 MG/DL
CHLORIDE SERPL-SCNC: 103 MMOL/L
CO2 SERPL-SCNC: 27 MMOL/L
CREAT SERPL-MCNC: 0.7 MG/DL
DIFFERENTIAL METHOD: ABNORMAL
EOSINOPHIL # BLD AUTO: ABNORMAL K/UL
EOSINOPHIL NFR BLD: 0 %
ERYTHROCYTE [DISTWIDTH] IN BLOOD BY AUTOMATED COUNT: 18.2 %
EST. GFR  (AFRICAN AMERICAN): >60 ML/MIN/1.73 M^2
EST. GFR  (NON AFRICAN AMERICAN): >60 ML/MIN/1.73 M^2
GIANT PLATELETS BLD QL SMEAR: PRESENT
GLUCOSE SERPL-MCNC: 89 MG/DL
HCT VFR BLD AUTO: 24.7 %
HGB BLD-MCNC: 7.8 G/DL
HYPOCHROMIA BLD QL SMEAR: ABNORMAL
LYMPHOCYTES # BLD AUTO: ABNORMAL K/UL
LYMPHOCYTES NFR BLD: 4 %
MAGNESIUM SERPL-MCNC: 1.5 MG/DL
MCH RBC QN AUTO: 27.6 PG
MCHC RBC AUTO-ENTMCNC: 31.6 %
MCV RBC AUTO: 87 FL
METAMYELOCYTES NFR BLD MANUAL: 1 %
MONOCYTES # BLD AUTO: ABNORMAL K/UL
MONOCYTES NFR BLD: 8 %
NEUTROPHILS NFR BLD: 86 %
PHOSPHATE SERPL-MCNC: 3.3 MG/DL
PLATELET # BLD AUTO: 273 K/UL
PLATELET BLD QL SMEAR: ABNORMAL
PMV BLD AUTO: 11.8 FL
POIKILOCYTOSIS BLD QL SMEAR: SLIGHT
POLYCHROMASIA BLD QL SMEAR: ABNORMAL
POTASSIUM SERPL-SCNC: 3.8 MMOL/L
PROT SERPL-MCNC: 4.7 G/DL
RBC # BLD AUTO: 2.83 M/UL
SODIUM SERPL-SCNC: 137 MMOL/L
WBC # BLD AUTO: 7.56 K/UL

## 2017-04-21 PROCEDURE — 25000003 PHARM REV CODE 250: Performed by: INTERNAL MEDICINE

## 2017-04-21 PROCEDURE — 25000003 PHARM REV CODE 250: Performed by: HOSPITALIST

## 2017-04-21 PROCEDURE — 63600175 PHARM REV CODE 636 W HCPCS: Performed by: PHYSICIAN ASSISTANT

## 2017-04-21 PROCEDURE — 84100 ASSAY OF PHOSPHORUS: CPT

## 2017-04-21 PROCEDURE — 25000003 PHARM REV CODE 250: Performed by: STUDENT IN AN ORGANIZED HEALTH CARE EDUCATION/TRAINING PROGRAM

## 2017-04-21 PROCEDURE — 97110 THERAPEUTIC EXERCISES: CPT

## 2017-04-21 PROCEDURE — 80053 COMPREHEN METABOLIC PANEL: CPT

## 2017-04-21 PROCEDURE — 83735 ASSAY OF MAGNESIUM: CPT

## 2017-04-21 PROCEDURE — 99233 SBSQ HOSP IP/OBS HIGH 50: CPT | Mod: ,,, | Performed by: PHYSICIAN ASSISTANT

## 2017-04-21 PROCEDURE — 36415 COLL VENOUS BLD VENIPUNCTURE: CPT

## 2017-04-21 PROCEDURE — 85027 COMPLETE CBC AUTOMATED: CPT

## 2017-04-21 PROCEDURE — 99231 SBSQ HOSP IP/OBS SF/LOW 25: CPT | Mod: GC,,, | Performed by: HOSPITALIST

## 2017-04-21 PROCEDURE — 85007 BL SMEAR W/DIFF WBC COUNT: CPT

## 2017-04-21 PROCEDURE — 97530 THERAPEUTIC ACTIVITIES: CPT

## 2017-04-21 PROCEDURE — 11000001 HC ACUTE MED/SURG PRIVATE ROOM

## 2017-04-21 PROCEDURE — 25000003 PHARM REV CODE 250: Performed by: PHYSICIAN ASSISTANT

## 2017-04-21 PROCEDURE — 63600175 PHARM REV CODE 636 W HCPCS: Performed by: STUDENT IN AN ORGANIZED HEALTH CARE EDUCATION/TRAINING PROGRAM

## 2017-04-21 RX ORDER — LOSARTAN POTASSIUM 50 MG/1
50 TABLET ORAL DAILY
Status: DISCONTINUED | OUTPATIENT
Start: 2017-04-21 | End: 2017-04-24 | Stop reason: HOSPADM

## 2017-04-21 RX ORDER — PREDNISONE 10 MG/1
10 TABLET ORAL DAILY
Status: DISCONTINUED | OUTPATIENT
Start: 2017-04-21 | End: 2017-04-24 | Stop reason: HOSPADM

## 2017-04-21 RX ADMIN — DULOXETINE 30 MG: 30 CAPSULE, DELAYED RELEASE ORAL at 08:04

## 2017-04-21 RX ADMIN — HYDROCHLOROTHIAZIDE 12.5 MG: 12.5 TABLET ORAL at 08:04

## 2017-04-21 RX ADMIN — PREGABALIN 75 MG: 75 CAPSULE ORAL at 08:04

## 2017-04-21 RX ADMIN — TRAMADOL HYDROCHLORIDE 50 MG: 50 TABLET, COATED ORAL at 08:04

## 2017-04-21 RX ADMIN — STANDARDIZED SENNA CONCENTRATE AND DOCUSATE SODIUM 1 TABLET: 8.6; 5 TABLET, FILM COATED ORAL at 08:04

## 2017-04-21 RX ADMIN — OXYCODONE HYDROCHLORIDE AND ACETAMINOPHEN 1 TABLET: 5; 325 TABLET ORAL at 01:04

## 2017-04-21 RX ADMIN — LIDOCAINE 1 PATCH: 50 PATCH TOPICAL at 05:04

## 2017-04-21 RX ADMIN — CALCIUM CARBONATE-CHOLECALCIFEROL TAB 250 MG-125 UNIT 1 TABLET: 250-125 TAB at 08:04

## 2017-04-21 RX ADMIN — MICONAZOLE NITRATE: 20 OINTMENT TOPICAL at 08:04

## 2017-04-21 RX ADMIN — PREDNISONE 10 MG: 10 TABLET ORAL at 01:04

## 2017-04-21 RX ADMIN — LOSARTAN POTASSIUM 50 MG: 50 TABLET, FILM COATED ORAL at 08:04

## 2017-04-21 RX ADMIN — DICLOFENAC: 10 GEL TOPICAL at 08:04

## 2017-04-21 RX ADMIN — DONEPEZIL HYDROCHLORIDE 10 MG: 10 TABLET, FILM COATED ORAL at 08:04

## 2017-04-21 RX ADMIN — ISOSORBIDE MONONITRATE 30 MG: 30 TABLET, EXTENDED RELEASE ORAL at 08:04

## 2017-04-21 RX ADMIN — OXYCODONE HYDROCHLORIDE AND ACETAMINOPHEN 1 TABLET: 5; 325 TABLET ORAL at 04:04

## 2017-04-21 RX ADMIN — ASPIRIN 81 MG CHEWABLE TABLET 81 MG: 81 TABLET CHEWABLE at 08:04

## 2017-04-21 RX ADMIN — ACETAMINOPHEN 650 MG: 325 TABLET ORAL at 06:04

## 2017-04-21 RX ADMIN — CEFAZOLIN 6 G: 1 INJECTION, POWDER, FOR SOLUTION INTRAMUSCULAR; INTRAVENOUS; PARENTERAL at 10:04

## 2017-04-21 RX ADMIN — ENOXAPARIN SODIUM 40 MG: 100 INJECTION SUBCUTANEOUS at 05:04

## 2017-04-21 RX ADMIN — PANTOPRAZOLE SODIUM 40 MG: 40 TABLET, DELAYED RELEASE ORAL at 08:04

## 2017-04-21 NOTE — PLAN OF CARE
JAMARCUS spoke with Danielle Olivo, [facility] - [phone], concerning discharge of patient to their skilled facility.  States she needs date of discharge to put on paperwork to submit for authorization.  JAMARCUS spoke with Dr. Ricci who states he would like to discharge the patient tomorrow.  JAMARCUS spoke with Danielle again about discharge for tomorrow, she states it would have to be approved by admissions and we need to send updated clinical information for them to determine if they can take her.  CM informed MARTINA Radha, of needed clinical information.    14:25  JAMARCUS received call from Ananth Ferrell [phone] ext [phone], concerning which facility the family has chosen.  Informed they chose [facility].  States as of now she has no request for auth from them, informed they are working on it now.    15:15  JAMARCUS received call from son, Andrés [phone], concerning discharge of his Mother to [facility].  States his Mother will be transferred to SNF on Monday even if he has to file an appeal.  He is out of town and can't make it back to finish paperwork at [place] until then.  JAMARCUS called Dr. Ricci with information from son.

## 2017-04-21 NOTE — PLAN OF CARE
MARTINA spoke with the CM and the pt will need a referral resent to [facility] .  [facility]  MARTINA/JAMARCUS will f/u as needed.    Radha Clement, [phone]

## 2017-04-21 NOTE — PLAN OF CARE
Problem: Patient Care Overview  Goal: Plan of Care Review  Outcome: Ongoing (interventions implemented as appropriate)  Patient AAOX2, follows commands and responds appropriately. Family at bedside and bed alarm in use. Patient repositioned every two hours. Wound care completed to right arm and right shin today. PRN pain medication given, as ordered. Continuous IV antibiotics maintained. Patient awaiting SNF placement; patient stable, will continue to monitor.    Problem: Infection, Risk/Actual (Adult)  Intervention: Manage Suspected/Actual Infection    04/21/17 1616   Safety Interventions   Infection Management aseptic technique maintained   Prevent/Manage Colorectal Surgical Infection   Fever Reduction/Comfort Measures lightweight clothing         Goal: Identify Related Risk Factors and Signs and Symptoms  Related risk factors and signs and symptoms are identified upon initiation of Human Response Clinical Practice Guideline (CPG)   Outcome: Ongoing (interventions implemented as appropriate)    04/21/17 1616   Infection, Risk/Actual   Related Risk Factors (Infection, Risk/Actual) chronic illness/condition;age extremes;prolonged hospitalization;skin integrity impairment   Signs and Symptoms (Infection, Risk/Actual) edema;malaise;pain;weakness       Goal: Infection Prevention/Resolution  Patient will demonstrate the desired outcomes by discharge/transition of care.   Outcome: Ongoing (interventions implemented as appropriate)    04/21/17 1616   Infection, Risk/Actual (Adult)   Infection Prevention/Resolution making progress toward outcome

## 2017-04-21 NOTE — PROGRESS NOTES
[facility]  Infectious Disease  Progress Note    Patient Name: Danyelle Jacobs  MRN: [id number]  Admission Date: 4/6/2017  Length of Stay: 15 days  Attending Physician: Yen Ledezma MD  Primary Care Provider: Bianca Dykes MD    Isolation Status: No active isolations  Assessment/Plan:      MSSA (methicillin susceptible Staphylococcus aureus) septicemia  88 year old female with hx of HTN, HLD, CAD, and RA admitted 4/6 for GI bleed. Patient's course complicated by fevers up to 103.5, leukocytosis of 45.79 on 4/13 and patient was found to have MSSA bacteremia and right psoas abscess. She is now on IV Cefazolin. Her leukocytosis has resolved. Remained afebrile. Persistently positive blood cultures likely secondary to need for adequate source control ( + 4/13, 4/15, 4/16, 4/17 ). Repeat blood cultures 4/19 NGTD.   - Continue IV Cefazolin  - IR drained abscess 4/18. Cultures growing MSSA  - 2D-echo negative for vegetations.   - ultrasound RUE negative for DVTs  - Anticipate 6 weeks of IV antibiotics from negative blood culture. Will need PICC line prior to discharge. Estimated end date 5/31/17.   - Will need ID to follow outpatient labs as patient will need weekly CBC, CMP, ESR, and CRP (our clinic fax is [phone])  -Pt remained afebrile, hemodynamically stable, leukocytosis resolved.   - Discussed with staff and primary team. ID will sign off at this time.     Anticipated Disposition: SNF placement     Thank you for your consult. I will sign off. Please contact us if you have any additional questions.    Bailey Sierra PA-C  Infectious Disease  [facility]  [phone]    Subjective:     Principal Problem:Severe sepsis    HPI: 89 y/o female with hx of HTN, HLD, CAD, and RA presented to ED 4/6 for GI bleeding. Patient has a significant history of 'bleeding angiodysplastic lesion found on EGD with hemostasis achieved' performed in 01/2017. Patient also underwent colonoscopy at the  same time which showed 'prominent vessels in the cecum but no definite AVM; raised bluish lesion likely a hematoma in the rectum.' While in the ICU she was started on protonix and was conservatively managed from a GI perspective. She had recent admission 3/24 -4/1 for respiratory failure (presumed to be 2/2 PNA, treated with abx). On 4/13 pt was found to be diaphoretic, hypotensive and tachycardic. Pt also spiked fever of 103.5 and leukocytosis trended upward to 45.79. Pt was placed on vancomycin and zosyn. Today, pt remained afebrile, and WBC down to 31.20. Blood cultures were drawn 4/13 which are positive for Staph aureus. Pt denies having any CP, abdominal pain, n/v/d. Per family, pt does have skin tears and wound of extremities. CXR and UA negative. 2D-echo ordered today. Otherwise no complaints other than myalgia 2/2 from rheumatoid arthritis.   Interval History:   No fevers overnight. Leukocytosis resolved. Blood cultures 4/19 negative to date. No new complaints at this time. RUE ultrasound negative for DVT.     Review of Systems   Constitutional: Negative for chills, diaphoresis and fever.   HENT: Negative for congestion.    Respiratory: Negative for cough and shortness of breath.    Cardiovascular: Negative for chest pain and leg swelling.   Gastrointestinal: Negative for abdominal pain, diarrhea, nausea and vomiting.   Genitourinary: Negative for difficulty urinating and dysuria.   Musculoskeletal: Positive for arthralgias.        + right hip pain  + right arm pain/swelling   Skin: Positive for wound (to upper and lower extremities).   Neurological: Negative for numbness and headaches.   Hematological: Bruises/bleeds easily.     Objective:     Vital Signs (Most Recent):  Temp: 98.7 °F (37.1 °C) (04/21/17 1500)  Pulse: 71 (04/21/17 1500)  Resp: 18 (04/21/17 1500)  BP: (!) 123/58 (04/21/17 1500)  SpO2: (!) 94 % (04/21/17 1500) Vital Signs (24h Range):  Temp:  [98.1 °F (36.7 °C)-98.7 °F (37.1 °C)] 98.7 °F  (37.1 °C)  Pulse:  [66-75] 71  Resp:  [16-18] 18  SpO2:  [91 %-98 %] 94 %  BP: (107-150)/(54-67) 123/58     Weight: 59.2 kg (130 lb 9.6 oz)  Body mass index is 28.26 kg/(m^2).    Estimated Creatinine Clearance: 51.9 mL/min (based on Cr of 0.7).    Physical Exam   Constitutional: She appears well-developed and well-nourished. No distress.   HENT:   Head: Normocephalic.   Eyes: Pupils are equal, round, and reactive to light.   Cardiovascular: Normal rate and intact distal pulses.  Exam reveals no gallop and no friction rub.    No murmur heard.  Distant heart sounds   Pulmonary/Chest: Effort normal. No respiratory distress. She has no wheezes. She has no rales.   Abdominal: Soft. She exhibits no distension. There is no tenderness.   Musculoskeletal: She exhibits edema (RUE) and tenderness (right arm and right leg). She exhibits no deformity.   Neurological: She is alert. No cranial nerve deficit.   Skin: Skin is warm and dry. She is not diaphoretic.   right lower extremity wound with minimal drainage. No malodor. + surrounding warmth, edema and erythema.  Ecchymosis to BUEs.    Psychiatric: She has a normal mood and affect.   Nursing note and vitals reviewed.      Significant Labs: All pertinent labs within the past 24 hours have been reviewed.    Significant Imaging: I have reviewed all pertinent imaging results/findings within the past 24 hours.

## 2017-04-21 NOTE — PLAN OF CARE
Problem: Patient Care Overview  Goal: Plan of Care Review  Outcome: Ongoing (interventions implemented as appropriate)  No acute events occurred overnight.  Pt complained of pain when turning or moving extremities.  PRN pain medication was given.  Pt's meds were crushed and given in pudding.  Pt tolerated well.  Continuous Ancef going at 20.8 mL/h.  Pt turned q 2 hours using wedge.  No falls or injuries occurred overnight.  Bed alarm set, call light within reach.  Pt's family remained at bedside throughout shift.  Will continue to monitor.

## 2017-04-21 NOTE — SUBJECTIVE & OBJECTIVE
Interval History:   No fevers overnight. Leukocytosis resolved. Blood cultures 4/19 negative to date. No new complaints at this time. RUE ultrasound negative for DVT.     Review of Systems   Constitutional: Negative for chills, diaphoresis and fever.   HENT: Negative for congestion.    Respiratory: Negative for cough and shortness of breath.    Cardiovascular: Negative for chest pain and leg swelling.   Gastrointestinal: Negative for abdominal pain, diarrhea, nausea and vomiting.   Genitourinary: Negative for difficulty urinating and dysuria.   Musculoskeletal: Positive for arthralgias.        + right hip pain  + right arm pain/swelling   Skin: Positive for wound (to upper and lower extremities).   Neurological: Negative for numbness and headaches.   Hematological: Bruises/bleeds easily.     Objective:     Vital Signs (Most Recent):  Temp: 98.7 °F (37.1 °C) (04/21/17 1500)  Pulse: 71 (04/21/17 1500)  Resp: 18 (04/21/17 1500)  BP: (!) 123/58 (04/21/17 1500)  SpO2: (!) 94 % (04/21/17 1500) Vital Signs (24h Range):  Temp:  [98.1 °F (36.7 °C)-98.7 °F (37.1 °C)] 98.7 °F (37.1 °C)  Pulse:  [66-75] 71  Resp:  [16-18] 18  SpO2:  [91 %-98 %] 94 %  BP: (107-150)/(54-67) 123/58     Weight: 59.2 kg (130 lb 9.6 oz)  Body mass index is 28.26 kg/(m^2).    Estimated Creatinine Clearance: 51.9 mL/min (based on Cr of 0.7).    Physical Exam   Constitutional: She appears well-developed and well-nourished. No distress.   HENT:   Head: Normocephalic.   Eyes: Pupils are equal, round, and reactive to light.   Cardiovascular: Normal rate and intact distal pulses.  Exam reveals no gallop and no friction rub.    No murmur heard.  Distant heart sounds   Pulmonary/Chest: Effort normal. No respiratory distress. She has no wheezes. She has no rales.   Abdominal: Soft. She exhibits no distension. There is no tenderness.   Musculoskeletal: She exhibits edema (RUE) and tenderness (right arm and right leg). She exhibits no deformity.   Neurological:  She is alert. No cranial nerve deficit.   Skin: Skin is warm and dry. She is not diaphoretic.   right lower extremity wound with minimal drainage. No malodor. + surrounding warmth, edema and erythema.  Ecchymosis to BUEs.    Psychiatric: She has a normal mood and affect.   Nursing note and vitals reviewed.      Significant Labs: All pertinent labs within the past 24 hours have been reviewed.    Significant Imaging: I have reviewed all pertinent imaging results/findings within the past 24 hours.

## 2017-04-21 NOTE — ASSESSMENT & PLAN NOTE
- on losartan, imdur, hydralazine and hctz  - became hypotensive and was given 500 bolus.  - hydralazine was dced and losartan was decreased.

## 2017-04-21 NOTE — SUBJECTIVE & OBJECTIVE
Interval Hx: swelling of her arm continues to improve, patient had a BM this morning.   Review of Systems   Constitutional: Negative for chills, diaphoresis and fever.   Eyes: Negative for visual disturbance.   Respiratory: Negative for cough and shortness of breath.    Cardiovascular: Negative for chest pain and leg swelling.   Gastrointestinal: Negative for abdominal pain, nausea and vomiting.   Musculoskeletal: Positive for arthralgias and back pain.   Skin: Positive for wound. Negative for color change.     Objective:     Vital Signs (Most Recent):  Temp: 98.6 °F (37 °C) (04/21/17 1109)  Pulse: 75 (04/21/17 1109)  Resp: 18 (04/21/17 1109)  BP: (!) 150/67 (04/21/17 1109)  SpO2: 95 % (04/21/17 1109) Vital Signs (24h Range):  Temp:  [98.1 °F (36.7 °C)-98.7 °F (37.1 °C)] 98.6 °F (37 °C)  Pulse:  [66-75] 75  Resp:  [16-18] 18  SpO2:  [91 %-98 %] 95 %  BP: (107-150)/(54-67) 150/67     Weight: 59.2 kg (130 lb 9.6 oz)  Body mass index is 28.26 kg/(m^2).    Intake/Output Summary (Last 24 hours) at 04/21/17 1254  Last data filed at 04/21/17 0845   Gross per 24 hour   Intake              570 ml   Output                0 ml   Net              570 ml      Physical Exam   Constitutional: She appears well-developed. No distress.   Cardiovascular: Normal rate, regular rhythm and normal heart sounds.    Pulmonary/Chest: Effort normal and breath sounds normal. No respiratory distress.   Abdominal: Soft. Bowel sounds are normal. She exhibits no distension. There is no tenderness.   Musculoskeletal: She exhibits edema (right arm swelling. tender and painful).   Neurological: She is alert.   Skin: Skin is warm and dry. She is not diaphoretic. There is erythema.   Vitals reviewed.

## 2017-04-21 NOTE — ASSESSMENT & PLAN NOTE
- MSSA bacteremia. Now with Rt. Psoas abscess.  - source likely skin given multiple areas of tear and breakdown  - UCx negative but urine collected after broad spectrum antibiotics so not helpful  - CXR does not show any new consolidation although aspiration still possible  - continue ancef.   - echo -ve for vegetation.   - cultures from the psoas abscess growing growing staph.  - blood cultures from 4/19 NTD.   - will need abx for total of 6 weeks.

## 2017-04-21 NOTE — PROGRESS NOTES
[facility] Hospital Medicine  Progress Note    Patient Name: Danyelle Jacobs  MRN: [id number]  Patient Class: IP- Inpatient   Admission Date: 2017  Length of Stay: 15 days  Attending Physician: Yen Ledezma MD  Primary Care Provider: Bianca Dykes MD    Hospital Medicine Team: [facility] O Rashaad Ricci MD    Subjective:     Principal Problem:Severe sepsis    HPI:  Ms. Jacobs is a 88 F with a history of HTN, HLD, CAD, PAD, HFpEF, dementia, and rheumatoid arthritis who presented to the ED   with GI bleeding. Her son reported that her symptoms began the evening before with bilious vomiting and diarrhea. Since then, she had had 6 episodes of emesis and 5 episodes of diarrhea which started out as black and became bright red by her last BM which was at 6 AM. She currently denies abdominal pain, SOB, or nausea; however, her son reports that she will usually deny symptoms even if they are present.     She had a recent admission from 3/24- for respiratory failure with hypoxia (presumed 2/2 to PNA, resolved with abx) and RLE pain. Also admitted - and transfused at that time. Patient has a significant history of 'bleeding angiodysplastic lesion found on EGD with hemostasis achieved' performed in 2017. Patient also underwent colonoscopy at the same time which showed 'prominent vessels in the cecum but no definite AVM; raised bluish lesion likely a hematoma in the rectum.' Son reports that she has declined both mentally and physically in the last few months and has become progressively weak, less responsive, and has had hallucinations including speaking to  relatives. Also, she has developed urinary and fecal incontinence recently.     While in the ICU she was started on protonix and was conservatively managed from a GI perspective. She was able to be stepped down to hospital medicine on  and has had a relatively benign course since that time. A rapid was called tonight for  Ms. Jacobs because she was found to be more altered, diaphoretic, pale and hypotensive in the 70s-80s with baseline in the 110s-120s. Patient was bolused IV fluids with improvement in both blood pressure and mental status but still remained somewhat lethargic. Patient had also spiking fevers up to 103.5. Given patient's rapid deterioration and borderline blood pressures despite fluids, she will be accepted in to the ICU for closer monitoring.       Hospital Course:  04/06/17: Upon initial evaluation patient's initial oxygen requirements appeared to be increasing. ABG was performed which showed adequate oxygenation. Hct was also obtained with the arterial stick which showed a Hct of 19. Based on this result and her presenting H/H of 8.3/26.2, 1 U PRBCs was ordered and received 2L of NS. GI and ICU were consulted for further management. Repeat hgb and hemodynamics are stable. Protonix drip started. CT abdomen W/O significant for diverticulosis, adnexal cystic structure, L1 compression fx, and lung opacities (atelectasis v infection/inflammation).    4/7: Hgb stable, no further episodes of vomiting or diarrhea. Pt c/o RLE pain. Hypertensive to 200 systolic, restarting home BP meds stepwise. GI recs supportive care. PARVEEN improved.     4/8: stepped down to hospital medicine. Hemodynamically stable.     4/9: No acute events, PTOT recommending SNF.      4/10: Son requested pelvic ultrasound for patient since she is currently admitted. H/H stable. Awaiting eval from [facility].    4/11: 4/9: no acute events overnight. Started nystatin ointment yesterday for candidal rash under breasts.S/p U/S revealing cystic mass in L adnexa. Awaiting placement.     4/12 - NAEON. Awaiting placement.    4/13 - A rapid was called because she was found to be more altered, diaphoretic, pale and hypotensive in the 70s-80s with baseline in the 110s-120s. Patient was bolused IV fluids with improvement in both blood pressure and mental status but  still remained somewhat lethargic. Patient had also spiking fevers up to 103.5. Given patient's rapid deterioration and borderline blood pressures despite fluids, she was sent to the ICU for closer monitoring.   Patient was fluid resuscitated with over 30 cc/kg and had full response in blood pressure and tachycardia. IV abx of vanc and zosyn were started. Patient's mental status improved to baseline by the morning. Discussed plan with patient's family.     4/15 - Pt transferred back to hospital medicine. SHAWNA.  She remained afebrile with all other VSS.  BCx positive for GPC, awaiting identification of organism.  Continue vanc/zosyn.    4/16 - NAEON.  Pt remained AFVSS.  BCx growing staph aureus, sensitivities pending, repeat BCx positive.  ID following, recommend right hip imaging.    04/17 - Right hip abscess elucidated on MRI. Talked to General Surgery and indicated that IR was the most likely route. Her WCC has decreased and there is no concern for a septic hip at this point.  Increased the patient's hydralazine dose as well.        Interval Hx: swelling of her arm continues to improve, patient had a BM this morning.   Review of Systems   Constitutional: Negative for chills, diaphoresis and fever.   Eyes: Negative for visual disturbance.   Respiratory: Negative for cough and shortness of breath.    Cardiovascular: Negative for chest pain and leg swelling.   Gastrointestinal: Negative for abdominal pain, nausea and vomiting.   Musculoskeletal: Positive for arthralgias and back pain.   Skin: Positive for wound. Negative for color change.     Objective:     Vital Signs (Most Recent):  Temp: 98.6 °F (37 °C) (04/21/17 1109)  Pulse: 75 (04/21/17 1109)  Resp: 18 (04/21/17 1109)  BP: (!) 150/67 (04/21/17 1109)  SpO2: 95 % (04/21/17 1109) Vital Signs (24h Range):  Temp:  [98.1 °F (36.7 °C)-98.7 °F (37.1 °C)] 98.6 °F (37 °C)  Pulse:  [66-75] 75  Resp:  [16-18] 18  SpO2:  [91 %-98 %] 95 %  BP: (107-150)/(54-67) 150/67      Weight: 59.2 kg (130 lb 9.6 oz)  Body mass index is 28.26 kg/(m^2).    Intake/Output Summary (Last 24 hours) at 04/21/17 1254  Last data filed at 04/21/17 0845   Gross per 24 hour   Intake              570 ml   Output                0 ml   Net              570 ml      Physical Exam   Constitutional: She appears well-developed. No distress.   Cardiovascular: Normal rate, regular rhythm and normal heart sounds.    Pulmonary/Chest: Effort normal and breath sounds normal. No respiratory distress.   Abdominal: Soft. Bowel sounds are normal. She exhibits no distension. There is no tenderness.   Musculoskeletal: She exhibits edema (right arm swelling. tender and painful).   Neurological: She is alert.   Skin: Skin is warm and dry. She is not diaphoretic. There is erythema.   Vitals reviewed.         Assessment/Plan:      MG (myasthenia gravis)  Will restart her prednisone 10 mg (was held likely because of GI bleed)      CAD (coronary artery disease)  - continue  ASA 81 mg PO   - Plavix 75 mg PO daily - held  - no signs of further bleeding, Hb stable.  -given hx of multiple GI bleeds, will defer restarting plavix as an OP    Mixed hyperlipidemia  - Lipitor 40 mg PO daily    Gastroesophageal reflux disease without esophagitis  -s/p protonix iv  - on protonix oral now.     Benign hypertension  - on losartan, imdur, hydralazine and hctz  - became hypotensive and was given 500 bolus.  - hydralazine was dced and losartan was decreased.      Rheumatoid arthritis involving multiple sites with positive rheumatoid factor  Now having with RUE pain,   Not on dmards 2/2 age and comoribidities  F/u with dr. Olivo        Anemia associated with acute blood loss  - s/p pRBC's on this admission secondary to GIB from angiodysplagia.   - no signs of repeat bleeding  - Will transfuse if patient's hb < 7      Angiodysplasia of stomach: see EGD 1/17  - EGD1/2017: 'bleeding angiodysplastic lesion found with hemostasis achieved'  -  Colonoscopy: 'prominent vessels in the cecum but no definite AVM; raised bluish lesion likely a hematoma in the rectum.'  - Multiple episodes of yellow/green emesis w/ five episodes of diarrhea, dark red --> bright red  - Patient currently HDS, H/H stable at this point  - GI consulted, recommend OP follow up    Compression fracture of first lumbar vertebra  -likely 2/2 to osteoporosis, seen on CT abdo  -possible nerve impingement contributing to R hip pain  -continue lyrica and PTOT  -Volteran gel      Sciatica associated with disorder of lumbar spine  - PTOT  - increased dose of lyrica from 50 to 75mg        Adnexal mass  L cystic adnexal mass seen on CTabdo in setting of reported complete hysterectomy  -Pelvic U/S revealing 2cm cystic structure, can be monitored with yearly U/S      MSSA (methicillin susceptible Staphylococcus aureus) septicemia  - MSSA bacteremia. Now with Rt. Psoas abscess.  - source likely skin given multiple areas of tear and breakdown  - UCx negative but urine collected after broad spectrum antibiotics so not helpful  - CXR does not show any new consolidation although aspiration still possible  - continue ancef.   - echo -ve for vegetation.   - cultures from the psoas abscess growing growing staph.  - blood cultures from 4/19 NTD.   - will need abx for total of 6 weeks.         Psoas abscess  - s/p IR CT guided aspiration on 4/18.  - Patient without septic hip at this point  - cultures are growing +ve cocci in clusters (staph aureus).    - Continue ancef      Pain and swelling of right forearm  US arm is -ve   Likely 2/2 RA  Started on steroid for MG, will likely help.       VTE Risk Mitigation         Ordered     enoxaparin injection 40 mg  Daily     Route:  Subcutaneous        04/19/17 1632     Medium Risk of VTE  Once      04/06/17 1535     Place ISHMAEL hose  Until discontinued      04/06/17 1535     Reason for No Pharmacological VTE Prophylaxis  Once      04/06/17 1535     Place sequential  compression device  Until discontinued      04/06/17 4443          Rashaad Ricci MD  Department of Hospital Medicine   [facility]

## 2017-04-21 NOTE — PLAN OF CARE
JAMARCUS met with daughter for discharge planning.  States she has some questions for the Drs from her brother, Andrés, about PICC line and she was suppose to have an US of her heart.  States her brother, Andrés, didn't get to go see [facility] yesterday but is going today.  She is waiting for MD to make rounds.  Will continue to follow.    10:30  JAMARCUS called son, Andrés, to ask him if he made up his mind on a facility.  States he is not going to see [facility] until 1:00 PM today.  States he has some concerns about her discharge.  Wants to know about PICC line, she was suppose to have US of her heart and to his knowledge that was not done yet and states she had an episode with her blood pressure last night.  Dr. Ricci informed of above concerns after talking to Andrés, will speak to son and daughter about above concerns.

## 2017-04-21 NOTE — PLAN OF CARE
JAMARCUS received call from son, Andrés [phone], concerning SNF placement for patient.  States they want her to go to [facility].  JAMARCUS called Thais at [facility] to ask her to put in for insurance Auth again.  JAMARCUS also called Mariaelena from [facility] to give her updated information, left message.

## 2017-04-21 NOTE — PLAN OF CARE
JAMARCUS received call from Ananth Ferrell [phone] ext [phone], inquiring about anticipated discharge date to SNF.  JAMARCUS informed her patient could go as early as today provided blood cultures come back negative for growth.  States she has a request for Auth from [facility] and if family still wants [facility] they would have to resubmit for Auth since it has been greater than five days.  JAMARCUS informed her that family is supposed to make a decision today on which facility they want and let JAMARCUS know.  Will call Mariaelena back with decision.

## 2017-04-21 NOTE — PLAN OF CARE
IMM signed by daughter, copy given to daughter and original placed on chart.     04/21/17 0930   Medicare Message   Important Message from Medicare regarding Discharge Appeal Rights Given to patient/caregiver;Explained to patient/caregiver;Signed/date by patient/caregiver   Date IMM was signed 04/21/17   Time IMM was signed 0930

## 2017-04-21 NOTE — ASSESSMENT & PLAN NOTE
88 year old female with hx of HTN, HLD, CAD, and RA admitted 4/6 for GI bleed. Patient's course complicated by fevers up to 103.5, leukocytosis of 45.79 on 4/13 and patient was found to have MSSA bacteremia and right psoas abscess. She is now on IV Cefazolin. Her leukocytosis has resolved. Remained afebrile. Persistently positive blood cultures likely secondary to need for adequate source control ( + 4/13, 4/15, 4/16, 4/17 ). Repeat blood cultures 4/19 NGTD.   - Continue IV Cefazolin  - IR drained abscess 4/18. Cultures growing MSSA  - 2D-echo negative for vegetations.   - ultrasound RUE negative for DVTs  - Anticipate 6 weeks of IV antibiotics from negative blood culture. Will need PICC line prior to discharge. Estimated end date 5/31/17.   - Will need ID to follow outpatient labs as patient will need weekly CBC, CMP, ESR, and CRP (our clinic fax is [phone])  -Pt remained afebrile, hemodynamically stable, leukocytosis resolved.   - Discussed with staff and primary team. ID will sign off at this time.

## 2017-04-21 NOTE — PLAN OF CARE
Problem: Physical Therapy Goal  Goal: Physical Therapy Goal  Goals to be met by: 2017     Patient will increase functional independence with mobility by performin. Supine to sit with Mod Assistance-not met  2. Sit to supine with Mod Assistance-not met  3. Sit to stand transfer with Max Assistance-not met  4. Bed to chair transfer with Max Assistance using LRAD-not met  5. Gait x 10 feet with Max Assistance using LRAD-not met  6. Sitting at edge of bed x 5 minutes with Minimal Assistance-not met  7. Stand for x 1 minute with Moderate Assistance using LRAD-not met  8. Lower extremity exercise program x15 reps per handout, with assistance as needed-not met   Outcome: Ongoing (interventions implemented as appropriate)  Pt progressing towards goals. All goals remain appropriate at this time.     Teresa Marie DPT, PT  2017

## 2017-04-22 LAB
ALBUMIN SERPL BCP-MCNC: 1.4 G/DL
ALP SERPL-CCNC: 133 U/L
ALT SERPL W/O P-5'-P-CCNC: 5 U/L
ANION GAP SERPL CALC-SCNC: 8 MMOL/L
ANISOCYTOSIS BLD QL SMEAR: SLIGHT
AST SERPL-CCNC: 19 U/L
BACTERIA BLD CULT: NORMAL
BASOPHILS # BLD AUTO: ABNORMAL K/UL
BASOPHILS NFR BLD: 0 %
BILIRUB SERPL-MCNC: 0.1 MG/DL
BUN SERPL-MCNC: 15 MG/DL
CALCIUM SERPL-MCNC: 8.6 MG/DL
CHLORIDE SERPL-SCNC: 103 MMOL/L
CO2 SERPL-SCNC: 27 MMOL/L
CREAT SERPL-MCNC: 0.6 MG/DL
DIFFERENTIAL METHOD: ABNORMAL
EOSINOPHIL # BLD AUTO: ABNORMAL K/UL
EOSINOPHIL NFR BLD: 0 %
ERYTHROCYTE [DISTWIDTH] IN BLOOD BY AUTOMATED COUNT: 18 %
EST. GFR  (AFRICAN AMERICAN): >60 ML/MIN/1.73 M^2
EST. GFR  (NON AFRICAN AMERICAN): >60 ML/MIN/1.73 M^2
GLUCOSE SERPL-MCNC: 92 MG/DL
HCT VFR BLD AUTO: 25 %
HGB BLD-MCNC: 8.1 G/DL
LYMPHOCYTES # BLD AUTO: ABNORMAL K/UL
LYMPHOCYTES NFR BLD: 8 %
MAGNESIUM SERPL-MCNC: 1.6 MG/DL
MCH RBC QN AUTO: 27.6 PG
MCHC RBC AUTO-ENTMCNC: 32.4 %
MCV RBC AUTO: 85 FL
METAMYELOCYTES NFR BLD MANUAL: 1 %
MONOCYTES # BLD AUTO: ABNORMAL K/UL
MONOCYTES NFR BLD: 3 %
NEUTROPHILS NFR BLD: 87 %
NEUTS BAND NFR BLD MANUAL: 1 %
PHOSPHATE SERPL-MCNC: 3.4 MG/DL
PLATELET # BLD AUTO: 347 K/UL
PLATELET BLD QL SMEAR: ABNORMAL
PMV BLD AUTO: 12.5 FL
POTASSIUM SERPL-SCNC: 4.1 MMOL/L
PROT SERPL-MCNC: 4.9 G/DL
RBC # BLD AUTO: 2.93 M/UL
SODIUM SERPL-SCNC: 138 MMOL/L
WBC # BLD AUTO: 9.11 K/UL

## 2017-04-22 PROCEDURE — 85007 BL SMEAR W/DIFF WBC COUNT: CPT

## 2017-04-22 PROCEDURE — 25000003 PHARM REV CODE 250: Performed by: STUDENT IN AN ORGANIZED HEALTH CARE EDUCATION/TRAINING PROGRAM

## 2017-04-22 PROCEDURE — 63600175 PHARM REV CODE 636 W HCPCS: Performed by: STUDENT IN AN ORGANIZED HEALTH CARE EDUCATION/TRAINING PROGRAM

## 2017-04-22 PROCEDURE — 83735 ASSAY OF MAGNESIUM: CPT

## 2017-04-22 PROCEDURE — 76937 US GUIDE VASCULAR ACCESS: CPT

## 2017-04-22 PROCEDURE — 25000003 PHARM REV CODE 250: Performed by: HOSPITALIST

## 2017-04-22 PROCEDURE — 80053 COMPREHEN METABOLIC PANEL: CPT

## 2017-04-22 PROCEDURE — 25000003 PHARM REV CODE 250: Performed by: INTERNAL MEDICINE

## 2017-04-22 PROCEDURE — 25000003 PHARM REV CODE 250: Performed by: PHYSICIAN ASSISTANT

## 2017-04-22 PROCEDURE — 36569 INSJ PICC 5 YR+ W/O IMAGING: CPT

## 2017-04-22 PROCEDURE — 63600175 PHARM REV CODE 636 W HCPCS: Performed by: PHYSICIAN ASSISTANT

## 2017-04-22 PROCEDURE — 84100 ASSAY OF PHOSPHORUS: CPT

## 2017-04-22 PROCEDURE — 99231 SBSQ HOSP IP/OBS SF/LOW 25: CPT | Mod: GC,,, | Performed by: HOSPITALIST

## 2017-04-22 PROCEDURE — 02HV33Z INSERTION OF INFUSION DEVICE INTO SUPERIOR VENA CAVA, PERCUTANEOUS APPROACH: ICD-10-PCS | Performed by: HOSPITALIST

## 2017-04-22 PROCEDURE — 36415 COLL VENOUS BLD VENIPUNCTURE: CPT

## 2017-04-22 PROCEDURE — 11000001 HC ACUTE MED/SURG PRIVATE ROOM

## 2017-04-22 PROCEDURE — 85027 COMPLETE CBC AUTOMATED: CPT

## 2017-04-22 PROCEDURE — C1751 CATH, INF, PER/CENT/MIDLINE: HCPCS

## 2017-04-22 RX ORDER — SODIUM CHLORIDE 0.9 % (FLUSH) 0.9 %
10 SYRINGE (ML) INJECTION
Status: DISCONTINUED | OUTPATIENT
Start: 2017-04-22 | End: 2017-04-24 | Stop reason: HOSPADM

## 2017-04-22 RX ORDER — MAGNESIUM SULFATE HEPTAHYDRATE 40 MG/ML
2 INJECTION, SOLUTION INTRAVENOUS ONCE
Status: COMPLETED | OUTPATIENT
Start: 2017-04-22 | End: 2017-04-22

## 2017-04-22 RX ORDER — PREGABALIN 25 MG/1
75 CAPSULE ORAL 3 TIMES DAILY
Status: DISCONTINUED | OUTPATIENT
Start: 2017-04-22 | End: 2017-04-24 | Stop reason: HOSPADM

## 2017-04-22 RX ORDER — SODIUM CHLORIDE 0.9 % (FLUSH) 0.9 %
10 SYRINGE (ML) INJECTION EVERY 6 HOURS
Status: DISCONTINUED | OUTPATIENT
Start: 2017-04-22 | End: 2017-04-24 | Stop reason: HOSPADM

## 2017-04-22 RX ADMIN — HYDROCHLOROTHIAZIDE 12.5 MG: 12.5 TABLET ORAL at 09:04

## 2017-04-22 RX ADMIN — STANDARDIZED SENNA CONCENTRATE AND DOCUSATE SODIUM 1 TABLET: 8.6; 5 TABLET, FILM COATED ORAL at 09:04

## 2017-04-22 RX ADMIN — LIDOCAINE 1 PATCH: 50 PATCH TOPICAL at 05:04

## 2017-04-22 RX ADMIN — POLYETHYLENE GLYCOL 3350 17 G: 17 POWDER, FOR SOLUTION ORAL at 09:04

## 2017-04-22 RX ADMIN — CALCIUM CARBONATE-CHOLECALCIFEROL TAB 250 MG-125 UNIT 1 TABLET: 250-125 TAB at 09:04

## 2017-04-22 RX ADMIN — CEFAZOLIN 6 G: 1 INJECTION, POWDER, FOR SOLUTION INTRAMUSCULAR; INTRAVENOUS; PARENTERAL at 10:04

## 2017-04-22 RX ADMIN — DICLOFENAC: 10 GEL TOPICAL at 09:04

## 2017-04-22 RX ADMIN — PREGABALIN 75 MG: 25 CAPSULE ORAL at 10:04

## 2017-04-22 RX ADMIN — ENOXAPARIN SODIUM 40 MG: 100 INJECTION SUBCUTANEOUS at 05:04

## 2017-04-22 RX ADMIN — PREGABALIN 75 MG: 25 CAPSULE ORAL at 05:04

## 2017-04-22 RX ADMIN — ASPIRIN 81 MG CHEWABLE TABLET 81 MG: 81 TABLET CHEWABLE at 09:04

## 2017-04-22 RX ADMIN — LOSARTAN POTASSIUM 50 MG: 50 TABLET, FILM COATED ORAL at 09:04

## 2017-04-22 RX ADMIN — Medication 3 ML: at 02:04

## 2017-04-22 RX ADMIN — PREGABALIN 75 MG: 75 CAPSULE ORAL at 09:04

## 2017-04-22 RX ADMIN — PANTOPRAZOLE SODIUM 40 MG: 40 TABLET, DELAYED RELEASE ORAL at 09:04

## 2017-04-22 RX ADMIN — DONEPEZIL HYDROCHLORIDE 10 MG: 10 TABLET, FILM COATED ORAL at 09:04

## 2017-04-22 RX ADMIN — MAGNESIUM SULFATE IN WATER 2 G: 40 INJECTION, SOLUTION INTRAVENOUS at 09:04

## 2017-04-22 RX ADMIN — DULOXETINE 30 MG: 30 CAPSULE, DELAYED RELEASE ORAL at 09:04

## 2017-04-22 RX ADMIN — PREDNISONE 10 MG: 10 TABLET ORAL at 09:04

## 2017-04-22 NOTE — ASSESSMENT & PLAN NOTE
- on losartan, imdur, hydralazine and hctz  - became hypotensive and was given 500 bolus.  - hydralazine was dced and losartan was decreased.   - will dc imdur to make BP on the higher side.

## 2017-04-22 NOTE — PLAN OF CARE
Problem: Pressure Ulcer Risk (Solomon Scale) (Adult,Obstetrics,Pediatric)  Goal: Skin Integrity  Patient will demonstrate the desired outcomes by discharge/transition of care.   Outcome: Ongoing (interventions implemented as appropriate)  Patient has multiple skin tears, foam dressings in place. Right arm and Right leg dressings in place, to be changed daily per orders. Incontinence care provided as needed overnight. Weight shift assistance provided to prevent further skin breakdown.    Problem: Patient Care Overview  Goal: Plan of Care Review  Outcome: Ongoing (interventions implemented as appropriate)  Plan of care reviewed with patient and son at bedside. Pt remains AAOx2, disoriented to time and situation. Vitals stable overnight. No acute events.     Problem: Fall Risk (Adult)  Goal: Absence of Falls  Patient will demonstrate the desired outcomes by discharge/transition of care.   Outcome: Ongoing (interventions implemented as appropriate)  Patient remains free from injury/falls. Bed alarm set for safety. Family at bedside

## 2017-04-22 NOTE — PROGRESS NOTES
[facility] Hospital Medicine  Progress Note    Patient Name: Danyelle Jacobs  MRN: [id number]  Patient Class: IP- Inpatient   Admission Date: 2017  Length of Stay: 16 days  Attending Physician: Yen Ledezma MD  Primary Care Provider: Bianca Dykes MD    Hospital Medicine Team: [facility] Rashaad Ricci MD    Subjective:     Principal Problem:Severe sepsis    HPI:  Ms. Jacobs is a 88 F with a history of HTN, HLD, CAD, PAD, HFpEF, dementia, and rheumatoid arthritis who presented to the ED   with GI bleeding. Her son reported that her symptoms began the evening before with bilious vomiting and diarrhea. Since then, she had had 6 episodes of emesis and 5 episodes of diarrhea which started out as black and became bright red by her last BM which was at 6 AM. She currently denies abdominal pain, SOB, or nausea; however, her son reports that she will usually deny symptoms even if they are present.     She had a recent admission from 3/24- for respiratory failure with hypoxia (presumed 2/2 to PNA, resolved with abx) and RLE pain. Also admitted - and transfused at that time. Patient has a significant history of 'bleeding angiodysplastic lesion found on EGD with hemostasis achieved' performed in 2017. Patient also underwent colonoscopy at the same time which showed 'prominent vessels in the cecum but no definite AVM; raised bluish lesion likely a hematoma in the rectum.' Son reports that she has declined both mentally and physically in the last few months and has become progressively weak, less responsive, and has had hallucinations including speaking to  relatives. Also, she has developed urinary and fecal incontinence recently.     While in the ICU she was started on protonix and was conservatively managed from a GI perspective. She was able to be stepped down to hospital medicine on  and has had a relatively benign course since that time. A rapid was called tonight for  Ms. Jacobs because she was found to be more altered, diaphoretic, pale and hypotensive in the 70s-80s with baseline in the 110s-120s. Patient was bolused IV fluids with improvement in both blood pressure and mental status but still remained somewhat lethargic. Patient had also spiking fevers up to 103.5. Given patient's rapid deterioration and borderline blood pressures despite fluids, she will be accepted in to the ICU for closer monitoring.       Hospital Course:  04/06/17: Upon initial evaluation patient's initial oxygen requirements appeared to be increasing. ABG was performed which showed adequate oxygenation. Hct was also obtained with the arterial stick which showed a Hct of 19. Based on this result and her presenting H/H of 8.3/26.2, 1 U PRBCs was ordered and received 2L of NS. GI and ICU were consulted for further management. Repeat hgb and hemodynamics are stable. Protonix drip started. CT abdomen W/O significant for diverticulosis, adnexal cystic structure, L1 compression fx, and lung opacities (atelectasis v infection/inflammation).    4/7: Hgb stable, no further episodes of vomiting or diarrhea. Pt c/o RLE pain. Hypertensive to 200 systolic, restarting home BP meds stepwise. GI recs supportive care. PARVEEN improved.     4/8: stepped down to hospital medicine. Hemodynamically stable.     4/9: No acute events, PTOT recommending SNF.      4/10: Son requested pelvic ultrasound for patient since she is currently admitted. H/H stable. Awaiting eval from [facility].    4/11: 4/9: no acute events overnight. Started nystatin ointment yesterday for candidal rash under breasts.S/p U/S revealing cystic mass in L adnexa. Awaiting placement.     4/12 - NAEON. Awaiting placement.    4/13 - A rapid was called because she was found to be more altered, diaphoretic, pale and hypotensive in the 70s-80s with baseline in the 110s-120s. Patient was bolused IV fluids with improvement in both blood pressure and mental status but  still remained somewhat lethargic. Patient had also spiking fevers up to 103.5. Given patient's rapid deterioration and borderline blood pressures despite fluids, she was sent to the ICU for closer monitoring.   Patient was fluid resuscitated with over 30 cc/kg and had full response in blood pressure and tachycardia. IV abx of vanc and zosyn were started. Patient's mental status improved to baseline by the morning. Discussed plan with patient's family.     4/15 - Pt transferred back to hospital medicine. SHAWNA.  She remained afebrile with all other VSS.  BCx positive for GPC, awaiting identification of organism.  Continue vanc/zosyn.    4/16 - NAEON.  Pt remained AFVSS.  BCx growing staph aureus, sensitivities pending, repeat BCx positive.  ID following, recommend right hip imaging.    04/17 - Right hip abscess elucidated on MRI. Talked to General Surgery and indicated that IR was the most likely route. Her WCC has decreased and there is no concern for a septic hip at this point.  Increased the patient's hydralazine dose as well.        Interval Hx: swelling of her arm continues to improve, however she is still complaining of pain.   Review of Systems   Constitutional: Negative for chills, diaphoresis and fever.   Eyes: Negative for visual disturbance.   Respiratory: Negative for cough and shortness of breath.    Cardiovascular: Negative for chest pain and leg swelling.   Gastrointestinal: Negative for abdominal pain, nausea and vomiting.   Musculoskeletal: Positive for arthralgias and back pain.   Skin: Positive for wound. Negative for color change.     Objective:     Vital Signs (Most Recent):  Temp: 98.2 °F (36.8 °C) (04/22/17 1100)  Pulse: 67 (04/22/17 1100)  Resp: 17 (04/22/17 0852)  BP: 127/63 (04/22/17 1100)  SpO2: 98 % (04/22/17 1100) Vital Signs (24h Range):  Temp:  [97.9 °F (36.6 °C)-98.7 °F (37.1 °C)] 98.2 °F (36.8 °C)  Pulse:  [63-73] 67  Resp:  [17-18] 17  SpO2:  [94 %-98 %] 98 %  BP: (122-151)/(56-69)  127/63     Weight: 59.6 kg (131 lb 8 oz)  Body mass index is 28.46 kg/(m^2).    Intake/Output Summary (Last 24 hours) at 04/22/17 1344  Last data filed at 04/22/17 1200   Gross per 24 hour   Intake             1750 ml   Output                0 ml   Net             1750 ml      Physical Exam   Constitutional: She appears well-developed. No distress.   Cardiovascular: Normal rate, regular rhythm and normal heart sounds.    Pulmonary/Chest: Effort normal and breath sounds normal. No respiratory distress.   Abdominal: Soft. Bowel sounds are normal. She exhibits no distension. There is no tenderness.   Musculoskeletal: She exhibits edema (right arm swelling. tender and painful).   Neurological: She is alert.   Skin: Skin is warm and dry. She is not diaphoretic. There is erythema.   Vitals reviewed.         Assessment/Plan:      MG (myasthenia gravis)  Continue prednisone.       CAD (coronary artery disease)  - continue  ASA 81 mg PO   - Plavix 75 mg PO daily - held  - no signs of further bleeding, Hb stable.  -given hx of multiple GI bleeds, will defer restarting plavix as an OP    Mixed hyperlipidemia  - Lipitor 40 mg PO daily    Gastroesophageal reflux disease without esophagitis  -s/p protonix iv  - on protonix oral now.     Benign hypertension  - on losartan, imdur, hydralazine and hctz  - became hypotensive and was given 500 bolus.  - hydralazine was dced and losartan was decreased.   - will dc imdur to make BP on the higher side.      Rheumatoid arthritis involving multiple sites with positive rheumatoid factor  Now having with RUE pain,   Not on dmards 2/2 age and comoribidities  F/u with dr. Olivo        Anemia associated with acute blood loss  - s/p pRBC's on this admission secondary to GIB from angiodysplagia.   - no signs of repeat bleeding  - Will transfuse if patient's hb < 7      Angiodysplasia of stomach: see EGD 1/17  - EGD1/2017: 'bleeding angiodysplastic lesion found with hemostasis achieved'  -  Colonoscopy: 'prominent vessels in the cecum but no definite AVM; raised bluish lesion likely a hematoma in the rectum.'  - Multiple episodes of yellow/green emesis w/ five episodes of diarrhea, dark red --> bright red  - Patient currently HDS, H/H stable at this point  - GI consulted, recommend OP follow up    Compression fracture of first lumbar vertebra  -likely 2/2 to osteoporosis, seen on CT abdo  -possible nerve impingement contributing to R hip pain  -continue lyrica and PTOT  -Volteran gel      Sciatica associated with disorder of lumbar spine  - PTOT  - increased dose of lyrica from 50 to 75mg TID.         Adnexal mass  L cystic adnexal mass seen on CTabdo in setting of reported complete hysterectomy  -Pelvic U/S revealing 2cm cystic structure, can be monitored with yearly U/S      Candidal intertrigo  -under breasts  -nystatin ointment      MSSA (methicillin susceptible Staphylococcus aureus) septicemia  - MSSA bacteremia. Now with Rt. Psoas abscess.  - source likely skin given multiple areas of tear and breakdown  - UCx negative but urine collected after broad spectrum antibiotics so not helpful  - CXR does not show any new consolidation although aspiration still possible  - continue ancef.   - echo -ve for vegetation.   - cultures from the psoas abscess growing growing staph.  - blood cultures from 4/19 NTD.   - Anticipate 6 weeks of IV antibiotics from negative blood culture. Will need PICC line prior to discharge. Estimated end date 5/31/17.   - Will need ID to follow outpatient labs as patient will need weekly CBC, CMP, ESR, and CRP (our clinic fax is [phone])        Psoas abscess  - s/p IR CT guided aspiration on 4/18.  - Patient without septic hip at this point  - cultures are growing +ve cocci in clusters (staph aureus).    - Continue ancef      VTE Risk Mitigation         Ordered     enoxaparin injection 40 mg  Daily     Route:  Subcutaneous        04/19/17 1632     Medium Risk of VTE  Once       04/06/17 1535     Place ISHMAEL hose  Until discontinued      04/06/17 1535     Reason for No Pharmacological VTE Prophylaxis  Once      04/06/17 1535     Place sequential compression device  Until discontinued      04/06/17 1504          Rashaad Ricci MD  Department of Hospital Medicine   [facility]

## 2017-04-22 NOTE — CONSULTS
Double lumen PICC placed in right basilic vein , 36 cm in length with 0 cm exposed. Arm circumference 26 cm. Lot #.[id number]

## 2017-04-22 NOTE — PROCEDURES
"Danyelle Jacobs is a 88 y.o. female patient.    Temp: 98 °F (36.7 °C) (04/22/17 0852)  Pulse: 66 (04/22/17 0852)  Resp: 17 (04/22/17 0852)  BP: 126/62 (04/22/17 0852)  SpO2: 96 % (04/22/17 0852)  Weight: 59.6 kg (131 lb 8 oz) (04/22/17 0400)  Height: 4' 9" (144.8 cm) (04/06/17 1144)    PICC  Date/Time: 4/22/2017 10:26 AM  Performed by: KALYANI MYLES  Consent Done: Yes  Time out: Immediately prior to procedure a time out was called to verify the correct patient, procedure, equipment, support staff and site/side marked as required  Indications: med administration and vascular access  Anesthesia: local infiltration  Anesthetic Total (mL): 4  Preparation: skin prepped with chlorhexidine (without alcohol)  Skin prep agent dried: skin prep agent completely dried prior to procedure  Sterile barriers: all five maximum sterile barriers used - cap, mask, sterile gown, sterile gloves, and large sterile sheet  Hand hygiene: hand hygiene performed prior to central venous catheter insertion  Location details: right basilic  Catheter type: double lumen  Catheter size: 5 Fr  Catheter Length: 36cm    Ultrasound guidance: yes  Vessel Caliber: medium and patentVascular Doppler: not done  Needle advanced into vessel with real time Ultrasound guidance.  Guidewire confirmed in vessel.  Image recorded and saved.  Sterile sheath used.  no esophageal manometryNumber of attempts: 1  Post-procedure: blood return through all ports  Specimens: No  Implants: No  Assessment: placement verified by x-ray        Nunu Shearer  4/22/2017    "

## 2017-04-22 NOTE — SUBJECTIVE & OBJECTIVE
Interval Hx: swelling of her arm continues to improve, however she is still complaining of pain.   Review of Systems   Constitutional: Negative for chills, diaphoresis and fever.   Eyes: Negative for visual disturbance.   Respiratory: Negative for cough and shortness of breath.    Cardiovascular: Negative for chest pain and leg swelling.   Gastrointestinal: Negative for abdominal pain, nausea and vomiting.   Musculoskeletal: Positive for arthralgias and back pain.   Skin: Positive for wound. Negative for color change.     Objective:     Vital Signs (Most Recent):  Temp: 98.2 °F (36.8 °C) (04/22/17 1100)  Pulse: 67 (04/22/17 1100)  Resp: 17 (04/22/17 0852)  BP: 127/63 (04/22/17 1100)  SpO2: 98 % (04/22/17 1100) Vital Signs (24h Range):  Temp:  [97.9 °F (36.6 °C)-98.7 °F (37.1 °C)] 98.2 °F (36.8 °C)  Pulse:  [63-73] 67  Resp:  [17-18] 17  SpO2:  [94 %-98 %] 98 %  BP: (122-151)/(56-69) 127/63     Weight: 59.6 kg (131 lb 8 oz)  Body mass index is 28.46 kg/(m^2).    Intake/Output Summary (Last 24 hours) at 04/22/17 1344  Last data filed at 04/22/17 1200   Gross per 24 hour   Intake             1750 ml   Output                0 ml   Net             1750 ml      Physical Exam   Constitutional: She appears well-developed. No distress.   Cardiovascular: Normal rate, regular rhythm and normal heart sounds.    Pulmonary/Chest: Effort normal and breath sounds normal. No respiratory distress.   Abdominal: Soft. Bowel sounds are normal. She exhibits no distension. There is no tenderness.   Musculoskeletal: She exhibits edema (right arm swelling. tender and painful).   Neurological: She is alert.   Skin: Skin is warm and dry. She is not diaphoretic. There is erythema.   Vitals reviewed.

## 2017-04-23 LAB
ALBUMIN SERPL BCP-MCNC: 1.4 G/DL
ALP SERPL-CCNC: 116 U/L
ALT SERPL W/O P-5'-P-CCNC: <5 U/L
ANION GAP SERPL CALC-SCNC: 9 MMOL/L
ANISOCYTOSIS BLD QL SMEAR: SLIGHT
AST SERPL-CCNC: 22 U/L
BASOPHILS NFR BLD: 0 %
BILIRUB SERPL-MCNC: 0.1 MG/DL
BUN SERPL-MCNC: 14 MG/DL
CALCIUM SERPL-MCNC: 8.2 MG/DL
CHLORIDE SERPL-SCNC: 106 MMOL/L
CO2 SERPL-SCNC: 24 MMOL/L
CREAT SERPL-MCNC: 0.6 MG/DL
DIFFERENTIAL METHOD: ABNORMAL
EOSINOPHIL NFR BLD: 2 %
ERYTHROCYTE [DISTWIDTH] IN BLOOD BY AUTOMATED COUNT: 17.7 %
EST. GFR  (AFRICAN AMERICAN): >60 ML/MIN/1.73 M^2
EST. GFR  (NON AFRICAN AMERICAN): >60 ML/MIN/1.73 M^2
GLUCOSE SERPL-MCNC: 81 MG/DL
HCT VFR BLD AUTO: 25.8 %
HGB BLD-MCNC: 8.3 G/DL
HYPOCHROMIA BLD QL SMEAR: ABNORMAL
LYMPHOCYTES NFR BLD: 4 %
MAGNESIUM SERPL-MCNC: 1.7 MG/DL
MCH RBC QN AUTO: 26.9 PG
MCHC RBC AUTO-ENTMCNC: 32.2 %
MCV RBC AUTO: 84 FL
MONOCYTES NFR BLD: 3 %
NEUTROPHILS NFR BLD: 91 %
OVALOCYTES BLD QL SMEAR: ABNORMAL
PHOSPHATE SERPL-MCNC: 3.4 MG/DL
PLATELET # BLD AUTO: 341 K/UL
PLATELET BLD QL SMEAR: ABNORMAL
PMV BLD AUTO: 11.2 FL
POIKILOCYTOSIS BLD QL SMEAR: SLIGHT
POLYCHROMASIA BLD QL SMEAR: ABNORMAL
POTASSIUM SERPL-SCNC: 4.2 MMOL/L
PROT SERPL-MCNC: 4.5 G/DL
RBC # BLD AUTO: 3.08 M/UL
SODIUM SERPL-SCNC: 139 MMOL/L
WBC # BLD AUTO: 10.62 K/UL

## 2017-04-23 PROCEDURE — 25000003 PHARM REV CODE 250: Performed by: INTERNAL MEDICINE

## 2017-04-23 PROCEDURE — 36415 COLL VENOUS BLD VENIPUNCTURE: CPT

## 2017-04-23 PROCEDURE — 63600175 PHARM REV CODE 636 W HCPCS: Performed by: STUDENT IN AN ORGANIZED HEALTH CARE EDUCATION/TRAINING PROGRAM

## 2017-04-23 PROCEDURE — 25000003 PHARM REV CODE 250: Performed by: HOSPITALIST

## 2017-04-23 PROCEDURE — 83735 ASSAY OF MAGNESIUM: CPT

## 2017-04-23 PROCEDURE — 80053 COMPREHEN METABOLIC PANEL: CPT

## 2017-04-23 PROCEDURE — 25000003 PHARM REV CODE 250: Performed by: STUDENT IN AN ORGANIZED HEALTH CARE EDUCATION/TRAINING PROGRAM

## 2017-04-23 PROCEDURE — 84100 ASSAY OF PHOSPHORUS: CPT

## 2017-04-23 PROCEDURE — 25000003 PHARM REV CODE 250: Performed by: PHYSICIAN ASSISTANT

## 2017-04-23 PROCEDURE — 85007 BL SMEAR W/DIFF WBC COUNT: CPT

## 2017-04-23 PROCEDURE — 63600175 PHARM REV CODE 636 W HCPCS: Performed by: PHYSICIAN ASSISTANT

## 2017-04-23 PROCEDURE — 11000001 HC ACUTE MED/SURG PRIVATE ROOM

## 2017-04-23 PROCEDURE — 85027 COMPLETE CBC AUTOMATED: CPT

## 2017-04-23 PROCEDURE — 99231 SBSQ HOSP IP/OBS SF/LOW 25: CPT | Mod: GC,,, | Performed by: HOSPITALIST

## 2017-04-23 RX ORDER — DICLOFENAC SODIUM 10 MG/G
GEL TOPICAL DAILY
Qty: 1 G | Refills: 3 | Status: SHIPPED | OUTPATIENT
Start: 2017-04-23

## 2017-04-23 RX ORDER — PREGABALIN 75 MG/1
75 CAPSULE ORAL 3 TIMES DAILY
Qty: 270 CAPSULE | Refills: 2 | Status: SHIPPED | OUTPATIENT
Start: 2017-04-23 | End: 2017-04-25 | Stop reason: SDUPTHER

## 2017-04-23 RX ORDER — LOSARTAN POTASSIUM 100 MG/1
50 TABLET ORAL DAILY
Qty: 90 TABLET | Refills: 3 | Status: SHIPPED | OUTPATIENT
Start: 2017-04-23

## 2017-04-23 RX ADMIN — SODIUM CHLORIDE, PRESERVATIVE FREE 10 ML: 5 INJECTION INTRAVENOUS at 05:04

## 2017-04-23 RX ADMIN — DONEPEZIL HYDROCHLORIDE 10 MG: 10 TABLET, FILM COATED ORAL at 08:04

## 2017-04-23 RX ADMIN — LOSARTAN POTASSIUM 50 MG: 50 TABLET, FILM COATED ORAL at 08:04

## 2017-04-23 RX ADMIN — LIDOCAINE 1 PATCH: 50 PATCH TOPICAL at 05:04

## 2017-04-23 RX ADMIN — PREDNISONE 10 MG: 10 TABLET ORAL at 08:04

## 2017-04-23 RX ADMIN — Medication 3 ML: at 05:04

## 2017-04-23 RX ADMIN — MICONAZOLE NITRATE: 20 OINTMENT TOPICAL at 08:04

## 2017-04-23 RX ADMIN — STANDARDIZED SENNA CONCENTRATE AND DOCUSATE SODIUM 1 TABLET: 8.6; 5 TABLET, FILM COATED ORAL at 08:04

## 2017-04-23 RX ADMIN — PREGABALIN 75 MG: 25 CAPSULE ORAL at 09:04

## 2017-04-23 RX ADMIN — PREGABALIN 75 MG: 25 CAPSULE ORAL at 05:04

## 2017-04-23 RX ADMIN — PANTOPRAZOLE SODIUM 40 MG: 40 TABLET, DELAYED RELEASE ORAL at 08:04

## 2017-04-23 RX ADMIN — ASPIRIN 81 MG CHEWABLE TABLET 81 MG: 81 TABLET CHEWABLE at 08:04

## 2017-04-23 RX ADMIN — POLYETHYLENE GLYCOL 3350 17 G: 17 POWDER, FOR SOLUTION ORAL at 08:04

## 2017-04-23 RX ADMIN — DULOXETINE 30 MG: 30 CAPSULE, DELAYED RELEASE ORAL at 08:04

## 2017-04-23 RX ADMIN — Medication 3 ML: at 09:04

## 2017-04-23 RX ADMIN — HYDROCHLOROTHIAZIDE 12.5 MG: 12.5 TABLET ORAL at 08:04

## 2017-04-23 RX ADMIN — ENOXAPARIN SODIUM 40 MG: 100 INJECTION SUBCUTANEOUS at 05:04

## 2017-04-23 RX ADMIN — CALCIUM CARBONATE-CHOLECALCIFEROL TAB 250 MG-125 UNIT 1 TABLET: 250-125 TAB at 08:04

## 2017-04-23 RX ADMIN — CEFAZOLIN 6 G: 1 INJECTION, POWDER, FOR SOLUTION INTRAMUSCULAR; INTRAVENOUS; PARENTERAL at 10:04

## 2017-04-23 RX ADMIN — OXYCODONE HYDROCHLORIDE AND ACETAMINOPHEN 1 TABLET: 5; 325 TABLET ORAL at 08:04

## 2017-04-23 RX ADMIN — DICLOFENAC: 10 GEL TOPICAL at 08:04

## 2017-04-23 NOTE — ASSESSMENT & PLAN NOTE
- MSSA bacteremia. Now with Rt. Psoas abscess.  - source likely skin given multiple areas of tear and breakdown  - UCx negative but urine collected after broad spectrum antibiotics so not helpful  - CXR does not show any new consolidation although aspiration still possible  - continue ancef.   - echo -ve for vegetation.   - cultures from the psoas abscess growing growing staph.  - blood cultures from 4/19 NTD.   - Anticipate 6 weeks of IV antibiotics from negative blood culture. Will need PICC line prior to discharge. Estimated end date 5/31/17.   - Will need ID to follow outpatient labs as patient will need weekly CBC, CMP, ESR, and CRP (our clinic fax is [phone])

## 2017-04-23 NOTE — PROGRESS NOTES
[facility] Hospital Medicine  Progress Note    Patient Name: Danyelle Jacobs  MRN: [id number]  Patient Class: IP- Inpatient   Admission Date: 2017  Length of Stay: 17 days  Attending Physician: Yen Ledezma MD  Primary Care Provider: Bianca Dykes MD    Hospital Medicine Team: [facility] [place] HOSP MED O Rashaad Ricci MD    Subjective:     Principal Problem:Severe sepsis    HPI:  Ms. Jacobs is a 88 F with a history of HTN, HLD, CAD, PAD, HFpEF, dementia, and rheumatoid arthritis who presented to the ED   with GI bleeding. Her son reported that her symptoms began the evening before with bilious vomiting and diarrhea. Since then, she had had 6 episodes of emesis and 5 episodes of diarrhea which started out as black and became bright red by her last BM which was at 6 AM. She currently denies abdominal pain, SOB, or nausea; however, her son reports that she will usually deny symptoms even if they are present.     She had a recent admission from 3/24- for respiratory failure with hypoxia (presumed 2/2 to PNA, resolved with abx) and RLE pain. Also admitted - and transfused at that time. Patient has a significant history of 'bleeding angiodysplastic lesion found on EGD with hemostasis achieved' performed in 2017. Patient also underwent colonoscopy at the same time which showed 'prominent vessels in the cecum but no definite AVM; raised bluish lesion likely a hematoma in the rectum.' Son reports that she has declined both mentally and physically in the last few months and has become progressively weak, less responsive, and has had hallucinations including speaking to  relatives. Also, she has developed urinary and fecal incontinence recently.     While in the ICU she was started on protonix and was conservatively managed from a GI perspective. She was able to be stepped down to hospital medicine on  and has had a relatively benign course since that time. A rapid was called tonight for  Ms. Jacobs because she was found to be more altered, diaphoretic, pale and hypotensive in the 70s-80s with baseline in the 110s-120s. Patient was bolused IV fluids with improvement in both blood pressure and mental status but still remained somewhat lethargic. Patient had also spiking fevers up to 103.5. Given patient's rapid deterioration and borderline blood pressures despite fluids, she will be accepted in to the ICU for closer monitoring.       Hospital Course:  04/06/17: Upon initial evaluation patient's initial oxygen requirements appeared to be increasing. ABG was performed which showed adequate oxygenation. Hct was also obtained with the arterial stick which showed a Hct of 19. Based on this result and her presenting H/H of 8.3/26.2, 1 U PRBCs was ordered and received 2L of NS. GI and ICU were consulted for further management. Repeat hgb and hemodynamics are stable. Protonix drip started. CT abdomen W/O significant for diverticulosis, adnexal cystic structure, L1 compression fx, and lung opacities (atelectasis v infection/inflammation).    4/7: Hgb stable, no further episodes of vomiting or diarrhea. Pt c/o RLE pain. Hypertensive to 200 systolic, restarting home BP meds stepwise. GI recs supportive care. PARVEEN improved.     4/8: stepped down to hospital medicine. Hemodynamically stable.     4/9: No acute events, PTOT recommending SNF.      4/10: Son requested pelvic ultrasound for patient since she is currently admitted. H/H stable. Awaiting eval from [facility].    4/11: 4/9: no acute events overnight. Started nystatin ointment yesterday for candidal rash under breasts.S/p U/S revealing cystic mass in L adnexa. Awaiting placement.     4/12 - NAEON. Awaiting placement.    4/13 - A rapid was called because she was found to be more altered, diaphoretic, pale and hypotensive in the 70s-80s with baseline in the 110s-120s. Patient was bolused IV fluids with improvement in both blood pressure and mental status but  still remained somewhat lethargic. Patient had also spiking fevers up to 103.5. Given patient's rapid deterioration and borderline blood pressures despite fluids, she was sent to the ICU for closer monitoring.   Patient was fluid resuscitated with over 30 cc/kg and had full response in blood pressure and tachycardia. IV abx of vanc and zosyn were started. Patient's mental status improved to baseline by the morning. Discussed plan with patient's family.     4/15 - Pt transferred back to hospital medicine. SHAWNA.  She remained afebrile with all other VSS.  BCx positive for GPC, awaiting identification of organism.  Continue vanc/zosyn.    4/16 - NAEON.  Pt remained AFVSS.  BCx growing staph aureus, sensitivities pending, repeat BCx positive.  ID following, recommend right hip imaging.    04/17 - Right hip abscess elucidated on MRI. Talked to General Surgery and indicated that IR was the most likely route. Her WCC has decreased and there is no concern for a septic hip at this point.  Increased the patient's hydralazine dose as well.        Interval Hx: swelling of her arm continues to improve, however she is still complaining of pain, has a picc line in place.   Review of Systems   Constitutional: Negative for chills, diaphoresis and fever.   Eyes: Negative for visual disturbance.   Respiratory: Negative for cough and shortness of breath.    Cardiovascular: Negative for chest pain and leg swelling.   Gastrointestinal: Negative for abdominal pain, nausea and vomiting.   Musculoskeletal: Positive for arthralgias and back pain.   Skin: Positive for wound. Negative for color change.     Objective:     Vital Signs (Most Recent):  Temp: 97.9 °F (36.6 °C) (04/23/17 0910)  Pulse: 71 (04/23/17 0910)  Resp: 17 (04/23/17 0910)  BP: 119/61 (04/23/17 0910)  SpO2: 97 % (04/23/17 0910) Vital Signs (24h Range):  Temp:  [97.7 °F (36.5 °C)-98.6 °F (37 °C)] 97.9 °F (36.6 °C)  Pulse:  [70-79] 71  Resp:  [17-18] 17  SpO2:  [93 %-97 %] 97  %  BP: (116-126)/(58-63) 119/61     Weight: 59.6 kg (131 lb 8 oz)  Body mass index is 28.46 kg/(m^2).    Intake/Output Summary (Last 24 hours) at 04/23/17 1210  Last data filed at 04/23/17 1000   Gross per 24 hour   Intake            729.6 ml   Output                0 ml   Net            729.6 ml      Physical Exam   Constitutional: She appears well-developed. No distress.   Cardiovascular: Normal rate, regular rhythm and normal heart sounds.    Pulmonary/Chest: Effort normal and breath sounds normal. No respiratory distress.   Abdominal: Soft. Bowel sounds are normal. She exhibits no distension. There is no tenderness.   Musculoskeletal: She exhibits edema (right arm swelling. tender and painful).   Neurological: She is alert.   Skin: Skin is warm and dry. She is not diaphoretic. There is erythema.   Vitals reviewed.         Assessment/Plan:      MG (myasthenia gravis)  Continue prednisone.       CAD (coronary artery disease)  - continue  ASA 81 mg PO   - Plavix 75 mg PO daily - held  - no signs of further bleeding, Hb stable.  -given hx of multiple GI bleeds, will defer restarting plavix as an OP    Mixed hyperlipidemia  - Lipitor 40 mg PO daily, will dc for now.     Gastroesophageal reflux disease without esophagitis  -s/p protonix iv  - on protonix oral now.     Benign hypertension  - initially was  on losartan, imdur, hydralazine and hctz  - was then held in the setting of GI bleed and sepsis   - currently on losartan and hctz.      Rheumatoid arthritis involving multiple sites with positive rheumatoid factor  Now having with RUE pain,   Not on dmards 2/2 age and comoribidities  F/u with dr. Olivo        Angiodysplasia of stomach: see EGD 1/17  - EGD1/2017: 'bleeding angiodysplastic lesion found with hemostasis achieved'  - Colonoscopy: 'prominent vessels in the cecum but no definite AVM; raised bluish lesion likely a hematoma in the rectum.'  - Multiple episodes of yellow/green emesis w/ five episodes of  diarrhea, dark red --> bright red  - Patient currently HDS, H/H stable at this point  - GI consulted, recommend OP follow up    Compression fracture of first lumbar vertebra  -likely 2/2 to osteoporosis, seen on CT abdo  -possible nerve impingement contributing to R hip pain  -continue lyrica and PTOT  -Volteran gel      Sciatica associated with disorder of lumbar spine  - PTOT  - increased dose of lyrica from 50 to 75mg TID.         Adnexal mass  L cystic adnexal mass seen on CTabdo in setting of reported complete hysterectomy  -Pelvic U/S revealing 2cm cystic structure, can be monitored with yearly U/S      Candidal intertrigo  -under breasts  -nystatin ointment      MSSA (methicillin susceptible Staphylococcus aureus) septicemia  - MSSA bacteremia. Now with Rt. Psoas abscess.  - source likely skin given multiple areas of tear and breakdown  - UCx negative but urine collected after broad spectrum antibiotics so not helpful  - CXR does not show any new consolidation although aspiration still possible  - continue ancef.   - echo -ve for vegetation.   - cultures from the psoas abscess growing growing staph.  - blood cultures from 4/19 NTD.   - Anticipate 6 weeks of IV antibiotics from negative blood culture. Will need PICC line prior to discharge. Estimated end date 5/31/17.   - Will need ID to follow outpatient labs as patient will need weekly CBC, CMP, ESR, and CRP (our clinic fax is [phone])        Psoas abscess  - s/p IR CT guided aspiration on 4/18.  - Patient without septic hip at this point  - cultures are growing +ve cocci in clusters (staph aureus).    - Continue ancef      Pain and swelling of right forearm  US arm is -ve   Likely 2/2 RA  Started on steroid for MG, will likely help.       VTE Risk Mitigation         Ordered     enoxaparin injection 40 mg  Daily     Route:  Subcutaneous        04/19/17 1632     Medium Risk of VTE  Once      04/06/17 1535     Place ISHMAEL hose  Until discontinued       04/06/17 1535     Reason for No Pharmacological VTE Prophylaxis  Once      04/06/17 1535     Place sequential compression device  Until discontinued      04/06/17 1504          Rashaad Ricci MD  Department of Hospital Medicine   [facility]

## 2017-04-23 NOTE — PLAN OF CARE
Problem: Infection, Risk/Actual (Adult)  Intervention: Prevent Infection/Maximize Resistance  88 F with a history of HTN, HLD, CAD, PAD, HFpEF, dementia, and rheumatoid arthritis who presented to the ED 4/6 with GI bleeding, which has now resolved.  Vitals are stable today. Upper left arm midline and piv removed today as patient has a right upper arm PICC placed yesterday. Plan is for patient to discharge possibly tomorrow with PICC and continue IV antibiotics.  Wounds to bilateral upper arms cleaned and redressed. Patient turned and positioned in bed every two hours.  Several family members present at bedside all day.          04/23/17 1651   Respiratory Interventions   Airway/Ventilation Management airway patency maintained;calming measures promoted   Pain/Comfort/Sleep Interventions   Sleep/Rest Enhancement awakenings minimized;consistent schedule promoted;family presence promoted;regular sleep/rest pattern promoted   Hygiene Care   Bathing/Skin Care bath, complete   Nutrition Interventions   Glycemic Management blood glucose monitoring;supplemental insulin given   Oral Nutrition Promotion calorie dense liquids provided;calorie dense foods provided;medicated;rest periods promoted;social interaction promoted

## 2017-04-23 NOTE — DISCHARGE SUMMARY
DISCHARGE SUMMARY  Hospital Medicine    Team: [facility]    Patient Name: Danyelle Jacobs  YOB: 1928    Admit Date: 4/6/2017    Discharge Date: 4/24/1017    Discharge Attending Physician: Yen Ledezma MD     Diagnoses:  Active Hospital Problems    Diagnosis  POA    *Severe sepsis [A41.9, R65.20]  Yes    Pain and swelling of right forearm [M79.631, M79.89]  Yes    Psoas abscess [K68.12]  Yes    MSSA (methicillin susceptible Staphylococcus aureus) septicemia [A41.01]  No    Candidal intertrigo [B37.2]  No    Adnexal mass [N94.9]  Yes    Sciatica associated with disorder of lumbar spine [M53.9]  Yes    Diverticulosis large intestine w/o perforation or abscess w/bleeding [K57.31]  Yes    Compression fracture of first lumbar vertebra [S32.010A]  Yes    Angiodysplasia of stomach: see EGD 1/17 [K31.819]  Yes    Anemia associated with acute blood loss [D62]  Yes    Current chronic use of systemic steroids [Z79.52]  Not Applicable    Chronic kidney disease (CKD), stage I [N18.1]  Yes    Rheumatoid arthritis involving multiple sites with positive rheumatoid factor [M05.79]  Yes    Gastroesophageal reflux disease without esophagitis [K21.9]  Yes    Benign hypertension [I10]  Yes    CAD (coronary artery disease) [I25.10]  Yes    Mixed hyperlipidemia [E78.2]  Yes    MG (myasthenia gravis) [G70.00]  Yes      Resolved Hospital Problems    Diagnosis Date Resolved POA    Altered mental status [R41.82] 04/15/2017 No    Gastrointestinal hemorrhage [K92.2] 04/14/2017 Yes    PARVEEN (acute kidney injury) [N17.9] 04/15/2017 Yes       Discharged Condition: admit problems have stabilized   HOSPITAL COURSE:    Initial Presentation:     Danyelle Jacobs is a 88 y.o. female w/ a significant PMHx of HTN, HLD, HFpEF, CAD, PAD, dementia, rheumatoid arthritis who presented to [facility] ED after a recent admission for respiratory failure with hypoxia. Per patient's son, who is at bedside, the patient has had 6 episodes  of yellow/green emesis as well as five loose bowel movements which started off as dark red and turned bright red by the last bowel movement over the past 24H. Patient has a significant history of 'bleeding angiodysplastic lesion found on EGD with hemostasis achieved' performed in 01/2017. Patient also underwent colonoscopy at the same time which showed 'prominent vessels in the cecum but no definite AVM; raised bluish lesion likely a hematoma in the rectum.'     The son also comments that the patient has continued to decline of the past two months and has started to have multiple falls. Patient was previously able to understand English and respond in Lao but now can only communicate in Lao. The patiently currently denies and vision changes, palpitations, chest pain, nausea/vomiting, however she does endorse shortness of breath.     Course of Principle Problem for Admission:    Upon initial evaluation patient's initial oxygen requirements appeared to be increasing. ABG was performed which showed adequate oxygenation. Hct was also obtained with the arterial stick which showed a Hct of 19. Based on this result and her presenting H/H of 8.3/26.2, 1 U PRBCs was ordered and received 2L of NS. GI and ICU were consulted for further management and patient was admitted to the ICU. Repeat hgb and hemodynamics are stable. Protonix drip started. CT abdomen W/O significant for diverticulosis, adnexal cystic structure, L1 compression fx, and lung opacities (atelectasis v infection/inflammation). On 4/8: stepped down to hospital medicine, hemodynamically stable at that time. While waiting for placement , patient became septic and developed MSSA bacteremia and was stepped back to ICU.  The patient stabilized and returned to the medicine floor.  TTE was negative for vegetations, but further imaging showed a R psoas abscess. On 4/18 patient underwent IR drainage of the abscess. Blood cultures cultures have been negative since  4/19, overall treatment course with Ancef is planned for 6 weeks treatment from that date -- planned end date 5/31/2017.    Plan by hospital problem on day of discharge:  MG (myasthenia gravis)  Continue prednisone.       CAD (coronary artery disease)  - continue ASA 81 mg PO  - Plavix 75 mg PO daily - held due to GIB; consider resumption as outpatient      Gastroesophageal reflux disease without esophagitis  - continue PPI      Benign hypertension  - initially was on losartan, imdur, hydralazine and hctz  - was then held in the setting of GI bleed and sepsis   - currently on losartan and hctz.      Rheumatoid arthritis involving multiple sites with positive rheumatoid factor  - Continue low dose prednisone  - Not on DMARDs 2/2 age and comoribidities  - Outpatient f/u      Angiodysplasia of stomach: see EGD 1/17  - EGD1/2017: 'bleeding angiodysplastic lesion found with hemostasis achieved'  - Colonoscopy: 'prominent vessels in the cecum but no definite AVM; raised bluish lesion likely a hematoma in the rectum.'  - stable H+H, hemodynamics  - GI recommends OP follow up      Compression fracture of first lumbar vertebra  -likely 2/2 to osteoporosis, seen on CT abdo  -possible nerve impingement contributing to R hip pain  -continue lyrica and PT/OT  -Volteran gel PRN      Sciatica associated with disorder of lumbar spine  - PTOT  - lyrica 75mg TID.       Adnexal mass  L cystic adnexal mass seen on CTabdo in setting of reported complete hysterectomy  -Pelvic U/S revealing 2cm cystic structure, can be monitored with yearly U/S      Candidal intertrigo  - continue antifungal cream      MSSA (methicillin susceptible Staphylococcus aureus) septicemia with Psoas Abscess  - Blood cultures now negative, abscess drained by IR 4/18  - continue Ancef 6g qday continuous, end date 5/31/17.   - Will need ID to follow outpatient labs as patient will need weekly CBC, CMP, ESR, and CRP (clinic fax is [phone])     Pain and swelling  of right forearm  - RUE US without clot  - Suspect delayed venous drainage due to partial obstruction from PICC  - Much improved today    Disposition:    SNF     Future Scheduled Appointments:  Future Appointments  Date Time Provider Department Center   5/22/2017 10:20 AM [facility] Calvillo [facility]   5/22/2017 10:30 AM Bianca Dykes MD [facility] Navi Jitendradwaine W   5/22/2017 1:00 PM VASCULAR, CARDIOLOGY [facility] ALONDRA Mcelroy [facility]   5/22/2017 2:00 PM [facility]     Last CBC/BMP/HgbA1c (if applicable):  Recent Results (from the past 336 hour(s))   CBC auto differential    Collection Time: 04/23/17  8:29 AM   Result Value Ref Range    WBC 10.62 3.90 - 12.70 K/uL    Hemoglobin 8.3 (L) 12.0 - 16.0 g/dL    Hematocrit 25.8 (L) 37.0 - 48.5 %    Platelets 341 150 - 350 K/uL   CBC auto differential    Collection Time: 04/22/17  4:22 AM   Result Value Ref Range    WBC 9.11 3.90 - 12.70 K/uL    Hemoglobin 8.1 (L) 12.0 - 16.0 g/dL    Hematocrit 25.0 (L) 37.0 - 48.5 %    Platelets 347 150 - 350 K/uL   CBC auto differential    Collection Time: 04/21/17  6:34 AM   Result Value Ref Range    WBC 7.56 3.90 - 12.70 K/uL    Hemoglobin 7.8 (L) 12.0 - 16.0 g/dL    Hematocrit 24.7 (L) 37.0 - 48.5 %    Platelets 273 150 - 350 K/uL     Recent Results (from the past 336 hour(s))   Basic metabolic panel    Collection Time: 04/14/17  3:44 AM   Result Value Ref Range    Sodium 135 (L) 136 - 145 mmol/L    Potassium 4.2 3.5 - 5.1 mmol/L    Chloride 109 95 - 110 mmol/L    CO2 17 (L) 23 - 29 mmol/L    BUN, Bld 21 8 - 23 mg/dL    Creatinine 1.1 0.5 - 1.4 mg/dL    Calcium 7.4 (L) 8.7 - 10.5 mg/dL    Anion Gap 9 8 - 16 mmol/L     Lab Results   Component Value Date    HGBA1C 5.3 03/21/2011       Discharge Medication List:     Medication List      START taking these medications          acetaminophen 325 MG tablet   Commonly known as:  TYLENOL   Take 2 tablets (650 mg total) by mouth every 6 (six)  hours as needed for Pain or Temperature greater than (100.4).       calcium carbonate-vitamin D3 250-125 mg 250-125 mg-unit Tab   Take 1 tablet by mouth once daily.       dextrose 5 % SolP 500 mL with ceFAZolin 1 gram SolR 6 g   Inject 6 g into the vein continuous.       diclofenac sodium 1 % Gel   Apply topically once daily. Apply to right arm.       miconazole nitrate 2% 2 % Oint   Commonly known as:  MICOTIN   Apply topically 2 (two) times daily.       polyethylene glycol 17 gram Pwpk   Commonly known as:  GLYCOLAX   Take 17 g by mouth daily as needed (constipation).       ramelteon 8 mg tablet   Commonly known as:  ROZEREM   Take 1 tablet (8 mg total) by mouth nightly as needed for Insomnia.       * sodium chloride 0.9% 0.9 % injection   Inject 10 mLs into the vein every 6 (six) hours.       * sodium chloride 0.9% 0.9 % injection   Inject 10 mLs into the vein as needed.       * Notice:  This list has 2 medication(s) that are the same as other medications prescribed for you. Read the directions carefully, and ask your doctor or other care provider to review them with you.      CHANGE how you take these medications          hydrocodone-acetaminophen 5-325mg 5-325 mg per tablet   Commonly known as:  NORCO   Take 1 tablet by mouth every 6 (six) hours as needed for Pain (pain not responsive to tylenol).   What changed:  reasons to take this       losartan 100 MG tablet   Commonly known as:  COZAAR   Take 0.5 tablets (50 mg total) by mouth once daily.   What changed:  how much to take       pregabalin 75 MG capsule   Commonly known as:  LYRICA   Take 1 capsule (75 mg total) by mouth 3 (three) times daily.   What changed:    - medication strength  - how much to take  - when to take this         CONTINUE taking these medications          aspirin 81 mg Tab        MG capsule   Generic drug:  docusate sodium   TAKE 1 CAPSULE BY MOUTH TWICE DAILY       donepezil 10 MG tablet   Commonly known as:  ARICEPT   Take 1  tablet (10 mg total) by mouth once daily.       duloxetine 30 MG capsule   Commonly known as:  CYMBALTA   Take 1 capsule (30 mg total) by mouth once daily.       ferrous sulfate 325 (65 FE) MG EC tablet   Take 1 tablet (325 mg total) by mouth 2 (two) times daily.       hydrochlorothiazide 12.5 mg capsule   Commonly known as:  MICROZIDE   Take 1 capsule (12.5 mg total) by mouth once daily.       pantoprazole 40 MG tablet   Commonly known as:  PROTONIX   Take 1 tablet (40 mg total) by mouth once daily.       predniSONE 10 MG tablet   Commonly known as:  DELTASONE   Take 1 tablet (10 mg total) by mouth once daily.         STOP taking these medications          alprazolam 0.5 MG tablet   Commonly known as:  XANAX       atorvastatin 40 MG tablet   Commonly known as:  LIPITOR       clopidogrel 75 mg tablet   Commonly known as:  PLAVIX       fluconazole 100 MG tablet   Commonly known as:  DIFLUCAN       hydrALAZINE 25 MG tablet   Commonly known as:  APRESOLINE       isosorbide mononitrate 30 MG 24 hr tablet   Commonly known as:  IMDUR       tolterodine 2 MG Cp24   Commonly known as:  DETROL LA            Where to Get Your Medications      These medications were sent to [facility] [address] [place] [address] [address] AT [facility] [address] [id number]    Hours:  24-hours Phone:  [phone]     dextrose 5 % SolP 500 mL with ceFAZolin 1 gram SolR 6 g    diclofenac sodium 1 % Gel    losartan 100 MG tablet         You can get these medications from any pharmacy     Bring a paper prescription for each of these medications     hydrocodone-acetaminophen 5-325mg 5-325 mg per tablet    pregabalin 75 MG capsule       You don't need a prescription for these medications     acetaminophen 325 MG tablet    calcium carbonate-vitamin D3 250-125 mg 250-125 mg-unit Tab    miconazole nitrate 2% 2 % Oint         Information about where to get these medications is not yet available      ! Ask your nurse or doctor about these medications     polyethylene glycol 17 gram Pwpk    ramelteon 8 mg tablet    sodium chloride 0.9% 0.9 % injection    sodium chloride 0.9% 0.9 % injection           Patient Instructions:  No discharge procedures on file.    Signing Physician:  Yen Ledezma MD

## 2017-04-23 NOTE — SUBJECTIVE & OBJECTIVE
Interval Hx: swelling of her arm continues to improve, however she is still complaining of pain, has a picc line in place.   Review of Systems   Constitutional: Negative for chills, diaphoresis and fever.   Eyes: Negative for visual disturbance.   Respiratory: Negative for cough and shortness of breath.    Cardiovascular: Negative for chest pain and leg swelling.   Gastrointestinal: Negative for abdominal pain, nausea and vomiting.   Musculoskeletal: Positive for arthralgias and back pain.   Skin: Positive for wound. Negative for color change.     Objective:     Vital Signs (Most Recent):  Temp: 97.9 °F (36.6 °C) (04/23/17 0910)  Pulse: 71 (04/23/17 0910)  Resp: 17 (04/23/17 0910)  BP: 119/61 (04/23/17 0910)  SpO2: 97 % (04/23/17 0910) Vital Signs (24h Range):  Temp:  [97.7 °F (36.5 °C)-98.6 °F (37 °C)] 97.9 °F (36.6 °C)  Pulse:  [70-79] 71  Resp:  [17-18] 17  SpO2:  [93 %-97 %] 97 %  BP: (116-126)/(58-63) 119/61     Weight: 59.6 kg (131 lb 8 oz)  Body mass index is 28.46 kg/(m^2).    Intake/Output Summary (Last 24 hours) at 04/23/17 1210  Last data filed at 04/23/17 1000   Gross per 24 hour   Intake            729.6 ml   Output                0 ml   Net            729.6 ml      Physical Exam   Constitutional: She appears well-developed. No distress.   Cardiovascular: Normal rate, regular rhythm and normal heart sounds.    Pulmonary/Chest: Effort normal and breath sounds normal. No respiratory distress.   Abdominal: Soft. Bowel sounds are normal. She exhibits no distension. There is no tenderness.   Musculoskeletal: She exhibits edema (right arm swelling. tender and painful).   Neurological: She is alert.   Skin: Skin is warm and dry. She is not diaphoretic. There is erythema.   Vitals reviewed.

## 2017-04-23 NOTE — PLAN OF CARE
[facility]     Department of Hospital Medicine     [address] [place] [address]     [phone] after hours  [phone] fax       NURSING HOME ORDERS    04/23/2017    Admit to Nursing Home: Skilled Bed                                                 Diagnoses:  Active Hospital Problems    Diagnosis  POA    *Severe sepsis [A41.9, R65.20]  Yes    Pain and swelling of right forearm [M79.631, M79.89]  Yes    Psoas abscess [K68.12]  Yes    MSSA (methicillin susceptible Staphylococcus aureus) septicemia [A41.01]  No    Candidal intertrigo [B37.2]  No    Adnexal mass [N94.9]  Yes    Sciatica associated with disorder of lumbar spine [M53.9]  Yes    Diverticulosis large intestine w/o perforation or abscess w/bleeding [K57.31]  Yes    Compression fracture of first lumbar vertebra [S32.010A]  Yes    Angiodysplasia of stomach: see EGD 1/17 [K31.819]  Yes    Anemia associated with acute blood loss [D62]  Yes    Current chronic use of systemic steroids [Z79.52]  Not Applicable    Chronic kidney disease (CKD), stage I [N18.1]  Yes    Rheumatoid arthritis involving multiple sites with positive rheumatoid factor [M05.79]  Yes    Gastroesophageal reflux disease without esophagitis [K21.9]  Yes    Benign hypertension [I10]  Yes    CAD (coronary artery disease) [I25.10]  Yes    Mixed hyperlipidemia [E78.2]  Yes    MG (myasthenia gravis) [G70.00]  Yes      Resolved Hospital Problems    Diagnosis Date Resolved POA    Altered mental status [R41.82] 04/15/2017 No    Gastrointestinal hemorrhage [K92.2] 04/14/2017 Yes    PARVEEN (acute kidney injury) [N17.9] 04/15/2017 Yes       Patient is homebound due to:  Severe sepsis    Allergies:  Review of patient's allergies indicates:   Allergen Reactions    Norvasc  [amlodipine]      Other reaction(s): Edema       Vitals:     Every shift (Skilled Nursing patients)    Diet: Supplement:  1 can every three times a day with  meals                         Type:    Nepro           Acitivities:     - per PT/OT    LABS:  Per facility protocol     CMP, CBC each month for 3 months   PT-INR each week for 1 month then monthly   Pre-albumin each month for 3 months   Digoxin level in 1 month and every 6 months   TSH every year    Nursing Precautions:   - Aspiration precautions:             - Total assistance with meals            -  Upright 90 degrees befor during and after meals             -  Suction at bedside          - Fall precautions per nursing home protocol   - Decubitus precautions:        -  for positioning   - Pressure reducing foam mattress   - Turn patient every two hours. Use wedge pillows to anchor patient    CONSULTS:   Physical Therapy to evaluate and treat     Occupational Therapy to evaluate and treat     Speech Therapy  to evaluate and treat     Nutrition to evaluate and recommend diet     Psychiatry to evaluate and follow patients for delirium    MISCELLANEOUS CARE:       Routine Skin for Bedridden Patients:  Apply moisture barrier cream to all    skin folds and wet areas in perineal area daily and after baths and                           all bowel movements.    Picc line care:  Scrub the Hub: Prior to accessing the line, always perform a 30 second alcohol scrub  Each lumen of the central line is to be flushed at least daily with 10 mL Normal Saline and 3 mL Heparin flush (100 units/mL)  Skilled Nurse (SN) may draw blood from IV access  Blood Draw Procedure:   - Aspirate at least 5 mL of blood   - Discard   - Obtain specimen   - Change posiflow cap   - Flush with 20 mL Normal Saline followed by a                 3-5 mL Heparin flush (100 units/mL)  Central :   - Sterile dressing changes are done weekly and as needed.   - Use chlor-hexadine scrub to cleanse site, apply Biopatch to insertion site,       apply securement device dressing   - Posi-flow caps are changed weekly and after EVERY lab draw.   -  If sterile gauze is under dressing to control oozing,                 dressing change must be performed every 24 hours until gauze is not needed.                         Medications: Discontinue all previous medication orders, if any. See new list below.     Miladys Jacobsa GLADYS   Home Medication Instructions TYLER:[id number]    Printed on:04/23/17 2022   Medication Information                      alprazolam (XANAX) 0.5 MG tablet  Take 1 tablet (0.5 mg total) by mouth daily as needed.             aspirin 81 mg Tab  Take 81 mg by mouth. 1 Tablet Oral Every day             calcium carbonate-vitamin D3 250-125 mg 250-125 mg-unit Tab  Take 1 tablet by mouth once daily.             dextrose 5 % SolP 500 mL with ceFAZolin 1 gram SolR 6 g  Inject 6 g into the vein continuous.    continuous over 24 hrs END date: 5/7/2017          diclofenac sodium 1 % Gel  Apply topically once daily. Apply to right arm.              mg capsule  TAKE 1 CAPSULE BY MOUTH TWICE DAILY             donepezil (ARICEPT) 10 MG tablet  Take 1 tablet (10 mg total) by mouth once daily.             duloxetine (CYMBALTA) 30 MG capsule  Take 1 capsule (30 mg total) by mouth once daily.             ferrous sulfate 325 (65 FE) MG EC tablet  Take 1 tablet (325 mg total) by mouth 2 (two) times daily.             hydrochlorothiazide (MICROZIDE) 12.5 mg capsule  Take 1 capsule (12.5 mg total) by mouth once daily.             hydrocodone-acetaminophen 5-325mg (NORCO) 5-325 mg per tablet  Take 1 tablet by mouth every 6 (six) hours as needed for Pain.             losartan (COZAAR) 100 MG tablet  Take 0.5 tablets (50 mg total) by mouth once daily.             miconazole nitrate 2% (MICOTIN) 2 % Oint  Apply topically 2 (two) times daily.             pantoprazole (PROTONIX) 40 MG tablet  Take 1 tablet (40 mg total) by mouth once daily.             predniSONE (DELTASONE) 10 MG tablet  Take 1 tablet (10 mg total) by mouth once daily.             pregabalin (LYRICA) 75 MG  capsule  Take 1 capsule (75 mg total) by mouth 3 (three) times daily.             tolterodine (DETROL LA) 2 MG Cp24  Take 1 capsule (2 mg total) by mouth once daily.                       _________________________________  Rashaad Ricci MD  04/23/2017

## 2017-04-23 NOTE — ASSESSMENT & PLAN NOTE
- initially was  on losartan, imdur, hydralazine and hctz  - was then held in the setting of GI bleed and sepsis   - currently on losartan and hctz.

## 2017-04-23 NOTE — PLAN OF CARE
Problem: Patient Care Overview  Goal: Plan of Care Review  Outcome: Ongoing (interventions implemented as appropriate)  Discussed care plan with son  vss  nad

## 2017-04-24 ENCOUNTER — OUTPATIENT CASE MANAGEMENT (OUTPATIENT)
Dept: ADMINISTRATIVE | Facility: OTHER | Age: 82
End: 2017-04-24

## 2017-04-24 VITALS
OXYGEN SATURATION: 93 % | BODY MASS INDEX: 28.37 KG/M2 | RESPIRATION RATE: 16 BRPM | DIASTOLIC BLOOD PRESSURE: 54 MMHG | WEIGHT: 131.5 LBS | SYSTOLIC BLOOD PRESSURE: 118 MMHG | HEART RATE: 82 BPM | TEMPERATURE: 98 F | HEIGHT: 57 IN

## 2017-04-24 LAB
ALBUMIN SERPL BCP-MCNC: 1.4 G/DL
ALP SERPL-CCNC: 119 U/L
ALT SERPL W/O P-5'-P-CCNC: 5 U/L
ANION GAP SERPL CALC-SCNC: 10 MMOL/L
ANISOCYTOSIS BLD QL SMEAR: SLIGHT
AST SERPL-CCNC: 22 U/L
BACTERIA BLD CULT: NORMAL
BACTERIA BLD CULT: NORMAL
BACTERIA SPEC ANAEROBE CULT: NORMAL
BASOPHILS # BLD AUTO: ABNORMAL K/UL
BASOPHILS NFR BLD: 0 %
BILIRUB SERPL-MCNC: 0.1 MG/DL
BUN SERPL-MCNC: 20 MG/DL
CALCIUM SERPL-MCNC: 8.2 MG/DL
CHLORIDE SERPL-SCNC: 108 MMOL/L
CO2 SERPL-SCNC: 23 MMOL/L
CREAT SERPL-MCNC: 0.6 MG/DL
DIFFERENTIAL METHOD: ABNORMAL
EOSINOPHIL # BLD AUTO: ABNORMAL K/UL
EOSINOPHIL NFR BLD: 0 %
ERYTHROCYTE [DISTWIDTH] IN BLOOD BY AUTOMATED COUNT: 18.1 %
EST. GFR  (AFRICAN AMERICAN): >60 ML/MIN/1.73 M^2
EST. GFR  (NON AFRICAN AMERICAN): >60 ML/MIN/1.73 M^2
GLUCOSE SERPL-MCNC: 75 MG/DL
HCT VFR BLD AUTO: 25.3 %
HGB BLD-MCNC: 8 G/DL
LYMPHOCYTES # BLD AUTO: ABNORMAL K/UL
LYMPHOCYTES NFR BLD: 5 %
MAGNESIUM SERPL-MCNC: 1.8 MG/DL
MCH RBC QN AUTO: 27.7 PG
MCHC RBC AUTO-ENTMCNC: 31.6 %
MCV RBC AUTO: 88 FL
MONOCYTES # BLD AUTO: ABNORMAL K/UL
MONOCYTES NFR BLD: 6 %
NEUTROPHILS NFR BLD: 88 %
NEUTS BAND NFR BLD MANUAL: 1 %
PHOSPHATE SERPL-MCNC: 3.3 MG/DL
PLATELET # BLD AUTO: 420 K/UL
PLATELET BLD QL SMEAR: ABNORMAL
PMV BLD AUTO: 12 FL
POLYCHROMASIA BLD QL SMEAR: ABNORMAL
POTASSIUM SERPL-SCNC: 3.7 MMOL/L
PROT SERPL-MCNC: 4.6 G/DL
RBC # BLD AUTO: 2.89 M/UL
SODIUM SERPL-SCNC: 141 MMOL/L
WBC # BLD AUTO: 11.59 K/UL

## 2017-04-24 PROCEDURE — 85007 BL SMEAR W/DIFF WBC COUNT: CPT

## 2017-04-24 PROCEDURE — 25000003 PHARM REV CODE 250: Performed by: HOSPITALIST

## 2017-04-24 PROCEDURE — 25000003 PHARM REV CODE 250: Performed by: STUDENT IN AN ORGANIZED HEALTH CARE EDUCATION/TRAINING PROGRAM

## 2017-04-24 PROCEDURE — 36415 COLL VENOUS BLD VENIPUNCTURE: CPT

## 2017-04-24 PROCEDURE — 25000003 PHARM REV CODE 250: Performed by: PHYSICIAN ASSISTANT

## 2017-04-24 PROCEDURE — 99238 HOSP IP/OBS DSCHRG MGMT 30/<: CPT | Mod: ,,, | Performed by: HOSPITALIST

## 2017-04-24 PROCEDURE — 85027 COMPLETE CBC AUTOMATED: CPT

## 2017-04-24 PROCEDURE — 25000003 PHARM REV CODE 250: Performed by: INTERNAL MEDICINE

## 2017-04-24 PROCEDURE — 84100 ASSAY OF PHOSPHORUS: CPT

## 2017-04-24 PROCEDURE — 80053 COMPREHEN METABOLIC PANEL: CPT

## 2017-04-24 PROCEDURE — 63600175 PHARM REV CODE 636 W HCPCS: Performed by: STUDENT IN AN ORGANIZED HEALTH CARE EDUCATION/TRAINING PROGRAM

## 2017-04-24 PROCEDURE — 63600175 PHARM REV CODE 636 W HCPCS: Performed by: PHYSICIAN ASSISTANT

## 2017-04-24 PROCEDURE — 83735 ASSAY OF MAGNESIUM: CPT

## 2017-04-24 RX ORDER — RAMELTEON 8 MG/1
8 TABLET ORAL NIGHTLY PRN
Start: 2017-04-24

## 2017-04-24 RX ORDER — ACETAMINOPHEN 325 MG/1
650 TABLET ORAL EVERY 6 HOURS PRN
Refills: 0 | COMMUNITY
Start: 2017-04-24

## 2017-04-24 RX ORDER — HYDROCODONE BITARTRATE AND ACETAMINOPHEN 5; 325 MG/1; MG/1
1 TABLET ORAL EVERY 6 HOURS PRN
Qty: 30 TABLET | Refills: 0 | Status: SHIPPED | OUTPATIENT
Start: 2017-04-24

## 2017-04-24 RX ORDER — SODIUM CHLORIDE 0.9 % (FLUSH) 0.9 %
10 SYRINGE (ML) INJECTION
Start: 2017-04-24

## 2017-04-24 RX ORDER — SODIUM CHLORIDE 0.9 % (FLUSH) 0.9 %
10 SYRINGE (ML) INJECTION EVERY 6 HOURS
Start: 2017-04-24

## 2017-04-24 RX ORDER — POLYETHYLENE GLYCOL 3350 17 G/17G
17 POWDER, FOR SOLUTION ORAL DAILY PRN
Refills: 0
Start: 2017-04-24

## 2017-04-24 RX ADMIN — ASPIRIN 81 MG CHEWABLE TABLET 81 MG: 81 TABLET CHEWABLE at 09:04

## 2017-04-24 RX ADMIN — PREGABALIN 75 MG: 25 CAPSULE ORAL at 02:04

## 2017-04-24 RX ADMIN — PANTOPRAZOLE SODIUM 40 MG: 40 TABLET, DELAYED RELEASE ORAL at 09:04

## 2017-04-24 RX ADMIN — LOSARTAN POTASSIUM 50 MG: 50 TABLET, FILM COATED ORAL at 09:04

## 2017-04-24 RX ADMIN — MICONAZOLE NITRATE: 20 OINTMENT TOPICAL at 09:04

## 2017-04-24 RX ADMIN — Medication 3 ML: at 06:04

## 2017-04-24 RX ADMIN — PREDNISONE 10 MG: 10 TABLET ORAL at 09:04

## 2017-04-24 RX ADMIN — HYDROCHLOROTHIAZIDE 12.5 MG: 12.5 TABLET ORAL at 09:04

## 2017-04-24 RX ADMIN — STANDARDIZED SENNA CONCENTRATE AND DOCUSATE SODIUM 1 TABLET: 8.6; 5 TABLET, FILM COATED ORAL at 09:04

## 2017-04-24 RX ADMIN — DULOXETINE 30 MG: 30 CAPSULE, DELAYED RELEASE ORAL at 09:04

## 2017-04-24 RX ADMIN — LIDOCAINE 1 PATCH: 50 PATCH TOPICAL at 05:04

## 2017-04-24 RX ADMIN — SODIUM CHLORIDE, PRESERVATIVE FREE 10 ML: 5 INJECTION INTRAVENOUS at 06:04

## 2017-04-24 RX ADMIN — DONEPEZIL HYDROCHLORIDE 10 MG: 10 TABLET, FILM COATED ORAL at 09:04

## 2017-04-24 RX ADMIN — PREGABALIN 75 MG: 25 CAPSULE ORAL at 06:04

## 2017-04-24 RX ADMIN — CALCIUM CARBONATE-CHOLECALCIFEROL TAB 250 MG-125 UNIT 1 TABLET: 250-125 TAB at 09:04

## 2017-04-24 RX ADMIN — DICLOFENAC: 10 GEL TOPICAL at 09:04

## 2017-04-24 RX ADMIN — SODIUM CHLORIDE, PRESERVATIVE FREE 10 ML: 5 INJECTION INTRAVENOUS at 05:04

## 2017-04-24 RX ADMIN — Medication 3 ML: at 02:04

## 2017-04-24 RX ADMIN — ENOXAPARIN SODIUM 40 MG: 100 INJECTION SUBCUTANEOUS at 05:04

## 2017-04-24 RX ADMIN — CEFAZOLIN 6 G: 1 INJECTION, POWDER, FOR SOLUTION INTRAMUSCULAR; INTRAVENOUS; PARENTERAL at 11:04

## 2017-04-24 RX ADMIN — SODIUM CHLORIDE, PRESERVATIVE FREE 10 ML: 5 INJECTION INTRAVENOUS at 11:04

## 2017-04-24 NOTE — PLAN OF CARE
MARTINA spoke to Thais with [facility] - [phone], concerning discharge of patient to their skilled facility. Ms Plascencia stated that they are still awtg auth from [employer].  MARTINA sent updates and orders through [facility].  MARTINA spoke to  and requested hard scripts for controlled substances.    MARTINA called [facility] [phone] ext [phone], to get an update on auth status.  MARTINA left a  requesting a call back as the only thing preventing pt's transfer to SNF is [employer] auth.    MARTINA will continue to follow.    Rosalina Piña LCSW     [phone]  Critical Care (MICU)

## 2017-04-24 NOTE — PLAN OF CARE
Problem: Patient Care Overview  Goal: Plan of Care Review  Outcome: Ongoing (interventions implemented as appropriate)  No acute changes overnight. Pt remained on bedrest utilizing positioning techniques to relieve pain from wounds along pts extremities. Heel boots are also still being used. Pt's family remained at bedside overnight. Pt is continuing ATC Ancef IV ABx, tolerating well. Medications taken with apple sauce. Plan is for discharge today with home IV ABx. Family at bedside to accommodate discharge. Will continue to monitor.

## 2017-04-24 NOTE — PLAN OF CARE
"MARTINA called Maddie with [facility].  She stated that the RN is reviewing the orders now and will call as soon as pt can transfer.  MARTINA updated pt's RN and made packet for transport.  MARTINA called [facility] and arranged for an ambulance to transport pt, via stretcher, in "will call" status.    Rosalina Piña LCSW     [phone]  Critical Care (MICU)        "

## 2017-04-24 NOTE — NURSING
Report called to [facility] SN[facility]. Currently awaiting transportation. All patient dressings changed during shift. Heel boots on patient. frequent repositioning.

## 2017-04-24 NOTE — PLAN OF CARE
MARTINA sent updated SNF orders to [facility].    Rosalina Piña LCSW     [phone]  Critical Care (MICU)

## 2017-04-24 NOTE — PROGRESS NOTES
[facility] Hospital Medicine  Progress Note    Patient Name: Danyelle Jacobs  MRN: [id number]  Patient Class: IP- Inpatient   Admission Date: 2017  Length of Stay: 18 days  Attending Physician: Yen Ledezma MD  Primary Care Provider: Bianca Dykes MD    [facility] Medicine Team: [facility]OS[facility] MED L Yen Ledezma MD    Subjective:     Principal Problem:Severe sepsis    HPI: Ms. Jacobs is a 88 F with a history of HTN, HLD, CAD, PAD, HFpEF, dementia, and rheumatoid arthritis who presented to the ED  with GI bleeding. Her son reported that her symptoms began the evening before with bilious vomiting and diarrhea. Since then, she had had 6 episodes of emesis and 5 episodes of diarrhea which started out as black and became bright red by her last BM which was at 6 AM. She currently denies abdominal pain, SOB, or nausea; however, her son reports that she will usually deny symptoms even if they are present.      She had a recent admission from 3/24- for respiratory failure with hypoxia (presumed 2/2 to PNA, resolved with abx) and RLE pain. Also admitted - and transfused at that time. Patient has a significant history of 'bleeding angiodysplastic lesion found on EGD with hemostasis achieved' performed in 2017. Patient also underwent colonoscopy at the same time which showed 'prominent vessels in the cecum but no definite AVM; raised bluish lesion likely a hematoma in the rectum.' Son reports that she has declined both mentally and physically in the last few months and has become progressively weak, less responsive, and has had hallucinations including speaking to  relatives. Also, she has developed urinary and fecal incontinence recently.      While in the ICU she was started on protonix and was conservatively managed from a GI perspective. She was able to be stepped down to hospital medicine on  and has had a relatively benign course since that time. A rapid was called tonight  for Ms. Jacobs because she was found to be more altered, diaphoretic, pale and hypotensive in the 70s-80s with baseline in the 110s-120s. Patient was bolused IV fluids with improvement in both blood pressure and mental status but still remained somewhat lethargic. Patient had also spiking fevers up to 103.5. Given patient's rapid deterioration and borderline blood pressures despite fluids, she will be accepted in to the ICU for closer monitoring.     Hospital Course: Upon initial evaluation patient's initial oxygen requirements appeared to be increasing. ABG was performed which showed adequate oxygenation. Hct was also obtained with the arterial stick which showed a Hct of 19. Based on this result and her presenting H/H of 8.3/26.2, 1 U PRBCs was ordered and received 2L of NS. GI and ICU were consulted for further management and patient was admitted to the ICU. Repeat hgb and hemodynamics are stable. Protonix drip started. CT abdomen W/O significant for diverticulosis, adnexal cystic structure, L1 compression fx, and lung opacities (atelectasis v infection/inflammation). On 4/8: stepped down to hospital medicine. Hemodynamically stable. While waiting for placement , patient became septic and developed MSSA bacteremia , TTE was -ve for vegetations, MRI RLE showed psoas abscess. 4/18 patient underwent IR drainage of the abscess. Cultures were -ve on 4/19 and plan for 6 weeks treatment from that date.     Interval History: Swelling in R hand has improved.  Plan for discharge today.  SNF orders updated given need for correction to medications/antibiotic end date.    Review of Systems   Constitutional: Negative for fever.   Respiratory: Negative for shortness of breath.    Gastrointestinal: Negative for vomiting.     Objective:     Vital Signs (Most Recent):  Temp: 98.1 °F (36.7 °C) (04/24/17 1518)  Pulse: 82 (04/24/17 1518)  Resp: 16 (04/24/17 1518)  BP: (!) 118/54 (04/24/17 1130)  SpO2: (!) 93 % (04/24/17 1518) Vital  Signs (24h Range):  Temp:  [98.1 °F (36.7 °C)-100 °F (37.8 °C)] 98.1 °F (36.7 °C)  Pulse:  [72-85] 82  Resp:  [16-18] 16  SpO2:  [93 %-97 %] 93 %  BP: (116-138)/(54-67) 118/54     Weight: 59.6 kg (131 lb 8 oz)  Body mass index is 28.46 kg/(m^2).    Intake/Output Summary (Last 24 hours) at 04/24/17 1537  Last data filed at 04/24/17 0600   Gross per 24 hour   Intake              570 ml   Output                0 ml   Net              570 ml      Physical Exam   Eyes: No scleral icterus.   Cardiovascular: Normal rate and regular rhythm.    Pulmonary/Chest: No respiratory distress.   Abdominal: She exhibits no distension. There is no tenderness.     Significant Labs: Hgb 8.0; chemistries stable.    Significant Imaging: No new imaging    Assessment/Plan:      Active Diagnoses:    Diagnosis Date Noted POA    PRINCIPAL PROBLEM:  Severe sepsis [A41.9, R65.20] 04/14/2017 Yes    Pain and swelling of right forearm [M79.631, M79.89] 04/20/2017 Yes    Psoas abscess [K68.12] 04/17/2017 Yes    MSSA (methicillin susceptible Staphylococcus aureus) septicemia [A41.01] 04/15/2017 No    Candidal intertrigo [B37.2] 04/11/2017 No    Adnexal mass [N94.9] 04/10/2017 Yes    Sciatica associated with disorder of lumbar spine [M53.9] 04/09/2017 Yes    Diverticulosis large intestine w/o perforation or abscess w/bleeding [K57.31] 04/09/2017 Yes    Compression fracture of first lumbar vertebra [S32.010A] 04/07/2017 Yes    Angiodysplasia of stomach: see EGD 1/17 [K31.819] 02/13/2017 Yes    Anemia associated with acute blood loss [D62] 01/24/2017 Yes    Current chronic use of systemic steroids [Z79.52] 10/04/2016 Not Applicable    Chronic kidney disease (CKD), stage I [N18.1] 02/18/2016 Yes    Rheumatoid arthritis involving multiple sites with positive rheumatoid factor [M05.79] 02/18/2016 Yes    Gastroesophageal reflux disease without esophagitis [K21.9] 10/05/2015 Yes    Benign hypertension [I10] 10/05/2015 Yes    CAD (coronary  artery disease) [I25.10] 08/13/2012 Yes    Mixed hyperlipidemia [E78.2] 08/13/2012 Yes    MG (myasthenia gravis) [G70.00] 08/13/2012 Yes      Problems Resolved During this Admission:    Diagnosis Date Noted Date Resolved POA    Altered mental status [R41.82] 04/14/2017 04/15/2017 No    Gastrointestinal hemorrhage [K92.2] 04/06/2017 04/14/2017 Yes    PARVEEN (acute kidney injury) [N17.9] 03/24/2017 04/15/2017 Yes     VTE Risk Mitigation         Ordered     enoxaparin injection 40 mg  Daily     Route:  Subcutaneous        04/19/17 1632     Medium Risk of VTE  Once      04/06/17 1535     Place ISHMAEL hose  Until discontinued      04/06/17 1535     Reason for No Pharmacological VTE Prophylaxis  Once      04/06/17 1535     Place sequential compression device  Until discontinued      04/06/17 1504        MG (myasthenia gravis)  Continue prednisone.      CAD (coronary artery disease)  - continue ASA 81 mg PO  - Plavix 75 mg PO daily - held due to GIB; consider resumption as outpatient      Gastroesophageal reflux disease without esophagitis  - continue PPI     Benign hypertension  - initially was  on losartan, imdur, hydralazine and hctz  - was then held in the setting of GI bleed and sepsis   - currently on losartan and hctz.     Rheumatoid arthritis involving multiple sites with positive rheumatoid factor  - Continue low dose prednisone  - Not on DMARDs 2/2 age and comoribidities  - Outpatient f/u     Angiodysplasia of stomach: see EGD 1/17  - EGD1/2017: 'bleeding angiodysplastic lesion found with hemostasis achieved'  - Colonoscopy: 'prominent vessels in the cecum but no definite AVM; raised bluish lesion likely a hematoma in the rectum.'  - stable H+H, hemodynamics  - GI recommends OP follow up     Compression fracture of first lumbar vertebra  -likely 2/2 to osteoporosis, seen on CT abdo  -possible nerve impingement contributing to R hip pain  -continue lyrica and PT/OT  -Volteran gel PRN     Sciatica associated with  disorder of lumbar spine  - PTOT  - lyrica 75mg TID.      Adnexal mass  L cystic adnexal mass seen on CTabdo in setting of reported complete hysterectomy  -Pelvic U/S revealing 2cm cystic structure, can be monitored with yearly U/S     Candidal intertrigo  - continue antifungal cream     MSSA (methicillin susceptible Staphylococcus aureus) septicemia with Psoas Abscess  - Blood cultures now negative, abscess drained by IR 4/18  - continue Ancef 6g qday continuous, end date 5/31/17.   - Will need ID to follow outpatient labs as patient will need weekly CBC, CMP, ESR, and CRP (clinic fax is [phone])    Pain and swelling of right forearm  - RUE US without clot  - Suspect delayed venous drainage due to partial obstruction from PICC  - Much improved today    Dispo - pending SNF discharge    Yen Ledezma MD  Department of Hospital Medicine   [facility]

## 2017-04-24 NOTE — PLAN OF CARE
Plan to discharge to [facility] SNF.       04/24/17 1651   Final Note   Assessment Type Discharge Planning Assessment   Discharge Disposition SNF   Discharge planning education complete? Yes   Hospital Follow Up  Appt(s) scheduled? No   Discharge plans and expectations educations in teach back method with documentation complete? No   Offered [facility] -- Bedside Delivery? n/a   Discharge/Hospital Encounter Summary to ([facility]) PCP n/a   Referral to Outpatient Case Management complete? n/a   Referral to / orders for Home Health Complete? n/a   30 day supply of medicines given at discharge, if documented non-compliance / non-adherence? n/a   Any social issues identified prior to discharge? n/a   Did you assess the readiness or willingness of the family or caregiver to support self management of care? Yes

## 2017-04-24 NOTE — PLAN OF CARE
SW arranged transport for 5pm and gave RN number for report.  Transport packet at RN station.    Rosalina Piña LCSW     [phone]  Critical Care (MICU)

## 2017-04-24 NOTE — PLAN OF CARE
IMM signed by daughter, copy given to daughter and original placed in chart.       04/24/17 1010   Medicare Message   Important Message from Medicare regarding Discharge Appeal Rights Given to patient/caregiver;Explained to patient/caregiver;Signed/date by patient/caregiver   Date IMM was signed 04/24/17   Time IMM was signed 1010

## 2017-04-24 NOTE — PROGRESS NOTES
4/24/17 Chart Reviewed. Per IPCM and IPSW plan is to discharge today to [facility] SN[facility] Unit for 6 weeks of IV Abx until 5/31 through PICC line. Will continue to follow.

## 2017-04-24 NOTE — NURSING
Pt discharging to [facility]. Spoke to MARTINA Romano, she will start working on transport and will update notes accordingly. MARTINA will also update notes to reflect who to call for report.

## 2017-04-24 NOTE — PLAN OF CARE
[facility]     Department of Hospital Medicine     [address] [place] [address]     [phone] after hours  [phone] fax       NURSING HOME ORDERS    04/24/2017    Admit to Nursing Home:  Skilled Bed                                             Diagnoses:  Active Hospital Problems    Diagnosis  POA    *Severe sepsis [A41.9, R65.20]  Yes    Pain and swelling of right forearm [M79.631, M79.89]  Yes    Psoas abscess [K68.12]  Yes    MSSA (methicillin susceptible Staphylococcus aureus) septicemia [A41.01]  No    Candidal intertrigo [B37.2]  No    Adnexal mass [N94.9]  Yes    Sciatica associated with disorder of lumbar spine [M53.9]  Yes    Diverticulosis large intestine w/o perforation or abscess w/bleeding [K57.31]  Yes    Compression fracture of first lumbar vertebra [S32.010A]  Yes    Angiodysplasia of stomach: see EGD 1/17 [K31.819]  Yes    Anemia associated with acute blood loss [D62]  Yes    Current chronic use of systemic steroids [Z79.52]  Not Applicable    Chronic kidney disease (CKD), stage I [N18.1]  Yes    Rheumatoid arthritis involving multiple sites with positive rheumatoid factor [M05.79]  Yes    Gastroesophageal reflux disease without esophagitis [K21.9]  Yes    Benign hypertension [I10]  Yes    CAD (coronary artery disease) [I25.10]  Yes    Mixed hyperlipidemia [E78.2]  Yes    MG (myasthenia gravis) [G70.00]  Yes      Resolved Hospital Problems    Diagnosis Date Resolved POA    Altered mental status [R41.82] 04/15/2017 No    Gastrointestinal hemorrhage [K92.2] 04/14/2017 Yes    PARVEEN (acute kidney injury) [N17.9] 04/15/2017 Yes     Patient is homebound due to:  Severe sepsis    Allergies:  Review of patient's allergies indicates:   Allergen Reactions    Norvasc  [amlodipine]      Other reaction(s): Edema     Vitals: Every shift (Skilled Nursing patients)    Diet: Cardiac   Supplement:  1 can every three times a day with  meals                         Type:  House        Acitivities:   - Up in a chair each morning as tolerated   - further activity as per PT/OT recs    LABS:  Per facility protocol  Weekly CBC, CMP, ESR, and CRP, sent to Infectious Disease, clinic fax [phone]    Nursing Precautions:   - Aspiration precautions:             - Total assistance with meals            -  Upright 90 degrees befor during and after meals             -  Suction at bedside          - Fall precautions per nursing home protocol   - Decubitus precautions:        -  for positioning   - Pressure reducing foam mattress   - Turn patient every two hours. Use wedge pillows to anchor patient      CONSULTS:  Physical Therapy to evaluate and treat     Occupational Therapy to evaluate and treat     Speech Therapy  to evaluate and treat     Nutrition to evaluate and recommend diet       MISCELLANEOUS CARE:        Routine Skin for Bedridden Patients: Apply moisture barrier cream to all  skin folds and wet areas in perineal area daily and after baths and   all bowel movements.     Picc line care:  Scrub the Hub: Prior to accessing the line, always perform a 30 second alcohol scrub  Each lumen of the central line is to be flushed at least daily with 10 mL Normal Saline and 3 mL Heparin flush (100 units/mL)  Skilled Nurse (SN) may draw blood from IV access  Blood Draw Procedure:  - Aspirate at least 5 mL of blood  - Discard  - Obtain specimen  - Change posiflow cap  - Flush with 20 mL Normal Saline followed by a   3-5 mL Heparin flush (100 units/mL)  Central :  - Sterile dressing changes are done weekly and as needed.  - Use chlor-hexadine scrub to cleanse site, apply Biopatch to insertion site,   apply securement device dressing  - Posi-flow caps are changed weekly and after EVERY lab draw.  - If sterile gauze is under dressing to control oozing,   dressing change must be performed every 24 hours until gauze is not needed.         Medications: Discontinue all previous medication orders, if any. See new list below.     Danyelle Jacobs   Home Medication Instructions TYLER:[id number]    Printed on:04/24/17 1137   Medication Information                      acetaminophen (TYLENOL) 325 MG tablet  Take 2 tablets (650 mg total) by mouth every 6 (six) hours as needed for Pain or Temperature greater than (100.4).             aspirin 81 mg Tab  Take 81 mg by mouth. 1 Tablet Oral Every day             calcium carbonate-vitamin D3 250-125 mg 250-125 mg-unit Tab  Take 1 tablet by mouth once daily.             dextrose 5 % SolP 500 mL with ceFAZolin 1 gram SolR 6 g  Inject 6 g into the vein continuous over 24 hrs.  Stop Date 5/31/2017           diclofenac sodium 1 % Gel  Apply topically once daily. Apply to right arm.              mg capsule  TAKE 1 CAPSULE BY MOUTH TWICE DAILY             donepezil (ARICEPT) 10 MG tablet  Take 1 tablet (10 mg total) by mouth once daily.             duloxetine (CYMBALTA) 30 MG capsule  Take 1 capsule (30 mg total) by mouth once daily.             ferrous sulfate 325 (65 FE) MG EC tablet  Take 1 tablet (325 mg total) by mouth 2 (two) times daily.             hydrochlorothiazide (MICROZIDE) 12.5 mg capsule  Take 1 capsule (12.5 mg total) by mouth once daily.             hydrocodone-acetaminophen 5-325mg (NORCO) 5-325 mg per tablet  Take 1 tablet by mouth every 6 (six) hours as needed for Pain (pain not responsive to tylenol).             losartan (COZAAR) 100 MG tablet  Take 0.5 tablets (50 mg total) by mouth once daily.             miconazole nitrate 2% (MICOTIN) 2 % Oint  Apply topically 2 (two) times daily.             pantoprazole (PROTONIX) 40 MG tablet  Take 1 tablet (40 mg total) by mouth once daily.             polyethylene glycol (GLYCOLAX) 17 gram PwPk  Take 17 g by mouth daily as needed (constipation).             predniSONE (DELTASONE) 10 MG tablet  Take 1 tablet (10 mg total) by mouth once daily.              pregabalin (LYRICA) 75 MG capsule  Take 1 capsule (75 mg total) by mouth 3 (three) times daily.             ramelteon (ROZEREM) 8 mg tablet  Take 1 tablet (8 mg total) by mouth nightly as needed for Insomnia.             sodium chloride 0.9% 0.9 % injection  Inject 10 mLs into the vein every 6 (six) hours.             sodium chloride 0.9% 0.9 % injection  Inject 10 mLs into the vein as needed.                       _________________________________  Yen Ledezma MD  04/24/2017

## 2017-04-25 ENCOUNTER — PATIENT OUTREACH (OUTPATIENT)
Dept: ADMINISTRATIVE | Facility: CLINIC | Age: 82
End: 2017-04-25
Payer: MEDICARE

## 2017-04-25 ENCOUNTER — TELEPHONE (OUTPATIENT)
Dept: INTERNAL MEDICINE | Facility: CLINIC | Age: 82
End: 2017-04-25

## 2017-04-25 RX ORDER — PREGABALIN 75 MG/1
75 CAPSULE ORAL 3 TIMES DAILY
Qty: 270 CAPSULE | Refills: 2 | Status: SHIPPED | OUTPATIENT
Start: 2017-04-25 | End: 2017-04-25 | Stop reason: SDUPTHER

## 2017-04-25 RX ORDER — PREGABALIN 75 MG/1
75 CAPSULE ORAL 3 TIMES DAILY
Qty: 270 CAPSULE | Refills: 2 | Status: SHIPPED | OUTPATIENT
Start: 2017-04-25 | End: 2017-10-24

## 2017-04-25 NOTE — TELEPHONE ENCOUNTER
The pt was discharged to a skilled nursing facility and per Dr. Martinez we can't see patients from skilled nursing facilities.

## 2017-04-25 NOTE — PROGRESS NOTES
C3 nurse attempted to conduct POST ACUTE 1 call with [facility].  Staff is unavailable at this time and Alize , requests call back at 1030.

## 2017-04-25 NOTE — TELEPHONE ENCOUNTER
Spoke to Cindy---she advised that pt was discharged to [facility] yesterday. She is currently still on IV PCN for the next 6 weeks due to a staph infection.     Cindy wanted to advise Dr. Dykes that they do not plan on keeping her here. The family would like pt to return home after she finishes IV PCN.    Because she is at a skilled nursing facility she would not be able to be seen in [facility]. Pt does have an appt scheduled with Dr. Dykes on 5/22/17 that Cindy would like to keep.

## 2017-04-25 NOTE — TELEPHONE ENCOUNTER
Please contact her daughter-in-law Cindy    I see she was in the hospital recently and very ill.  It looks to me like she would benefit from a [facility] appointment, please assist with this if possible.  Thank you

## 2017-04-25 NOTE — TELEPHONE ENCOUNTER
Left message for Cindy to return call.     Please advise if pt meet the requirements to be seen in [facility]

## 2017-04-27 NOTE — PT/OT/SLP DISCHARGE
Physical Therapy Discharge Summary    Danyelle Jacobs  MRN: [id number]   Severe sepsis   Patient Discharged from acute Physical Therapy on 17.  Please refer to prior PT noted date on 17 for functional status.     Assessment:   Patient appropriate for care in another setting.  GOALS:   Physical Therapy Goals     Not on file      Multidisciplinary Problems (Resolved)        Problem: Physical Therapy Goal    Goal Priority Disciplines Outcome Goal Variances Interventions   Physical Therapy Goal   (Resolved)     PT/OT, PT Outcome(s) achieved     Description:  Goals to be met by: 2017     Patient will increase functional independence with mobility by performin. Supine to sit with Mod Assistance-not met  2. Sit to supine with Mod Assistance-not met  3. Sit to stand transfer with Max Assistance-not met  4. Bed to chair transfer with Max Assistance using LRAD-not met  5. Gait x 10 feet with Max Assistance using LRAD-not met  6. Sitting at edge of bed x 5 minutes with Minimal Assistance-not met  7. Stand for x 1 minute with Moderate Assistance using LRAD-not met  8. Lower extremity exercise program x15 reps per handout, with assistance as needed-not met                Reasons for Discontinuation of Therapy Services  Transfer to alternate level of care.      Plan:  Patient Discharged to: [facility].     Tersea Marie DPT, PT  2017

## 2017-04-27 NOTE — PT/OT/SLP DISCHARGE
Occupational Therapy Discharge Summary    Danyelle Jacobs  MRN: [id number]   Severe sepsis   Patient Discharged from acute Occupational Therapy on 04/24/17.  Please refer to prior OT note dated on 04/20/17 for functional status.     Assessment:   Patient was discharge unexpectedly.  Information required to complete and accurate discharge summary is unknown.  Refer to therapy initial evaluation and last progress note for initial and most recent functional status and goal achievement.  Recommendations made may be found in medical record.  GOALS:   Occupational Therapy Goals     Not on file      Multidisciplinary Problems (Resolved)        Problem: Occupational Therapy Goal    Goal Priority Disciplines Outcome Interventions   Occupational Therapy Goal   (Resolved)     OT, PT/OT Outcome(s) achieved    Description:  Goals to be met by: 4/22/17     Patient will increase functional independence with ADLs by performing:    UE Dressing with Moderate Assistance.  LE Dressing with Moderate Assistance.  Grooming while EOB with Set-up Assistance and Stand-by Assistance.  Toileting from toilet with Moderate Assistance for hygiene and clothing management.   Sitting at edge of bed x15 minutes with Stand-by Assistance.  Rolling to Bilateral with Minimal Assistance.   Supine to sit with Minimal Assistance.  Stand pivot transfers with Moderate Assistance.  Toilet transfer to toilet with Moderate Assistance.             Problem: Occupational Therapy Goal    Goal Priority Disciplines Outcome Interventions   Occupational Therapy Goal   (Resolved)     OT, PT/OT Outcome(s) achieved            Reasons for Discontinuation of Therapy Services  Transfer to alternate level of care.      Plan:  Patient Discharged to: Skilled Nursing Facility.

## 2017-05-08 ENCOUNTER — OUTPATIENT CASE MANAGEMENT (OUTPATIENT)
Dept: ADMINISTRATIVE | Facility: OTHER | Age: 82
End: 2017-05-08

## 2017-05-08 NOTE — PROGRESS NOTES
Patient currently in [facility] Unit for 6 weeks of IV Abx which will be through May 31st. Plan is to discharge home upon finishing antibiotics. Will continue to follow. Changed to monitoring status and then will change to complex upon completing nursing assessment.

## 2017-05-22 ENCOUNTER — CLINICAL SUPPORT (OUTPATIENT)
Dept: CARDIOLOGY | Facility: CLINIC | Age: 82
End: 2017-05-22
Payer: MEDICARE

## 2017-05-22 ENCOUNTER — OFFICE VISIT (OUTPATIENT)
Dept: CARDIOLOGY | Facility: CLINIC | Age: 82
End: 2017-05-22
Payer: MEDICARE

## 2017-05-22 VITALS — DIASTOLIC BLOOD PRESSURE: 80 MMHG | SYSTOLIC BLOOD PRESSURE: 148 MMHG

## 2017-05-22 DIAGNOSIS — I10 BENIGN HYPERTENSION: ICD-10-CM

## 2017-05-22 DIAGNOSIS — I77.9 PAOD (PERIPHERAL ARTERIAL OCCLUSIVE DISEASE): ICD-10-CM

## 2017-05-22 DIAGNOSIS — I77.9 BILATERAL CAROTID ARTERY DISEASE: ICD-10-CM

## 2017-05-22 DIAGNOSIS — Z87.891 FORMER SMOKER: Primary | ICD-10-CM

## 2017-05-22 LAB — VASCULAR ANKLE BRACHIAL INDEX (ABI) RIGHT: 0.41 (ref 0.9–1.2)

## 2017-05-22 PROCEDURE — 99213 OFFICE O/P EST LOW 20 MIN: CPT | Mod: S$GLB,,, | Performed by: INTERNAL MEDICINE

## 2017-05-22 PROCEDURE — 99999 PR PBB SHADOW E&M-EST. PATIENT-LVL III: CPT | Mod: PBBFAC,,, | Performed by: INTERNAL MEDICINE

## 2017-05-22 PROCEDURE — 1160F RVW MEDS BY RX/DR IN RCRD: CPT | Mod: S$GLB,,, | Performed by: INTERNAL MEDICINE

## 2017-05-22 PROCEDURE — 93922 UPR/L XTREMITY ART 2 LEVELS: CPT | Mod: S$GLB,,, | Performed by: INTERNAL MEDICINE

## 2017-05-22 PROCEDURE — 1159F MED LIST DOCD IN RCRD: CPT | Mod: S$GLB,,, | Performed by: INTERNAL MEDICINE

## 2017-05-22 PROCEDURE — 1125F AMNT PAIN NOTED PAIN PRSNT: CPT | Mod: S$GLB,,, | Performed by: INTERNAL MEDICINE

## 2017-05-22 PROCEDURE — 99499 UNLISTED E&M SERVICE: CPT | Mod: S$GLB,,, | Performed by: INTERNAL MEDICINE

## 2017-05-22 NOTE — PROGRESS NOTES
Subjective:    Patient ID:  Danyelle Jacobs is a 88 y.o. female who presents for follow-up of PAOD      HPI  Ms. Jacobs presents for follow-up of peripheral vascular disease.  She is accompanied by her family. She admitted to ER for pneumonia, she stayed for 23 days. Since then she has been compiling of muscle cramps and spasm. She has not been able to walk or stand on her legs despite home physical therapy. Today PARAM shows stable chronic PAD, no worsening from the last test. Her last carotid doppler showed stable moderate disease. The patient denies any signs or symptoms of stroke or mini stroke.        Carotid doppler 1/2017.  There is 40 - 49% right Internal Carotid stenosis.  There is 50 - 59% left Internal Carotid stenosis.  Left vertebral artery stenosis.  Bilateral ECA high grade stenosis.  Review of Systems   Constitution: Negative for diaphoresis and malaise/fatigue.   Eyes: Negative.    Cardiovascular: Negative for chest pain, cyanosis and leg swelling.   Respiratory: Negative.    Hematologic/Lymphatic: Negative for bleeding problem. Does not bruise/bleed easily.   Skin: Negative.    Musculoskeletal: Positive for back pain, joint pain, muscle cramps, myalgias and stiffness.   Neurological: Negative for aphonia, brief paralysis and sensory change.        Patient Active Problem List   Diagnosis    MG (myasthenia gravis)    CAD (coronary artery disease)    PAOD (peripheral arterial occlusive disease)    IBS (irritable bowel syndrome)    Mixed hyperlipidemia    Anxiety    Recurrent major depressive disorder, in partial remission    Fibromyalgia    Neuropathic pain of both legs    Carotid disease: Carotids 40-49 % R; 50-59% L 1/17    Vitamin D deficiency disease    Goiter, nontoxic, multinodular: ultrasound 2014    Adverse effect of oral bisphosphonates    Senile osteoporosis: see DEXA 2016, on Reclst per endo    MCI (mild cognitive impairment) with memory loss    Gastroesophageal reflux disease  without esophagitis    Benign hypertension    OAB (overactive bladder)    Left ventricular diastolic dysfunction with preserved systolic function    Rheumatoid arthritis involving multiple sites with positive rheumatoid factor    Chronic kidney disease (CKD), stage I    Former smoker    Intractable tension-type headache    Anemia    Current chronic use of systemic steroids    Aortic atherosclerosis: see CXR 2012    Senile purpura    Anemia associated with acute blood loss    Angiodysplasia of stomach: see EGD 1/17    Compression fracture of first lumbar vertebra    Sciatica associated with disorder of lumbar spine    Diverticulosis large intestine w/o perforation or abscess w/bleeding    Adnexal mass    Candidal intertrigo    Severe sepsis    MSSA (methicillin susceptible Staphylococcus aureus) septicemia    Psoas abscess    Pain and swelling of right forearm       Current Outpatient Prescriptions   Medication Sig Dispense Refill    acetaminophen (TYLENOL) 325 MG tablet Take 2 tablets (650 mg total) by mouth every 6 (six) hours as needed for Pain or Temperature greater than (100.4).  0    aspirin 81 mg Tab Take 81 mg by mouth. 1 Tablet Oral Every day      calcium carbonate-vitamin D3 250-125 mg 250-125 mg-unit Tab Take 1 tablet by mouth once daily.      diclofenac sodium 1 % Gel Apply topically once daily. Apply to right arm. 1 g 3     mg capsule TAKE 1 CAPSULE BY MOUTH TWICE DAILY 180 capsule 0    donepezil (ARICEPT) 10 MG tablet Take 1 tablet (10 mg total) by mouth once daily. 90 tablet 3    duloxetine (CYMBALTA) 30 MG capsule Take 1 capsule (30 mg total) by mouth once daily. 90 capsule 3    ferrous sulfate 325 (65 FE) MG EC tablet Take 1 tablet (325 mg total) by mouth 2 (two) times daily. 60 tablet 12    hydrochlorothiazide (MICROZIDE) 12.5 mg capsule Take 1 capsule (12.5 mg total) by mouth once daily. 90 capsule 3    hydrocodone-acetaminophen 5-325mg (NORCO) 5-325 mg per  tablet Take 1 tablet by mouth every 6 (six) hours as needed for Pain (pain not responsive to tylenol). 30 tablet 0    losartan (COZAAR) 100 MG tablet Take 0.5 tablets (50 mg total) by mouth once daily. 90 tablet 3    miconazole nitrate 2% (MICOTIN) 2 % Oint Apply topically 2 (two) times daily.  0    pantoprazole (PROTONIX) 40 MG tablet Take 1 tablet (40 mg total) by mouth once daily. 90 tablet 3    polyethylene glycol (GLYCOLAX) 17 gram PwPk Take 17 g by mouth daily as needed (constipation).  0    predniSONE (DELTASONE) 10 MG tablet Take 1 tablet (10 mg total) by mouth once daily. 90 tablet 3    pregabalin (LYRICA) 75 MG capsule Take 1 capsule (75 mg total) by mouth 3 (three) times daily. 270 capsule 2    ramelteon (ROZEREM) 8 mg tablet Take 1 tablet (8 mg total) by mouth nightly as needed for Insomnia.      sodium chloride 0.9% 0.9 % injection Inject 10 mLs into the vein every 6 (six) hours.      sodium chloride 0.9% 0.9 % injection Inject 10 mLs into the vein as needed.       No current facility-administered medications for this visit.           Objective:   BP (!) 148/80 (BP Location: Left arm, Patient Position: Sitting, BP Method: Manual)   LMP  (LMP Unknown)      Physical Exam   Constitutional: She is oriented to person, place, and time. No distress.   HENT:   Head: Normocephalic and atraumatic.   Cardiovascular: Normal rate and regular rhythm.  Exam reveals decreased pulses.    Pulmonary/Chest: Effort normal and breath sounds normal. No respiratory distress. She has no wheezes.   Musculoskeletal: She exhibits tenderness (generalized). She exhibits no edema.        Right hand: She exhibits deformity.        Left hand: She exhibits deformity.   Neurological: She is alert and oriented to person, place, and time.   Ambulating with a wheelchair.   Skin: Skin is warm and dry. Bruising noted. No rash noted. No pallor.   Hyperpigmentation and varicose veins visualized in the bilateral legs.   Psychiatric:  She has a normal mood and affect.       Lab Results   Component Value Date    WBC 11.59 04/24/2017    HGB 8.0 (L) 04/24/2017    HCT 25.3 (L) 04/24/2017     (H) 04/24/2017    CHOL 151 10/04/2016    TRIG 53 10/04/2016    HDL 71 10/04/2016    ALT 5 (L) 04/24/2017    AST 22 04/24/2017     04/24/2017    K 3.7 04/24/2017     04/24/2017    CREATININE 0.6 04/24/2017    BUN 20 04/24/2017    CO2 23 04/24/2017    TSH 0.903 06/12/2015    INR 1.2 04/14/2017    HGBA1C 5.3 03/21/2011     Lab Results   Component Value Date    LDLCALC 69.4 10/04/2016           Assessment:       1. Former smoker    2. Carotid disease: Carotids 40-49 % R; 50-59% L 1/17    3. PAOD (peripheral arterial occlusive disease)    4. Benign hypertension           Plan:       1.  Stable Chronic PAD and carotid artery disease. Consider carotid revascularization for stenosis >80%.   2.  Continue CV risk factor modification and dual antiplatelet therapy.  3.  Her leg symptoms are most probably due musculoskeletal rather than vascular in nature. I recommend neurology evaluation if persists.  4.  Continue Physical therapy.  5.  Follow up in 6 months.  Eddie Murillo

## 2017-05-31 ENCOUNTER — OUTPATIENT CASE MANAGEMENT (OUTPATIENT)
Dept: ADMINISTRATIVE | Facility: OTHER | Age: 82
End: 2017-05-31

## 2017-05-31 NOTE — PROGRESS NOTES
5/31/17 Screen completed with daughter-in-law Cindy Jacobs. [facility] patient remains in [facility] for IV Abx and PT. Patient had been living with her and son for last 13 to 14 years. They don't mind her coming back home as long as patient can walk some or transfer safely from bed to wheelchair. [facility] patient has been working with PT but is mainly bed bound still. Trying to get her to use walker. At that time they will bring her back home with home health. If patient remains bed ridden then they will leave her in Nursing Home. Discussed [facility] program and how they can help with services if patient goes home. Provided web-site information and [place] agency who can help with application if they choose that route. Provided [facility] On Call 24 hr nurse # if needs arise in future. Case will be closed at this time as patient remains in NH.    5/8/17 Patient currently in [facility] SN[facility] Unit for 6 weeks of IV Abx which will be through May 31st. Plan is to discharge home upon finishing antibiotics. Will continue to follow. Changed to monitoring status and then will change to complex upon completing nursing assessment.

## 2017-06-28 ENCOUNTER — TELEPHONE (OUTPATIENT)
Dept: INTERNAL MEDICINE | Facility: CLINIC | Age: 82
End: 2017-06-28

## 2017-06-28 NOTE — TELEPHONE ENCOUNTER
Pt Daughter in law called to advise pt passed away on 6/25/2017 at [place] due to staph infection affecting her heart.

## 2019-08-15 NOTE — PT/OT/SLP EVAL
PATIENT NAME:  Sis Teixeira     DATE OF PROCEDURE: 8/15/2019    PROCEDURE:  Port-A-Cath placement with Fluoroscopy.    PREOPERATIVE DIAGNOSIS:  Colon Cancer    POSTOPERATIVE DIAGNOSIS:  Same    SURGEON:  Edmundo Whitman M.D.  ASSISTANT: none    DESCRIPTION OF PROCEDURE:  After appropriate consent was obtained, consent forms   signed and questions answered, the patient was taken to the operating room and   placed on the operating room table where general anesthesia was applied.    Preoperative antibiotics were administered.  Timeout procedure was performed.    SCDs were in place.  The chest and neck were prepped and draped in the usual   sterile fashion.  The patient was placed in Trendelenburg and the left   subclavian vein was cannulated via the Seldinger technique.  The wire was   confirmed in position under fluoroscopy.  A skin incision was then made and a   subcutaneous pocket was created.  The port was assembled and then secured within   the pocket with 3-0 Prolene suture.  The catheter was then tunneled to the   insertion site and cut to the appropriate length.  The dilator and introducer   were then inserted over the wire under fluoroscopy and the catheter was inserted   through the introducer sheath, which was peeled away.  The port flushed and   aspirated easily after placement.  It was in good position under fluoroscopy.    Subcutaneous tissues were reapproximated with a running 3-0 Vicryl suture.    Then, 0.25% Marcaine was injected for local anesthetic.  Skin was then closed   with 4-0 Monocryl subcuticular stitches.  Steri-Strips were applied. The patient was awakened and   transferred to the recovery room in a stable condition.  She tolerated the   procedure without complication. Post procedure chest xray was ordered in the recovery room.  Counts were correct at the end of the procedure.    EBL: 5 cc  Specimen: none  Complications: None         Physical Therapy  Evaluation    Danyelle Jacobs   MRN: [id number]   Admitting Diagnosis: Acute respiratory failure with hypoxia    PT Received On: 03/25/17  PT Start Time: 0829     PT Stop Time: 0850    PT Total Time (min): 21 min     Co-eval with OT    Billable Minutes:  Evaluation 21    Diagnosis: Acute respiratory failure with hypoxia      Past Medical History:   Diagnosis Date    Adverse effect of oral bisphosphonates     Anemia     Angiodysplasia of stomach: see EGD 1/17 2/13/2017    Anxiety     Aortic atherosclerosis     Benign hypertension 10/5/2015    Carcinoma, renal cell     Carotid arterial disease 1/20/2014    Cerebellar stroke, acute     Chronic kidney disease (CKD), stage I 2/18/2016    Coronary artery disease     Current chronic use of systemic steroids 10/4/2016    Degenerative disc disease     Depression     Diverticulosis     Former smoker 6/13/2016    Gastroesophageal reflux disease without esophagitis 10/5/2015    GERD (gastroesophageal reflux disease)     Goiter, nontoxic, multinodular     IBS (irritable bowel syndrome)     Left ventricular diastolic dysfunction with preserved systolic function 10/5/2015    Lung nodule     right    MGUS (monoclonal gammopathy of unknown significance) 6/13/2016    Myasthenia gravis, adult form     Osteoporosis 8/10/2015    Osteoporosis, unspecified     PAOD (peripheral arterial occlusive disease)     RA (rheumatoid arthritis)     Smoker 1/20/2014    Stroke     Ulcer     Vitamin D deficiency disease 1/20/2014      Past Surgical History:   Procedure Laterality Date    CATARACT EXTRACTION W/  INTRAOCULAR LENS IMPLANT Bilateral     CHOLECYSTECTOMY      COLONOSCOPY N/A 1/24/2017    Procedure: COLONOSCOPY;  Surgeon: Kristofer Worthington MD;  Location: [address];  Service: Endoscopy;  Laterality: N/A;  for chronic anemia ; needs to be off plavix x 5 days.     Off Plavix since recent hospitalization 1/19/17.    Speaks Romansh/needs  .    HYSTERECTOMY      KIDNEY SURGERY  2003    NEPHRECTOMY         Referring physician: MATTIE Carbone  Date referred to PT: 03/24/2017    General Precautions: Standard, fall  Orthopedic Precautions: N/A   Braces: N/A            Patient History:  Lives With: child(sherry), adult  Living Arrangements: house  Home Layout: Able to live on 1st floor  Living Environment Comment: Pt lives with son and daughter-in-law in 1-story house without EVA. Pt other son who lives out of town reports pt mod (I) with minimal household amb and assist with ADLs. Pt son reports pt has 24hr A upon d/c.   Equipment Currently Used at Home: cane, straight, wheelchair, walker, rolling  DME owned (not currently used): rolling walker, single point cane and wheelchair    Previous Level of Function:  Ambulation Skills: independent  Transfer Skills: independent  ADL Skills: needs assist    Subjective:  Communicated with RN prior to session.  Pt and son agreeable to therapy session; Pt majority Czech speaking, OT and pt son translated  Chief Complaint: B LE pain (R>L)  Patient goals: return home    Pain Rating: other (see comments) (pt did not rate pain)   Location - Side:  (R > L)  Location - Orientation: generalized  Location: leg  Pain Addressed: Reposition, Distraction  Pain Rating Post-Intervention: other (see comments) (pt did not rate pain)    Objective:   Patient found with: oxygen, peripheral IV     Cognitive Exam:  Oriented to: Person    Follows Commands/attention: Follows multistep  commands  Communication: clear/fluent  Safety awareness/insight to disability: impaired    Physical Exam:  Postural examination/scapula alignment: Rounded shoulder    Skin integrity: Visible skin intact  Edema: None noted B LE    Sensation:   Intact  light/touch B LE    Lower Extremity Range of Motion:  Right Lower Extremity: WFL  Left Lower Extremity: WFL    Lower Extremity Strength:  Right Lower Extremity: not formally assessed 2* pain; gross 3/5  with transfers  Left Lower Extremity: not formally assessed 2* pain; gross 3/5 with transfers     Gross motor coordination: WFL    Functional Mobility:  Bed Mobility:  Supine to Sit: Minimum Assistance    Transfers:  Sit <> Stand Assistance: Maximum Assistance  Sit <> Stand Assistive Device: No Assistive Device    Gait:   Gait Distance: 3 side steps to chair; pt limited by JANES MESA pain  Assistance 1: Maximum assistance  Gait Assistive Device: No device  Gait Deviation(s): decreased toe-to-floor clearance, decreased weight-shifting ability, decreased velocity of limb motion    Balance:   Static Sit: FAIR: Maintains without assist, but unable to take any challenges   Dynamic Sit: FAIR: Cannot move trunk without losing balance  Static Stand: 0: Needs MAXIMAL assist to maintain   Dynamic stand: 0: N/A    Therapeutic Activities and Exercises:  Pt and son educated on role of PT/POC.  Pt safe to perform transfers with RN staff.     AM-PAC 6 CLICK MOBILITY  How much help from another person does this patient currently need?   1 = Unable, Total/Dependent Assistance  2 = A lot, Maximum/Moderate Assistance  3 = A little, Minimum/Contact Guard/Supervision  4 = None, Modified [place]/Independent    Turning over in bed (including adjusting bedclothes, sheets and blankets)?: 3  Sitting down on and standing up from a chair with arms (e.g., wheelchair, bedside commode, etc.): 2  Moving from lying on back to sitting on the side of the bed?: 3  Moving to and from a bed to a chair (including a wheelchair)?: 2  Need to walk in hospital room?: 2  Climbing 3-5 steps with a railing?: 1  Total Score: 13     AM-PAC Raw Score CMS G-Code Modifier Level of Impairment Assistance   6 % Total / Unable   7 - 9 CM 80 - 100% Maximal Assist   10 - 14 CL 60 - 80% Moderate Assist   15 - 19 CK 40 - 60% Moderate Assist   20 - 22 CJ 20 - 40% Minimal Assist   23 CI 1-20% SBA / CGA   24 CH 0% Independent/ Mod I     Patient left up in chair with all  lines intact, call button in reach, RN notified and son present.    Assessment:   Danyelle Jacobs is a 88 y.o. female with a medical diagnosis of Acute respiratory failure with hypoxia and presents with increased pain and decreased overall functional mobility. Pt evaluation limited 2* B LE pain (R>L). Pt performed bed mobility min A and sit<>stand transfers max A. Pt took 3 side steps to bedside chair max A without AD. Pt will benefit from skilled PT to improve deficits and increase overall functional mobility.     Rehab identified problem list/impairments: Rehab identified problem list/impairments: weakness, impaired balance, decreased safety awareness, impaired endurance, impaired functional mobilty, gait instability, decreased lower extremity function, pain    Rehab potential is good.    Activity tolerance: Fair    Discharge recommendations: Discharge Facility/Level Of Care Needs: home with home health (24hr A)     Barriers to discharge: Barriers to Discharge: None    Equipment recommendations: Equipment Needed After Discharge: none     GOALS:   Physical Therapy Goals        Problem: Physical Therapy Goal    Goal Priority Disciplines Outcome Goal Variances Interventions   Physical Therapy Goal     PT/OT, PT Ongoing (interventions implemented as appropriate)     Description:  Goals to be met by: 2017     Patient will increase functional independence with mobility by performin. Supine to sit with Contact Guard Assistance  2. Sit to supine with Contact Guard Assistance  3. Sit to stand transfer with Minimal Assistance  4. Bed to chair transfer with Minimal Assistance  5. Gait  x 20 feet with Minimal Assistance using appropriate AD.                 PLAN:    Patient to be seen 4 x/week to address the above listed problems via gait training, therapeutic activities, therapeutic exercises  Plan of Care expires: 17  Plan of Care reviewed with: patient, son          ANDI SILVA, PT  2017

## 2020-10-16 NOTE — PT/OT/SLP PROGRESS
"Physical Therapy  Treatment    Danyelle Jacobs   MRN: [id number]   Admitting Diagnosis: Severe sepsis    PT Received On: 04/21/17  PT Start Time: 1400     PT Stop Time: 1423    PT Total Time (min): 23 min       Billable Minutes:  Therapeutic Activity 15 minutes and Therapeutic Exercise 8 minutes    Treatment Type: Treatment  PT/PTA: PT     PTA Visit Number: 0       General Precautions: Standard, aspiration, fall  Orthopedic Precautions: N/A   Braces: N/A    Do you have any cultural, spiritual, Mandaen conflicts, given your current situation?: None noted    Subjective:  Communicated with RN prior to session.  Pt agreeable to PT session. Pt's daughter present throughout session to help translate. Pt speaking in Turkish throughout session. Pt states in Monegasque "Ah it hurts."    Pain Rating:  (pt grimacing and calling out in pain with movement to roll and sit up on EOB - did not give numerical value but pain significantly decreased when resting)  Location - Side: Right  Location - Orientation: proximal  Location: hip  Pain Addressed: Distraction, Cessation of Activity, Pre-medicate for activity, Reposition  Pain Rating Post-Intervention:  (See comment above)    Objective:   Patient found with: peripheral IV    Functional Mobility:  Bed Mobility:   Rolling/Turning Right: Maximum assistance with use of bedrail.   Scooting/Bridging: Total Assistance via draw sheet to scoot to EOB and HOB.   Supine to Sit: Total Assistance at BLE and trunk- increased time to complete secondary to pain   Sit to Supine: Total Assistance at BLE and trunk     Transfers:  Sit <> Stand Assistance: Total Assistance  Sit <> Stand Assistive Device:  (support of therapist)    Gait:   Gait Distance: Did not occur    Balance:   Static Sit: POOR: Needs MODERATE assist to maintain  Dynamic Sit: 0: N/A  Static Stand: 0: Needs MAXIMAL assist to maintain     Therapeutic Activities and Exercises:  Therapeutic activities aimed to increase pt's independence, " Prescription for Bactrim sent to the pharmacy.   safety, and efficiency with bed mobility and functional transfers. See above for assistance levels.   · EOB sit balance with mod-max A x 10 minutes. Pt displays L lateral lean in order to off load weight on R hip. Increased kyphotic posture--> tactile and verbal cues provide to improve upright posture and core activation. Pt with increased complaints of back pain during this time.   · 2 trials of sit to stand. Trial 1: total A (max A x 2) via support of PT and rehab tech. Trial 1 stand balance: total A x 10 seconds. Trial 2: total A x 1 via support of therapist. Cues to place B hands on therapist's shoulders/back for support. Trial 2 stand balance: total A x 30 seconds. Cues to lift head off therapist's chest     Pt completed the following BLE therapeutic exercises x 10 reps: hip abduction/adduction with GS, QS, heel slides, and AP. Pt's daughter educated on completing HEP 3x/day. Pt 's daughter verbalized understanding.     AM-PAC 6 CLICK MOBILITY  How much help from another person does this patient currently need?   1 = Unable, Total/Dependent Assistance  2 = A lot, Maximum/Moderate Assistance  3 = A little, Minimum/Contact Guard/Supervision  4 = None, Modified [place]/Independent    Turning over in bed (including adjusting bedclothes, sheets and blankets)?: 2  Sitting down on and standing up from a chair with arms (e.g., wheelchair, bedside commode, etc.): 1  Moving from lying on back to sitting on the side of the bed?: 2  Moving to and from a bed to a chair (including a wheelchair)?: 1  Need to walk in hospital room?: 1  Climbing 3-5 steps with a railing?: 1  Total Score: 8    AM-PAC Raw Score CMS G-Code Modifier Level of Impairment Assistance   6 % Total / Unable   7 - 9 CM 80 - 100% Maximal Assist   10 - 14 CL 60 - 80% Moderate Assist   15 - 19 CK 40 - 60% Moderate Assist   20 - 22 CJ 20 - 40% Minimal Assist   23 CI 1-20% SBA / CGA   24 CH 0% Independent/ Mod I     Patient left with bed in chair  position with all lines intact, call button in reach and daughter present.    Assessment:  Danyelle Jacobs is a 88 y.o. female with a medical diagnosis of Severe sepsis and presents with problems listed below. Pt progressing towards goals, but not at PLOF. Pt tolerated session fair but session limited secondary to increased R hip pain. Pt tolerated transfer training with support of therapist and total A. Pt would benefit from continued PT services to improve overall functional mobility. Recommend d/c to Skilled nursing facility to maximize functional independence.    Rehab identified problem list/impairments: Rehab identified problem list/impairments: weakness, impaired endurance, impaired self care skills, impaired functional mobilty, impaired balance, decreased coordination, decreased upper extremity function, decreased lower extremity function, decreased safety awareness, pain, decreased ROM, edema, impaired joint extensibility    Rehab potential is fair.    Activity tolerance: Fair    Discharge recommendations: Discharge Facility/Level Of Care Needs: nursing facility, skilled     Barriers to discharge: Barriers to Discharge: Inaccessible home environment, Decreased caregiver support (increased assistance)    Equipment recommendations: Equipment Needed After Discharge:  (TBD at next level of care)     GOALS:   Physical Therapy Goals        Problem: Physical Therapy Goal    Goal Priority Disciplines Outcome Goal Variances Interventions   Physical Therapy Goal     PT/OT, PT Ongoing (interventions implemented as appropriate)     Description:  Goals to be met by: 2017     Patient will increase functional independence with mobility by performin. Supine to sit with Mod Assistance-not met  2. Sit to supine with Mod Assistance-not met  3. Sit to stand transfer with Max Assistance-not met  4. Bed to chair transfer with Max Assistance using LRAD-not met  5. Gait x 10 feet with Max Assistance using LRAD-not met  6.  Sitting at edge of bed x 5 minutes with Minimal Assistance-not met  7. Stand for x 1 minute with Moderate Assistance using LRAD-not met  8. Lower extremity exercise program x15 reps per handout, with assistance as needed-not met                  PLAN:    Patient to be seen 3 x/week  to address the above listed problems via gait training, therapeutic activities, therapeutic exercises, neuromuscular re-education  Plan of Care expires: 05/17/17  Plan of Care reviewed with: patient, daughter    Teresa STARKS Cynthia, PT  04/21/2017

## 2021-07-20 NOTE — H&P
"[facility] Medicine  History & Physical    Patient Name: Danyelle Jacobs  MRN: [id number]  Admission Date: 1/17/2017  Attending Physician: Jose Thrasher M.D.  Primary Care Provider: Bianca Dykes MD    [facility] Medicine Team: [facility] MED MARICEL Calixto MD     Patient information was obtained from patient, relative(s), past medical records and ER records.     Subjective:     Principal Problem: Anemia    Chief Complaint:  "My blood was low.  They made me come back to the hospital."     HPI: Ms. Jacobs is a very nice Estonian speaking lady with a past medical history of RCCA, HTN, RA and MGUS.  She also has a history of iron deficiency anemia for which she used to take iron supplementation, though it was stopped.  She is accompanied by her sister-in-law, who assists with her history despite not being able to speak Estonian.  Apparently she has been progressively more "tired feeling" over the past few months, though she denies any new shortness of breath, SORENSON or dizziness.  She has been passing her bowels normally with no abdominal pain.  She denies any blood in her stool or dark, tarry stools, stating, "they've just been brown."    Her family member relates to me that for many years she has been taking chronic liqui-gel Advil's, two in the morning and two at night for her RA.  Recently she has added a third to lunch.  In addition, her son is now at home after being institutionalized for many years for cognitive delay, due to newly diagnosed brain cancer, all of which has been affecting her mood, diet and stress.    Past Medical History   Diagnosis Date    Adverse effect of oral bisphosphonates     Anemia     Anxiety     Aortic atherosclerosis     Benign hypertension 10/5/2015    Carcinoma, renal cell     Carotid arterial disease 1/20/2014    Cerebellar stroke, acute     Chronic kidney disease (CKD), stage I 2/18/2016    Coronary artery disease     Current chronic use of systemic " steroids 10/4/2016    Degenerative disc disease     Depression     Diverticulosis     Former smoker 6/13/2016    Gastroesophageal reflux disease without esophagitis 10/5/2015    GERD (gastroesophageal reflux disease)     Goiter, nontoxic, multinodular     IBS (irritable bowel syndrome)     Left ventricular diastolic dysfunction with preserved systolic function 10/5/2015    Lung nodule      right    MGUS (monoclonal gammopathy of unknown significance) 6/13/2016    Myasthenia gravis, adult form     Osteoporosis 8/10/2015    Osteoporosis, unspecified     PAOD (peripheral arterial occlusive disease)     RA (rheumatoid arthritis)     Smoker 1/20/2014    Stroke     Ulcer     Vitamin D deficiency disease 1/20/2014       Past Surgical History   Procedure Laterality Date    Cholecystectomy      Hysterectomy      Kidney surgery  2003    Nephrectomy      Cataract extraction w/  intraocular lens implant Bilateral        Review of patient's allergies indicates:   Allergen Reactions    Norvasc  [amlodipine]      Other reaction(s): Edema       No current facility-administered medications on file prior to encounter.      Current Outpatient Prescriptions on File Prior to Encounter   Medication Sig    alprazolam (XANAX) 0.5 MG tablet Take 1 tablet (0.5 mg total) by mouth daily as needed.    aspirin 81 mg Tab Take 81 mg by mouth. 1 Tablet Oral Every day    atorvastatin (LIPITOR) 40 MG tablet Take 1 tablet (40 mg total) by mouth every evening.    clopidogrel (PLAVIX) 75 mg tablet Take 1 tablet (75 mg total) by mouth once daily.    DOC-Q-LACE 100 mg capsule TAKE 1 CAPSULE BY MOUTH TWICE DAILY    donepezil (ARICEPT) 10 MG tablet Take 1 tablet (10 mg total) by mouth once daily.    duloxetine (CYMBALTA) 30 MG capsule Take 1 capsule (30 mg total) by mouth once daily.    hydrALAZINE (APRESOLINE) 25 MG tablet Take 1 tablet (25 mg total) by mouth every 8 (eight) hours.    hydrochlorothiazide (MICROZIDE) 12.5  mg capsule Take 1 capsule (12.5 mg total) by mouth once daily.    [START ON 1/18/2017] hydrocodone-acetaminophen 5-325mg (NORCO) 5-325 mg per tablet Take 1 tablet by mouth 3 (three) times daily as needed for Pain.    [START ON 2/18/2017] hydrocodone-acetaminophen 5-325mg (NORCO) 5-325 mg per tablet Take 1 tablet by mouth 3 (three) times daily as needed for Pain.    [START ON 3/18/2017] hydrocodone-acetaminophen 5-325mg (NORCO) 5-325 mg per tablet Take 1 tablet by mouth 3 (three) times daily as needed for Pain.    isosorbide mononitrate (IMDUR) 30 MG 24 hr tablet Take 1 tablet (30 mg total) by mouth every evening.    losartan (COZAAR) 100 MG tablet Take 1 tablet (100 mg total) by mouth once daily.    pantoprazole (PROTONIX) 40 MG tablet Take 1 tablet (40 mg total) by mouth once daily.    predniSONE (DELTASONE) 10 MG tablet Take 1 tablet (10 mg total) by mouth once daily.    tolterodine (DETROL LA) 2 MG Cp24 Take 1 capsule (2 mg total) by mouth once daily.    [DISCONTINUED] alprazolam (XANAX) 0.5 MG tablet Take 1 tablet (0.5 mg total) by mouth daily as needed.    [DISCONTINUED] atorvastatin (LIPITOR) 40 MG tablet Take 1 tablet (40 mg total) by mouth every evening.    [DISCONTINUED] donepezil (ARICEPT) 10 MG tablet Take 1 tablet (10 mg total) by mouth once daily.    [DISCONTINUED] duloxetine (CYMBALTA) 30 MG capsule Take 1 capsule (30 mg total) by mouth once daily.    [DISCONTINUED] hydrALAZINE (APRESOLINE) 25 MG tablet Take 1 tablet (25 mg total) by mouth every 8 (eight) hours.    [DISCONTINUED] hydrocodone-acetaminophen 5-325mg (NORCO) 5-325 mg per tablet Take 1 tablet by mouth 3 (three) times daily as needed for Pain.    [DISCONTINUED] isosorbide mononitrate (IMDUR) 30 MG 24 hr tablet Take 1 tablet (30 mg total) by mouth every evening.    [DISCONTINUED] losartan (COZAAR) 100 MG tablet Take 1 tablet (100 mg total) by mouth once daily.    [DISCONTINUED] pantoprazole (PROTONIX) 40 MG tablet Take 1  tablet (40 mg total) by mouth once daily.    [DISCONTINUED] predniSONE (DELTASONE) 10 MG tablet Take 1 tablet (10 mg total) by mouth once daily.    [DISCONTINUED] tolterodine (DETROL LA) 2 MG Cp24 TAKE ONE CAPSULE BY MOUTH EVERY DAY     Family History     Problem Relation (Age of Onset)    Alzheimer's disease Sister    Heart disease Father    Stroke Mother        Social History Main Topics    Smoking status: Former Smoker     Quit date: 1/19/2015    Smokeless tobacco: Never Used    Alcohol use No    Drug use: No    Sexual activity: No     Review of Systems   Constitutional: Positive for activity change, appetite change and fatigue. Negative for chills and fever.   HENT: Negative for congestion.    Respiratory: Negative for cough and shortness of breath.    Cardiovascular: Negative for chest pain.   Gastrointestinal: Negative for abdominal distention, abdominal pain, blood in stool, constipation, diarrhea, nausea and vomiting.   Endocrine: Positive for cold intolerance.   Genitourinary: Negative for hematuria and vaginal bleeding.   Musculoskeletal: Positive for arthralgias, back pain, joint swelling, myalgias, neck pain and neck stiffness.   Skin: Positive for color change.   Neurological: Positive for headaches. Negative for dizziness, weakness and light-headedness.   Psychiatric/Behavioral: Negative for confusion. The patient is not nervous/anxious.      Objective:     Vital Signs (Most Recent):  Temp: 98.3 °F (36.8 °C) (01/17/17 2103)  Pulse: 74 (01/17/17 2103)  Resp: 16 (01/17/17 2103)  BP: (!) 155/67 (01/17/17 2103)  SpO2: (!) 94 % (01/17/17 2103) Vital Signs (24h Range):  Temp:  [98 °F (36.7 °C)-98.7 °F (37.1 °C)] 98.3 °F (36.8 °C)  Pulse:  [74-82] 74  Resp:  [16-18] 16  SpO2:  [94 %-96 %] 94 %  BP: (128-172)/(58-75) 155/67     Weight: 60.4 kg (133 lb 1.6 oz)  Body mass index is 29.84 kg/(m^2).    Physical Exam   Constitutional: She is oriented to person, place, and time. She appears well-developed and  well-nourished.  Non-toxic appearance. She does not have a sickly appearance. She does not appear ill. No distress.   HENT:   Head: Normocephalic and atraumatic.   Mouth/Throat: Abnormal dentition. No oropharyngeal exudate.   Eyes: Conjunctivae and EOM are normal. Pupils are equal, round, and reactive to light. Right eye exhibits no discharge. Left eye exhibits no discharge. Right conjunctiva is not injected. Left conjunctiva is not injected. No scleral icterus.   Neck: Normal range of motion. Neck supple. No JVD present. No tracheal deviation present. No thyromegaly present.   Cardiovascular: Normal rate, regular rhythm, normal heart sounds and intact distal pulses.  Exam reveals no gallop and no friction rub.    No murmur heard.  Pulmonary/Chest: Effort normal. No stridor. No tachypnea and no bradypnea. No respiratory distress. She has no decreased breath sounds. She has no wheezes. She has no rhonchi. She has rales in the right lower field and the left lower field. She exhibits no tenderness.   Abdominal: Soft. Bowel sounds are normal. She exhibits no distension and no mass. There is no tenderness. There is no rebound and no guarding.   Genitourinary:   Genitourinary Comments: No low in place   Musculoskeletal: Normal range of motion. She exhibits no edema or tenderness.   Diffuse arthritic changes   Lymphadenopathy:     She has no cervical adenopathy.   No peripheral edema   Neurological: She is alert and oriented to person, place, and time. She is not disoriented. She displays normal reflexes. No cranial nerve deficit. She exhibits normal muscle tone. Coordination normal. GCS eye subscore is 4. GCS verbal subscore is 5. GCS motor subscore is 6.   Skin: Skin is warm and dry. No rash noted. She is not diaphoretic. No erythema. No pallor.   Chronic appearing hyperpigmented skin to lower legs. Few purpura to right arm.   Psychiatric: She has a normal mood and affect. Her behavior is normal. Judgment and thought  content normal. Her speech is rapid and/or pressured. She is not actively hallucinating. Cognition and memory are normal. She is attentive.   Nursing note and vitals reviewed.      Significant Labs:   Recent Results (from the past 24 hour(s))   CBC auto differential    Collection Time: 01/17/17 10:15 AM   Result Value Ref Range    WBC 10.54 3.90 - 12.70 K/uL    RBC 2.13 (L) 4.00 - 5.40 M/uL    Hemoglobin 5.1 (LL) 12.0 - 16.0 g/dL    Hematocrit 18.0 (LL) 37.0 - 48.5 %    MCV 85 82 - 98 fL    MCH 23.9 (L) 27.0 - 31.0 pg    MCHC 28.3 (L) 32.0 - 36.0 %    RDW 16.0 (H) 11.5 - 14.5 %    Platelets 348 150 - 350 K/uL    MPV 12.9 9.2 - 12.9 fL    Gran # 9.2 (H) 1.8 - 7.7 K/uL    Lymph # 0.3 (L) 1.0 - 4.8 K/uL    Mono # 0.7 0.3 - 1.0 K/uL    Eos # 0.2 0.0 - 0.5 K/uL    Baso # 0.11 0.00 - 0.20 K/uL    Gran% 87.5 (H) 38.0 - 73.0 %    Lymph% 2.8 (L) 18.0 - 48.0 %    Mono% 6.8 4.0 - 15.0 %    Eosinophil% 1.7 0.0 - 8.0 %    Basophil% 1.0 0.0 - 1.9 %    Aniso Slight     Poik Slight     Poly Occasional     Hypo Moderate     Fragmented Cells Occasional     Differential Method Automated    Prepare RBC 2 Units    Collection Time: 01/17/17  4:55 PM   Result Value Ref Range    UNIT NUMBER [id number]     PRODUCT CODE R6416G33     DISPENSE STATUS ISSUED     CODING SYSTEM [id number]     Unit Blood Type Code 5100     Unit Blood Type O POS     Unit Expiration [id number]     UNIT NUMBER [id number]     PRODUCT CODE D6211U82     DISPENSE STATUS ISSUED     CODING SYSTEM [id number]     Unit Blood Type Code 5100     Unit Blood Type O POS     Unit Expiration [id number]    Type & Screen    Collection Time: 01/17/17  5:06 PM   Result Value Ref Range    Group & Rh O POS     Indirect Jaime NEG    Protime-INR    Collection Time: 01/17/17  5:06 PM   Result Value Ref Range    Prothrombin Time 11.0 9.0 - 12.5 sec    INR 1.0 0.8 - 1.2   CBC auto differential    Collection Time: 01/17/17  5:06 PM   Result Value Ref Range    WBC 10.33 3.90 - 12.70 K/uL     RBC 2.16 (L) 4.00 - 5.40 M/uL    Hemoglobin 5.3 (LL) 12.0 - 16.0 g/dL    Hematocrit 17.4 (LL) 37.0 - 48.5 %    MCV 81 (L) 82 - 98 fL    MCH 24.5 (L) 27.0 - 31.0 pg    MCHC 30.5 (L) 32.0 - 36.0 %    RDW 16.1 (H) 11.5 - 14.5 %    Platelets 391 (H) 150 - 350 K/uL    MPV 12.2 9.2 - 12.9 fL    Gran # 9.2 (H) 1.8 - 7.7 K/uL    Lymph # 0.3 (L) 1.0 - 4.8 K/uL    Mono # 0.8 0.3 - 1.0 K/uL    Eos # 0.0 0.0 - 0.5 K/uL    Baso # 0.03 0.00 - 0.20 K/uL    Gran% 89.0 (H) 38.0 - 73.0 %    Lymph% 3.0 (L) 18.0 - 48.0 %    Mono% 7.4 4.0 - 15.0 %    Eosinophil% 0.1 0.0 - 8.0 %    Basophil% 0.3 0.0 - 1.9 %    Differential Method Automated    Comprehensive metabolic panel    Collection Time: 01/17/17  5:06 PM   Result Value Ref Range    Sodium 138 136 - 145 mmol/L    Potassium 3.6 3.5 - 5.1 mmol/L    Chloride 104 95 - 110 mmol/L    CO2 22 (L) 23 - 29 mmol/L    Glucose 107 70 - 110 mg/dL    BUN, Bld 19 8 - 23 mg/dL    Creatinine 1.2 0.5 - 1.4 mg/dL    Calcium 8.4 (L) 8.7 - 10.5 mg/dL    Total Protein 6.4 6.0 - 8.4 g/dL    Albumin 3.3 (L) 3.5 - 5.2 g/dL    Total Bilirubin 0.2 0.1 - 1.0 mg/dL    Alkaline Phosphatase 54 (L) 55 - 135 U/L    AST 17 10 - 40 U/L    ALT 11 10 - 44 U/L    Anion Gap 12 8 - 16 mmol/L    eGFR if African American 46.6 (A) >60 mL/min/1.73 m^2    eGFR if non African American 40.4 (A) >60 mL/min/1.73 m^2   Lipase    Collection Time: 01/17/17  5:06 PM   Result Value Ref Range    Lipase 11 4 - 60 U/L   APTT    Collection Time: 01/17/17  5:06 PM   Result Value Ref Range    aPTT 21.7 21.0 - 32.0 sec   Brain natriuretic peptide    Collection Time: 01/17/17  5:06 PM   Result Value Ref Range     (H) 0 - 99 pg/mL   Reticulocytes    Collection Time: 01/17/17  5:06 PM   Result Value Ref Range    Retic 2.2 0.5 - 2.5 %   Vitamin B12    Collection Time: 01/17/17  5:06 PM   Result Value Ref Range    Vitamin B-12 520 210 - 950 pg/mL   Folate    Collection Time: 01/17/17  5:06 PM   Result Value Ref Range    Folate 15.7 4.0 - 24.0  ng/mL   Iron and TIBC    Collection Time: 01/17/17  5:06 PM   Result Value Ref Range    Iron 130 30 - 160 ug/dL    Transferrin 319 200 - 375 mg/dL    TIBC 472 (H) 250 - 450 ug/dL    Saturated Iron 28 20 - 50 %   Ferritin    Collection Time: 01/17/17  5:06 PM   Result Value Ref Range    Ferritin 12 (L) 20.0 - 300.0 ng/mL         Significant Imaging:   X-Ray Chest PA And Lateral 01/17/2017 None Specified          RESULTS:  PA and lateral chest x-ray     Comparison: 5/20/12     Findings: There is prominence of interstitial markings in the low lung zones. There are small bilateral pleural effusions. There is no pneumothorax. Cardiac silhouette is enlarged, increased from prior study. Aortic calcification noted. Degenerative changes of the thoracic spine with exaggerated kyphosis noted.  IMPRESSION:   As above.        Electronically signed by: CASSIA ALEX MD  Date:     01/17/17  Time:    18:30        Signed By: Cassia Alex MD on 1/17/2017 6:30 PM                   No EKG done in the ED    Assessment/Plan:     CHRONIC BLOOD LOSS ANEMIA  -Worrisome for chronic GI bleeding given NSAID abuse in face of ASA and Plavix  -Comorbid conditions contributing also could be CKD and chronic disease  -Hold ASA and plavix x 24 hours pending EGD +/- colonoscopy  -Check H. Pylori Sab  -Check B12, folate  -GI consult, Protonix 40mg IV bid    BLOOD IN STOOL  -No velma BRBPR or melena - all just trace heme positive    NSAID'S CAUSING ADVERSE EFFECTS IN THERAPEUTIC USE  -Hold all NSAID use - counseled on this    CHRONIC KIDNEY DISEASE, STAGE 3  -Continue to monitor  -Consider repeat SPEP after 3 months and UPEP    BENIGN ESSENTIAL HYPERTENSION  -Continue home medications after her 2U PRBC    CHRONIC DIASTOLIC HEART FAILURE  -Monitor for signs of volume overload with transfusion  -Lasix if needed    VTE Risk Mitigation         Ordered     Place ISHMAEL hose  Until discontinued      01/17/17 0379        W Jay Calixto MD  Department of  Hospital Medicine [facility]   no

## 2022-01-25 NOTE — SUBJECTIVE & OBJECTIVE
Past Medical History:   Diagnosis Date    Adverse effect of oral bisphosphonates     Anemia     Angiodysplasia of stomach: see EGD 1/17 2/13/2017    Anxiety     Aortic atherosclerosis     Benign hypertension 10/5/2015    Carcinoma, renal cell     Carotid arterial disease 1/20/2014    Cerebellar stroke, acute     Chronic kidney disease (CKD), stage I 2/18/2016    Coronary artery disease     Current chronic use of systemic steroids 10/4/2016    Degenerative disc disease     Depression     Diverticulosis     Former smoker 6/13/2016    Gastroesophageal reflux disease without esophagitis 10/5/2015    GERD (gastroesophageal reflux disease)     Goiter, nontoxic, multinodular     IBS (irritable bowel syndrome)     Left ventricular diastolic dysfunction with preserved systolic function 10/5/2015    Lung nodule     right    MGUS (monoclonal gammopathy of unknown significance) 6/13/2016    Myasthenia gravis, adult form     Osteoporosis 8/10/2015    Osteoporosis, unspecified     PAOD (peripheral arterial occlusive disease)     RA (rheumatoid arthritis)     Smoker 1/20/2014    Stroke     Ulcer     Vitamin D deficiency disease 1/20/2014       Past Surgical History:   Procedure Laterality Date    CATARACT EXTRACTION W/  INTRAOCULAR LENS IMPLANT Bilateral     CHOLECYSTECTOMY      COLONOSCOPY N/A 1/24/2017    Procedure: COLONOSCOPY;  Surgeon: Kristofer Worthington MD;  Location: [address];  Service: Endoscopy;  Laterality: N/A;  for chronic anemia ; needs to be off plavix x 5 days.     Off Plavix since recent hospitalization 1/19/17.    Speaks Ethiopian/needs .    HYSTERECTOMY      KIDNEY SURGERY  2003    NEPHRECTOMY         Review of patient's allergies indicates:   Allergen Reactions    Norvasc  [amlodipine]      Other reaction(s): Edema       Family History     Problem Relation (Age of Onset)    Alzheimer's disease Sister    Heart disease Father    Stroke Mother        Social  History Main Topics    Smoking status: Former Smoker     Quit date: 1/19/2015    Smokeless tobacco: Never Used    Alcohol use No    Drug use: No    Sexual activity: No      Review of Systems   Constitutional: Positive for activity change, diaphoresis, fatigue and fever.   HENT: Negative for congestion, mouth sores and rhinorrhea.    Respiratory: Negative for cough and shortness of breath.    Cardiovascular: Negative for chest pain.   Gastrointestinal: Negative for abdominal pain, blood in stool, diarrhea and nausea.   Genitourinary:        Patient unable to say whether she has dysuria or not   Skin: Positive for pallor.   Neurological: Positive for speech difficulty. Negative for facial asymmetry and weakness.     Objective:     Vital Signs (Most Recent):  Temp: 99.3 °F (37.4 °C) (04/14/17 0005)  Pulse: 82 (04/14/17 0020)  Resp: (!) 27 (04/14/17 0020)  BP: (!) 97/51 (04/14/17 0020)  SpO2: 97 % (04/14/17 0020) Vital Signs (24h Range):  Temp:  [98.7 °F (37.1 °C)-103.5 °F (39.7 °C)] 99.3 °F (37.4 °C)  Pulse:  [] 82  Resp:  [16-27] 27  SpO2:  [92 %-97 %] 97 %  BP: ()/(34-70) 97/51   Weight: 54.7 kg (120 lb 9.5 oz)  Body mass index is 26.1 kg/(m^2).      Intake/Output Summary (Last 24 hours) at 04/14/17 0028  Last data filed at 04/13/17 0600   Gross per 24 hour   Intake              100 ml   Output                0 ml   Net              100 ml       Physical Exam   Constitutional: She appears well-developed and well-nourished.   HENT:   Head: Normocephalic and atraumatic.   Eyes: EOM are normal. Pupils are equal, round, and reactive to light. No scleral icterus.   Cardiovascular: Regular rhythm and normal heart sounds.    Pulmonary/Chest: Effort normal and breath sounds normal. No stridor. No respiratory distress. She has no rales.   Abdominal: Soft. Bowel sounds are normal. She exhibits no distension. There is no tenderness.   Musculoskeletal: She exhibits no edema.   Neurological:   Lethargic but able  A&Ox2 after volume resuscitation. Speech pattern lacks clarity but is appropriate.    Skin: Skin is warm. She is diaphoretic.   Skin tear noted to right forearm and right shin    Vitals reviewed.      Vents:     Lines/Drains/Airways     Peripheral Intravenous Line                 Peripheral IV - Single Lumen 04/12/17 Left Forearm 2 days              Significant Labs:    CBC/Anemia Profile:    Recent Labs  Lab 04/12/17  0633 04/13/17  0442 04/13/17  2258   WBC 11.27 10.52 35.93*   HGB 8.8* 8.5* 7.5*   HCT 28.6* 26.2* 22.8*    253 202   MCV 88 84 85   RDW 20.7* 20.5* 20.2*        Chemistries:    Recent Labs  Lab 04/12/17  0633 04/13/17 0442 04/13/17  2258    142 139   K 4.0 3.5 4.3    109 109   CO2 21* 24 22*   BUN 14 15 21   CREATININE 0.8 0.8 1.2   CALCIUM 8.6* 8.3* 7.8*   ALBUMIN 2.1* 2.0* 1.8*   PROT 5.0* 4.9* 4.4*   BILITOT 0.2 0.3 0.3   ALKPHOS 92 100 102   ALT 14 15 23   AST 15 16 32   MG 1.6 1.7 1.3*   PHOS 3.2 4.4 1.6*       Significant Imaging: I have reviewed all pertinent imaging results/findings within the past 24 hours.   normal...

## 2023-08-09 NOTE — RESIDENT HANDOFF
Handoff     Primary Team: Networked reference to record [facility]  Room Number: 3094/3094 A     Patient Name: Danyelle Jacobs MRN: [id number]     Date of Birth: [birth year] Allergies: Norvasc  [amlodipine]     Age: 88 y.o. Admit Date: 2017     Sex: female  BMI: Body mass index is 26.72 kg/(m^2).     Code Status: Full Code        Illness Level: Watcher - No    Reason for Admission: Gastrointestinal hemorrhage    Brief HPI: Ms. Jacobs is a 88 F with a history of HTN, HLD, CAD, PAD, HFpEF, dementia, and rheumatoid arthritis who presents to the ED with GI bleeding. Her son reports that her symptoms began yesterday evening with bilious vomiting and diarrhea. Since then, she has had 6 episodes of emesis and 5 episodes of diarrhea which started out as black and became bright red by her last BM which was at 6 AM. She currently denies abdominal pain, SOB, or nausea; however, her son reports that she will usually deny symptoms even if they are present.      She had a recent admission from 3/24- for respiratory failure with hypoxia (presumed 2/2 to PNA, resolved with abx) and RLE pain. Also admitted - and transfused at that time. Patient has a significant history of 'bleeding angiodysplastic lesion found on EGD with hemostasis achieved' performed in 2017. Patient also underwent colonoscopy at the same time which showed 'prominent vessels in the cecum but no definite AVM; raised bluish lesion likely a hematoma in the rectum.' Son reports that she has declined both mentally and physically in the last few months and has become progressively weak, less responsive, and has had hallucinations including speaking to  relatives. Also, she has developed urinary and fecal incontinence recently.     Hospital Course: 17: Upon initial evaluation patient's initial oxygen requirements appeared to be increasing. ABG was performed which showed adequate oxygenation. Hct was also obtained with the arterial stick which showed a Hct of  19. Based on this result and her presenting H/H of 8.3/26.2, 1 U PRBCs was ordered. GI and ICU were consulted for further management. Repeat hgb and hemodynamics are stable. Protonix drip started. CT abdomen W/O significant for diverticulosis, adnexal cystic structure, L1 compression fx, and lung opacities (atelectasis v infection/inflammation).    4/7: Hgb stable, no further episodes of vomiting or diarrhea. Pt c/o RLE pain. Hypertensive to 200 systolic, restarting home BP meds stepwise. GI recs supportive care. PARVEEN improved.     Tasks: Continue to restart home meds as indicated.   Monitor CBCs.  Consider US pelvis to further characterize cystic adnexal structure seen on CT.    Contingency Plan: If pt has further significant episodes of bleeding or becomes hemodynamically unstable, re-consult ICU.     Estimated Discharge Date: 4/9/17    Discharge Disposition: [place]-Health Care [facility]     92

## 2025-04-24 NOTE — NURSING
Discharge instructions and paper prescriptions given to patient and son at bedside. IV discontinued per order, Son stated understanding, all questions answered. Awaiting transport for wheelchair services.   [de-identified] : Well developed, well nourished in no apparent distress, awake, alert and orientated to person, place and time with appropriate mood and affect Respirations are even and unlabored. Gait evaluation does not reveal a limp. There is no inguinal adenopathy. The affected limb is well-perfused with palpable pedal pulse, without skin lesions, shows a grossly normal motor and sensory examination. Incision is CDI Hip motion is full and painless throughout ROM. Leg lengths are approximately equal